# Patient Record
Sex: MALE | Race: BLACK OR AFRICAN AMERICAN | NOT HISPANIC OR LATINO
[De-identification: names, ages, dates, MRNs, and addresses within clinical notes are randomized per-mention and may not be internally consistent; named-entity substitution may affect disease eponyms.]

---

## 2017-01-18 ENCOUNTER — APPOINTMENT (OUTPATIENT)
Dept: FAMILY MEDICINE | Facility: CLINIC | Age: 72
End: 2017-01-18

## 2017-01-18 VITALS — WEIGHT: 170 LBS | BODY MASS INDEX: 25.18 KG/M2 | HEIGHT: 69 IN

## 2017-01-18 VITALS — DIASTOLIC BLOOD PRESSURE: 62 MMHG | SYSTOLIC BLOOD PRESSURE: 130 MMHG

## 2017-01-20 LAB
ALBUMIN SERPL ELPH-MCNC: 4.6 G/DL
ALP BLD-CCNC: 112 U/L
ALT SERPL-CCNC: 27 U/L
ANION GAP SERPL CALC-SCNC: 16 MMOL/L
AST SERPL-CCNC: 18 U/L
BASOPHILS # BLD AUTO: 0.03 K/UL
BASOPHILS NFR BLD AUTO: 0.3 %
BILIRUB SERPL-MCNC: 0.3 MG/DL
BUN SERPL-MCNC: 16 MG/DL
CALCIUM SERPL-MCNC: 10 MG/DL
CHLORIDE SERPL-SCNC: 101 MMOL/L
CHOLEST SERPL-MCNC: 158 MG/DL
CHOLEST/HDLC SERPL: 3.4 RATIO
CO2 SERPL-SCNC: 23 MMOL/L
CREAT SERPL-MCNC: 1.45 MG/DL
CREAT SPEC-SCNC: 52 MG/DL
EOSINOPHIL # BLD AUTO: 0.21 K/UL
EOSINOPHIL NFR BLD AUTO: 2.3 %
GLUCOSE SERPL-MCNC: 231 MG/DL
HBA1C MFR BLD HPLC: 12.7 %
HCT VFR BLD CALC: 44.1 %
HDLC SERPL-MCNC: 47 MG/DL
HGB BLD-MCNC: 13.7 G/DL
IMM GRANULOCYTES NFR BLD AUTO: 0.4 %
LDLC SERPL CALC-MCNC: 80 MG/DL
LYMPHOCYTES # BLD AUTO: 3.24 K/UL
LYMPHOCYTES NFR BLD AUTO: 35.7 %
MAN DIFF?: NORMAL
MCHC RBC-ENTMCNC: 27.2 PG
MCHC RBC-ENTMCNC: 31.1 GM/DL
MCV RBC AUTO: 87.5 FL
MICROALBUMIN 24H UR DL<=1MG/L-MCNC: 2.7 MG/DL
MICROALBUMIN/CREAT 24H UR-RTO: 52 UG/MG
MONOCYTES # BLD AUTO: 0.63 K/UL
MONOCYTES NFR BLD AUTO: 6.9 %
NEUTROPHILS # BLD AUTO: 4.93 K/UL
NEUTROPHILS NFR BLD AUTO: 54.4 %
PLATELET # BLD AUTO: 298 K/UL
POTASSIUM SERPL-SCNC: 4.5 MMOL/L
PROT SERPL-MCNC: 7.4 G/DL
RBC # BLD: 5.04 M/UL
RBC # FLD: 13.6 %
SODIUM SERPL-SCNC: 140 MMOL/L
TRIGL SERPL-MCNC: 156 MG/DL
WBC # FLD AUTO: 9.08 K/UL

## 2017-01-26 ENCOUNTER — APPOINTMENT (OUTPATIENT)
Dept: ENDOCRINOLOGY | Facility: CLINIC | Age: 72
End: 2017-01-26

## 2017-01-26 VITALS
SYSTOLIC BLOOD PRESSURE: 140 MMHG | HEART RATE: 80 BPM | WEIGHT: 176 LBS | BODY MASS INDEX: 26.07 KG/M2 | DIASTOLIC BLOOD PRESSURE: 70 MMHG | HEIGHT: 69 IN

## 2017-01-26 LAB — GLUCOSE BLDC GLUCOMTR-MCNC: 115

## 2017-01-26 RX ORDER — INSULIN GLARGINE 100 [IU]/ML
100 INJECTION, SOLUTION SUBCUTANEOUS DAILY
Refills: 0 | Status: DISCONTINUED | COMMUNITY
Start: 2017-01-18 | End: 2017-01-26

## 2017-01-31 ENCOUNTER — APPOINTMENT (OUTPATIENT)
Dept: ENDOCRINOLOGY | Facility: CLINIC | Age: 72
End: 2017-01-31

## 2017-01-31 VITALS
SYSTOLIC BLOOD PRESSURE: 130 MMHG | HEIGHT: 69 IN | BODY MASS INDEX: 25.92 KG/M2 | DIASTOLIC BLOOD PRESSURE: 70 MMHG | WEIGHT: 175 LBS | HEART RATE: 80 BPM

## 2017-02-01 LAB
C PEPTIDE SERPL-MCNC: 0.9 NG/ML
GLUCOSE SERPL-MCNC: 335 MG/DL
T3 SERPL-MCNC: 82 NG/DL
T4 FREE SERPL-MCNC: 1.2 NG/DL
T4 SERPL-MCNC: 4.1 UG/DL
THYROGLOB AB SERPL-ACNC: 34.5 IU/ML
THYROPEROXIDASE AB SERPL IA-ACNC: <10 IU/ML
TSH SERPL-ACNC: 4.21 UIU/ML

## 2017-02-02 ENCOUNTER — APPOINTMENT (OUTPATIENT)
Dept: ENDOCRINOLOGY | Facility: CLINIC | Age: 72
End: 2017-02-02

## 2017-02-02 RX ORDER — LANCETS 30 GAUGE
EACH MISCELLANEOUS
Qty: 1 | Refills: 5 | Status: ACTIVE | COMMUNITY
Start: 2017-02-02 | End: 1900-01-01

## 2017-02-02 RX ORDER — BLOOD SUGAR DIAGNOSTIC
STRIP MISCELLANEOUS 4 TIMES DAILY
Qty: 1 | Refills: 5 | Status: ACTIVE | COMMUNITY
Start: 2017-02-02 | End: 1900-01-01

## 2017-02-06 LAB — GAD65 AB SER-MCNC: 0 NMOL/L

## 2017-02-07 LAB — PANC ISLET CELL AB SER QL: <5 JDF UNITS

## 2017-03-02 ENCOUNTER — APPOINTMENT (OUTPATIENT)
Dept: ENDOCRINOLOGY | Facility: CLINIC | Age: 72
End: 2017-03-02

## 2017-03-02 DIAGNOSIS — R25.2 CRAMP AND SPASM: ICD-10-CM

## 2017-03-20 ENCOUNTER — APPOINTMENT (OUTPATIENT)
Dept: ENDOCRINOLOGY | Facility: CLINIC | Age: 72
End: 2017-03-20

## 2017-03-20 VITALS
BODY MASS INDEX: 25.92 KG/M2 | DIASTOLIC BLOOD PRESSURE: 62 MMHG | SYSTOLIC BLOOD PRESSURE: 148 MMHG | HEIGHT: 69 IN | WEIGHT: 175 LBS | HEART RATE: 78 BPM

## 2017-03-20 DIAGNOSIS — E78.2 MIXED HYPERLIPIDEMIA: ICD-10-CM

## 2017-03-20 LAB
GLUCOSE BLDC GLUCOMTR-MCNC: 374
HBA1C MFR BLD HPLC: 10.9

## 2017-03-29 ENCOUNTER — APPOINTMENT (OUTPATIENT)
Dept: FAMILY MEDICINE | Facility: CLINIC | Age: 72
End: 2017-03-29

## 2017-03-29 VITALS
WEIGHT: 175 LBS | DIASTOLIC BLOOD PRESSURE: 60 MMHG | HEIGHT: 69 IN | SYSTOLIC BLOOD PRESSURE: 110 MMHG | BODY MASS INDEX: 25.92 KG/M2

## 2017-03-29 DIAGNOSIS — Z09 ENCOUNTER FOR FOLLOW-UP EXAMINATION AFTER COMPLETED TREATMENT FOR CONDITIONS OTHER THAN MALIGNANT NEOPLASM: ICD-10-CM

## 2017-03-29 RX ORDER — FUROSEMIDE 40 MG/1
40 TABLET ORAL DAILY
Qty: 90 | Refills: 1 | Status: ACTIVE | COMMUNITY
Start: 2017-01-18

## 2017-03-29 RX ORDER — FLAXSEED OIL 1000 MG
1000 CAPSULE ORAL DAILY
Refills: 0 | Status: ACTIVE | COMMUNITY
Start: 2017-03-29

## 2017-04-03 ENCOUNTER — APPOINTMENT (OUTPATIENT)
Dept: ENDOCRINOLOGY | Facility: CLINIC | Age: 72
End: 2017-04-03

## 2017-04-20 ENCOUNTER — MEDICATION RENEWAL (OUTPATIENT)
Age: 72
End: 2017-04-20

## 2017-04-28 ENCOUNTER — APPOINTMENT (OUTPATIENT)
Dept: CHRONIC DISEASE MANAGEMENT | Facility: CLINIC | Age: 72
End: 2017-04-28

## 2017-05-25 ENCOUNTER — APPOINTMENT (OUTPATIENT)
Dept: ENDOCRINOLOGY | Facility: CLINIC | Age: 72
End: 2017-05-25

## 2017-05-25 VITALS
WEIGHT: 180 LBS | BODY MASS INDEX: 26.66 KG/M2 | SYSTOLIC BLOOD PRESSURE: 124 MMHG | HEIGHT: 69 IN | HEART RATE: 76 BPM | DIASTOLIC BLOOD PRESSURE: 62 MMHG

## 2017-05-25 LAB
GLUCOSE BLDC GLUCOMTR-MCNC: 196
HBA1C MFR BLD HPLC: 10.2

## 2017-06-27 ENCOUNTER — APPOINTMENT (OUTPATIENT)
Dept: FAMILY MEDICINE | Facility: CLINIC | Age: 72
End: 2017-06-27

## 2017-06-27 VITALS — SYSTOLIC BLOOD PRESSURE: 138 MMHG | WEIGHT: 176 LBS | BODY MASS INDEX: 25.99 KG/M2 | DIASTOLIC BLOOD PRESSURE: 68 MMHG

## 2017-06-27 DIAGNOSIS — Z86.79 PERSONAL HISTORY OF OTHER DISEASES OF THE CIRCULATORY SYSTEM: ICD-10-CM

## 2017-06-27 DIAGNOSIS — H26.9 UNSPECIFIED CATARACT: ICD-10-CM

## 2017-06-27 DIAGNOSIS — R60.0 LOCALIZED EDEMA: ICD-10-CM

## 2017-06-27 DIAGNOSIS — R35.0 FREQUENCY OF MICTURITION: ICD-10-CM

## 2017-06-28 LAB
ALBUMIN SERPL ELPH-MCNC: 3.8 G/DL
ALP BLD-CCNC: 95 U/L
ALT SERPL-CCNC: 25 U/L
ANION GAP SERPL CALC-SCNC: 17 MMOL/L
APPEARANCE: CLEAR
AST SERPL-CCNC: 22 U/L
BACTERIA: NEGATIVE
BASOPHILS # BLD AUTO: 0.02 K/UL
BASOPHILS NFR BLD AUTO: 0.2 %
BILIRUB SERPL-MCNC: 0.6 MG/DL
BILIRUBIN URINE: NEGATIVE
BLOOD URINE: ABNORMAL
BUN SERPL-MCNC: 21 MG/DL
CALCIUM SERPL-MCNC: 9.1 MG/DL
CHLORIDE SERPL-SCNC: 100 MMOL/L
CHOLEST SERPL-MCNC: 142 MG/DL
CHOLEST/HDLC SERPL: 3.4 RATIO
CO2 SERPL-SCNC: 21 MMOL/L
COLOR: YELLOW
CREAT SERPL-MCNC: 1.81 MG/DL
EOSINOPHIL # BLD AUTO: 0.15 K/UL
EOSINOPHIL NFR BLD AUTO: 1.6 %
GLUCOSE QUALITATIVE U: 1000 MG/DL
GLUCOSE SERPL-MCNC: 375 MG/DL
HBA1C MFR BLD HPLC: 10.4 %
HCT VFR BLD CALC: 38.7 %
HDLC SERPL-MCNC: 42 MG/DL
HGB BLD-MCNC: 12.4 G/DL
HYALINE CASTS: 3 /LPF
IMM GRANULOCYTES NFR BLD AUTO: 0.5 %
KETONES URINE: NEGATIVE
LDLC SERPL CALC-MCNC: 78 MG/DL
LEUKOCYTE ESTERASE URINE: NEGATIVE
LYMPHOCYTES # BLD AUTO: 1.74 K/UL
LYMPHOCYTES NFR BLD AUTO: 18.7 %
MAN DIFF?: NORMAL
MCHC RBC-ENTMCNC: 26.8 PG
MCHC RBC-ENTMCNC: 32 GM/DL
MCV RBC AUTO: 83.6 FL
MICROSCOPIC-UA: NORMAL
MONOCYTES # BLD AUTO: 0.96 K/UL
MONOCYTES NFR BLD AUTO: 10.3 %
NEUTROPHILS # BLD AUTO: 6.38 K/UL
NEUTROPHILS NFR BLD AUTO: 68.7 %
NITRITE URINE: NEGATIVE
NT-PROBNP SERPL-MCNC: 125 PG/ML
PH URINE: 5
PLATELET # BLD AUTO: 317 K/UL
POTASSIUM SERPL-SCNC: 4.7 MMOL/L
PROT SERPL-MCNC: 7.3 G/DL
PROTEIN URINE: 30 MG/DL
PSA SERPL-MCNC: 1.28 NG/ML
RBC # BLD: 4.63 M/UL
RBC # FLD: 14.1 %
RED BLOOD CELLS URINE: 2 /HPF
SODIUM SERPL-SCNC: 138 MMOL/L
SPECIFIC GRAVITY URINE: 1.02
SQUAMOUS EPITHELIAL CELLS: 1 /HPF
TRIGL SERPL-MCNC: 111 MG/DL
TSH SERPL-ACNC: 3.24 UIU/ML
UROBILINOGEN URINE: NORMAL MG/DL
WBC # FLD AUTO: 9.3 K/UL
WHITE BLOOD CELLS URINE: 0 /HPF

## 2017-07-06 ENCOUNTER — MEDICATION RENEWAL (OUTPATIENT)
Age: 72
End: 2017-07-06

## 2017-07-07 RX ORDER — INFUSION SET FOR INSULIN PUMP
INFUSION SETS-PARAPHERNALIA MISCELLANEOUS
Qty: 3 | Refills: 1 | Status: DISCONTINUED | OUTPATIENT
Start: 2017-07-06 | End: 2017-07-06

## 2017-07-07 RX ORDER — INSULIN PUMP SYRINGE, 3 ML
EACH MISCELLANEOUS
Qty: 3 | Refills: 1 | Status: DISCONTINUED | OUTPATIENT
Start: 2017-07-06 | End: 2017-07-06

## 2017-07-13 ENCOUNTER — APPOINTMENT (OUTPATIENT)
Dept: ENDOCRINOLOGY | Facility: CLINIC | Age: 72
End: 2017-07-13

## 2017-08-16 ENCOUNTER — APPOINTMENT (OUTPATIENT)
Dept: ENDOCRINOLOGY | Facility: CLINIC | Age: 72
End: 2017-08-16

## 2017-10-26 ENCOUNTER — APPOINTMENT (OUTPATIENT)
Dept: ENDOCRINOLOGY | Facility: CLINIC | Age: 72
End: 2017-10-26

## 2017-11-02 ENCOUNTER — APPOINTMENT (OUTPATIENT)
Dept: FAMILY MEDICINE | Facility: CLINIC | Age: 72
End: 2017-11-02
Payer: MEDICARE

## 2017-11-02 VITALS
DIASTOLIC BLOOD PRESSURE: 66 MMHG | HEIGHT: 69 IN | BODY MASS INDEX: 25.48 KG/M2 | WEIGHT: 172 LBS | SYSTOLIC BLOOD PRESSURE: 154 MMHG

## 2017-11-02 VITALS — DIASTOLIC BLOOD PRESSURE: 70 MMHG | SYSTOLIC BLOOD PRESSURE: 136 MMHG

## 2017-11-02 DIAGNOSIS — Z23 ENCOUNTER FOR IMMUNIZATION: ICD-10-CM

## 2017-11-02 DIAGNOSIS — E11.40 TYPE 2 DIABETES MELLITUS WITH DIABETIC NEUROPATHY, UNSPECIFIED: ICD-10-CM

## 2017-11-02 PROCEDURE — 99214 OFFICE O/P EST MOD 30 MIN: CPT | Mod: 25

## 2017-11-02 PROCEDURE — G0008: CPT

## 2017-11-02 PROCEDURE — 90688 IIV4 VACCINE SPLT 0.5 ML IM: CPT

## 2017-11-15 ENCOUNTER — APPOINTMENT (OUTPATIENT)
Dept: ENDOCRINOLOGY | Facility: CLINIC | Age: 72
End: 2017-11-15

## 2017-12-05 ENCOUNTER — APPOINTMENT (OUTPATIENT)
Dept: FAMILY MEDICINE | Facility: CLINIC | Age: 72
End: 2017-12-05
Payer: MEDICARE

## 2017-12-05 VITALS
WEIGHT: 172 LBS | DIASTOLIC BLOOD PRESSURE: 60 MMHG | BODY MASS INDEX: 25.48 KG/M2 | SYSTOLIC BLOOD PRESSURE: 140 MMHG | HEIGHT: 69 IN

## 2017-12-05 DIAGNOSIS — Z00.00 ENCOUNTER FOR GENERAL ADULT MEDICAL EXAMINATION W/OUT ABNORMAL FINDINGS: ICD-10-CM

## 2017-12-05 DIAGNOSIS — I50.30 UNSPECIFIED DIASTOLIC (CONGESTIVE) HEART FAILURE: ICD-10-CM

## 2017-12-05 PROCEDURE — 99214 OFFICE O/P EST MOD 30 MIN: CPT | Mod: 25

## 2017-12-05 PROCEDURE — 36415 COLL VENOUS BLD VENIPUNCTURE: CPT

## 2017-12-06 ENCOUNTER — APPOINTMENT (OUTPATIENT)
Dept: ENDOCRINOLOGY | Facility: CLINIC | Age: 72
End: 2017-12-06
Payer: MEDICARE

## 2017-12-06 VITALS
WEIGHT: 177 LBS | BODY MASS INDEX: 26.22 KG/M2 | SYSTOLIC BLOOD PRESSURE: 130 MMHG | HEIGHT: 69 IN | DIASTOLIC BLOOD PRESSURE: 60 MMHG | HEART RATE: 72 BPM

## 2017-12-06 LAB
25(OH)D3 SERPL-MCNC: 10.5 NG/ML
ALBUMIN SERPL ELPH-MCNC: 4 G/DL
ALP BLD-CCNC: 113 U/L
ALT SERPL-CCNC: 20 U/L
ANION GAP SERPL CALC-SCNC: 21 MMOL/L
APPEARANCE: CLEAR
AST SERPL-CCNC: 14 U/L
BACTERIA: NEGATIVE
BASOPHILS # BLD AUTO: 0.04 K/UL
BASOPHILS NFR BLD AUTO: 0.5 %
BILIRUB SERPL-MCNC: 0.2 MG/DL
BILIRUBIN URINE: NEGATIVE
BLOOD URINE: NEGATIVE
BUN SERPL-MCNC: 15 MG/DL
CALCIUM SERPL-MCNC: 9.3 MG/DL
CHLORIDE SERPL-SCNC: 95 MMOL/L
CHOLEST SERPL-MCNC: 182 MG/DL
CHOLEST/HDLC SERPL: 4.7 RATIO
CO2 SERPL-SCNC: 24 MMOL/L
COLOR: YELLOW
CREAT SERPL-MCNC: 1.27 MG/DL
EOSINOPHIL # BLD AUTO: 0.29 K/UL
EOSINOPHIL NFR BLD AUTO: 3.7
GLUCOSE BLDC GLUCOMTR-MCNC: 148
GLUCOSE QUALITATIVE U: 1000 MG/DL
GLUCOSE SERPL-MCNC: 280 MG/DL
HBA1C MFR BLD HPLC: 12.9 %
HCT VFR BLD CALC: 41.9 %
HDLC SERPL-MCNC: 39 MG/DL
HGB BLD-MCNC: 13.3 G/DL
IMM GRANULOCYTES NFR BLD AUTO: 0.6 %
KETONES URINE: NEGATIVE
LDLC SERPL CALC-MCNC: 88 MG/DL
LEUKOCYTE ESTERASE URINE: NEGATIVE
LYMPHOCYTES # BLD AUTO: 3.26 K/UL
LYMPHOCYTES NFR BLD AUTO: 42.1 %
MAN DIFF?: NORMAL
MCHC RBC-ENTMCNC: 28.1 PG
MCHC RBC-ENTMCNC: 31.7 GM/DL
MCV RBC AUTO: 88.4 FL
MICROSCOPIC-UA: NORMAL
MONOCYTES # BLD AUTO: 0.54 K/UL
MONOCYTES NFR BLD AUTO: 7 %
NEUTROPHILS # BLD AUTO: 3.57 K/UL
NEUTROPHILS NFR BLD AUTO: 46.1 %
NITRITE URINE: NEGATIVE
PH URINE: 5.5
PLATELET # BLD AUTO: 270 K/UL
POTASSIUM SERPL-SCNC: 4.4 MMOL/L
PROT SERPL-MCNC: 6.9 G/DL
PROTEIN URINE: NEGATIVE MG/DL
PSA SERPL-MCNC: 1.09 NG/ML
RBC # BLD: 4.74 M/UL
RBC # FLD: 14 %
RED BLOOD CELLS URINE: 1 /HPF
SODIUM SERPL-SCNC: 140 MMOL/L
SPECIFIC GRAVITY URINE: 1.02
SQUAMOUS EPITHELIAL CELLS: 0 /HPF
TRIGL SERPL-MCNC: 273 MG/DL
TSH SERPL-ACNC: 3.62 UIU/ML
UROBILINOGEN URINE: NEGATIVE MG/DL
WBC # FLD AUTO: 7.75 K/UL
WHITE BLOOD CELLS URINE: 0 /HPF

## 2017-12-06 PROCEDURE — 82962 GLUCOSE BLOOD TEST: CPT

## 2017-12-06 PROCEDURE — 99214 OFFICE O/P EST MOD 30 MIN: CPT | Mod: 25

## 2017-12-06 RX ORDER — GABAPENTIN 300 MG/1
300 CAPSULE ORAL
Refills: 0 | Status: ACTIVE | COMMUNITY

## 2017-12-06 RX ORDER — ISOSORBIDE MONONITRATE 30 MG/1
30 TABLET, EXTENDED RELEASE ORAL
Refills: 0 | Status: ACTIVE | COMMUNITY

## 2018-01-08 ENCOUNTER — APPOINTMENT (OUTPATIENT)
Dept: ENDOCRINOLOGY | Facility: CLINIC | Age: 73
End: 2018-01-08
Payer: COMMERCIAL

## 2018-01-08 PROCEDURE — G0108 DIAB MANAGE TRN  PER INDIV: CPT

## 2018-01-08 RX ORDER — INSULIN LISPRO 100 [IU]/ML
100 INJECTION, SOLUTION INTRAVENOUS; SUBCUTANEOUS
Qty: 5 | Refills: 3 | Status: ACTIVE | COMMUNITY
Start: 2018-01-08

## 2018-01-17 ENCOUNTER — APPOINTMENT (OUTPATIENT)
Dept: ENDOCRINOLOGY | Facility: CLINIC | Age: 73
End: 2018-01-17

## 2018-01-22 ENCOUNTER — APPOINTMENT (OUTPATIENT)
Dept: FAMILY MEDICINE | Facility: CLINIC | Age: 73
End: 2018-01-22
Payer: COMMERCIAL

## 2018-01-22 VITALS
SYSTOLIC BLOOD PRESSURE: 130 MMHG | HEIGHT: 69 IN | BODY MASS INDEX: 26.22 KG/M2 | TEMPERATURE: 99.8 F | DIASTOLIC BLOOD PRESSURE: 70 MMHG | WEIGHT: 177 LBS

## 2018-01-22 PROCEDURE — 99213 OFFICE O/P EST LOW 20 MIN: CPT

## 2018-06-06 ENCOUNTER — APPOINTMENT (OUTPATIENT)
Dept: FAMILY MEDICINE | Facility: CLINIC | Age: 73
End: 2018-06-06
Payer: COMMERCIAL

## 2018-06-06 VITALS
RESPIRATION RATE: 18 BRPM | HEART RATE: 73 BPM | DIASTOLIC BLOOD PRESSURE: 72 MMHG | SYSTOLIC BLOOD PRESSURE: 128 MMHG | HEIGHT: 71 IN | OXYGEN SATURATION: 97 %

## 2018-06-06 DIAGNOSIS — Z79.4 TYPE 2 DIABETES MELLITUS WITH HYPERGLYCEMIA: ICD-10-CM

## 2018-06-06 DIAGNOSIS — E78.5 HYPERLIPIDEMIA, UNSPECIFIED: ICD-10-CM

## 2018-06-06 DIAGNOSIS — J45.909 UNSPECIFIED ASTHMA, UNCOMPLICATED: ICD-10-CM

## 2018-06-06 DIAGNOSIS — I50.9 HEART FAILURE, UNSPECIFIED: ICD-10-CM

## 2018-06-06 DIAGNOSIS — I73.9 PERIPHERAL VASCULAR DISEASE, UNSPECIFIED: ICD-10-CM

## 2018-06-06 DIAGNOSIS — E11.65 TYPE 2 DIABETES MELLITUS WITH HYPERGLYCEMIA: ICD-10-CM

## 2018-06-06 DIAGNOSIS — I25.10 ATHEROSCLEROTIC HEART DISEASE OF NATIVE CORONARY ARTERY W/OUT ANGINA PECTORIS: ICD-10-CM

## 2018-06-06 DIAGNOSIS — I10 ESSENTIAL (PRIMARY) HYPERTENSION: ICD-10-CM

## 2018-06-06 PROCEDURE — 99215 OFFICE O/P EST HI 40 MIN: CPT | Mod: 25

## 2018-06-06 PROCEDURE — 36415 COLL VENOUS BLD VENIPUNCTURE: CPT

## 2018-06-06 RX ORDER — INSULIN LISPRO 100 [IU]/ML
100 INJECTION, SOLUTION INTRAVENOUS; SUBCUTANEOUS 3 TIMES DAILY
Qty: 2 | Refills: 0 | Status: ACTIVE | COMMUNITY
Start: 1900-01-01 | End: 1900-01-01

## 2018-06-06 RX ORDER — AZITHROMYCIN 250 MG/1
250 TABLET, FILM COATED ORAL
Qty: 1 | Refills: 0 | Status: DISCONTINUED | COMMUNITY
Start: 2018-01-22 | End: 2018-06-06

## 2018-06-06 RX ORDER — INSULIN ASPART 100 [IU]/ML
100 INJECTION, SOLUTION INTRAVENOUS; SUBCUTANEOUS 3 TIMES DAILY
Refills: 0 | Status: DISCONTINUED | COMMUNITY
Start: 2017-01-18 | End: 2018-06-06

## 2018-06-06 RX ORDER — INSULIN GLARGINE 300 U/ML
300 INJECTION, SOLUTION SUBCUTANEOUS
Qty: 2 | Refills: 5 | Status: ACTIVE | COMMUNITY
Start: 1900-01-01 | End: 1900-01-01

## 2018-06-06 RX ORDER — PREDNISONE 20 MG/1
20 TABLET ORAL
Qty: 12 | Refills: 0 | Status: DISCONTINUED | COMMUNITY
Start: 2018-01-22 | End: 2018-06-06

## 2018-06-06 NOTE — PHYSICAL EXAM
[No Acute Distress] : no acute distress [Well Nourished] : well nourished [Normal Sclera/Conjunctiva] : normal sclera/conjunctiva [EOMI] : extraocular movements intact [No Respiratory Distress] : no respiratory distress  [Clear to Auscultation] : lungs were clear to auscultation bilaterally [No Accessory Muscle Use] : no accessory muscle use [Normal Rate] : normal rate  [Regular Rhythm] : with a regular rhythm [Normal Gait] : normal gait [Normal Affect] : the affect was normal [Alert and Oriented x3] : oriented to person, place, and time [Normal Insight/Judgement] : insight and judgment were intact

## 2018-06-06 NOTE — HISTORY OF PRESENT ILLNESS
[FreeTextEntry1] : in the process of getting the insulin pump [de-identified] : here for bw and insulin refills\par admits eating 3 eggs daily, using evaporated milk, eating rice, sweets

## 2018-06-06 NOTE — COUNSELING
[Healthy eating counseling provided] : healthy eating [Low Fat Diet] : Low fat diet [Low Salt Diet] : Low salt diet [Patient Non-adherent to care plan] : Patient non-adherent to care plan [Good understanding] : Patient has a good understanding of lifestyle changes and the steps needed to achieve self management goals [de-identified] : Cont rx, chk bw, enforced low sugar/carb diet, exercise, close bs monitoring including post prandial\par

## 2018-06-13 DIAGNOSIS — E11.21 TYPE 2 DIABETES MELLITUS WITH DIABETIC NEPHROPATHY: ICD-10-CM

## 2018-08-03 ENCOUNTER — APPOINTMENT (OUTPATIENT)
Dept: ULTRASOUND IMAGING | Facility: CLINIC | Age: 73
End: 2018-08-03

## 2018-08-10 ENCOUNTER — FORM ENCOUNTER (OUTPATIENT)
Age: 73
End: 2018-08-10

## 2018-08-11 ENCOUNTER — APPOINTMENT (OUTPATIENT)
Dept: ULTRASOUND IMAGING | Facility: IMAGING CENTER | Age: 73
End: 2018-08-11
Payer: COMMERCIAL

## 2018-08-11 ENCOUNTER — OUTPATIENT (OUTPATIENT)
Dept: OUTPATIENT SERVICES | Facility: HOSPITAL | Age: 73
LOS: 1 days | End: 2018-08-11
Payer: COMMERCIAL

## 2018-08-11 DIAGNOSIS — Z00.8 ENCOUNTER FOR OTHER GENERAL EXAMINATION: ICD-10-CM

## 2018-08-11 PROCEDURE — 76857 US EXAM PELVIC LIMITED: CPT

## 2018-08-11 PROCEDURE — 93976 VASCULAR STUDY: CPT | Mod: 26,59

## 2018-08-11 PROCEDURE — 76857 US EXAM PELVIC LIMITED: CPT | Mod: 26

## 2018-08-11 PROCEDURE — 93975 VASCULAR STUDY: CPT

## 2018-09-03 PROBLEM — R60.0 BILATERAL EDEMA OF LOWER EXTREMITY: Status: RESOLVED | Noted: 2017-03-29 | Resolved: 2018-09-03

## 2018-12-10 ENCOUNTER — INPATIENT (INPATIENT)
Facility: HOSPITAL | Age: 73
LOS: 9 days | Discharge: NURSING, REHAB OR LONG-TERM CARE | DRG: 853 | End: 2018-12-20
Attending: INTERNAL MEDICINE | Admitting: STUDENT IN AN ORGANIZED HEALTH CARE EDUCATION/TRAINING PROGRAM
Payer: MEDICARE

## 2018-12-10 PROCEDURE — 99053 MED SERV 10PM-8AM 24 HR FAC: CPT

## 2018-12-10 PROCEDURE — 93010 ELECTROCARDIOGRAM REPORT: CPT | Mod: 59

## 2018-12-10 PROCEDURE — 36556 INSERT NON-TUNNEL CV CATH: CPT

## 2018-12-10 PROCEDURE — 99291 CRITICAL CARE FIRST HOUR: CPT | Mod: 25

## 2018-12-11 VITALS — HEART RATE: 106 BPM | OXYGEN SATURATION: 100 %

## 2018-12-11 DIAGNOSIS — K92.2 GASTROINTESTINAL HEMORRHAGE, UNSPECIFIED: ICD-10-CM

## 2018-12-11 LAB
ALBUMIN SERPL ELPH-MCNC: 1.8 G/DL — LOW (ref 3.3–5)
ALBUMIN SERPL ELPH-MCNC: 2 G/DL — LOW (ref 3.3–5)
ALBUMIN SERPL ELPH-MCNC: 2.2 G/DL — LOW (ref 3.3–5)
ALBUMIN SERPL ELPH-MCNC: 2.2 G/DL — LOW (ref 3.3–5)
ALBUMIN SERPL ELPH-MCNC: 2.7 G/DL — LOW (ref 3.3–5)
ALP SERPL-CCNC: 101 U/L — SIGNIFICANT CHANGE UP (ref 40–120)
ALP SERPL-CCNC: 102 U/L — SIGNIFICANT CHANGE UP (ref 40–120)
ALP SERPL-CCNC: 119 U/L — SIGNIFICANT CHANGE UP (ref 40–120)
ALP SERPL-CCNC: 137 U/L — HIGH (ref 40–120)
ALP SERPL-CCNC: 85 U/L — SIGNIFICANT CHANGE UP (ref 40–120)
ALT FLD-CCNC: 1442 U/L — HIGH (ref 10–45)
ALT FLD-CCNC: 1578 U/L — HIGH (ref 10–45)
ALT FLD-CCNC: 1729 U/L — HIGH (ref 10–45)
ALT FLD-CCNC: 25 U/L — SIGNIFICANT CHANGE UP (ref 10–45)
ALT FLD-CCNC: 624 U/L — HIGH (ref 10–45)
ANION GAP SERPL CALC-SCNC: 16 MMOL/L — SIGNIFICANT CHANGE UP (ref 5–17)
ANION GAP SERPL CALC-SCNC: 19 MMOL/L — HIGH (ref 5–17)
ANION GAP SERPL CALC-SCNC: 20 MMOL/L — HIGH (ref 5–17)
ANION GAP SERPL CALC-SCNC: 26 MMOL/L — HIGH (ref 5–17)
ANION GAP SERPL CALC-SCNC: 28 MMOL/L — HIGH (ref 5–17)
APPEARANCE UR: ABNORMAL
APPEARANCE UR: ABNORMAL
APTT BLD: 26.9 SEC — LOW (ref 27.5–36.3)
AST SERPL-CCNC: 2375 U/L — HIGH (ref 10–40)
AST SERPL-CCNC: 2632 U/L — HIGH (ref 10–40)
AST SERPL-CCNC: 2634 U/L — HIGH (ref 10–40)
AST SERPL-CCNC: 37 U/L — SIGNIFICANT CHANGE UP (ref 10–40)
AST SERPL-CCNC: 818 U/L — HIGH (ref 10–40)
BASOPHILS # BLD AUTO: 0.1 K/UL — SIGNIFICANT CHANGE UP (ref 0–0.2)
BASOPHILS # BLD AUTO: 0.1 K/UL — SIGNIFICANT CHANGE UP (ref 0–0.2)
BASOPHILS NFR BLD AUTO: 0.6 % — SIGNIFICANT CHANGE UP (ref 0–2)
BILIRUB SERPL-MCNC: 0.3 MG/DL — SIGNIFICANT CHANGE UP (ref 0.2–1.2)
BILIRUB SERPL-MCNC: 0.3 MG/DL — SIGNIFICANT CHANGE UP (ref 0.2–1.2)
BILIRUB SERPL-MCNC: 0.4 MG/DL — SIGNIFICANT CHANGE UP (ref 0.2–1.2)
BILIRUB UR-MCNC: NEGATIVE — SIGNIFICANT CHANGE UP
BILIRUB UR-MCNC: NEGATIVE — SIGNIFICANT CHANGE UP
BLD GP AB SCN SERPL QL: NEGATIVE — SIGNIFICANT CHANGE UP
BUN SERPL-MCNC: 76 MG/DL — HIGH (ref 7–23)
BUN SERPL-MCNC: 84 MG/DL — HIGH (ref 7–23)
BUN SERPL-MCNC: 88 MG/DL — HIGH (ref 7–23)
CALCIUM SERPL-MCNC: 10.3 MG/DL — SIGNIFICANT CHANGE UP (ref 8.4–10.5)
CALCIUM SERPL-MCNC: 8.4 MG/DL — SIGNIFICANT CHANGE UP (ref 8.4–10.5)
CALCIUM SERPL-MCNC: 8.9 MG/DL — SIGNIFICANT CHANGE UP (ref 8.4–10.5)
CALCIUM SERPL-MCNC: 9.1 MG/DL — SIGNIFICANT CHANGE UP (ref 8.4–10.5)
CALCIUM SERPL-MCNC: 9.4 MG/DL — SIGNIFICANT CHANGE UP (ref 8.4–10.5)
CHLORIDE SERPL-SCNC: 104 MMOL/L — SIGNIFICANT CHANGE UP (ref 96–108)
CHLORIDE SERPL-SCNC: 107 MMOL/L — SIGNIFICANT CHANGE UP (ref 96–108)
CHLORIDE SERPL-SCNC: 107 MMOL/L — SIGNIFICANT CHANGE UP (ref 96–108)
CHLORIDE SERPL-SCNC: 108 MMOL/L — SIGNIFICANT CHANGE UP (ref 96–108)
CHLORIDE SERPL-SCNC: 110 MMOL/L — HIGH (ref 96–108)
CO2 SERPL-SCNC: 17 MMOL/L — LOW (ref 22–31)
CO2 SERPL-SCNC: 18 MMOL/L — LOW (ref 22–31)
CO2 SERPL-SCNC: 20 MMOL/L — LOW (ref 22–31)
CO2 SERPL-SCNC: 20 MMOL/L — LOW (ref 22–31)
CO2 SERPL-SCNC: 21 MMOL/L — LOW (ref 22–31)
COLOR SPEC: ABNORMAL
COLOR SPEC: YELLOW — SIGNIFICANT CHANGE UP
CREAT SERPL-MCNC: 1.42 MG/DL — HIGH (ref 0.5–1.3)
CREAT SERPL-MCNC: 1.57 MG/DL — HIGH (ref 0.5–1.3)
CREAT SERPL-MCNC: 1.58 MG/DL — HIGH (ref 0.5–1.3)
CREAT SERPL-MCNC: 1.59 MG/DL — HIGH (ref 0.5–1.3)
CREAT SERPL-MCNC: 1.73 MG/DL — HIGH (ref 0.5–1.3)
D DIMER BLD IA.RAPID-MCNC: 3673 NG/ML DDU — HIGH
DIFF PNL FLD: ABNORMAL
DIFF PNL FLD: ABNORMAL
EOSINOPHIL # BLD AUTO: 0.3 K/UL — SIGNIFICANT CHANGE UP (ref 0–0.5)
EOSINOPHIL # BLD AUTO: 0.4 K/UL — SIGNIFICANT CHANGE UP (ref 0–0.5)
EOSINOPHIL NFR BLD AUTO: 1.8 % — SIGNIFICANT CHANGE UP (ref 0–6)
EOSINOPHIL NFR BLD AUTO: 3 % — SIGNIFICANT CHANGE UP (ref 0–6)
FIBRINOGEN PPP-MCNC: 620 MG/DL — HIGH (ref 350–510)
GAS PNL BLDA: SIGNIFICANT CHANGE UP
GAS PNL BLDV: SIGNIFICANT CHANGE UP
GLUCOSE BLDC GLUCOMTR-MCNC: 186 MG/DL — HIGH (ref 70–99)
GLUCOSE BLDC GLUCOMTR-MCNC: 329 MG/DL — HIGH (ref 70–99)
GLUCOSE SERPL-MCNC: 175 MG/DL — HIGH (ref 70–99)
GLUCOSE SERPL-MCNC: 232 MG/DL — HIGH (ref 70–99)
GLUCOSE SERPL-MCNC: 321 MG/DL — HIGH (ref 70–99)
GLUCOSE SERPL-MCNC: 348 MG/DL — HIGH (ref 70–99)
GLUCOSE SERPL-MCNC: 356 MG/DL — HIGH (ref 70–99)
GLUCOSE UR QL: NEGATIVE — SIGNIFICANT CHANGE UP
GLUCOSE UR QL: NEGATIVE — SIGNIFICANT CHANGE UP
GRAM STN FLD: SIGNIFICANT CHANGE UP
HAPTOGLOB SERPL-MCNC: 192 MG/DL — SIGNIFICANT CHANGE UP (ref 34–200)
HCT VFR BLD CALC: 17.9 % — CRITICAL LOW (ref 39–50)
HCT VFR BLD CALC: 22.2 % — LOW (ref 39–50)
HCT VFR BLD CALC: 23.5 % — LOW (ref 39–50)
HCT VFR BLD CALC: 25.3 % — LOW (ref 39–50)
HGB BLD-MCNC: 5.7 G/DL — CRITICAL LOW (ref 13–17)
HGB BLD-MCNC: 7.5 G/DL — LOW (ref 13–17)
HGB BLD-MCNC: 7.8 G/DL — LOW (ref 13–17)
HGB BLD-MCNC: 8.4 G/DL — LOW (ref 13–17)
INR BLD: 1.45 RATIO — HIGH (ref 0.88–1.16)
KETONES UR-MCNC: NEGATIVE — SIGNIFICANT CHANGE UP
KETONES UR-MCNC: NEGATIVE — SIGNIFICANT CHANGE UP
LACTATE BLDV-MCNC: 13.5 MMOL/L — CRITICAL HIGH (ref 0.7–2)
LDH SERPL L TO P-CCNC: >2250 U/L — HIGH (ref 50–242)
LEUKOCYTE ESTERASE UR-ACNC: ABNORMAL
LEUKOCYTE ESTERASE UR-ACNC: ABNORMAL
LYMPHOCYTES # BLD AUTO: 24 % — SIGNIFICANT CHANGE UP (ref 13–44)
LYMPHOCYTES # BLD AUTO: 24.5 % — SIGNIFICANT CHANGE UP (ref 13–44)
LYMPHOCYTES # BLD AUTO: 5.1 K/UL — HIGH (ref 1–3.3)
LYMPHOCYTES # BLD AUTO: 6 K/UL — HIGH (ref 1–3.3)
MAGNESIUM SERPL-MCNC: 1.8 MG/DL — SIGNIFICANT CHANGE UP (ref 1.6–2.6)
MAGNESIUM SERPL-MCNC: 1.8 MG/DL — SIGNIFICANT CHANGE UP (ref 1.6–2.6)
MAGNESIUM SERPL-MCNC: 2 MG/DL — SIGNIFICANT CHANGE UP (ref 1.6–2.6)
MCHC RBC-ENTMCNC: 27.9 PG — SIGNIFICANT CHANGE UP (ref 27–34)
MCHC RBC-ENTMCNC: 29.5 PG — SIGNIFICANT CHANGE UP (ref 27–34)
MCHC RBC-ENTMCNC: 30.1 PG — SIGNIFICANT CHANGE UP (ref 27–34)
MCHC RBC-ENTMCNC: 30.3 PG — SIGNIFICANT CHANGE UP (ref 27–34)
MCHC RBC-ENTMCNC: 31.8 GM/DL — LOW (ref 32–36)
MCHC RBC-ENTMCNC: 33.2 GM/DL — SIGNIFICANT CHANGE UP (ref 32–36)
MCHC RBC-ENTMCNC: 33.4 GM/DL — SIGNIFICANT CHANGE UP (ref 32–36)
MCHC RBC-ENTMCNC: 33.7 GM/DL — SIGNIFICANT CHANGE UP (ref 32–36)
MCV RBC AUTO: 87.6 FL — SIGNIFICANT CHANGE UP (ref 80–100)
MCV RBC AUTO: 89 FL — SIGNIFICANT CHANGE UP (ref 80–100)
MCV RBC AUTO: 89.1 FL — SIGNIFICANT CHANGE UP (ref 80–100)
MCV RBC AUTO: 90.6 FL — SIGNIFICANT CHANGE UP (ref 80–100)
MONOCYTES # BLD AUTO: 1.8 K/UL — HIGH (ref 0–0.9)
MONOCYTES # BLD AUTO: 3.5 K/UL — HIGH (ref 0–0.9)
MONOCYTES NFR BLD AUTO: 11 % — SIGNIFICANT CHANGE UP (ref 2–14)
MONOCYTES NFR BLD AUTO: 8.6 % — SIGNIFICANT CHANGE UP (ref 2–14)
NEUTROPHILS # BLD AUTO: 13.4 K/UL — HIGH (ref 1.8–7.4)
NEUTROPHILS # BLD AUTO: 24.9 K/UL — HIGH (ref 1.8–7.4)
NEUTROPHILS NFR BLD AUTO: 53 % — SIGNIFICANT CHANGE UP (ref 43–77)
NEUTROPHILS NFR BLD AUTO: 64.6 % — SIGNIFICANT CHANGE UP (ref 43–77)
NITRITE UR-MCNC: NEGATIVE — SIGNIFICANT CHANGE UP
NITRITE UR-MCNC: NEGATIVE — SIGNIFICANT CHANGE UP
OB PNL STL: POSITIVE
PH UR: 5.5 — SIGNIFICANT CHANGE UP (ref 5–8)
PH UR: 6 — SIGNIFICANT CHANGE UP (ref 5–8)
PHOSPHATE SERPL-MCNC: 4.8 MG/DL — HIGH (ref 2.5–4.5)
PHOSPHATE SERPL-MCNC: 4.9 MG/DL — HIGH (ref 2.5–4.5)
PHOSPHATE SERPL-MCNC: 5.7 MG/DL — HIGH (ref 2.5–4.5)
PLATELET # BLD AUTO: 292 K/UL — SIGNIFICANT CHANGE UP (ref 150–400)
PLATELET # BLD AUTO: 301 K/UL — SIGNIFICANT CHANGE UP (ref 150–400)
PLATELET # BLD AUTO: 305 K/UL — SIGNIFICANT CHANGE UP (ref 150–400)
PLATELET # BLD AUTO: 450 K/UL — HIGH (ref 150–400)
POTASSIUM SERPL-MCNC: 4.4 MMOL/L — SIGNIFICANT CHANGE UP (ref 3.5–5.3)
POTASSIUM SERPL-MCNC: 4.9 MMOL/L — SIGNIFICANT CHANGE UP (ref 3.5–5.3)
POTASSIUM SERPL-MCNC: 5.5 MMOL/L — HIGH (ref 3.5–5.3)
POTASSIUM SERPL-MCNC: 5.5 MMOL/L — HIGH (ref 3.5–5.3)
POTASSIUM SERPL-MCNC: 6 MMOL/L — HIGH (ref 3.5–5.3)
POTASSIUM SERPL-SCNC: 4.4 MMOL/L — SIGNIFICANT CHANGE UP (ref 3.5–5.3)
POTASSIUM SERPL-SCNC: 4.9 MMOL/L — SIGNIFICANT CHANGE UP (ref 3.5–5.3)
POTASSIUM SERPL-SCNC: 5.5 MMOL/L — HIGH (ref 3.5–5.3)
POTASSIUM SERPL-SCNC: 5.5 MMOL/L — HIGH (ref 3.5–5.3)
POTASSIUM SERPL-SCNC: 6 MMOL/L — HIGH (ref 3.5–5.3)
PROT SERPL-MCNC: 5 G/DL — LOW (ref 6–8.3)
PROT SERPL-MCNC: 5.7 G/DL — LOW (ref 6–8.3)
PROT SERPL-MCNC: 5.8 G/DL — LOW (ref 6–8.3)
PROT SERPL-MCNC: 5.9 G/DL — LOW (ref 6–8.3)
PROT SERPL-MCNC: 7.1 G/DL — SIGNIFICANT CHANGE UP (ref 6–8.3)
PROT UR-MCNC: ABNORMAL
PROT UR-MCNC: ABNORMAL
PROTHROM AB SERPL-ACNC: 16.9 SEC — HIGH (ref 10–12.9)
RAPID RVP RESULT: SIGNIFICANT CHANGE UP
RBC # BLD: 2.05 M/UL — LOW (ref 4.2–5.8)
RBC # BLD: 2.49 M/UL — LOW (ref 4.2–5.8)
RBC # BLD: 2.64 M/UL — LOW (ref 4.2–5.8)
RBC # BLD: 2.79 M/UL — LOW (ref 4.2–5.8)
RBC # FLD: 14.8 % — HIGH (ref 10.3–14.5)
RBC # FLD: 15.3 % — HIGH (ref 10.3–14.5)
RBC # FLD: 15.7 % — HIGH (ref 10.3–14.5)
RBC # FLD: 15.7 % — HIGH (ref 10.3–14.5)
RH IG SCN BLD-IMP: POSITIVE — SIGNIFICANT CHANGE UP
RH IG SCN BLD-IMP: POSITIVE — SIGNIFICANT CHANGE UP
SODIUM SERPL-SCNC: 146 MMOL/L — HIGH (ref 135–145)
SODIUM SERPL-SCNC: 147 MMOL/L — HIGH (ref 135–145)
SODIUM SERPL-SCNC: 147 MMOL/L — HIGH (ref 135–145)
SODIUM SERPL-SCNC: 150 MMOL/L — HIGH (ref 135–145)
SODIUM SERPL-SCNC: 151 MMOL/L — HIGH (ref 135–145)
SP GR SPEC: 1.02 — SIGNIFICANT CHANGE UP (ref 1.01–1.02)
SP GR SPEC: 1.02 — SIGNIFICANT CHANGE UP (ref 1.01–1.02)
SPECIMEN SOURCE: SIGNIFICANT CHANGE UP
URATE SERPL-MCNC: 11.7 MG/DL — HIGH (ref 3.4–8.8)
UROBILINOGEN FLD QL: NEGATIVE — SIGNIFICANT CHANGE UP
UROBILINOGEN FLD QL: NEGATIVE — SIGNIFICANT CHANGE UP
WBC # BLD: 20.8 K/UL — HIGH (ref 3.8–10.5)
WBC # BLD: 22.2 K/UL — HIGH (ref 3.8–10.5)
WBC # BLD: 32.1 K/UL — HIGH (ref 3.8–10.5)
WBC # BLD: 34.7 K/UL — HIGH (ref 3.8–10.5)
WBC # FLD AUTO: 20.8 K/UL — HIGH (ref 3.8–10.5)
WBC # FLD AUTO: 22.2 K/UL — HIGH (ref 3.8–10.5)
WBC # FLD AUTO: 32.1 K/UL — HIGH (ref 3.8–10.5)
WBC # FLD AUTO: 34.7 K/UL — HIGH (ref 3.8–10.5)

## 2018-12-11 PROCEDURE — 99291 CRITICAL CARE FIRST HOUR: CPT

## 2018-12-11 PROCEDURE — 99222 1ST HOSP IP/OBS MODERATE 55: CPT | Mod: GC

## 2018-12-11 PROCEDURE — 93970 EXTREMITY STUDY: CPT | Mod: 26

## 2018-12-11 PROCEDURE — 71045 X-RAY EXAM CHEST 1 VIEW: CPT | Mod: 26

## 2018-12-11 PROCEDURE — 93971 EXTREMITY STUDY: CPT | Mod: 26,GC,59

## 2018-12-11 PROCEDURE — 93308 TTE F-UP OR LMTD: CPT | Mod: 26,GC,59

## 2018-12-11 PROCEDURE — 76705 ECHO EXAM OF ABDOMEN: CPT | Mod: 26,GC,59

## 2018-12-11 PROCEDURE — 93010 ELECTROCARDIOGRAM REPORT: CPT

## 2018-12-11 PROCEDURE — 95819 EEG AWAKE AND ASLEEP: CPT | Mod: 26

## 2018-12-11 PROCEDURE — 76604 US EXAM CHEST: CPT | Mod: 26,GC,59

## 2018-12-11 RX ORDER — DEXTROSE 50 % IN WATER 50 %
12.5 SYRINGE (ML) INTRAVENOUS ONCE
Qty: 0 | Refills: 0 | Status: DISCONTINUED | OUTPATIENT
Start: 2018-12-11 | End: 2018-12-20

## 2018-12-11 RX ORDER — INSULIN LISPRO 100/ML
VIAL (ML) SUBCUTANEOUS EVERY 6 HOURS
Qty: 0 | Refills: 0 | Status: DISCONTINUED | OUTPATIENT
Start: 2018-12-11 | End: 2018-12-12

## 2018-12-11 RX ORDER — SODIUM CHLORIDE 9 MG/ML
1000 INJECTION, SOLUTION INTRAVENOUS
Qty: 0 | Refills: 0 | Status: DISCONTINUED | OUTPATIENT
Start: 2018-12-11 | End: 2018-12-20

## 2018-12-11 RX ORDER — MEROPENEM 1 G/30ML
1000 INJECTION INTRAVENOUS EVERY 8 HOURS
Qty: 0 | Refills: 0 | Status: DISCONTINUED | OUTPATIENT
Start: 2018-12-11 | End: 2018-12-15

## 2018-12-11 RX ORDER — INSULIN LISPRO 100/ML
VIAL (ML) SUBCUTANEOUS
Qty: 0 | Refills: 0 | Status: DISCONTINUED | OUTPATIENT
Start: 2018-12-11 | End: 2018-12-11

## 2018-12-11 RX ORDER — FLUCONAZOLE 150 MG/1
800 TABLET ORAL ONCE
Qty: 0 | Refills: 0 | Status: COMPLETED | OUTPATIENT
Start: 2018-12-11 | End: 2018-12-11

## 2018-12-11 RX ORDER — MEROPENEM 1 G/30ML
1000 INJECTION INTRAVENOUS ONCE
Qty: 0 | Refills: 0 | Status: COMPLETED | OUTPATIENT
Start: 2018-12-11 | End: 2018-12-11

## 2018-12-11 RX ORDER — DEXTROSE 50 % IN WATER 50 %
50 SYRINGE (ML) INTRAVENOUS ONCE
Qty: 0 | Refills: 0 | Status: COMPLETED | OUTPATIENT
Start: 2018-12-11 | End: 2018-12-11

## 2018-12-11 RX ORDER — NOREPINEPHRINE BITARTRATE/D5W 8 MG/250ML
0.1 PLASTIC BAG, INJECTION (ML) INTRAVENOUS
Qty: 8 | Refills: 0 | Status: DISCONTINUED | OUTPATIENT
Start: 2018-12-11 | End: 2018-12-12

## 2018-12-11 RX ORDER — DEXTROSE 50 % IN WATER 50 %
25 SYRINGE (ML) INTRAVENOUS ONCE
Qty: 0 | Refills: 0 | Status: DISCONTINUED | OUTPATIENT
Start: 2018-12-11 | End: 2018-12-20

## 2018-12-11 RX ORDER — INSULIN LISPRO 100/ML
VIAL (ML) SUBCUTANEOUS EVERY 6 HOURS
Qty: 0 | Refills: 0 | Status: DISCONTINUED | OUTPATIENT
Start: 2018-12-11 | End: 2018-12-11

## 2018-12-11 RX ORDER — CALCIUM GLUCONATE 100 MG/ML
2 VIAL (ML) INTRAVENOUS ONCE
Qty: 0 | Refills: 0 | Status: COMPLETED | OUTPATIENT
Start: 2018-12-11 | End: 2018-12-11

## 2018-12-11 RX ORDER — SODIUM CHLORIDE 9 MG/ML
1000 INJECTION, SOLUTION INTRAVENOUS ONCE
Qty: 0 | Refills: 0 | Status: COMPLETED | OUTPATIENT
Start: 2018-12-11 | End: 2018-12-11

## 2018-12-11 RX ORDER — SODIUM CHLORIDE 9 MG/ML
1000 INJECTION INTRAMUSCULAR; INTRAVENOUS; SUBCUTANEOUS ONCE
Qty: 0 | Refills: 0 | Status: DISCONTINUED | OUTPATIENT
Start: 2018-12-11 | End: 2018-12-11

## 2018-12-11 RX ORDER — MEROPENEM 1 G/30ML
INJECTION INTRAVENOUS
Qty: 0 | Refills: 0 | Status: DISCONTINUED | OUTPATIENT
Start: 2018-12-11 | End: 2018-12-15

## 2018-12-11 RX ORDER — VANCOMYCIN HCL 1 G
1000 VIAL (EA) INTRAVENOUS EVERY 24 HOURS
Qty: 0 | Refills: 0 | Status: DISCONTINUED | OUTPATIENT
Start: 2018-12-11 | End: 2018-12-12

## 2018-12-11 RX ORDER — CEFEPIME 1 G/1
1000 INJECTION, POWDER, FOR SOLUTION INTRAMUSCULAR; INTRAVENOUS ONCE
Qty: 0 | Refills: 0 | Status: COMPLETED | OUTPATIENT
Start: 2018-12-11 | End: 2018-12-11

## 2018-12-11 RX ORDER — FLUCONAZOLE 150 MG/1
200 TABLET ORAL EVERY 24 HOURS
Qty: 0 | Refills: 0 | Status: DISCONTINUED | OUTPATIENT
Start: 2018-12-11 | End: 2018-12-16

## 2018-12-11 RX ORDER — GLUCAGON INJECTION, SOLUTION 0.5 MG/.1ML
1 INJECTION, SOLUTION SUBCUTANEOUS ONCE
Qty: 0 | Refills: 0 | Status: DISCONTINUED | OUTPATIENT
Start: 2018-12-11 | End: 2018-12-20

## 2018-12-11 RX ORDER — FUROSEMIDE 40 MG
20 TABLET ORAL ONCE
Qty: 0 | Refills: 0 | Status: COMPLETED | OUTPATIENT
Start: 2018-12-11 | End: 2018-12-11

## 2018-12-11 RX ORDER — ACETAMINOPHEN 500 MG
650 TABLET ORAL EVERY 6 HOURS
Qty: 0 | Refills: 0 | Status: DISCONTINUED | OUTPATIENT
Start: 2018-12-11 | End: 2018-12-20

## 2018-12-11 RX ORDER — INSULIN HUMAN 100 [IU]/ML
5 INJECTION, SOLUTION SUBCUTANEOUS ONCE
Qty: 0 | Refills: 0 | Status: COMPLETED | OUTPATIENT
Start: 2018-12-11 | End: 2018-12-11

## 2018-12-11 RX ORDER — VANCOMYCIN HCL 1 G
1000 VIAL (EA) INTRAVENOUS ONCE
Qty: 0 | Refills: 0 | Status: COMPLETED | OUTPATIENT
Start: 2018-12-11 | End: 2018-12-11

## 2018-12-11 RX ORDER — SODIUM CHLORIDE 9 MG/ML
1000 INJECTION, SOLUTION INTRAVENOUS
Qty: 0 | Refills: 0 | Status: DISCONTINUED | OUTPATIENT
Start: 2018-12-11 | End: 2018-12-12

## 2018-12-11 RX ORDER — SODIUM CHLORIDE 9 MG/ML
2200 INJECTION, SOLUTION INTRAVENOUS ONCE
Qty: 0 | Refills: 0 | Status: COMPLETED | OUTPATIENT
Start: 2018-12-11 | End: 2018-12-11

## 2018-12-11 RX ORDER — CALCIUM GLUCONATE 100 MG/ML
2 VIAL (ML) INTRAVENOUS ONCE
Qty: 0 | Refills: 0 | Status: DISCONTINUED | OUTPATIENT
Start: 2018-12-11 | End: 2018-12-11

## 2018-12-11 RX ORDER — INSULIN HUMAN 100 [IU]/ML
5 INJECTION, SOLUTION SUBCUTANEOUS ONCE
Qty: 0 | Refills: 0 | Status: DISCONTINUED | OUTPATIENT
Start: 2018-12-11 | End: 2018-12-11

## 2018-12-11 RX ORDER — PANTOPRAZOLE SODIUM 20 MG/1
40 TABLET, DELAYED RELEASE ORAL EVERY 12 HOURS
Qty: 0 | Refills: 0 | Status: DISCONTINUED | OUTPATIENT
Start: 2018-12-11 | End: 2018-12-11

## 2018-12-11 RX ORDER — PANTOPRAZOLE SODIUM 20 MG/1
8 TABLET, DELAYED RELEASE ORAL
Qty: 80 | Refills: 0 | Status: DISCONTINUED | OUTPATIENT
Start: 2018-12-11 | End: 2018-12-12

## 2018-12-11 RX ORDER — DEXTROSE 50 % IN WATER 50 %
15 SYRINGE (ML) INTRAVENOUS ONCE
Qty: 0 | Refills: 0 | Status: DISCONTINUED | OUTPATIENT
Start: 2018-12-11 | End: 2018-12-20

## 2018-12-11 RX ADMIN — FLUCONAZOLE 100 MILLIGRAM(S): 150 TABLET ORAL at 10:46

## 2018-12-11 RX ADMIN — Medication 1: at 05:36

## 2018-12-11 RX ADMIN — SODIUM CHLORIDE 500 MILLILITER(S): 9 INJECTION, SOLUTION INTRAVENOUS at 12:57

## 2018-12-11 RX ADMIN — Medication 13.12 MICROGRAM(S)/KG/MIN: at 03:40

## 2018-12-11 RX ADMIN — Medication 2: at 11:57

## 2018-12-11 RX ADMIN — Medication 250 MILLIGRAM(S): at 03:03

## 2018-12-11 RX ADMIN — Medication 50 MILLILITER(S): at 12:57

## 2018-12-11 RX ADMIN — SODIUM CHLORIDE 2000 MILLILITER(S): 9 INJECTION, SOLUTION INTRAVENOUS at 05:54

## 2018-12-11 RX ADMIN — Medication 4: at 18:29

## 2018-12-11 RX ADMIN — Medication 8: at 21:09

## 2018-12-11 RX ADMIN — SODIUM CHLORIDE 1000 MILLILITER(S): 9 INJECTION, SOLUTION INTRAVENOUS at 18:30

## 2018-12-11 RX ADMIN — PANTOPRAZOLE SODIUM 10 MG/HR: 20 TABLET, DELAYED RELEASE ORAL at 14:40

## 2018-12-11 RX ADMIN — MEROPENEM 100 MILLIGRAM(S): 1 INJECTION INTRAVENOUS at 13:02

## 2018-12-11 RX ADMIN — CEFEPIME 100 MILLIGRAM(S): 1 INJECTION, POWDER, FOR SOLUTION INTRAMUSCULAR; INTRAVENOUS at 00:35

## 2018-12-11 RX ADMIN — SODIUM CHLORIDE 2200 MILLILITER(S): 9 INJECTION, SOLUTION INTRAVENOUS at 01:30

## 2018-12-11 RX ADMIN — Medication 200 GRAM(S): at 14:40

## 2018-12-11 RX ADMIN — MEROPENEM 100 MILLIGRAM(S): 1 INJECTION INTRAVENOUS at 05:17

## 2018-12-11 RX ADMIN — INSULIN HUMAN 5 UNIT(S): 100 INJECTION, SOLUTION SUBCUTANEOUS at 12:56

## 2018-12-11 RX ADMIN — SODIUM CHLORIDE 75 MILLILITER(S): 9 INJECTION, SOLUTION INTRAVENOUS at 08:58

## 2018-12-11 RX ADMIN — MEROPENEM 100 MILLIGRAM(S): 1 INJECTION INTRAVENOUS at 21:06

## 2018-12-11 RX ADMIN — Medication 20 MILLIGRAM(S): at 13:02

## 2018-12-11 NOTE — ED ADULT NURSE NOTE - OBJECTIVE STATEMENT
Pt presents to ED via EMS as resident of assisted living for projectile vomiting and AMS/ Pt unresponsive, nonresponsive to pain, pupils fixed at 3mm, nonverbal does not respond to commands. Pt had coffee ground emesis at facility, Pt breathing with retractions, lung sounds with rhonci, pt has tracheostomy in place. Pt has PEG tube in place, cole in place on arrival draining cloudy urine. Pt with generalized +2 edema to upper and lower extremities. Pt has large unstageable pressure ulcer to the sacrum, dark stool . Pt hypotensive on arrival, started on dopamine drip by EMS, pt is febrile rectally.

## 2018-12-11 NOTE — CONSULT NOTE ADULT - ATTENDING COMMENTS
Anemia, coffee ground emesis reported  No evidence of active bleeding  Medically unstable at this time  Consider EGD when patient's clinical status has improved  Continue PPI  Follow up clinically

## 2018-12-11 NOTE — ED PROVIDER NOTE - ATTENDING CONTRIBUTION TO CARE
Patient with fever, emesis and hypotension in route status post emesis with ems. Patient is non-verbal at baseline, has a tracheostomy and peg tube, + recent  and MRSA recently hospitalized at [facility] for sepsis and on antibiotics for 2 weeks prior to discharge. Patient on 10mg of dopamine via ems.  Patient with dark emesis with ems  GEN: ill appearing, eyes open, pupils 3mm non-reactive  HEENT: NCAT, pulled secretions, MMM, tracheostomy tube in place, Trachea midline, normal conjunctiva, perrl  CHEST/LUNGS: vented, Non-tachypneic, CTAB, bilateral breath sounds  CARDIAC: Non-tachycardic, decreased perfusion  ABDOMEN: Soft, ND, No rebound/guarding, no pain elicited  MSK: + edema to all extremities, no gross deformity of extremities  SKIN: No rashes, no petechiae, no vesicles  NEURO: pupils unreactive, patient not moving spontaneously, intact gag reflex, not moving secondary to pain  PSYCH: without capacity  concern for gi bleed with positive guaiac and hypotension with dark emesis  will get iv, cbc, cmp, iv fluids, Will obtain IV x 2, cbc, cmp, +/-ce, VBG with lactate at time 0, fluid bolus @ 30cc/kg initiated upon departure from the room on initial assessment within 30 minutes of arrival, urine analysis and blood cultures x 2 are obtained prior to antibiotics =>prior to administration of antibiotics goal within 3 hours of arrival, and will repeat lactate with VBG if the original is elevated and do so within 6 hours of initial lactate.   will transfuse one unit packed red blood cells, + ulcer to sacrum 8cm unstageable   will consult micu place central line and switch to levophed and admit

## 2018-12-11 NOTE — H&P ADULT - NSHPSOCIALHISTORY_GEN_ALL_CORE
Lives in [facility]   Patient unable to give history secondary to medical condition

## 2018-12-11 NOTE — ED PROVIDER NOTE - CRITICAL CARE PROVIDED
documentation/consult w/ pt's family directly relating to pts condition/consultation with other physicians/conducted a detailed discussion of DNR status/additional history taking/direct patient care (not related to procedure)/interpretation of diagnostic studies

## 2018-12-11 NOTE — ED PROVIDER NOTE - PHYSICAL EXAMINATION
Gen: unresponsive, trach in place, dark vomitus dried around mouth  Head: NCAT  HEENT: pupils equal, round, sluggish to react, oral mucosa dry with dark vomitus dried around  mouth.   Lung: Coarse/rhonchorous breath sounds bilaterally  CV: rrr, no murmurs, stron femoral pulses  Abd: soft, NTND.  Black tarry harpal on rectal exam  MSK: No edema, no visible deformities  Skin: large sacral decubitus non stageable, contaminaed with stool

## 2018-12-11 NOTE — ED ADULT NURSE NOTE - NSIMPLEMENTINTERV_GEN_ALL_ED
Implemented All Fall with Harm Risk Interventions:  [place] to call system. Call bell, personal items and telephone within reach. Instruct patient to call for assistance. Room bathroom lighting operational. Non-slip footwear when patient is off stretcher. Physically safe environment: no spills, clutter or unnecessary equipment. Stretcher in lowest position, wheels locked, appropriate side rails in place. Provide visual cue, wrist band, yellow gown, etc. Monitor gait and stability. Monitor for mental status changes and reorient to person, place, and time. Review medications for side effects contributing to fall risk. Reinforce activity limits and safety measures with patient and family. Provide visual clues: red socks.

## 2018-12-11 NOTE — CHART NOTE - NSCHARTNOTEFT_GEN_A_CORE
:  Tiffani Mcmanus    INDICATION:  Respiratory failure/shock    PROCEDURE:  [x ] LIMITED ECHO  [x ] LIMITED CHEST  [x ] LIMITED ABDOMINAL  [x ] LIMITED DVT    FINDINGS:  Bilateral A line pattern.    Subcostal view only. Normal appearing RV and LV appearance and systolic function  Dilated and non-variable IVC  No pericardial effusion    Bladder collapsed around cole catheter balloon    The bilateral leg veins were examined at the common femoral vein, saphenous vein, and profunda and found to be compressible.   The bilateral popliteal veins are compressible.     INTERPRETATION:  Normal aeration pattern  Normal cardiac function  No evidence of DVT  Distributive shock, not likely to be fluid responsive.     Tiffani Mcmanus MD  Pulmonary/Critical Care Fellow  page: [phone]

## 2018-12-11 NOTE — H&P ADULT - ATTENDING COMMENTS
critical care time 40 mins  Patient seen and examined.  Agree with resident note as above.  Patient with hx as noted including CVA in Sept with resultant "locked in " syndrome with trach, PEG, sacral decub, frequent hosp with drug resistant infections.  Now presents with fever, vomiting, hyperglycemia, melena.  Found in ER to be anemic, volume depleted, acidemic due to lactate, +UA.  Appears to have shock with both hemorrhagic and septic components.  Acute on chronic hypercarbic respiratory failure. Sepsis due to acute cystitis.  Patient will grimace, initiate vent, but otherwise unresponsive.  Will bring to MICU for pressors, vent.  Follow cultures and RVP, broad abx.  Full plan as above and I have edited where appropriate.  FULL CODE.

## 2018-12-11 NOTE — ED PROVIDER NOTE - CARE PLAN
Principal Discharge DX:	GIB (gastrointestinal bleeding)  Secondary Diagnosis:	Hypotension  Secondary Diagnosis:	Fever

## 2018-12-11 NOTE — ED PROVIDER NOTE - MEDICAL DECISION MAKING DETAILS
febrile, hypotensive, unresponsive - will obtain labs, cultures, give fluids, abx, continue pressors and consult micu for admission.  - Janet Lopez, DO

## 2018-12-11 NOTE — H&P ADULT - NSHPLABSRESULTS_GEN_ALL_CORE
.  Labs reviewed personally.                          8.4    34.7  )-----------( 305      ( 11 Dec 2018 05:13 )             25.3     Hgb Trend: 8.4<--, 5.7<--      150<H>  |  104  |  88<H>  ----------------------------<  321<H>  5.5<H>   |  18<L>  |  1.59<H>    Ca    10.3      11 Dec 2018 00:35    TPro  7.1  /  Alb  2.7<L>  /  TBili  0.4  /  DBili  x   /  AST  37  /  ALT  25  /  AlkPhos  137<H>      Creatinine Trend: 1.59<--  PT/INR - ( 11 Dec 2018 00:35 )   PT: 16.9 sec;   INR: 1.45 ratio         PTT - ( 11 Dec 2018 00:35 )  PTT:26.9 sec  Urinalysis Basic - ( 11 Dec 2018 05:16 )    Color: Yellow / Appearance: Turbid / S.021 / pH: x  Gluc: x / Ketone: x  / Bili: x / Urobili: x   Blood: x / Protein: x / Nitrite: x   Leuk Esterase: x / RBC: x / WBC x   Sq Epi: x / Non Sq Epi: x / Bacteria: x        Imaging reviewed personally.    CXR    INTERPRETATION: No focal consolidation.

## 2018-12-11 NOTE — H&P ADULT - HISTORY OF PRESENT ILLNESS
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 Presented to ED from [facility] rehab facility with fevers x 4d, and 4 episodes of vomiting today.  En route to hospital in EMS, patient became hypotensive to 60/palp and dopamine was started.  Pt arrived unresponsive with dark vomitus around mouth. In ED, Pt was found to have melanotic stool and black vomitus. BP was 94/37; Hb was 5.7 and WBC 20.8 with lactate of 13.5. Pt was started on norepi, transfused 2PRBC, cefepime, vanc and given 2.2 L LR. Afterwards, BP improved to 125/56. Past hospitals were [facility] and [facility].  Per family, the rehab facility said he had low grade fevers for a few days. 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 [facility], and 4 episodes of vomiting today.  En route to hospital in EMS, patient became hypotensive to 60/palp and dopamine was started.  Pt arrived unresponsive with dark vomitus around mouth. In ED, Pt was found to have melanotic stool and black vomitus. BP was 94/37; Hb was 5.7 and WBC 20.8 with lactate of 13.5. Pt was started on norepi, transfused 1PRBC, started on  cefepime and vanc, and given 2.2 L LR bolus. Afterwards, BP improved to 125/56. Past hospitals were [facility] and [facility]. 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 [facility], and 4 episodes of vomiting today.  En route to hospital in EMS, patient became hypotensive to 60/palp and dopamine was started.  Pt arrived unresponsive with dark vomitus around mouth. In ED, Pt was found to have melanotic stool and black vomitus. BP was 94/37; Hb was 5.7 and WBC 20.8 with lactate of 13.5. Pt was started on norepi, transfused 2U PRBC, started on  cefepime and vanc, and given 2.2 L LR bolus. Afterwards, BP improved to 125/56. Past hospitals were [facility] and [facility].

## 2018-12-11 NOTE — ED PROCEDURE NOTE - ATTENDING CONTRIBUTION TO CARE
***Carlos Cohen MD FACEP*** Attending physician was available for the key components of the procedure, the patient tolerated well. There were no complications with the procedure.

## 2018-12-11 NOTE — CONSULT NOTE ADULT - SUBJECTIVE AND OBJECTIVE BOX
Chief Complaint:  Patient is a 73y old  Male who presents with a chief complaint of Multifactorial shock (11 Dec 2018 05:31)      HPI:  The patient is a 73M PMH quadriplegic locked in syndrome froml medullary infarct in 2018 with trach, vent dependant and PEG, PE, CAD, PAD, HTN, HLD, T2DM,  who inigtally presented for fever from his rehab and is now admitted to MICU for multifactorial shock.  The patient was sent from his rehab for fevers x 4 d, and dark emesis.  Patient became hypotensive in EMS and was started on pressors and on arrival was noted to have dark vomitus around mouth.     In ED, Pt was found to have dark stools and black vomitus. Hgb was 5.7 now 8.4 and WBC 20.8 now 34.7 with lactate of 13.5, now 9.2. Pt was started on norepi, transfused 2U PRBC, started on  cefepime and vanc (now robert and vanc) and given 2.2 L LR bolus. Afterwards, BP improved to 125/56.     GI was consulted for possible GIB given questionable dark stools in ED and noted anemia.  Of note we have no baseline for comparison.  The patients PEG tube was flushed at bedside and initial return was dark and bilious but after full flushing the return was clear.      Allergies:  penicillin (Unknown)      Home Medications:    Hospital Medications:  acetaminophen    Suspension .. 650 milliGRAM(s) Oral every 6 hours PRN  fluconAZOLE IVPB 200 milliGRAM(s) IV Intermittent every 24 hours  insulin lispro (HumaLOG) corrective regimen sliding scale   SubCutaneous every 6 hours  lactated ringers. 1000 milliLiter(s) IV Continuous <Continuous>  meropenem  IVPB      meropenem  IVPB 1000 milliGRAM(s) IV Intermittent every 8 hours  norepinephrine Infusion 0.1 MICROgram(s)/kG/Min IV Continuous <Continuous>  pantoprazole  Injectable 40 milliGRAM(s) IV Push every 12 hours  vancomycin  IVPB 1000 milliGRAM(s) IV Intermittent every 24 hours      PMHX/PSHX:  Heart failure  Diabetic neuropathy  Hyperlipidemia  Quadriplegia  Hypertension  Stroke  Hypertension  Obstructive cardiomyopathy  No significant past surgical history      Family history:  No pertinent family history in first degree relatives      Social History:     ROS:     General:  No wt loss, fevers, chills, night sweats, fatigue,   Eyes:  Good vision, no reported pain  ENT:  No sore throat, pain, runny nose, dysphagia  CV:  No pain, palpitations, hypo/hypertension  Resp:  No dyspnea, cough, tachypnea, wheezing  GI:  See HPI  :  No pain, bleeding, incontinence, nocturia  Muscle:  No pain, weakness  Neuro:  No weakness, tingling, memory problems  Psych:  No fatigue, insomnia, mood problems, depression  Endocrine:  No polyuria, polydipsia, cold/heat intolerance  Heme:  No petechiae, ecchymosis, easy bruisability  Skin:  No rash, edema      PHYSICAL EXAM:     GENERAL:  tach on vent, non-responsive  CHEST:  Full & symmetric excursion  HEART:  Regular rhythm, no abdominal bruit, no edema  ABDOMEN:  Soft, non-tender, non-distended, normoactive bowel sounds,  no masses , no hepatosplenomegaly, bilious and then clear drainage from PEG tube  EXTREMITIES:  no cyanosis,clubbing or edema  SKIN:  No rash/erythema/ecchymoses/petechiae/wounds/abscess/warm/dry    Vital Signs:  Vital Signs Last 24 Hrs  T(C): 36.9 (11 Dec 2018 08:00), Max: 38.8 (11 Dec 2018 01:05)  T(F): 98.5 (11 Dec 2018 08:00), Max: 101.8 (11 Dec 2018 01:05)  HR: 106 (11 Dec 2018 09:30) (98 - 112)  BP: 129/60 (11 Dec 2018 09:30) (93/44 - 148/63)  BP(mean): 87 (11 Dec 2018 09:30) (64 - 104)  RR: 27 (11 Dec 2018 09:30) (15 - 29)  SpO2: 98% (11 Dec 2018 09:30) (94% - 100%)  Daily Height in cm: 182.88 (11 Dec 2018 04:38)    Daily     LABS:                        8.4    34.7  )-----------( 305      ( 11 Dec 2018 05:13 )             25.3     12-11    151<H>  |  108  |  84<H>  ----------------------------<  175<H>  4.4   |  17<L>  |  1.73<H>    Ca    9.4      11 Dec 2018 05:13  Phos  4.8     12-  Mg     2.0     12-    TPro  5.9<L>  /  Alb  2.2<L>  /  TBili  0.3  /  DBili  x   /  AST  818<H>  /  ALT  624<H>  /  AlkPhos  119  12-11    LIVER FUNCTIONS - ( 11 Dec 2018 05:13 )  Alb: 2.2 g/dL / Pro: 5.9 g/dL / ALK PHOS: 119 U/L / ALT: 624 U/L / AST: 818 U/L / GGT: x           PT/INR - ( 11 Dec 2018 00:35 )   PT: 16.9 sec;   INR: 1.45 ratio         PTT - ( 11 Dec 2018 00:35 )  PTT:26.9 sec  Urinalysis Basic - ( 11 Dec 2018 05:16 )    Color: Yellow / Appearance: Turbid / S.021 / pH: x  Gluc: x / Ketone: Negative  / Bili: Negative / Urobili: Negative   Blood: x / Protein: 300 mg/dL / Nitrite: Negative   Leuk Esterase: Small / RBC: >50 /hpf / WBC 12 /HPF   Sq Epi: x / Non Sq Epi: 2 / Bacteria: Few          Imaging:

## 2018-12-11 NOTE — H&P ADULT - ASSESSMENT
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     #Neuro   Unclear baseline mental status   Per prior hospitalisation " best neuro exam was awake, alert, able to answer questions with purposeful blinking"  No sedation and currently ventilator dependant   Tylenol for pain control     #CVS  Multifactorial shock (septic/hemorrhagic) with BP down to 60/palp in EMS on pressors   On norepinephrine   S/p 1U PRBC   S/p 2.2L LR in ED, receiving 1L LR in MICU.   At home, on norvasc and metoprolol for BP control  At home, on atorvastatin for HLD       #Heme   Melena and dakr vomitus siggestive of UGIB vs LGIB   S/p transfusion 1U PRBC to 8.4; baseline 8.3 from prior hospitalisation 11/29  Continue to trend Hgb   At home, on xarelto for prior pulmonary emboli     #Pulm:   Has trach; vent dependant.   CXR demonstrates no focal consolidation   On vanc and cefepime for coverage of possible pneumonia   No blood gas performed in ED     GI   UGIB vs LGIB with patient presenting with anemia, melena and dark vomitus.   Hb 5.7 received 1U pRBC to 8.4  Has PEG  NPO   Pantoprazole 40 IV qday  Possible GI c/s for endoscopy to localize site of bleeding when pt stable.     #Renal   CKD with baseline of 1.25 - 1.81 since 2015   Current Cr 1.59, BUN 88 c/b hyperkalemia to 5.5  Anion gap 28, likely lactic acidosis from multifactorial shock   Will trend K, Cr, BUN and Uop    #ID   Multifactorial shock possibly septic in origin from possible UTI source   Currently on vanc and meropenem   In ED given vancomycin and cefepime   Lactate 13.5 ->9.2 , will trend   Awaiting Ucx to possibly narrow   Large sacral decubitus ulcer, continuing with regimen of [facility] by nursing home.    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     #Neuro   Per prior hospitalisation and daughter " best neuro exam was awake, alert, able to answer questions with purposeful blinking"  No sedation and currently ventilator dependant   Tylenol for pain control     #CVS  Multifactorial shock (septic/hemorrhagic) with BP down to 60/palp in EMS on pressors   On norepinephrine   S/p 2U PRBC   S/p 2.2L LR in ED, receiving 1L LR in MICU.   At home, on norvasc and metoprolol for BP control  At home, on atorvastatin for HLD       #Heme   Melena and dakr vomitus siggestive of UGIB vs LGIB   S/p transfusion 2U PRBC to 8.4; baseline 8.3 from prior hospitalisation 11/29  Continue to trend Hgb   At home, on xarelto for prior pulmonary emboli     #Pulm:   Has trach; vent dependant.   CXR demonstrates no focal consolidation   On vanc and cefepime for coverage of possible pneumonia   No blood gas performed in ED     GI   UGIB vs LGIB with patient presenting with anemia, melena and dark vomitus.   Hb 5.7 received 2U pRBC to 8.4  Has PEG  NPO   Pantoprazole 40 IV qday  Possible GI c/s for endoscopy to localize site of bleeding when pt stable.     #Renal   CKD with baseline of 1.25 - 1.81 since 2015   Current Cr 1.59, BUN 88 c/b hyperkalemia to 5.5  Anion gap 28, likely lactic acidosis from multifactorial shock   Will trend K, Cr, BUN and Uop    #ID   Multifactorial shock possibly septic in origin from possible UTI source   Currently on vanc and meropenem   In ED given vancomycin and cefepime   Lactate 13.5 ->9.2 , will trend   Awaiting Ucx to possibly narrow   Large sacral decubitus ulcer, continuing with regimen of [facility] by nursing home.    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     #Neuro   Per prior hospitalisation and daughter " best neuro exam was awake, alert, able to answer questions with purposeful blinking"  No sedation and currently ventilator dependant   Tylenol for pain control   Check EEG    #CVS  Multifactorial shock (septic/hemorrhagic) with BP down to 60/palp in EMS on pressors   On norepinephrine   S/p 2U PRBC   S/p 2.2L LR in ED, receiving 1L LR in MICU.   At home, on norvasc and metoprolol for BP control  At home, on atorvastatin for HLD       #Heme   Melena and dakr vomitus siggestive of UGIB vs LGIB   S/p transfusion 2U PRBC to 8.4; baseline 8.3 from prior hospitalisation 11/29  Continue to trend Hgb   At home, on xarelto for prior pulmonary emboli     #Pulm:   Has trach; vent dependant.   CXR demonstrates no focal consolidation   On vanc and meropenem for coverage of possible pneumonia   check ABG    GI   likely UGIB with patient presenting with anemia, melena and dark vomitus.   Hb 5.7 received 2U pRBC to 8.4, trend hgb q12h  Has PEG, place to gravity for now  NPO   Pantoprazole 40 IV q12h  Possible GI c/s for endoscopy to localize site of bleeding when pt stable.     #Renal   CKD with baseline of 1.25 - 1.81 since 2015   Current Cr 1.59, BUN 88 c/b hyperkalemia to 5.5  Anion gap 28, likely lactic acidosis from multifactorial shock   Will trend K, Cr, BUN and Uop    #ID   Multifactorial shock possibly septic in origin from possible UTI source   Currently on vanc and meropenem   In ED given vancomycin and cefepime   Lactate 13.5 ->9.2 , will trend   Awaiting Ucx to possibly narrow   Large sacral decubitus ulcer, continuing with regimen of [facility] by nursing home.    #Endo   BG elevated to 321 in setting of multifactorial shock with T2DM  Repeat BG down to 189  BG q6hrs  ISS    At home, on humulin and ISS humalog

## 2018-12-11 NOTE — ED PROVIDER NOTE - OBJECTIVE STATEMENT
73M pmh CAD, HTN, HLD, CVA with trach presents from rehab facility with 4 days of fevers, and 4 episodes of vomiting today.  Per EMS, en route to hospital, patient became hypotensive to 60/palp and dopamine was started.  Pt arrives unresponsive with dark vomitus around mouth.  Strong femoral pulses.  Per family, the rehab facility said he had low grade fevers for a few days.

## 2018-12-11 NOTE — H&P ADULT - NSHPPHYSICALEXAM_GEN_ALL_CORE
Gen: unresponsive, trach in place, dark vomitus dried around mouth  Head: NCAT  HEENT: pupils equal, round, sluggish to react, oral mucosa dry with dark vomitus dried around  mouth.   Lung: Coarse/rhonchorous breath sounds bilaterally  CV: rrr, no murmurs, stron femoral pulses  Abd: soft, NTND.  Black tarry harpal on rectal exam  MSK: No edema, no visible deformities  Skin: large sacral decubitus non stageable, contaminaed with stool Gen: unresponsive, trach in place, dark vomitus dried around mouth  Head: NCAT  HEENT: pupils equal, round, sluggish to react, oral mucosa dry with dark vomitus dried around  mouth.   Lung: Coarse/rhonchorous breath sounds bilaterally  CV: rrr, no murmurs, strong femoral pulses  Abd: soft, NTND.  Black tarry stool on rectal exam  MSK: no visible deformities +LUE edema (due to infiltrated IV in ER)  Skin: large sacral decubitus non stageable, contaminated with stool

## 2018-12-11 NOTE — H&P ADULT - PMH
Diabetic neuropathy    Heart failure    Hyperlipidemia    Hypertension    Hypertension    Obstructive cardiomyopathy    Quadriplegia    Stroke

## 2018-12-11 NOTE — CONSULT NOTE ADULT - ASSESSMENT
Impression:  1)Anemia -     Recommendations:   - obtain outpatient records for baseline hemoglobin   - put patient on PPI gtt   - monitor H/H, transfuse as needed   - NPO for now   - monitor BM's    Radha Zamora, PGY-4  Gastroenterology Fellow  Pager x [phone] or [phone]  (After 5 pm or on weekends please page GI on call) Impression:  1)Anemia - possible does to some GI losses however patients hemoglobin responded appropriately to transfusions and patients PEG tube flush was bilious follow by clear so he does not appear to currently have an active upper GIB that would have caused his instability  This is much more likely from his septic state.  We would recommend further stabilization of patient and infection management before proceeding with any endoscopic intervention.      Recommendations:   - obtain outpatient records for baseline hemoglobin   - put patient on PPI gtt   - monitor H/H, transfuse as needed   - NPO for now   - monitor BM's   - no role for endoscopic intervention at this time, will re-evaluate need for scope once patient's medical status improves   - if patient does not have BM's would obtain CT A/P to rule out obstruction    Radha Zamora, PGY-4  Gastroenterology Fellow  Pager x [phone] or [phone]  (After 5 pm or on weekends please page GI on call)

## 2018-12-12 DIAGNOSIS — T14.90XA INJURY, UNSPECIFIED, INITIAL ENCOUNTER: ICD-10-CM

## 2018-12-12 DIAGNOSIS — E11.9 TYPE 2 DIABETES MELLITUS WITHOUT COMPLICATIONS: ICD-10-CM

## 2018-12-12 DIAGNOSIS — I26.99 OTHER PULMONARY EMBOLISM WITHOUT ACUTE COR PULMONALE: ICD-10-CM

## 2018-12-12 DIAGNOSIS — I10 ESSENTIAL (PRIMARY) HYPERTENSION: ICD-10-CM

## 2018-12-12 DIAGNOSIS — A41.9 SEPSIS, UNSPECIFIED ORGANISM: ICD-10-CM

## 2018-12-12 DIAGNOSIS — I63.9 CEREBRAL INFARCTION, UNSPECIFIED: ICD-10-CM

## 2018-12-12 DIAGNOSIS — N17.9 ACUTE KIDNEY FAILURE, UNSPECIFIED: ICD-10-CM

## 2018-12-12 DIAGNOSIS — J96.90 RESPIRATORY FAILURE, UNSPECIFIED, UNSPECIFIED WHETHER WITH HYPOXIA OR HYPERCAPNIA: ICD-10-CM

## 2018-12-12 DIAGNOSIS — E87.0 HYPEROSMOLALITY AND HYPERNATREMIA: ICD-10-CM

## 2018-12-12 DIAGNOSIS — Z29.9 ENCOUNTER FOR PROPHYLACTIC MEASURES, UNSPECIFIED: ICD-10-CM

## 2018-12-12 DIAGNOSIS — K92.2 GASTROINTESTINAL HEMORRHAGE, UNSPECIFIED: ICD-10-CM

## 2018-12-12 LAB
ALBUMIN SERPL ELPH-MCNC: 1.9 G/DL — LOW (ref 3.3–5)
ALBUMIN SERPL ELPH-MCNC: 2 G/DL — LOW (ref 3.3–5)
ALBUMIN SERPL ELPH-MCNC: 2.1 G/DL — LOW (ref 3.3–5)
ALP SERPL-CCNC: 105 U/L — SIGNIFICANT CHANGE UP (ref 40–120)
ALP SERPL-CCNC: 95 U/L — SIGNIFICANT CHANGE UP (ref 40–120)
ALP SERPL-CCNC: 99 U/L — SIGNIFICANT CHANGE UP (ref 40–120)
ALT FLD-CCNC: 1332 U/L — HIGH (ref 10–45)
ALT FLD-CCNC: 1440 U/L — HIGH (ref 10–45)
ALT FLD-CCNC: 1533 U/L — HIGH (ref 10–45)
ANION GAP SERPL CALC-SCNC: 11 MMOL/L — SIGNIFICANT CHANGE UP (ref 5–17)
ANION GAP SERPL CALC-SCNC: 13 MMOL/L — SIGNIFICANT CHANGE UP (ref 5–17)
ANION GAP SERPL CALC-SCNC: 17 MMOL/L — SIGNIFICANT CHANGE UP (ref 5–17)
APTT BLD: 31.1 SEC — SIGNIFICANT CHANGE UP (ref 27.5–36.3)
AST SERPL-CCNC: 1261 U/L — HIGH (ref 10–40)
AST SERPL-CCNC: 1823 U/L — HIGH (ref 10–40)
AST SERPL-CCNC: 945 U/L — HIGH (ref 10–40)
BASOPHILS # BLD AUTO: 0 K/UL — SIGNIFICANT CHANGE UP (ref 0–0.2)
BILIRUB SERPL-MCNC: 0.4 MG/DL — SIGNIFICANT CHANGE UP (ref 0.2–1.2)
BILIRUB SERPL-MCNC: 0.5 MG/DL — SIGNIFICANT CHANGE UP (ref 0.2–1.2)
BILIRUB SERPL-MCNC: 0.5 MG/DL — SIGNIFICANT CHANGE UP (ref 0.2–1.2)
BUN SERPL-MCNC: 51 MG/DL — HIGH (ref 7–23)
BUN SERPL-MCNC: 59 MG/DL — HIGH (ref 7–23)
BUN SERPL-MCNC: 66 MG/DL — HIGH (ref 7–23)
CALCIUM SERPL-MCNC: 8.9 MG/DL — SIGNIFICANT CHANGE UP (ref 8.4–10.5)
CALCIUM SERPL-MCNC: 9.1 MG/DL — SIGNIFICANT CHANGE UP (ref 8.4–10.5)
CALCIUM SERPL-MCNC: 9.1 MG/DL — SIGNIFICANT CHANGE UP (ref 8.4–10.5)
CHLORIDE SERPL-SCNC: 113 MMOL/L — HIGH (ref 96–108)
CHLORIDE SERPL-SCNC: 114 MMOL/L — HIGH (ref 96–108)
CHLORIDE SERPL-SCNC: 115 MMOL/L — HIGH (ref 96–108)
CO2 SERPL-SCNC: 22 MMOL/L — SIGNIFICANT CHANGE UP (ref 22–31)
CO2 SERPL-SCNC: 25 MMOL/L — SIGNIFICANT CHANGE UP (ref 22–31)
CO2 SERPL-SCNC: 26 MMOL/L — SIGNIFICANT CHANGE UP (ref 22–31)
CREAT SERPL-MCNC: 1.14 MG/DL — SIGNIFICANT CHANGE UP (ref 0.5–1.3)
CREAT SERPL-MCNC: 1.22 MG/DL — SIGNIFICANT CHANGE UP (ref 0.5–1.3)
CREAT SERPL-MCNC: 1.35 MG/DL — HIGH (ref 0.5–1.3)
CULTURE RESULTS: SIGNIFICANT CHANGE UP
EOSINOPHIL # BLD AUTO: 0.2 K/UL — SIGNIFICANT CHANGE UP (ref 0–0.5)
EOSINOPHIL NFR BLD AUTO: 2 % — SIGNIFICANT CHANGE UP (ref 0–6)
GLUCOSE BLDC GLUCOMTR-MCNC: 281 MG/DL — HIGH (ref 70–99)
GLUCOSE BLDC GLUCOMTR-MCNC: 284 MG/DL — HIGH (ref 70–99)
GLUCOSE BLDC GLUCOMTR-MCNC: 290 MG/DL — HIGH (ref 70–99)
GLUCOSE BLDC GLUCOMTR-MCNC: 302 MG/DL — HIGH (ref 70–99)
GLUCOSE BLDC GLUCOMTR-MCNC: 356 MG/DL — HIGH (ref 70–99)
GLUCOSE SERPL-MCNC: 297 MG/DL — HIGH (ref 70–99)
GLUCOSE SERPL-MCNC: 349 MG/DL — HIGH (ref 70–99)
GLUCOSE SERPL-MCNC: 363 MG/DL — HIGH (ref 70–99)
HCT VFR BLD CALC: 20.2 % — CRITICAL LOW (ref 39–50)
HCT VFR BLD CALC: 24.8 % — LOW (ref 39–50)
HCT VFR BLD CALC: 24.9 % — LOW (ref 39–50)
HGB BLD-MCNC: 6.7 G/DL — CRITICAL LOW (ref 13–17)
HGB BLD-MCNC: 8.2 G/DL — LOW (ref 13–17)
HGB BLD-MCNC: 8.2 G/DL — LOW (ref 13–17)
INR BLD: 1.88 RATIO — HIGH (ref 0.88–1.16)
LYMPHOCYTES # BLD AUTO: 1.7 K/UL — SIGNIFICANT CHANGE UP (ref 1–3.3)
LYMPHOCYTES # BLD AUTO: 15 % — SIGNIFICANT CHANGE UP (ref 13–44)
MAGNESIUM SERPL-MCNC: 1.7 MG/DL — SIGNIFICANT CHANGE UP (ref 1.6–2.6)
MAGNESIUM SERPL-MCNC: 1.7 MG/DL — SIGNIFICANT CHANGE UP (ref 1.6–2.6)
MAGNESIUM SERPL-MCNC: 1.8 MG/DL — SIGNIFICANT CHANGE UP (ref 1.6–2.6)
MCHC RBC-ENTMCNC: 29.1 PG — SIGNIFICANT CHANGE UP (ref 27–34)
MCHC RBC-ENTMCNC: 29.3 PG — SIGNIFICANT CHANGE UP (ref 27–34)
MCHC RBC-ENTMCNC: 29.8 PG — SIGNIFICANT CHANGE UP (ref 27–34)
MCHC RBC-ENTMCNC: 32.9 GM/DL — SIGNIFICANT CHANGE UP (ref 32–36)
MCHC RBC-ENTMCNC: 33.1 GM/DL — SIGNIFICANT CHANGE UP (ref 32–36)
MCHC RBC-ENTMCNC: 33.3 GM/DL — SIGNIFICANT CHANGE UP (ref 32–36)
MCV RBC AUTO: 88.4 FL — SIGNIFICANT CHANGE UP (ref 80–100)
MCV RBC AUTO: 88.7 FL — SIGNIFICANT CHANGE UP (ref 80–100)
MCV RBC AUTO: 89.5 FL — SIGNIFICANT CHANGE UP (ref 80–100)
MONOCYTES # BLD AUTO: 1 K/UL — HIGH (ref 0–0.9)
MONOCYTES NFR BLD AUTO: 1 % — LOW (ref 2–14)
NEUTROPHILS # BLD AUTO: 15.8 K/UL — HIGH (ref 1.8–7.4)
NEUTROPHILS NFR BLD AUTO: 74 % — SIGNIFICANT CHANGE UP (ref 43–77)
PHOSPHATE SERPL-MCNC: 2.4 MG/DL — LOW (ref 2.5–4.5)
PHOSPHATE SERPL-MCNC: 2.9 MG/DL — SIGNIFICANT CHANGE UP (ref 2.5–4.5)
PHOSPHATE SERPL-MCNC: 3.7 MG/DL — SIGNIFICANT CHANGE UP (ref 2.5–4.5)
PLATELET # BLD AUTO: 225 K/UL — SIGNIFICANT CHANGE UP (ref 150–400)
PLATELET # BLD AUTO: 237 K/UL — SIGNIFICANT CHANGE UP (ref 150–400)
PLATELET # BLD AUTO: 245 K/UL — SIGNIFICANT CHANGE UP (ref 150–400)
POTASSIUM SERPL-MCNC: 3.3 MMOL/L — LOW (ref 3.5–5.3)
POTASSIUM SERPL-MCNC: 3.7 MMOL/L — SIGNIFICANT CHANGE UP (ref 3.5–5.3)
POTASSIUM SERPL-MCNC: 3.9 MMOL/L — SIGNIFICANT CHANGE UP (ref 3.5–5.3)
POTASSIUM SERPL-SCNC: 3.3 MMOL/L — LOW (ref 3.5–5.3)
POTASSIUM SERPL-SCNC: 3.7 MMOL/L — SIGNIFICANT CHANGE UP (ref 3.5–5.3)
POTASSIUM SERPL-SCNC: 3.9 MMOL/L — SIGNIFICANT CHANGE UP (ref 3.5–5.3)
PROT SERPL-MCNC: 5.2 G/DL — LOW (ref 6–8.3)
PROT SERPL-MCNC: 5.5 G/DL — LOW (ref 6–8.3)
PROT SERPL-MCNC: 5.6 G/DL — LOW (ref 6–8.3)
PROTHROM AB SERPL-ACNC: 21.9 SEC — HIGH (ref 10–12.9)
RBC # BLD: 2.26 M/UL — LOW (ref 4.2–5.8)
RBC # BLD: 2.79 M/UL — LOW (ref 4.2–5.8)
RBC # BLD: 2.82 M/UL — LOW (ref 4.2–5.8)
RBC # FLD: 15.2 % — HIGH (ref 10.3–14.5)
RBC # FLD: 15.7 % — HIGH (ref 10.3–14.5)
RBC # FLD: 16.5 % — HIGH (ref 10.3–14.5)
SODIUM SERPL-SCNC: 152 MMOL/L — HIGH (ref 135–145)
SPECIMEN SOURCE: SIGNIFICANT CHANGE UP
WBC # BLD: 14.2 K/UL — HIGH (ref 3.8–10.5)
WBC # BLD: 18.7 K/UL — HIGH (ref 3.8–10.5)
WBC # BLD: 19.3 K/UL — HIGH (ref 3.8–10.5)
WBC # FLD AUTO: 14.2 K/UL — HIGH (ref 3.8–10.5)
WBC # FLD AUTO: 18.7 K/UL — HIGH (ref 3.8–10.5)
WBC # FLD AUTO: 19.3 K/UL — HIGH (ref 3.8–10.5)

## 2018-12-12 PROCEDURE — 99291 CRITICAL CARE FIRST HOUR: CPT

## 2018-12-12 PROCEDURE — 43235 EGD DIAGNOSTIC BRUSH WASH: CPT | Mod: GC

## 2018-12-12 RX ORDER — INSULIN LISPRO 100/ML
VIAL (ML) SUBCUTANEOUS EVERY 6 HOURS
Qty: 0 | Refills: 0 | Status: DISCONTINUED | OUTPATIENT
Start: 2018-12-12 | End: 2018-12-12

## 2018-12-12 RX ORDER — POTASSIUM CHLORIDE 20 MEQ
20 PACKET (EA) ORAL ONCE
Qty: 0 | Refills: 0 | Status: COMPLETED | OUTPATIENT
Start: 2018-12-12 | End: 2018-12-12

## 2018-12-12 RX ORDER — POTASSIUM CHLORIDE 20 MEQ
20 PACKET (EA) ORAL ONCE
Qty: 0 | Refills: 0 | Status: DISCONTINUED | OUTPATIENT
Start: 2018-12-12 | End: 2018-12-12

## 2018-12-12 RX ORDER — INSULIN LISPRO 100/ML
VIAL (ML) SUBCUTANEOUS EVERY 6 HOURS
Qty: 0 | Refills: 0 | Status: DISCONTINUED | OUTPATIENT
Start: 2018-12-12 | End: 2018-12-20

## 2018-12-12 RX ORDER — HUMAN INSULIN 100 [IU]/ML
20 INJECTION, SUSPENSION SUBCUTANEOUS ONCE
Qty: 0 | Refills: 0 | Status: COMPLETED | OUTPATIENT
Start: 2018-12-12 | End: 2018-12-12

## 2018-12-12 RX ORDER — HUMAN INSULIN 100 [IU]/ML
30 INJECTION, SUSPENSION SUBCUTANEOUS ONCE
Qty: 0 | Refills: 0 | Status: DISCONTINUED | OUTPATIENT
Start: 2018-12-12 | End: 2018-12-12

## 2018-12-12 RX ORDER — HUMAN INSULIN 100 [IU]/ML
20 INJECTION, SUSPENSION SUBCUTANEOUS EVERY 12 HOURS
Qty: 0 | Refills: 0 | Status: DISCONTINUED | OUTPATIENT
Start: 2018-12-12 | End: 2018-12-14

## 2018-12-12 RX ORDER — INSULIN GLARGINE 100 [IU]/ML
30 INJECTION, SOLUTION SUBCUTANEOUS AT BEDTIME
Qty: 0 | Refills: 0 | Status: DISCONTINUED | OUTPATIENT
Start: 2018-12-12 | End: 2018-12-12

## 2018-12-12 RX ORDER — SODIUM CHLORIDE 9 MG/ML
1000 INJECTION, SOLUTION INTRAVENOUS
Qty: 0 | Refills: 0 | Status: DISCONTINUED | OUTPATIENT
Start: 2018-12-12 | End: 2018-12-16

## 2018-12-12 RX ORDER — FENTANYL CITRATE 50 UG/ML
50 INJECTION INTRAVENOUS ONCE
Qty: 0 | Refills: 0 | Status: DISCONTINUED | OUTPATIENT
Start: 2018-12-12 | End: 2018-12-12

## 2018-12-12 RX ORDER — PANTOPRAZOLE SODIUM 20 MG/1
40 TABLET, DELAYED RELEASE ORAL EVERY 12 HOURS
Qty: 0 | Refills: 0 | Status: DISCONTINUED | OUTPATIENT
Start: 2018-12-12 | End: 2018-12-19

## 2018-12-12 RX ORDER — MIDAZOLAM HYDROCHLORIDE 1 MG/ML
2 INJECTION, SOLUTION INTRAMUSCULAR; INTRAVENOUS ONCE
Qty: 0 | Refills: 0 | Status: DISCONTINUED | OUTPATIENT
Start: 2018-12-12 | End: 2018-12-12

## 2018-12-12 RX ADMIN — Medication 20: at 07:10

## 2018-12-12 RX ADMIN — HUMAN INSULIN 20 UNIT(S): 100 INJECTION, SUSPENSION SUBCUTANEOUS at 23:06

## 2018-12-12 RX ADMIN — Medication 6: at 18:21

## 2018-12-12 RX ADMIN — SODIUM CHLORIDE 75 MILLILITER(S): 9 INJECTION, SOLUTION INTRAVENOUS at 17:31

## 2018-12-12 RX ADMIN — FLUCONAZOLE 100 MILLIGRAM(S): 150 TABLET ORAL at 10:34

## 2018-12-12 RX ADMIN — MIDAZOLAM HYDROCHLORIDE 2 MILLIGRAM(S): 1 INJECTION, SOLUTION INTRAMUSCULAR; INTRAVENOUS at 09:35

## 2018-12-12 RX ADMIN — Medication 10: at 02:22

## 2018-12-12 RX ADMIN — Medication 3: at 12:11

## 2018-12-12 RX ADMIN — PANTOPRAZOLE SODIUM 40 MILLIGRAM(S): 20 TABLET, DELAYED RELEASE ORAL at 23:16

## 2018-12-12 RX ADMIN — HUMAN INSULIN 20 UNIT(S): 100 INJECTION, SUSPENSION SUBCUTANEOUS at 10:38

## 2018-12-12 RX ADMIN — Medication 250 MILLIGRAM(S): at 02:16

## 2018-12-12 RX ADMIN — MEROPENEM 100 MILLIGRAM(S): 1 INJECTION INTRAVENOUS at 23:17

## 2018-12-12 RX ADMIN — Medication 20 MILLIEQUIVALENT(S): at 13:36

## 2018-12-12 RX ADMIN — MEROPENEM 100 MILLIGRAM(S): 1 INJECTION INTRAVENOUS at 13:36

## 2018-12-12 RX ADMIN — FENTANYL CITRATE 50 MICROGRAM(S): 50 INJECTION INTRAVENOUS at 09:40

## 2018-12-12 RX ADMIN — MEROPENEM 100 MILLIGRAM(S): 1 INJECTION INTRAVENOUS at 06:13

## 2018-12-12 RX ADMIN — Medication 8: at 23:14

## 2018-12-12 NOTE — PROGRESS NOTE ADULT - PROBLEM SELECTOR PLAN 6
Patient with STACEY in setting of Sepsis   Patient with Hypernatremia Sodium 152  Will continue 1/2 ns @ 75 cc/hr   Continue to monitor BMP

## 2018-12-12 NOTE — PROGRESS NOTE ADULT - ASSESSMENT
Impression:  1)Anemia - GI losses likely however  PEG tube flush was bilious follow by clear so he does not appear to currently have an active upper GIB that would have caused his instability  This is much more likely from his septic state.  We would recommend further stabilization of patient and infection management before proceeding with any endoscopic intervention.      Recommendations:   - obtain outpatient records for baseline hemoglobin   - put patient on PPI gtt   - monitor H/H, transfuse as needed   - NPO for now   - monitor BM's   - no role for endoscopic intervention at this time, will re-evaluate need for scope once patient's medical status improves   - GI will continue to follow    Radha Zamora, PGY-4  Gastroenterology Fellow  Pager x [phone] or [phone]  (After 5 pm or on weekends please page GI on call) Impression:  1)Anemia - GI losses likely however  PEG tube flush was bilious follow by clear so he does not appear to currently have an active upper GIB that would have caused his instability  This is much more likely from his septic state.  We would recommend endoscopic evaluation today as patient is now off pressor support and continues to have dropping H/H and questionable dark BM's.    Recommendations:   - will perform EGD at bedside today   - obtain outpatient records for baseline hemoglobin   - put patient on PPI gtt   - monitor H/H, transfuse as needed   - NPO for now   - monitor BM's   - GI will continue to follow    Radha Zamora, PGY-4  Gastroenterology Fellow  Pager x [phone] or [phone]  (After 5 pm or on weekends please page GI on call)

## 2018-12-12 NOTE — PROGRESS NOTE ADULT - SUBJECTIVE AND OBJECTIVE BOX
Chief Complaint:  Patient is a 73y old  Male who presents with a chief complaint of Multifactorial shock (11 Dec 2018 11:09)      Interval Events:   Patient with drop in H/H overnight to 6.7 from 8.4 the day prior and received 2 units of PRBC and is now 8.2.  Patient with multiple "dark" BM's but not melena overnight.      Allergies:  penicillin (Unknown)      Hospital Medications:  acetaminophen    Suspension .. 650 milliGRAM(s) Oral every 6 hours PRN  dextrose 40% Gel 15 Gram(s) Oral once PRN  dextrose 5%. 1000 milliLiter(s) IV Continuous <Continuous>  dextrose 50% Injectable 12.5 Gram(s) IV Push once  dextrose 50% Injectable 25 Gram(s) IV Push once  dextrose 50% Injectable 25 Gram(s) IV Push once  fluconAZOLE IVPB 200 milliGRAM(s) IV Intermittent every 24 hours  glucagon  Injectable 1 milliGRAM(s) IntraMuscular once PRN  insulin lispro (HumaLOG) corrective regimen sliding scale   SubCutaneous every 6 hours  meropenem  IVPB      meropenem  IVPB 1000 milliGRAM(s) IV Intermittent every 8 hours  norepinephrine Infusion 0.1 MICROgram(s)/kG/Min IV Continuous <Continuous>  pantoprazole Infusion 8 mG/Hr IV Continuous <Continuous>  sodium chloride 0.45%. 1000 milliLiter(s) IV Continuous <Continuous>  vancomycin  IVPB 1000 milliGRAM(s) IV Intermittent every 24 hours      PMHX/PSHX:  Heart failure  Diabetic neuropathy  Hyperlipidemia  Quadriplegia  Hypertension  Stroke  Hypertension  Obstructive cardiomyopathy  No significant past surgical history      Family history:  No pertinent family history in first degree relatives          PHYSICAL EXAM:   GENERAL:  tach on vent, non-responsive  CHEST:  Full & symmetric excursion  HEART:  Regular rhythm, no abdominal bruit, no edema  ABDOMEN:  Soft, non-tender, non-distended, normoactive bowel sounds,  no masses , no hepatosplenomegaly, bilious and then clear drainage from PEG tube  EXTREMITIES:  no cyanosis,clubbing or edema  SKIN:  No rash/erythema/ecchymoses/petechiae/wounds/abscess/warm/dry      Vital Signs:  Vital Signs Last 24 Hrs  T(C): 37 (12 Dec 2018 04:00), Max: 38.2 (11 Dec 2018 12:00)  T(F): 98.6 (12 Dec 2018 04:00), Max: 100.7 (11 Dec 2018 12:00)  HR: 107 (12 Dec 2018 06:00) (96 - 127)  BP: 124/57 (12 Dec 2018 06:00) (92/45 - 161/67)  BP(mean): 82 (12 Dec 2018 06:00) (64 - 97)  RR: 18 (12 Dec 2018 06:00) (9 - 28)  SpO2: 100% (12 Dec 2018 06:00) (94% - 100%)  Daily     Daily Weight in k.6 (12 Dec 2018 04:00)    LABS:                        8.2    18.7  )-----------( 225      ( 12 Dec 2018 06:14 )             24.9     12    152<H>  |  114<H>  |  59<H>  ----------------------------<  363<H>  3.7   |  25  |  1.22    Ca    9.1      12 Dec 2018 06:14  Phos  2.9     12  Mg     1.7         TPro  5.5<L>  /  Alb  2.0<L>  /  TBili  0.5  /  DBili  x   /  AST  1261<H>  /  ALT  1440<H>  /  AlkPhos  99  12-12    LIVER FUNCTIONS - ( 12 Dec 2018 06:14 )  Alb: 2.0 g/dL / Pro: 5.5 g/dL / ALK PHOS: 99 U/L / ALT: 1440 U/L / AST: 1261 U/L / GGT: x           PT/INR - ( 12 Dec 2018 00:26 )   PT: 21.9 sec;   INR: 1.88 ratio         PTT - ( 12 Dec 2018 00:26 )  PTT:31.1 sec  Urinalysis Basic - ( 11 Dec 2018 05:16 )    Color: Yellow / Appearance: Turbid / S.021 / pH: x  Gluc: x / Ketone: Negative  / Bili: Negative / Urobili: Negative   Blood: x / Protein: 300 mg/dL / Nitrite: Negative   Leuk Esterase: Small / RBC: >50 /hpf / WBC 12 /HPF   Sq Epi: x / Non Sq Epi: 2 / Bacteria: Few          Imaging:

## 2018-12-12 NOTE — PROVIDER CONTACT NOTE (OTHER) - REASON
while aspirating peg tube received 130 residual, also pt had 3 spots of eschar at .5cm on back of head

## 2018-12-12 NOTE — PROGRESS NOTE ADULT - PROBLEM SELECTOR PLAN 1
Patient admitted with Multi- Factorial Shock ( Sepsis/ Hemorrhagic)   UTI likely cause of Sepsis; UA + Yeast / Bacteria   Urine Cx 12/11: Contaminated, Repeat CX Ordered   Continue Meropenem and Fluconazole   Sputum cx 12/11: Pseudomonas / NL christopher-> Possible colonization  CXR 12/11: No focal Consolidation   Bld cx 12/11: No growth

## 2018-12-12 NOTE — DIETITIAN INITIAL EVALUATION ADULT. - PERTINENT MEDS FT
MEDICATIONS  (STANDING):  dextrose 5%. 1000 milliLiter(s) (50 mL/Hr) IV Continuous <Continuous>  dextrose 50% Injectable 12.5 Gram(s) IV Push once  dextrose 50% Injectable 25 Gram(s) IV Push once  dextrose 50% Injectable 25 Gram(s) IV Push once  fentaNYL    Injectable 50 MICROGram(s) IV Push once  fluconAZOLE IVPB 200 milliGRAM(s) IV Intermittent every 24 hours  insulin lispro (HumaLOG) corrective regimen sliding scale   SubCutaneous every 6 hours  insulin NPH human recombinant 20 Unit(s) SubCutaneous every 12 hours  meropenem  IVPB      meropenem  IVPB 1000 milliGRAM(s) IV Intermittent every 8 hours  midazolam Injectable 2 milliGRAM(s) IV Push once  norepinephrine Infusion 0.1 MICROgram(s)/kG/Min (13.125 mL/Hr) IV Continuous <Continuous>  pantoprazole Infusion 8 mG/Hr (10 mL/Hr) IV Continuous <Continuous>  sodium chloride 0.45%. 1000 milliLiter(s) (75 mL/Hr) IV Continuous <Continuous>    MEDICATIONS  (PRN):  acetaminophen    Suspension .. 650 milliGRAM(s) Oral every 6 hours PRN Moderate Pain (4 - 6)  dextrose 40% Gel 15 Gram(s) Oral once PRN Blood Glucose LESS THAN 70 milliGRAM(s)/deciLiter  glucagon  Injectable 1 milliGRAM(s) IntraMuscular once PRN Glucose <70 milliGRAM(s)/deciLiter

## 2018-12-12 NOTE — CHART NOTE - NSCHARTNOTEFT_GEN_A_CORE
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 Presented to ED from [facility] with fevers x 4d, and 4 episodes of vomiting today. En route to hospital in EMS, patient became hypotensive to 60/palp and dopamine was started.  Pt arrived unresponsive with dark vomitus around mouth. In ED, pt was found to have melanotic stool and black vomitus. BP was 94/37; Hb was 5.7 and WBC 20.8 with lactate of 13.5. Pt was started on norepi, transfused 2U PRBC, started on cefepime and vanc, and given 2.2 L LR bolus w/ improvement in BP to 125/56.           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 Presented to ED from [facility] with fevers x 4d, and 4 episodes of vomiting today. En route to hospital in EMS, patient became hypotensive to 60/palp and dopamine was started.  Pt arrived unresponsive with dark vomitus around mouth. In ED, pt was found to have melanotic stool and black vomitus. BP was 94/37; Hb was 5.7 and WBC 20.8 with lactate of 13.5. Pt was started on norepi, transfused 2U PRBC, started on cefepime and vanc, and given 2.2 L LR bolus w/ improvement in BP to 125/56. UA positive for bacteria and budding yeast. Pt started on vanc, meropenem, and diflucan. Hgb continued to trend down on MICU floor, pt required 1U prbc transfusion, started on PPI gtt. Lactate trending down. Pt with elevated K on floors, corrected w/ albuterol, insulin, lasix. Also found to have shock liver. GI performed upper endoscopy, no source of bleeding visualized. TFs restarted. Urine culture contaminated, repeat culture sent. Sputum culture grew pseudomonas. BCx NGTD. Course complicated by hyperglycemia, pt transitioned from low corrective ISS to high, started on NPH 20 bid. Pt has remained afebrile since admission, off levophed since yesterday.     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     #Neuro   - Per prior hospitalization and daughter "best neuro exam was awake, alert, able to answer questions with purposeful blinking"  - No sedation and currently ventilator dependant   - Tylenol for pain control     #CVS  Multifactorial shock (septic/hemorrhagic) with BP down to 60/palp in EMS on pressors   - Off norepinephrine   - S/p 1U PRBC o/n, 3U total since admission   - pt hypernatremic o/n, switched to 1/2 NS for IVF resuscitation  - At home, on norvasc and metoprolol for BP control  - At home, on atorvastatin for HLD     #Heme   Melena and dark vomitus on presentation suggestive of UGIB vs LGIB   - baseline Hgb 8.3 from prior hospitalization 11/29  - Continue to trend Hgb, s/p 1U o/n   - at home, on xarelto for prior pulmonary emboli   - LE duplex negative for DVTs    #Pulm:   Has trach; vent dependant.   - CXR demonstrates no focal consolidation   - c/w meropenem for coverage of possible pneumonia  - will d/c vanc   - sputum GS showing moderate GN rods, few GP rods; cx growing pseudomonas    #GI   likely UGIB with patient presenting with anemia, melena and dark vomitus.   - s/p upper endoscopy this am, no source of bleeding identified  - PPI gtt stopped  - trend hgb q12h  - Has PEG, placed to gravity for now  - will restart TFs    #Renal   - CKD with baseline of 1.25 - 1.81 since 2015   - Cr improving  - s/p insulin, albuterol, lasix for hyperkalemia yesterday  - Initial Anion gap 28, likely lactic acidosis from multifactorial shock. Anion gap now closed   - Will trend K, Cr, BUN and Uop    Hypernatremia  - pt hypernatremic o/n, switched to 1/2 NS  - will monitor     #ID   Multifactorial shock possibly septic in origin from possible UTI source   - will c/w meropenem, d/c-ed vanc  - UA + with few budding yeast, started on diflucan   - lactate trending down  - Ucx contaminated, will repeat today  - Large sacral decubitus ulcer, continuing with regimen of medihoney by nursing home    #Endo   BG elevated in setting of multifactorial shock with T2DM  - pt on humulin 38 bid at home  - placed on high dose ISS, will start NPH 20 bid  - will increase NPH once TFs restarted  - BG q6hrs    FOR FOLLOW UP:  - increase NPH as needed; pt on humalin 38 bid as outpt  - f/u Ucx, Bcx  - c/w meropenem, diflucan  - monitor CBC

## 2018-12-12 NOTE — PROGRESS NOTE ADULT - ASSESSMENT
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 Presented to ED from [facility] with fevers x 4d, and 4 episodes of vomiting on day of admission. En route to hospital in ambulance, patient became hypotensive and dopamine was started.  Pt arrived unresponsive with dark vomitus around mouth. In ED, pt was found to have melanotic stool and black vomitus. BP was 94/37; Hb was 5.7 and WBC 20.8 with lactate of 13.5. Pt was started on norepi, transfused 2U PRBC, started on cefepime and vanc, and given 2.2 L LR bolus w/ improvement in BP. Patient was admitted to MICU for further management. Patient was found to have a + UA positive for bacteria and budding yeast. Pt was started on meropenem, and diflucan. Hgb continued to trend down on MICU floor, pt required 1U prbc transfusion again on 12/12, Patient was placed on PPI gtt. GI performed upper endoscopy on 12/2 no source of bleeding visualized. TFs restarted. Urine culture contaminated, repeat culture sent. Sputum culture grew pseudomonas but no evidence of Infiltrate on CXR     12/12: Patient transferred to RCU this evening. Patient transfused 1 unit of PRBCs this morning. Patient S/p bedside EGD no evidence of active bleeding, TFs resumed. Patient transitioned off PPI drip to Protonix IVP Q 12 hrs  Patient remains on broad spec abx / antifungal for presumed UTI. Will attempt TOV this evening

## 2018-12-12 NOTE — PROGRESS NOTE ADULT - PROBLEM SELECTOR PLAN 3
Patient with Melena / Coffee Ground Emesis on admission   EGD 12/12: No evidence of active bleeding   If H+H continues to downtrend GI will consider Colonoscopy   Continue to monitor CBC Q 12 Hrs  Continue IVP Protonix q 12hrs

## 2018-12-12 NOTE — PHARMACOTHERAPY INTERVENTION NOTE - COMMENTS
Recommended to give NPH 20 units SQ q12h for hyperglycemia. Will continue to monitor and titrate dose base on BG and PO status.

## 2018-12-12 NOTE — PROGRESS NOTE ADULT - SUBJECTIVE AND OBJECTIVE BOX
Patient is a 73y old  Male who presents with a chief complaint of Multifactorial shock (12 Dec 2018 07:21)      Interval Events: Patient transferred to RCU This evening     REVIEW OF SYSTEMS:  [ ] Positive  [ ] All other systems negative  [x] Unable to assess ROS because patient is Non-responsive to verbal stimuli     Vital Signs Last 24 Hrs  T(C): 37.2 (-18 @ 15:35), Max: 37.4 (18 @ 20:00)  T(F): 99 (18 @ 15:35), Max: 99.3 (18 @ 20:00)  HR: 114 (18 @ 18:33) (104 - 123)  BP: 120/61 (18 @ 15:35) (104/49 - 136/63)  RR: 19 (18 @ 15:35) (12 - 22)  SpO2: 100% (18 @ 18:33) (100% - 100%)    PHYSICAL EXAM:  HEENT:   [x]Tracheostomy: # 8 Cuffed Shiley   [x]Pupils equal  [ ]No oral lesions  [ ]Abnormal    SKIN  [ ]No Rash  [ ] Abnormal  [x] pressure Unstagable Sacral pressure wound      CARDIAC  [ ]Regular  [x]Abnormal Slightly tachycardic     PULMONARY  [x]Bilateral Coarse Breath Sounds  [ ]Normal Excursion  [ ]Abnormal    GI  [x]PEG      [x] +BS		              [x]Soft, nondistended, nontender	  [ ]Abnormal    MUSCULOSKELETAL                                   [x]Bedbound                 [ ]Abnormal    [ ]Ambulatory/OOB to chair                           EXTREMITIES                                         [ ]Normal  [x]Edema + Trace pedal edema                           NEUROLOGIC  [ ] Normal, non focal  [x] Focal findings: Grimaces to noxious stimuli, actively trying to resist eye opening, No withdrawal to pain     PSYCHIATRIC  [ ]Alert and appropriate  [x] Sedated	 [ ]Agitated    :  Guerra: [x] Yes, if yes: Date of Placement: Placed  will attempt TOV this evening and placed EUD to protect sacral pressure wound from Incontinence                    [  ] No    LINES: Central Lines [ ] Yes, if yes: Date of Placement                                     [x] No    HOSPITAL MEDICATIONS:  MEDICATIONS  (STANDING):  dextrose 5%. 1000 milliLiter(s) (50 mL/Hr) IV Continuous <Continuous>  dextrose 50% Injectable 12.5 Gram(s) IV Push once  dextrose 50% Injectable 25 Gram(s) IV Push once  dextrose 50% Injectable 25 Gram(s) IV Push once  fluconAZOLE IVPB 200 milliGRAM(s) IV Intermittent every 24 hours  insulin lispro (HumaLOG) corrective regimen sliding scale   SubCutaneous every 6 hours  insulin NPH human recombinant 20 Unit(s) SubCutaneous every 12 hours  meropenem  IVPB      meropenem  IVPB 1000 milliGRAM(s) IV Intermittent every 8 hours  pantoprazole  Injectable 40 milliGRAM(s) IV Push every 12 hours  sodium chloride 0.45%. 1000 milliLiter(s) (75 mL/Hr) IV Continuous <Continuous>    MEDICATIONS  (PRN):  acetaminophen    Suspension .. 650 milliGRAM(s) Oral every 6 hours PRN Moderate Pain (4 - 6)  dextrose 40% Gel 15 Gram(s) Oral once PRN Blood Glucose LESS THAN 70 milliGRAM(s)/deciLiter  glucagon  Injectable 1 milliGRAM(s) IntraMuscular once PRN Glucose <70 milliGRAM(s)/deciLiter      LABS:                        8.2    19.3  )-----------( 237      ( 12 Dec 2018 12:11 )             24.8     12-12    152<H>  |  115<H>  |  51<H>  ----------------------------<  297<H>  3.3<L>   |  26  |  1.14    Ca    9.1      12 Dec 2018 12:11  Phos  2.4     12-12  Mg     1.7     1212    TPro  5.6<L>  /  Alb  2.1<L>  /  TBili  0.5  /  DBili  x   /  AST  945<H>  /  ALT  1332<H>  /  AlkPhos  105  12-12    PT/INR - ( 12 Dec 2018 00:26 )   PT: 21.9 sec;   INR: 1.88 ratio         PTT - ( 12 Dec 2018 00:26 )  PTT:31.1 sec  Urinalysis Basic - ( 11 Dec 2018 05:16 )    Color: Yellow / Appearance: Turbid / S.021 / pH: x  Gluc: x / Ketone: Negative  / Bili: Negative / Urobili: Negative   Blood: x / Protein: 300 mg/dL / Nitrite: Negative   Leuk Esterase: Small / RBC: >50 /hpf / WBC 12 /HPF   Sq Epi: x / Non Sq Epi: 2 / Bacteria: Few      Arterial Blood Gas:   @ 18:09  7.48/31/149/23/99/.0  ABG lactate: --  Arterial Blood Gas:   @ 11:38  7.47/30/146/22/99/-1.3  ABG lactate: --  Arterial Blood Gas:   @ 05:04  7.40/32/163/20/99/-3.9  ABG lactate: --    Venous Blood Gas:   @ 06:14  7.37/38/53/22/86  VBG Lactate: 8.1  Venous Blood Gas:   @ 02:07  --/--/--/--/--  VBG Lactate: 13.5    CAPILLARY BLOOD GLUCOSE    MICROBIOLOGY:     RADIOLOGY:  [ ] Reviewed and interpreted by me    Mode: AC/ CMV (Assist Control/ Continuous Mandatory Ventilation)  RR (machine): 16  TV (machine): 500  FiO2: 30  PEEP: 5  ITime: 1  MAP: 8  PIP: 16

## 2018-12-12 NOTE — PROGRESS NOTE ADULT - PROBLEM SELECTOR PLAN 2
Patient with Tracheostomy at baseline   Continue Mechanical Ventilation   Continue Chest PT and Suctioning PRN   Attempt weaning as tolerating

## 2018-12-12 NOTE — CHART NOTE - NSCHARTNOTEFT_GEN_A_CORE
Called by RN, pt with Called by RN, pt with increased residual at 8 pm of 140 cc. Tube feeding held, at 10 pm residual 100 cc.   RN will recheck in 1 hour and resume tube feeds if residual cont to decrease.   Per policy to hold TFs if residual is 1.5x > than TF rate.       Cherelle Doan ANP-BC  [id number]

## 2018-12-12 NOTE — DIETITIAN INITIAL EVALUATION ADULT. - ENERGY NEEDS
Ht: 72"  Wt: 158  BMI: 21.4 kg/m2   IBW: 178 (+/-10%)     89% IBW  Edema: 2+ generaglzed, 3+ arm        Skin per flow sheet: unstageable sacrum, DTI B/L heels

## 2018-12-12 NOTE — PROGRESS NOTE ADULT - SUBJECTIVE AND OBJECTIVE BOX
INTERVAL HPI/OVERNIGHT EVENTS:    SUBJECTIVE: Patient seen and examined at bedside.     ROS not obtained 2/2 MS.     OBJECTIVE:    VITAL SIGNS:  ICU Vital Signs Last 24 Hrs  T(C): 37 (12 Dec 2018 04:00), Max: 38.2 (11 Dec 2018 12:00)  T(F): 98.6 (12 Dec 2018 04:00), Max: 100.7 (11 Dec 2018 12:00)  HR: 107 (12 Dec 2018 06:00) (96 - 127)  BP: 124/57 (12 Dec 2018 06:00) (92/45 - 161/67)  BP(mean): 82 (12 Dec 2018 06:00) (64 - 97)  ABP: --  ABP(mean): --  RR: 18 (12 Dec 2018 06:00) (9 - 28)  SpO2: 100% (12 Dec 2018 06:00) (94% - 100%)    Mode: AC/ CMV (Assist Control/ Continuous Mandatory Ventilation), RR (machine): 18, TV (machine): 500, FiO2: 30, PEEP: 5, ITime: 0.7, MAP: 8, PIP: 19    12-11 @ 07:01  -  12-12 @ 07:00  --------------------------------------------------------  IN: 5189.9 mL / OUT: 3760 mL / NET: 1429.9 mL      CAPILLARY BLOOD GLUCOSE      POCT Blood Glucose.: 356 mg/dL (12 Dec 2018 02:15)      PHYSICAL EXAM:    General: NAD  HEENT: NCAT, PERRL, clear conjunctiva, no erythema or exudates in the oropharynx  Neck: supple, no JVD  Respiratory: CTAB, normal respiratory effort  Cardiovascular: RRR, S1S2, no murmurs, rubs, or gallops  Abdomen: soft, nontender, nondistended, normal bowel sounds  Extremities: 2+ peripheral pulses b/l, no edema or cyanosis   Skin: normal color and turgor; no rash  Neurological: AOx3, nonfocal    MEDICATIONS:  MEDICATIONS  (STANDING):  dextrose 5%. 1000 milliLiter(s) (50 mL/Hr) IV Continuous <Continuous>  dextrose 50% Injectable 12.5 Gram(s) IV Push once  dextrose 50% Injectable 25 Gram(s) IV Push once  dextrose 50% Injectable 25 Gram(s) IV Push once  fluconAZOLE IVPB 200 milliGRAM(s) IV Intermittent every 24 hours  insulin lispro (HumaLOG) corrective regimen sliding scale   SubCutaneous every 6 hours  meropenem  IVPB      meropenem  IVPB 1000 milliGRAM(s) IV Intermittent every 8 hours  norepinephrine Infusion 0.1 MICROgram(s)/kG/Min (13.125 mL/Hr) IV Continuous <Continuous>  pantoprazole Infusion 8 mG/Hr (10 mL/Hr) IV Continuous <Continuous>  sodium chloride 0.45%. 1000 milliLiter(s) (75 mL/Hr) IV Continuous <Continuous>  vancomycin  IVPB 1000 milliGRAM(s) IV Intermittent every 24 hours    MEDICATIONS  (PRN):  acetaminophen    Suspension .. 650 milliGRAM(s) Oral every 6 hours PRN Moderate Pain (4 - 6)  dextrose 40% Gel 15 Gram(s) Oral once PRN Blood Glucose LESS THAN 70 milliGRAM(s)/deciLiter  glucagon  Injectable 1 milliGRAM(s) IntraMuscular once PRN Glucose <70 milliGRAM(s)/deciLiter      ALLERGIES:  Allergies    penicillin (Unknown)    Intolerances        LABS:                        8.2    18.7  )-----------( 225      ( 12 Dec 2018 06:14 )             24.9     12-    152<H>  |  114<H>  |  59<H>  ----------------------------<  363<H>  3.7   |  25  |  1.22    Ca    9.1      12 Dec 2018 06:14  Phos  2.9       Mg     1.7         TPro  5.5<L>  /  Alb  2.0<L>  /  TBili  0.5  /  DBili  x   /  AST  x   /  ALT  x   /  AlkPhos  99  12-12    PT/INR - ( 12 Dec 2018 00:26 )   PT: 21.9 sec;   INR: 1.88 ratio         PTT - ( 12 Dec 2018 00:26 )  PTT:31.1 sec  Urinalysis Basic - ( 11 Dec 2018 05:16 )    Color: Yellow / Appearance: Turbid / S.021 / pH: x  Gluc: x / Ketone: Negative  / Bili: Negative / Urobili: Negative   Blood: x / Protein: 300 mg/dL / Nitrite: Negative   Leuk Esterase: Small / RBC: >50 /hpf / WBC 12 /HPF   Sq Epi: x / Non Sq Epi: 2 / Bacteria: Few        RADIOLOGY & ADDITIONAL TESTS: Reviewed. INTERVAL HPI/OVERNIGHT EVENTS: Pt's Hgb fell to 6.7 o/n, required 1U pRBC transfusion. Hgb increased to 8.2. Pt hypernatremic, switched to 1/2 LR.     SUBJECTIVE: Patient seen and examined at bedside.     ROS not obtained 2/2 MS.     OBJECTIVE:    VITAL SIGNS:  ICU Vital Signs Last 24 Hrs  T(C): 37 (12 Dec 2018 04:00), Max: 38.2 (11 Dec 2018 12:00)  T(F): 98.6 (12 Dec 2018 04:00), Max: 100.7 (11 Dec 2018 12:00)  HR: 107 (12 Dec 2018 06:00) (96 - 127)  BP: 124/57 (12 Dec 2018 06:00) (92/45 - 161/67)  BP(mean): 82 (12 Dec 2018 06:00) (64 - 97)  ABP: --  ABP(mean): --  RR: 18 (12 Dec 2018 06:00) (9 - 28)  SpO2: 100% (12 Dec 2018 06:00) (94% - 100%)    Mode: AC/ CMV (Assist Control/ Continuous Mandatory Ventilation), RR (machine): 18, TV (machine): 500, FiO2: 30, PEEP: 5, ITime: 0.7, MAP: 8, PIP: 19    12- @ 07:01  -  12- @ 07:00  --------------------------------------------------------  IN: 5189.9 mL / OUT: 3760 mL / NET: 1429.9 mL      CAPILLARY BLOOD GLUCOSE      POCT Blood Glucose.: 356 mg/dL (12 Dec 2018 02:15)      PHYSICAL EXAM:    General: NAD  HEENT: NCAT, PERRL, clear conjunctiva, no erythema or exudates in the oropharynx  Neck: supple, no JVD  Respiratory: CTAB, normal respiratory effort  Cardiovascular: RRR, S1S2, no murmurs, rubs, or gallops  Abdomen: soft, nontender, nondistended, normal bowel sounds  Extremities: 2+ peripheral pulses b/l. b/l LE edema, LUE edema   Skin: normal color and turgor; no rash  Neurological: AOx0, nonfocal    MEDICATIONS:  MEDICATIONS  (STANDING):  dextrose 5%. 1000 milliLiter(s) (50 mL/Hr) IV Continuous <Continuous>  dextrose 50% Injectable 12.5 Gram(s) IV Push once  dextrose 50% Injectable 25 Gram(s) IV Push once  dextrose 50% Injectable 25 Gram(s) IV Push once  fluconAZOLE IVPB 200 milliGRAM(s) IV Intermittent every 24 hours  insulin lispro (HumaLOG) corrective regimen sliding scale   SubCutaneous every 6 hours  meropenem  IVPB      meropenem  IVPB 1000 milliGRAM(s) IV Intermittent every 8 hours  norepinephrine Infusion 0.1 MICROgram(s)/kG/Min (13.125 mL/Hr) IV Continuous <Continuous>  pantoprazole Infusion 8 mG/Hr (10 mL/Hr) IV Continuous <Continuous>  sodium chloride 0.45%. 1000 milliLiter(s) (75 mL/Hr) IV Continuous <Continuous>  vancomycin  IVPB 1000 milliGRAM(s) IV Intermittent every 24 hours    MEDICATIONS  (PRN):  acetaminophen    Suspension .. 650 milliGRAM(s) Oral every 6 hours PRN Moderate Pain (4 - 6)  dextrose 40% Gel 15 Gram(s) Oral once PRN Blood Glucose LESS THAN 70 milliGRAM(s)/deciLiter  glucagon  Injectable 1 milliGRAM(s) IntraMuscular once PRN Glucose <70 milliGRAM(s)/deciLiter      ALLERGIES:  Allergies    penicillin (Unknown)    Intolerances        LABS:                        8.2    18.7  )-----------( 225      ( 12 Dec 2018 06:14 )             24.9     12-12    152<H>  |  114<H>  |  59<H>  ----------------------------<  363<H>  3.7   |  25  |  1.22    Ca    9.1      12 Dec 2018 06:14  Phos  2.9     12-12  Mg     1.7     12-12    TPro  5.5<L>  /  Alb  2.0<L>  /  TBili  0.5  /  DBili  x   /  AST  x   /  ALT  x   /  AlkPhos  99  12-12    PT/INR - ( 12 Dec 2018 00:26 )   PT: 21.9 sec;   INR: 1.88 ratio         PTT - ( 12 Dec 2018 00:26 )  PTT:31.1 sec  Urinalysis Basic - ( 11 Dec 2018 05:16 )    Color: Yellow / Appearance: Turbid / S.021 / pH: x  Gluc: x / Ketone: Negative  / Bili: Negative / Urobili: Negative   Blood: x / Protein: 300 mg/dL / Nitrite: Negative   Leuk Esterase: Small / RBC: >50 /hpf / WBC 12 /HPF   Sq Epi: x / Non Sq Epi: 2 / Bacteria: Few        RADIOLOGY & ADDITIONAL TESTS: Reviewed.

## 2018-12-12 NOTE — EEG REPORT - NS EEG TEXT BOX
[facility]   COMPREHENSIVE EPILEPSY CENTER   REPORT OF ROUTINE VIDEO EEG     [facility]: [address] [place] [address] [address], [place] [address], Ph#: [phone]  [facility]: [id number], [place] [address], Ph#: [phone]  Office: 1 [facility], [place] [address] Ph#: [phone]    Patient Name: TRACEY OZUNA  Age and : 73y (43)  MRN #: [id number]  Location: [facility]  Referring Physician: Jolynn Richardson    Study Date: 18    _____________________________________________________________  TECHNICAL INFORMATION    Placement and Labeling of Electrodes:  The EEG was performed utilizing 20 channels referential EEG connections (coronal over temporal over parasagittal montage) using all standard 10-20 electrode placements with EKG.  Recording was at a sampling rate of 256 samples per second per channel.  Time synchronized digital video recording was done simultaneously with EEG recording.  A low light infrared camera was used for low light recording.  Michael and seizure detection algorithms were utilized.    _____________________________________________________________  HISTORY    Patient is a 73y old  Male who presents with a chief complaint of Multifactorial shock (12 Dec 2018 07:21)      PERTINENT MEDICATION:  none pertinent  _____________________________________________________________  STUDY INTERPRETATION    Findings: The background was continuous, variable minimally reactive.   Background predominantly consisted of delta activities. No posterior dominant rhythm seen.    Focal Slowing:   None were present.    Sleep Background:  Stage II sleep transients were not recorded.    Other Non-Epileptiform Findings:  Diffuse attenuation.    Interictal Epileptiform Activity:   None were present.    Events:  Clinical events: None recorded.  Seizures: None recorded.    Activation Procedures:   Hyperventilation was not performed.    Photic stimulation was not performed.     Artifacts:  Intermittent myogenic and movement artifacts were noted.    ECG:  The heart rate on single channel ECG was predominantly between 70-80 BPM.    _____________________________________________________________  EEG SUMMARY/CLASSIFICATION  Abnormal EEG in an unresponsive patient patient.  - Severe generalized slowing.    _____________________________________________________________  EEG IMPRESSION/CLINICAL CORRELATE  Abnormal EEG study.  1. Severe nonspecific diffuse or multifocal cerebral dysfunction.   2. No epileptiform pattern or seizure seen.    Daniela William MD  Epilepsy Fellow, [facility] [place] [facility]   COMPREHENSIVE EPILEPSY CENTER   REPORT OF ROUTINE VIDEO EEG     [facility]: [address] [place] [address] [address], [place] [address], Ph#: [phone]  [facility]: [address], [place] [address], Ph#: [phone]  Office: 1 [facility], [facility] [address], [place] [address] Ph#: [phone]    Patient Name: TRACEY OZUNA  Age and : 73y (43)  MRN #: [id number]  [facility]: [facility]  Referring Physician: Jolynn Richardson    Study Date: 18    _____________________________________________________________  TECHNICAL INFORMATION    Placement and Labeling of Electrodes:  The EEG was performed utilizing 20 channels referential EEG connections (coronal over temporal over parasagittal montage) using all standard 10-20 electrode placements with EKG.  Recording was at a sampling rate of 256 samples per second per channel.  Time synchronized digital video recording was done simultaneously with EEG recording.  A low light infrared camera was used for low light recording.  Michael and seizure detection algorithms were utilized.    _____________________________________________________________  HISTORY    Patient is a 73y old  Male who presents with a chief complaint of Multifactorial shock (12 Dec 2018 07:21)      PERTINENT MEDICATION:  none pertinent  _____________________________________________________________  STUDY INTERPRETATION    Findings: The background was continuous, variable minimally reactive. Background predominantly consisted of diffusely attenuated theta, delta and faster activities. No posterior dominant rhythm seen.    Focal Slowing:   None were present.    Sleep Background:  Stage II sleep transients were not recorded.    Other Non-Epileptiform Findings:  Diffuse attenuation.    Interictal Epileptiform Activity:   None were present.    Events:  Clinical events: None recorded.  Seizures: None recorded.    Activation Procedures:   Hyperventilation was not performed.    Photic stimulation was not performed.     Artifacts:  Intermittent myogenic and movement artifacts were noted.    ECG:  The heart rate on single channel ECG was predominantly between 70-80 BPM.    _____________________________________________________________  EEG SUMMARY/CLASSIFICATION  Abnormal EEG in an unresponsive patient.  - Severe generalized slowing.    _____________________________________________________________  EEG IMPRESSION/CLINICAL CORRELATE  Abnormal EEG study.  1. Severe nonspecific diffuse or multifocal cerebral dysfunction.   2. No epileptiform pattern or seizure seen.      Daniela William MD  Epilepsy Fellow, [facility]    Isabela Beck MD  Attending Physician, [facility]

## 2018-12-12 NOTE — DIETITIAN INITIAL EVALUATION ADULT. - NS AS NUTRI INTERV ENTERAL NUTRITION
When EN initiated, recommend Vital 1.2 at 85cc/hr x 18 hrs provides 1836 kcals, 115gm protein, 1240cc free water  meets 25 Kcal/Kg, 1.6Gm/kg dosing wt 71.6kg.  Danactive 2 times/day

## 2018-12-12 NOTE — PROGRESS NOTE ADULT - PROBLEM SELECTOR PLAN 8
Patient with hx of HTN Usually on Norvasc and Metoprolol   Patient hypotensive in setting of shock on admission requiring pressors   Will monitor BP off Anti- htn currently

## 2018-12-12 NOTE — DIETITIAN INITIAL EVALUATION ADULT. - FACTORS AFF FOOD INTAKE
difficulty swallowing/EN on hold for GI work up, plan for EGD today difficulty swallowing/EN on hold, plan for EGD today

## 2018-12-12 NOTE — PROGRESS NOTE ADULT - PROBLEM SELECTOR PLAN 4
Tube feeds restarted this evening   Patient placed back on Decreased dose of NPH until patient at goal rate  Patient usually on NPH 38 Units BID at NH   Will continue NPH 20 Units q 12 hrs and increase as needed as patient reaches goal rate of tube feeds  Continue Insulin sliding scale , Monitor Blood Glucose levels

## 2018-12-12 NOTE — PROGRESS NOTE ADULT - ASSESSMENT
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     #Neuro   - Per prior hospitalization and daughter " best neuro exam was awake, alert, able to answer questions with purposeful blinking"  - No sedation and currently ventilator dependant   - Tylenol for pain control     #CVS  Multifactorial shock (septic/hemorrhagic) with BP down to 60/palp in EMS on pressors   - On norepinephrine   - S/p 1U PRBC o/n, 3U total since admission   - pt hypernatremic o/n, switched to 1/2 NS for IVF resuscitation  - At home, on norvasc and metoprolol for BP control  - At home, on atorvastatin for HLD     #Heme   Melena and dark vomitus on presentation suggestive of UGIB vs LGIB   - baseline Hgb 8.3 from prior hospitalization 11/29  - Continue to trend Hgb, s/p 1U o/n   - home, on xarelto for prior pulmonary emboli   - LE duplex negative for DVTs    #Pulm:   Has trach; vent dependant.   - CXR demonstrates no focal consolidation   - On vanc and meropenem for coverage of possible pneumonia   - sputum GS showing moderate GN rods, few GP rods    #GI   likely UGIB with patient presenting with anemia, melena and dark vomitus.   - trend hgb q12h  - Has PEG, placed to gravity for now  - NPO   - Pantoprazole 40 gtt  - pt seen by GI, possible intervention when more hemodynamically stable    #Renal   - CKD with baseline of 1.25 - 1.81 since 2015   - Current Cr 1.59, BUN 88 c/b hyperkalemia to 5.5  - s/p insulin, albuterol, lasix for hyperkalemia yesterday  - Initial Anion gap 28, likely lactic acidosis from multifactorial shock. Anion gap now closed   - Will trend K, Cr, BUN and Uop    Hypernatremia  - pt hypernatremic o/n, switched to 1/2 NS  - will monitor     #ID   Multifactorial shock possibly septic in origin from possible UTI source   - Currently on vanc and meropenem   - UA + with few budding yeast, so also started on diflucan   - lactate trending down  - Awaiting Ucx to possibly narrow   - Large sacral decubitus ulcer, continuing with regimen of medihoney by nursing home.    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     #Neuro   - Per prior hospitalization and daughter " best neuro exam was awake, alert, able to answer questions with purposeful blinking"  - No sedation and currently ventilator dependant   - Tylenol for pain control     #CVS  Multifactorial shock (septic/hemorrhagic) with BP down to 60/palp in EMS on pressors   - Off norepinephrine   - S/p 1U PRBC o/n, 3U total since admission   - pt hypernatremic o/n, switched to 1/2 NS for IVF resuscitation  - At home, on norvasc and metoprolol for BP control  - At home, on atorvastatin for HLD     #Heme   Melena and dark vomitus on presentation suggestive of UGIB vs LGIB   - baseline Hgb 8.3 from prior hospitalization 11/29  - Continue to trend Hgb, s/p 1U o/n   - at home, on xarelto for prior pulmonary emboli   - LE duplex negative for DVTs    #Pulm:   Has trach; vent dependant.   - CXR demonstrates no focal consolidation   - c/w meropenem for coverage of possible pneumonia  - will d/c vanc   - sputum GS showing moderate GN rods, few GP rods; cx growing pseudomonas    #GI   likely UGIB with patient presenting with anemia, melena and dark vomitus.   - s/p upper endoscopy this am, no source of bleeding identified  - trend hgb q12h  - Has PEG, placed to gravity for now  - NPO   - Pantoprazole 40 gtt  - pt seen by GI, possible intervention when more hemodynamically stable    #Renal   - CKD with baseline of 1.25 - 1.81 since 2015   - Current Cr 1.59, BUN 88 c/b hyperkalemia to 5.5  - s/p insulin, albuterol, lasix for hyperkalemia yesterday  - Initial Anion gap 28, likely lactic acidosis from multifactorial shock. Anion gap now closed   - Will trend K, Cr, BUN and Uop    Hypernatremia  - pt hypernatremic o/n, switched to 1/2 NS  - will monitor     #ID   Multifactorial shock possibly septic in origin from possible UTI source   - Currently on vanc and meropenem   - UA + with few budding yeast, so also started on diflucan   - lactate trending down  - Awaiting Ucx to possibly narrow   - Large sacral decubitus ulcer, continuing with regimen of medihoney by nursing home.    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     #Neuro   - Per prior hospitalization and daughter " best neuro exam was awake, alert, able to answer questions with purposeful blinking"  - No sedation and currently ventilator dependant   - Tylenol for pain control     #CVS  Multifactorial shock (septic/hemorrhagic) with BP down to 60/palp in EMS on pressors   - Off norepinephrine   - S/p 1U PRBC o/n, 3U total since admission   - pt hypernatremic o/n, switched to 1/2 NS for IVF resuscitation  - At home, on norvasc and metoprolol for BP control  - At home, on atorvastatin for HLD     #Heme   Melena and dark vomitus on presentation suggestive of UGIB vs LGIB   - baseline Hgb 8.3 from prior hospitalization 11/29  - Continue to trend Hgb, s/p 1U o/n   - at home, on xarelto for prior pulmonary emboli   - LE duplex negative for DVTs    #Pulm:   Has trach; vent dependant.   - CXR demonstrates no focal consolidation   - c/w meropenem for coverage of possible pneumonia  - will d/c vanc   - sputum GS showing moderate GN rods, few GP rods; cx growing pseudomonas    #GI   likely UGIB with patient presenting with anemia, melena and dark vomitus.   - s/p upper endoscopy this am, no source of bleeding identified  - trend hgb q12h  - Has PEG, placed to gravity for now  - will restart TFs  - Pantoprazole 40 gtt    #Renal   - CKD with baseline of 1.25 - 1.81 since 2015   - Cr improving  - s/p insulin, albuterol, lasix for hyperkalemia yesterday  - Initial Anion gap 28, likely lactic acidosis from multifactorial shock. Anion gap now closed   - Will trend K, Cr, BUN and Uop    Hypernatremia  - pt hypernatremic o/n, switched to 1/2 NS  - will monitor     #ID   Multifactorial shock possibly septic in origin from possible UTI source   - will c/w meropenem, d/c-ed vanc  - UA + with few budding yeast, started on diflucan   - lactate trending down  - Ucx contaminated, will repeat today  - Large sacral decubitus ulcer, continuing with regimen of medihoney by nursing home    #Endo   BG elevated in setting of multifactorial shock with T2DM  - pt on humulin 38 bid at home  - placed on high dose ISS, will start NPH 20 bid  - will increase NPH once TFs restarted  - BG q6hrs

## 2018-12-12 NOTE — DIETITIAN INITIAL EVALUATION ADULT. - OTHER INFO
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   GI issues:  no vomiting, fecal incontinence 12/11    Food allergies: NKFA   Vit/supplement PTA: liquid protein supplement, vitamin C, multivitamin/mineral, Zn

## 2018-12-12 NOTE — DIETITIAN INITIAL EVALUATION ADULT. - NS FNS WEIGHT USED FOR CALC
Progress Notes by Rubia Irving MD at 11/08/18 08:40 AM     Author:  Rubia Irving MD Service:  (none) Author Type:  Physician     Filed:  11/08/18 08:40 AM Encounter Date:  11/6/2018 Status:  Signed     :  Rubia Irving MD (Physician)            Result Reviewed. Electronically Signed by:    Rubia Irving MD , 11/8/2018    Please let patient know biopsies from the colonoscopy showed hyperplastic polyps, which do not put at increased risk for cancer. We can plan to repeat colonoscopy in 5 years or so, sooner if symptoms of concern were to develop.   [KJ1.1M]    Revision History        User Key Date/Time User Provider Type Action    > KJ1.1 11/08/18 08:40 AM Rubia Irving MD Physician Sign    M - Manual
dosing/71.6kg

## 2018-12-12 NOTE — PROGRESS NOTE ADULT - PROBLEM SELECTOR PLAN 7
Patient with Hx of CVA resulting in " Locked in Syndrome"   ASA currently on held 2/2 GI Bleed  Atorvastatin currently on hold in setting of shock liver  Continue supportive care

## 2018-12-13 LAB
-  AMIKACIN: SIGNIFICANT CHANGE UP
-  AZTREONAM: SIGNIFICANT CHANGE UP
-  CANDIDA ALBICANS: SIGNIFICANT CHANGE UP
-  CANDIDA GLABRATA: SIGNIFICANT CHANGE UP
-  CANDIDA KRUSEI: SIGNIFICANT CHANGE UP
-  CANDIDA PARAPSILOSIS: SIGNIFICANT CHANGE UP
-  CANDIDA TROPICALIS: SIGNIFICANT CHANGE UP
-  CEFEPIME: SIGNIFICANT CHANGE UP
-  CEFTAZIDIME: SIGNIFICANT CHANGE UP
-  CIPROFLOXACIN: SIGNIFICANT CHANGE UP
-  COAGULASE NEGATIVE STAPHYLOCOCCUS: SIGNIFICANT CHANGE UP
-  GENTAMICIN: SIGNIFICANT CHANGE UP
-  IMIPENEM: SIGNIFICANT CHANGE UP
-  K. PNEUMONIAE GROUP: SIGNIFICANT CHANGE UP
-  KPC RESISTANCE GENE: SIGNIFICANT CHANGE UP
-  LEVOFLOXACIN: SIGNIFICANT CHANGE UP
-  MEROPENEM: SIGNIFICANT CHANGE UP
-  PIPERACILLIN/TAZOBACTAM: SIGNIFICANT CHANGE UP
-  STREPTOCOCCUS SP. (NOT GRP A, B OR S PNEUMONIAE): SIGNIFICANT CHANGE UP
-  TOBRAMYCIN: SIGNIFICANT CHANGE UP
A BAUMANNII DNA SPEC QL NAA+PROBE: SIGNIFICANT CHANGE UP
ANION GAP SERPL CALC-SCNC: 11 MMOL/L — SIGNIFICANT CHANGE UP (ref 5–17)
APPEARANCE UR: CLEAR — SIGNIFICANT CHANGE UP
APTT BLD: 31.4 SEC — SIGNIFICANT CHANGE UP (ref 27.5–36.3)
BILIRUB UR-MCNC: NEGATIVE — SIGNIFICANT CHANGE UP
BUN SERPL-MCNC: 32 MG/DL — HIGH (ref 7–23)
CALCIUM SERPL-MCNC: 9.2 MG/DL — SIGNIFICANT CHANGE UP (ref 8.4–10.5)
CHLORIDE SERPL-SCNC: 116 MMOL/L — HIGH (ref 96–108)
CHOLEST SERPL-MCNC: 76 MG/DL — SIGNIFICANT CHANGE UP (ref 10–199)
CO2 SERPL-SCNC: 25 MMOL/L — SIGNIFICANT CHANGE UP (ref 22–31)
COLOR SPEC: YELLOW — SIGNIFICANT CHANGE UP
CREAT SERPL-MCNC: 0.89 MG/DL — SIGNIFICANT CHANGE UP (ref 0.5–1.3)
CULTURE RESULTS: SIGNIFICANT CHANGE UP
DIFF PNL FLD: NEGATIVE — SIGNIFICANT CHANGE UP
E CLOAC COMP DNA BLD POS QL NAA+PROBE: SIGNIFICANT CHANGE UP
E COLI DNA BLD POS QL NAA+NON-PROBE: SIGNIFICANT CHANGE UP
ENTEROCOC DNA BLD POS QL NAA+NON-PROBE: SIGNIFICANT CHANGE UP
ENTEROCOC DNA BLD POS QL NAA+NON-PROBE: SIGNIFICANT CHANGE UP
GLUCOSE BLDC GLUCOMTR-MCNC: 216 MG/DL — HIGH (ref 70–99)
GLUCOSE BLDC GLUCOMTR-MCNC: 234 MG/DL — HIGH (ref 70–99)
GLUCOSE BLDC GLUCOMTR-MCNC: 264 MG/DL — HIGH (ref 70–99)
GLUCOSE BLDC GLUCOMTR-MCNC: 274 MG/DL — HIGH (ref 70–99)
GLUCOSE SERPL-MCNC: 234 MG/DL — HIGH (ref 70–99)
GLUCOSE UR QL: NEGATIVE MG/DL — SIGNIFICANT CHANGE UP
GP B STREP DNA BLD POS QL NAA+NON-PROBE: SIGNIFICANT CHANGE UP
GRAM STN FLD: SIGNIFICANT CHANGE UP
HAEM INFLU DNA BLD POS QL NAA+NON-PROBE: SIGNIFICANT CHANGE UP
HBA1C BLD-MCNC: 6.2 % — HIGH (ref 4–5.6)
HCT VFR BLD CALC: 23.9 % — LOW (ref 39–50)
HCT VFR BLD CALC: 25.5 % — LOW (ref 39–50)
HDLC SERPL-MCNC: 17 MG/DL — LOW
HGB BLD-MCNC: 7.8 G/DL — LOW (ref 13–17)
HGB BLD-MCNC: 7.9 G/DL — LOW (ref 13–17)
INR BLD: 1.33 RATIO — HIGH (ref 0.88–1.16)
K OXYTOCA DNA BLD POS QL NAA+NON-PROBE: SIGNIFICANT CHANGE UP
KETONES UR-MCNC: NEGATIVE — SIGNIFICANT CHANGE UP
L MONOCYTOG DNA BLD POS QL NAA+NON-PROBE: SIGNIFICANT CHANGE UP
LEUKOCYTE ESTERASE UR-ACNC: NEGATIVE — SIGNIFICANT CHANGE UP
LIPID PNL WITH DIRECT LDL SERPL: 34 MG/DL — SIGNIFICANT CHANGE UP
MAGNESIUM SERPL-MCNC: 1.8 MG/DL — SIGNIFICANT CHANGE UP (ref 1.6–2.6)
MCHC RBC-ENTMCNC: 27.9 PG — SIGNIFICANT CHANGE UP (ref 27–34)
MCHC RBC-ENTMCNC: 29.1 PG — SIGNIFICANT CHANGE UP (ref 27–34)
MCHC RBC-ENTMCNC: 31.1 GM/DL — LOW (ref 32–36)
MCHC RBC-ENTMCNC: 32.5 GM/DL — SIGNIFICANT CHANGE UP (ref 32–36)
MCV RBC AUTO: 89.3 FL — SIGNIFICANT CHANGE UP (ref 80–100)
MCV RBC AUTO: 89.7 FL — SIGNIFICANT CHANGE UP (ref 80–100)
METHOD TYPE: SIGNIFICANT CHANGE UP
METHOD TYPE: SIGNIFICANT CHANGE UP
MRSA SPEC QL CULT: SIGNIFICANT CHANGE UP
MSSA DNA SPEC QL NAA+PROBE: SIGNIFICANT CHANGE UP
N MEN ISLT CULT: SIGNIFICANT CHANGE UP
NITRITE UR-MCNC: NEGATIVE — SIGNIFICANT CHANGE UP
ORGANISM # SPEC MICROSCOPIC CNT: SIGNIFICANT CHANGE UP
ORGANISM # SPEC MICROSCOPIC CNT: SIGNIFICANT CHANGE UP
P AERUGINOSA DNA BLD POS NAA+NON-PROBE: SIGNIFICANT CHANGE UP
PH UR: 7.5 — SIGNIFICANT CHANGE UP (ref 5–8)
PHOSPHATE SERPL-MCNC: 1.5 MG/DL — LOW (ref 2.5–4.5)
PLATELET # BLD AUTO: 204 K/UL — SIGNIFICANT CHANGE UP (ref 150–400)
PLATELET # BLD AUTO: 239 K/UL — SIGNIFICANT CHANGE UP (ref 150–400)
POTASSIUM SERPL-MCNC: 3.3 MMOL/L — LOW (ref 3.5–5.3)
POTASSIUM SERPL-SCNC: 3.3 MMOL/L — LOW (ref 3.5–5.3)
PROCALCITONIN SERPL-MCNC: 11.54 NG/ML — HIGH (ref 0.02–0.1)
PROT UR-MCNC: ABNORMAL MG/DL
PROTHROM AB SERPL-ACNC: 15.4 SEC — HIGH (ref 10–12.9)
RBC # BLD: 2.68 M/UL — LOW (ref 4.2–5.8)
RBC # BLD: 2.85 M/UL — LOW (ref 4.2–5.8)
RBC # FLD: 16.4 % — HIGH (ref 10.3–14.5)
RBC # FLD: 17.1 % — HIGH (ref 10.3–14.5)
S MARCESCENS DNA BLD POS NAA+NON-PROBE: SIGNIFICANT CHANGE UP
S PNEUM DNA BLD POS QL NAA+NON-PROBE: SIGNIFICANT CHANGE UP
S PYO DNA BLD POS QL NAA+NON-PROBE: SIGNIFICANT CHANGE UP
SODIUM SERPL-SCNC: 152 MMOL/L — HIGH (ref 135–145)
SP GR SPEC: 1.01 — SIGNIFICANT CHANGE UP (ref 1.01–1.02)
SPECIMEN SOURCE: SIGNIFICANT CHANGE UP
TOTAL CHOLESTEROL/HDL RATIO MEASUREMENT: 4.5 RATIO — SIGNIFICANT CHANGE UP (ref 3.4–9.6)
TRIGL SERPL-MCNC: 125 MG/DL — SIGNIFICANT CHANGE UP (ref 10–149)
UROBILINOGEN FLD QL: 1 MG/DL — SIGNIFICANT CHANGE UP
WBC # BLD: 16.4 K/UL — HIGH (ref 3.8–10.5)
WBC # BLD: 17 K/UL — HIGH (ref 3.8–10.5)
WBC # FLD AUTO: 16.4 K/UL — HIGH (ref 3.8–10.5)
WBC # FLD AUTO: 17 K/UL — HIGH (ref 3.8–10.5)

## 2018-12-13 PROCEDURE — 99232 SBSQ HOSP IP/OBS MODERATE 35: CPT | Mod: GC

## 2018-12-13 PROCEDURE — 99223 1ST HOSP IP/OBS HIGH 75: CPT | Mod: GC

## 2018-12-13 PROCEDURE — 99233 SBSQ HOSP IP/OBS HIGH 50: CPT | Mod: GC

## 2018-12-13 PROCEDURE — 99232 SBSQ HOSP IP/OBS MODERATE 35: CPT

## 2018-12-13 RX ORDER — POTASSIUM CHLORIDE 20 MEQ
40 PACKET (EA) ORAL ONCE
Qty: 0 | Refills: 0 | Status: COMPLETED | OUTPATIENT
Start: 2018-12-13 | End: 2018-12-13

## 2018-12-13 RX ORDER — BACLOFEN 100 %
10 POWDER (GRAM) MISCELLANEOUS ONCE
Qty: 0 | Refills: 0 | Status: COMPLETED | OUTPATIENT
Start: 2018-12-13 | End: 2018-12-13

## 2018-12-13 RX ORDER — POTASSIUM PHOSPHATE, MONOBASIC POTASSIUM PHOSPHATE, DIBASIC 236; 224 MG/ML; MG/ML
15 INJECTION, SOLUTION INTRAVENOUS ONCE
Qty: 0 | Refills: 0 | Status: COMPLETED | OUTPATIENT
Start: 2018-12-13 | End: 2018-12-13

## 2018-12-13 RX ORDER — SODIUM HYPOCHLORITE 0.125 %
1 SOLUTION, NON-ORAL MISCELLANEOUS
Qty: 0 | Refills: 0 | Status: DISCONTINUED | OUTPATIENT
Start: 2018-12-13 | End: 2018-12-20

## 2018-12-13 RX ADMIN — HUMAN INSULIN 20 UNIT(S): 100 INJECTION, SUSPENSION SUBCUTANEOUS at 22:58

## 2018-12-13 RX ADMIN — Medication 40 MILLIEQUIVALENT(S): at 09:23

## 2018-12-13 RX ADMIN — Medication 4: at 23:03

## 2018-12-13 RX ADMIN — MEROPENEM 100 MILLIGRAM(S): 1 INJECTION INTRAVENOUS at 21:15

## 2018-12-13 RX ADMIN — MEROPENEM 100 MILLIGRAM(S): 1 INJECTION INTRAVENOUS at 06:37

## 2018-12-13 RX ADMIN — Medication 10 MILLIGRAM(S): at 22:33

## 2018-12-13 RX ADMIN — SODIUM CHLORIDE 75 MILLILITER(S): 9 INJECTION, SOLUTION INTRAVENOUS at 06:39

## 2018-12-13 RX ADMIN — PANTOPRAZOLE SODIUM 40 MILLIGRAM(S): 20 TABLET, DELAYED RELEASE ORAL at 13:01

## 2018-12-13 RX ADMIN — Medication 6: at 06:37

## 2018-12-13 RX ADMIN — Medication 6: at 13:07

## 2018-12-13 RX ADMIN — Medication 4: at 18:30

## 2018-12-13 RX ADMIN — HUMAN INSULIN 20 UNIT(S): 100 INJECTION, SUSPENSION SUBCUTANEOUS at 09:23

## 2018-12-13 RX ADMIN — MEROPENEM 100 MILLIGRAM(S): 1 INJECTION INTRAVENOUS at 13:07

## 2018-12-13 RX ADMIN — SODIUM CHLORIDE 75 MILLILITER(S): 9 INJECTION, SOLUTION INTRAVENOUS at 22:35

## 2018-12-13 RX ADMIN — PANTOPRAZOLE SODIUM 40 MILLIGRAM(S): 20 TABLET, DELAYED RELEASE ORAL at 21:15

## 2018-12-13 RX ADMIN — POTASSIUM PHOSPHATE, MONOBASIC POTASSIUM PHOSPHATE, DIBASIC 62.5 MILLIMOLE(S): 236; 224 INJECTION, SOLUTION INTRAVENOUS at 09:21

## 2018-12-13 RX ADMIN — FLUCONAZOLE 100 MILLIGRAM(S): 150 TABLET ORAL at 09:26

## 2018-12-13 NOTE — PROGRESS NOTE ADULT - SUBJECTIVE AND OBJECTIVE BOX
Patient is a 73y old  Male who presents with a chief complaint of Multifactorial shock (13 Dec 2018 07:37)      Interval Events:    REVIEW OF SYSTEMS:  [ ] Positive  [ ] All other systems negative  [ ] Unable to assess ROS because ________    Vital Signs Last 24 Hrs  T(C): 36.8 (12-13-18 @ 06:37), Max: 37.4 (12-12-18 @ 20:00)  T(F): 98.2 (12-13-18 @ 06:37), Max: 99.3 (12-12-18 @ 20:00)  HR: 104 (12-13-18 @ 08:00) (93 - 116)  BP: 153/67 (12-13-18 @ 06:37) (102/60 - 153/67)  RR: 16 (12-13-18 @ 06:37) (16 - 19)  SpO2: 100% (12-13-18 @ 08:00) (100% - 100%)    PHYSICAL EXAM:  HEENT:   [ ]Tracheostomy:  [ ]Pupils equal  [ ]No oral lesions  [ ]Abnormal    SKIN  [ ]No Rash  [ ] Abnormal  [ ] pressure    CARDIAC  [ ]Regular  [ ]Abnormal    PULMONARY  [ ]Bilateral Clear Breath Sounds  [ ]Normal Excursion  [ ]Abnormal    GI  [ ]PEG      [ ] +BS		              [ ]Soft, nondistended, nontender	  [ ]Abnormal    MUSCULOSKELETAL                                   [ ]Bedbound                 [ ]Abnormal    [ ]Ambulatory/OOB to chair                           EXTREMITIES                                         [ ]Normal  [ ]Edema                           NEUROLOGIC  [ ] Normal, non focal  [ ] Focal findings:    PSYCHIATRIC  [ ]Alert and appropriate  [ ] Sedated	 [ ]Agitated    :  Guerra: [ ] Yes, if yes: Date of Placement:                   [  ] No    LINES: Central Lines [ ] Yes, if yes: Date of Placement                                     [  ] No    HOSPITAL MEDICATIONS:  MEDICATIONS  (STANDING):  dextrose 5%. 1000 milliLiter(s) (50 mL/Hr) IV Continuous <Continuous>  dextrose 50% Injectable 12.5 Gram(s) IV Push once  dextrose 50% Injectable 25 Gram(s) IV Push once  dextrose 50% Injectable 25 Gram(s) IV Push once  fluconAZOLE IVPB 200 milliGRAM(s) IV Intermittent every 24 hours  insulin lispro (HumaLOG) corrective regimen sliding scale   SubCutaneous every 6 hours  insulin NPH human recombinant 20 Unit(s) SubCutaneous every 12 hours  meropenem  IVPB      meropenem  IVPB 1000 milliGRAM(s) IV Intermittent every 8 hours  pantoprazole  Injectable 40 milliGRAM(s) IV Push every 12 hours  potassium chloride   Powder 40 milliEquivalent(s) Oral once  sodium chloride 0.45%. 1000 milliLiter(s) (75 mL/Hr) IV Continuous <Continuous>    MEDICATIONS  (PRN):  acetaminophen    Suspension .. 650 milliGRAM(s) Oral every 6 hours PRN Moderate Pain (4 - 6)  dextrose 40% Gel 15 Gram(s) Oral once PRN Blood Glucose LESS THAN 70 milliGRAM(s)/deciLiter  glucagon  Injectable 1 milliGRAM(s) IntraMuscular once PRN Glucose <70 milliGRAM(s)/deciLiter      LABS:                        7.9    17.0  )-----------( 239      ( 13 Dec 2018 07:16 )             25.5     12-13    152<H>  |  116<H>  |  32<H>  ----------------------------<  234<H>  3.3<L>   |  25  |  0.89    Ca    9.2      13 Dec 2018 07:16  Phos  1.5     12-13  Mg     1.8     12-13    TPro  5.6<L>  /  Alb  2.1<L>  /  TBili  0.5  /  DBili  x   /  AST  945<H>  /  ALT  1332<H>  /  AlkPhos  105  12-12    PT/INR - ( 13 Dec 2018 07:16 )   PT: 15.4 sec;   INR: 1.33 ratio       PTT - ( 13 Dec 2018 07:16 )  PTT:31.4 sec    Arterial Blood Gas:  12-11 @ 18:09  7.48/31/149/23/99/.0  ABG lactate: --  Arterial Blood Gas:  12-11 @ 11:38  7.47/30/146/22/99/-1.3  ABG lactate: --    CAPILLARY BLOOD GLUCOSE    MICROBIOLOGY:     RADIOLOGY:  [ ] Reviewed and interpreted by me    Mode: AC/ CMV (Assist Control/ Continuous Mandatory Ventilation)  RR (machine): 16  TV (machine): 500  FiO2: 30  PEEP: 5  ITime: 1  MAP: 9  PIP: 18 Patient is a 73y old  Male who presents with a chief complaint of Multifactorial shock (13 Dec 2018 07:37)    Interval Events:    No events overnight.    REVIEW OF SYSTEMS:  [ ] Positive  [ ] All other systems negative  [ ] Unable to assess ROS because ________    Vital Signs Last 24 Hrs  T(C): 36.8 (12-13-18 @ 06:37), Max: 37.4 (12-12-18 @ 20:00)  T(F): 98.2 (12-13-18 @ 06:37), Max: 99.3 (12-12-18 @ 20:00)  HR: 104 (12-13-18 @ 08:00) (93 - 116)  BP: 153/67 (12-13-18 @ 06:37) (102/60 - 153/67)  RR: 16 (12-13-18 @ 06:37) (16 - 19)  SpO2: 100% (12-13-18 @ 08:00) (100% - 100%)    PHYSICAL EXAM:  HEENT:   X[ ]Tracheostomy:  #8 Cuffed Shiley   [ ]Pupils equal  [ ]No oral lesions  [ ]Abnormal    SKIN  [X ]No Rash  [ ] Abnormal  [ ] pressure    CARDIAC  [X ]Regular  [ ]Abnormal    PULMONARY  [X ]Bilateral Clear Breath Sounds  [ ]Normal Excursion  [ ]Abnormal    GI  [ X]PEG      [X ] +BS		              [X ]Soft, nondistended, nontender	  [ ]Abnormal    MUSCULOSKELETAL                                   [ ]Bedbound                 [ ]Abnormal    [ X]Ambulatory/OOB to chair                           EXTREMITIES                                         [X ]Normal  [ ]Edema                           NEUROLOGIC  [ ] Normal, non focal  [ ] Focal findings:    PSYCHIATRIC  [ ]Alert and appropriate  [ ] Sedated	 [ ]Agitated    :  Guerra: [ ] Yes, if yes: Date of Placement:                   [ X ] No    LINES: Central Lines [ ] Yes, if yes: Date of Placement                                     [X  ] No    HOSPITAL MEDICATIONS:  MEDICATIONS  (STANDING):  dextrose 5%. 1000 milliLiter(s) (50 mL/Hr) IV Continuous <Continuous>  dextrose 50% Injectable 12.5 Gram(s) IV Push once  dextrose 50% Injectable 25 Gram(s) IV Push once  dextrose 50% Injectable 25 Gram(s) IV Push once  fluconAZOLE IVPB 200 milliGRAM(s) IV Intermittent every 24 hours  insulin lispro (HumaLOG) corrective regimen sliding scale   SubCutaneous every 6 hours  insulin NPH human recombinant 20 Unit(s) SubCutaneous every 12 hours  meropenem  IVPB      meropenem  IVPB 1000 milliGRAM(s) IV Intermittent every 8 hours  pantoprazole  Injectable 40 milliGRAM(s) IV Push every 12 hours  potassium chloride   Powder 40 milliEquivalent(s) Oral once  sodium chloride 0.45%. 1000 milliLiter(s) (75 mL/Hr) IV Continuous <Continuous>    MEDICATIONS  (PRN):  acetaminophen    Suspension .. 650 milliGRAM(s) Oral every 6 hours PRN Moderate Pain (4 - 6)  dextrose 40% Gel 15 Gram(s) Oral once PRN Blood Glucose LESS THAN 70 milliGRAM(s)/deciLiter  glucagon  Injectable 1 milliGRAM(s) IntraMuscular once PRN Glucose <70 milliGRAM(s)/deciLiter    LABS:                        7.9    17.0  )-----------( 239      ( 13 Dec 2018 07:16 )             25.5     12-13    152<H>  |  116<H>  |  32<H>  ----------------------------<  234<H>  3.3<L>   |  25  |  0.89    Ca    9.2      13 Dec 2018 07:16  Phos  1.5     12-13  Mg     1.8     12-13    TPro  5.6<L>  /  Alb  2.1<L>  /  TBili  0.5  /  DBili  x   /  AST  945<H>  /  ALT  1332<H>  /  AlkPhos  105  12-12    PT/INR - ( 13 Dec 2018 07:16 )   PT: 15.4 sec;   INR: 1.33 ratio       PTT - ( 13 Dec 2018 07:16 )  PTT:31.4 sec    Arterial Blood Gas:  12-11 @ 18:09  7.48/31/149/23/99/.0  ABG lactate: --  Arterial Blood Gas:  12-11 @ 11:38  7.47/30/146/22/99/-1.3  ABG lactate: --    CAPILLARY BLOOD GLUCOSE    MICROBIOLOGY:     RADIOLOGY:  [ ] Reviewed and interpreted by me    Mode: AC/ CMV (Assist Control/ Continuous Mandatory Ventilation)  RR (machine): 16  TV (machine): 500  FiO2: 30  PEEP: 5  ITime: 1  MAP: 9  PIP: 18 Patient is a 73y old  Male who presents with a chief complaint of Multifactorial shock (13 Dec 2018 07:37)    Interval Events:    No events overnight.    REVIEW OF SYSTEMS:  [ ] Positive  [ ] All other systems negative  [X ] Unable to assess ROS because patient is non-verbal     Vital Signs Last 24 Hrs  T(C): 36.8 (12-13-18 @ 06:37), Max: 37.4 (12-12-18 @ 20:00)  T(F): 98.2 (12-13-18 @ 06:37), Max: 99.3 (12-12-18 @ 20:00)  HR: 104 (12-13-18 @ 08:00) (93 - 116)  BP: 153/67 (12-13-18 @ 06:37) (102/60 - 153/67)  RR: 16 (12-13-18 @ 06:37) (16 - 19)  SpO2: 100% (12-13-18 @ 08:00) (100% - 100%)    PHYSICAL EXAM:  HEENT:   X[ ]Tracheostomy:  #8 Cuffed Shiley   [ ]Pupils equal  [ ]No oral lesions  [ ]Abnormal    SKIN  [X ]No Rash  [ ] Abnormal  [ ] pressure    CARDIAC  [X ]Regular  [ ]Abnormal    PULMONARY  [X ]Bilateral Clear Breath Sounds  [ ]Normal Excursion  [ ]Abnormal    GI  [ X]PEG      [X ] +BS		              [X ]Soft, nondistended, nontender	  [ ]Abnormal    MUSCULOSKELETAL                                   [ ]Bedbound                 [ ]Abnormal    [ X]Ambulatory/OOB to chair                           EXTREMITIES                                         [X ]Normal  [ ]Edema                           NEUROLOGIC  [ ] Normal, non focal  [ ] Focal findings:    PSYCHIATRIC  [ ]Alert and appropriate  [ ] Sedated	 [ ]Agitated    :  Guerra: [ ] Yes, if yes: Date of Placement:                   [ X ] No    LINES: Central Lines [ ] Yes, if yes: Date of Placement                                     [X  ] No    HOSPITAL MEDICATIONS:  MEDICATIONS  (STANDING):  dextrose 5%. 1000 milliLiter(s) (50 mL/Hr) IV Continuous <Continuous>  dextrose 50% Injectable 12.5 Gram(s) IV Push once  dextrose 50% Injectable 25 Gram(s) IV Push once  dextrose 50% Injectable 25 Gram(s) IV Push once  fluconAZOLE IVPB 200 milliGRAM(s) IV Intermittent every 24 hours  insulin lispro (HumaLOG) corrective regimen sliding scale   SubCutaneous every 6 hours  insulin NPH human recombinant 20 Unit(s) SubCutaneous every 12 hours  meropenem  IVPB      meropenem  IVPB 1000 milliGRAM(s) IV Intermittent every 8 hours  pantoprazole  Injectable 40 milliGRAM(s) IV Push every 12 hours  potassium chloride   Powder 40 milliEquivalent(s) Oral once  sodium chloride 0.45%. 1000 milliLiter(s) (75 mL/Hr) IV Continuous <Continuous>    MEDICATIONS  (PRN):  acetaminophen    Suspension .. 650 milliGRAM(s) Oral every 6 hours PRN Moderate Pain (4 - 6)  dextrose 40% Gel 15 Gram(s) Oral once PRN Blood Glucose LESS THAN 70 milliGRAM(s)/deciLiter  glucagon  Injectable 1 milliGRAM(s) IntraMuscular once PRN Glucose <70 milliGRAM(s)/deciLiter    LABS:                        7.9    17.0  )-----------( 239      ( 13 Dec 2018 07:16 )             25.5     12-13    152<H>  |  116<H>  |  32<H>  ----------------------------<  234<H>  3.3<L>   |  25  |  0.89    Ca    9.2      13 Dec 2018 07:16  Phos  1.5     12-13  Mg     1.8     12-13    TPro  5.6<L>  /  Alb  2.1<L>  /  TBili  0.5  /  DBili  x   /  AST  945<H>  /  ALT  1332<H>  /  AlkPhos  105  12-12    PT/INR - ( 13 Dec 2018 07:16 )   PT: 15.4 sec;   INR: 1.33 ratio       PTT - ( 13 Dec 2018 07:16 )  PTT:31.4 sec    Arterial Blood Gas:  12-11 @ 18:09  7.48/31/149/23/99/.0  ABG lactate: --  Arterial Blood Gas:  12-11 @ 11:38  7.47/30/146/22/99/-1.3  ABG lactate: --    CAPILLARY BLOOD GLUCOSE    MICROBIOLOGY:     RADIOLOGY:  [ ] Reviewed and interpreted by me    Mode: AC/ CMV (Assist Control/ Continuous Mandatory Ventilation)  RR (machine): 16  TV (machine): 500  FiO2: 30  PEEP: 5  ITime: 1  MAP: 9  PIP: 18

## 2018-12-13 NOTE — PROGRESS NOTE ADULT - PROBLEM SELECTOR PLAN 3
Patient with Melena / Coffee Ground Emesis on admission   EGD 12/12: No evidence of active bleeding   If H+H continues to downtrend GI will consider Colonoscopy   Continue to monitor CBC Q 12 Hrs  Continue IVP Protonix q 12hrs Patient with Melena / Coffee Ground Emesis on admission   EGD 12/12: No evidence of active bleeding   If H+H continues to downtrend GI will consider Colonoscopy   Continue to monitor CBC Q 12 Hrs, H/H stable   Continue IVP Protonix q 12hrs

## 2018-12-13 NOTE — PROGRESS NOTE ADULT - SUBJECTIVE AND OBJECTIVE BOX
Chief Complaint:  Patient is a 73y old  Male who presents with a chief complaint of Multifactorial shock (12 Dec 2018 19:05)      Interval Events:   Patient moved from MICU overnight to RCU.  Per nursing he has had no BRB or melena since his move.  Feeds where started but he is having high residuals per nursing.    Allergies:  penicillin (Unknown)      Hospital Medications:  acetaminophen    Suspension .. 650 milliGRAM(s) Oral every 6 hours PRN  dextrose 40% Gel 15 Gram(s) Oral once PRN  dextrose 5%. 1000 milliLiter(s) IV Continuous <Continuous>  dextrose 50% Injectable 12.5 Gram(s) IV Push once  dextrose 50% Injectable 25 Gram(s) IV Push once  dextrose 50% Injectable 25 Gram(s) IV Push once  fluconAZOLE IVPB 200 milliGRAM(s) IV Intermittent every 24 hours  glucagon  Injectable 1 milliGRAM(s) IntraMuscular once PRN  insulin lispro (HumaLOG) corrective regimen sliding scale   SubCutaneous every 6 hours  insulin NPH human recombinant 20 Unit(s) SubCutaneous every 12 hours  meropenem  IVPB      meropenem  IVPB 1000 milliGRAM(s) IV Intermittent every 8 hours  pantoprazole  Injectable 40 milliGRAM(s) IV Push every 12 hours  sodium chloride 0.45%. 1000 milliLiter(s) IV Continuous <Continuous>      PMHX/PSHX:  Heart failure  Diabetic neuropathy  Hyperlipidemia  Quadriplegia  Hypertension  Stroke  Hypertension  Obstructive cardiomyopathy  No significant past surgical history      Family history:  No pertinent family history in first degree relatives          PHYSICAL EXAM:     GENERAL:  tach on vent, non-responsive  CHEST:  Full & symmetric excursion  HEART:  Regular rhythm, no abdominal bruit, no edema  ABDOMEN:  Soft, non-tender, non-distended, normoactive bowel sounds,  no masses , no hepatosplenomegaly, bilious and then clear drainage from PEG tube  EXTREMITIES:  no cyanosis,clubbing or edema  SKIN:  No rash/erythema/ecchymoses/petechiae/wounds/abscess/warm/dry    Vital Signs:  Vital Signs Last 24 Hrs  T(C): 36.8 (13 Dec 2018 06:37), Max: 37.4 (12 Dec 2018 20:00)  T(F): 98.2 (13 Dec 2018 06:37), Max: 99.3 (12 Dec 2018 20:00)  HR: 105 (13 Dec 2018 06:37) (93 - 116)  BP: 153/67 (13 Dec 2018 06:37) (102/60 - 153/67)  BP(mean): 80 (12 Dec 2018 13:00) (80 - 86)  RR: 16 (13 Dec 2018 06:37) (16 - 19)  SpO2: 100% (13 Dec 2018 06:37) (100% - 100%)  Daily     Daily Weight in k.6 (12 Dec 2018 10:05)    LABS:                        7.8    16.4  )-----------( 204      ( 13 Dec 2018 01:34 )             23.9     12    152<H>  |  115<H>  |  51<H>  ----------------------------<  297<H>  3.3<L>   |  26  |  1.14    Ca    9.1      12 Dec 2018 12:11  Phos  2.4     12-  Mg     1.7     12    TPro  5.6<L>  /  Alb  2.1<L>  /  TBili  0.5  /  DBili  x   /  AST  945<H>  /  ALT  1332<H>  /  AlkPhos  105  12-12    LIVER FUNCTIONS - ( 12 Dec 2018 12:11 )  Alb: 2.1 g/dL / Pro: 5.6 g/dL / ALK PHOS: 105 U/L / ALT: 1332 U/L / AST: 945 U/L / GGT: x           PT/INR - ( 12 Dec 2018 00:26 )   PT: 21.9 sec;   INR: 1.88 ratio         PTT - ( 12 Dec 2018 00:26 )  PTT:31.1 sec        Imaging:

## 2018-12-13 NOTE — CONSULT NOTE ADULT - ASSESSMENT
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 Patient now s/p EGD w/o identified bleed and w/stable Hgb, weaned off pressors on Vanc/meropenem and found to have GNR bacteremia    # Bacteremia: GNR in anaerobic bottle. U/A grossly positive and urine culture with 3+ organisms, sputum with pseudomonas, sacral decubitus ulcer present. Suspect possible proteus bacteremia from urine source as PCR negative for other organisms. Of note, Pseudomonas in sputum is resistent to meropenem/imipenem (I - fluoroquinolones, S - Gentamycin/Cefepime/Zosyn).        Hospital course has been complicated by significant leukocytosis and elevated procalcitonin, transaminitis 2/2 shock liver, blood cultures growing GNR in anaerobic bottles, + sputum culture for pseudomonas (R - meropenem/imipenem, I - fluoroquinolones, S - Gentamycin/Cefepime/Zosyn), positive urinalysis for bacteria and budding yeast with blood and +LE. Fever of 100.7 F was recorded on 12/11 at noon but no other fevers noted. Patient underwent EGD that was negative for active bleed. RVP negative, CXR did not demonstrate pulmonary infection, LE duplex negative for DVT. Patient received Vancomycin x1 dose on 12/11 and 12/12, and Cefepime was broadened to Meropenem on day of admission (12/11). Diflucan was added for budding yeast in the urine.  Patient has been seen by wound care for sacral decubitus. 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 Patient now s/p EGD w/o identified bleed and w/stable Hgb, weaned off pressors on Vanc/meropenem and found to have GNR bacteremia    # Bacteremia: GNR in anaerobic bottle. U/A grossly positive and urine culture with 3+ organisms, sputum with pseudomonas, sacral decubitus ulcer present. Suspect possible proteus bacteremia from urine source as PCR negative for other organisms. Of note, Pseudomonas in sputum is resistent to meropenem/imipenem (I - fluoroquinolones, S - Gentamycin/Cefepime/Zosyn).  - Follow-up speciation of BCx and repeat UCx  - C/w meropenem for now, if cultures grow pseudomonas would switch back to cefepime.  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 Patient now s/p EGD w/o identified bleed and w/stable Hgb, weaned off pressors on Vanc/meropenem and found to have GNR bacteremia    # Bacteremia: GNR in anaerobic bottle. U/A grossly positive and urine culture with 3+ organisms, sputum with pseudomonas, sacral decubitus ulcer present. Suspect possible proteus bacteremia from urine source as PCR negative for other organisms. Of note, Pseudomonas in sputum is resistent to meropenem/imipenem (I - fluoroquinolones, S - Gentamycin/Cefepime/Zosyn).  - Follow-up speciation of BCx and repeat UCx  - C/w meropenem for now, if cultures grow pseudomonas would switch back to cefepime.  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 Patient now s/p EGD w/o identified bleed and w/stable Hgb, weaned off pressors on Vanc/meropenem and found to have GNR bacteremia    # Bacteremia: GNR in anaerobic bottle. U/A grossly positive and urine culture with 3+ organisms, sputum with pseudomonas, sacral decubitus ulcer present. Suspect possible proteus bacteremia from urine source as PCR negative for other organisms. May be GI source given GN anaerobic bacteremia and GI bleed, difficult to evaluate for abdominal tenderness. Of note, Pseudomonas in sputum is resistent to meropenem/imipenem (I - fluoroquinolones, S - Gentamycin/Cefepime/Zosyn).  - Follow-up speciation of BCx and repeat UCx  - C/w meropenem for now, if cultures grow pseudomonas would switch back to cefepime + anaerobic coverage.  - Trend leukocytosis, fevers, vitals  - C/w wound care

## 2018-12-13 NOTE — CHART NOTE - NSCHARTNOTEFT_GEN_A_CORE
Called by RN, blood cultures from 12/11 with gram negative rods in anaerobic bottles of both sets.   Pt is currently on Meropenem for sepsis, possible UTI source  Will endorse to primary team in am and repeat BCx.   ID eval ?     Cherelle Doan ANP-BC  [phone]

## 2018-12-13 NOTE — PROGRESS NOTE ADULT - ASSESSMENT
Impression:  1)Anemia - GI losses likely however EGD completely normal.  Patients hemoglobin has now stabilized over the last three readings and no overt GI bleeding.    Recommendations:   - monitor H/H, transfuse as needed   - continue with tube feeds   - monitor BM's   - if patient drops his hemoglobin again may need to consider CT vs colonoscopy    - please call GI back with any further questions    Radha Zamora, PGY-4  Gastroenterology Fellow  Pager x [phone] or [phone]  (After 5 pm or on weekends please page GI on call)

## 2018-12-13 NOTE — PROGRESS NOTE ADULT - ASSESSMENT
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 Presented to ED from [facility] with fevers x 4d, and 4 episodes of vomiting on day of admission. En route to hospital in ambulance, patient became hypotensive and dopamine was started.  Pt arrived unresponsive with dark vomitus around mouth. In ED, pt was found to have melanotic stool and black vomitus. BP was 94/37; Hb was 5.7 and WBC 20.8 with lactate of 13.5. Pt was started on norepi, transfused 2U PRBC, started on cefepime and vanc, and given 2.2 L LR bolus w/ improvement in BP. Patient was admitted to MICU for further management. Patient was found to have a + UA positive for bacteria and budding yeast. Pt was started on meropenem, and diflucan. Hgb continued to trend down on MICU floor, pt required 1U prbc transfusion again on 12/12, Patient was placed on PPI gtt. GI performed upper endoscopy on 12/2 no source of bleeding visualized. TFs restarted. Urine culture contaminated, repeat culture sent. Sputum culture grew pseudomonas but no evidence of Infiltrate on CXR     12/12: Patient transferred to RCU this evening. Patient transfused 1 unit of PRBCs this morning. Patient S/p bedside EGD no evidence of active bleeding, TFs resumed. Patient transitioned off PPI drip to Protonix IVP Q 12 hrs  Patient remains on broad spec abx / antifungal for presumed UTI. 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 Presented to ED from [facility] with fevers x 4d, and 4 episodes of vomiting on day of admission. En route to hospital in ambulance, patient became hypotensive and dopamine was started.  Pt arrived unresponsive with dark vomitus around mouth. In ED, pt was found to have melanotic stool and black vomitus. BP was 94/37; Hb was 5.7 and WBC 20.8 with lactate of 13.5. Pt was started on norepi, transfused 2U PRBC, started on cefepime and vanc, and given 2.2 L LR bolus w/ improvement in BP. Patient was admitted to MICU for further management. Patient was found to have a + UA positive for bacteria and budding yeast. Pt was started on meropenem, and diflucan. Hgb continued to trend down on MICU floor, pt required 1U prbc transfusion again on 12/12, Patient was placed on PPI gtt. GI performed upper endoscopy on 12/2 no source of bleeding visualized. TFs restarted. Urine culture contaminated, repeat culture sent. Sputum culture grew pseudomonas but no evidence of Infiltrate on CXR     12/12: Patient transferred to RCU this evening. Patient transfused 1 unit of PRBCs this morning. Patient S/p bedside EGD no evidence of active bleeding, TFs resumed. Patient transitioned off PPI drip to Protonix IVP Q 12 hrs  Patient remains on broad spec abx / antifungal for presumed UTI. Will attempt TOV this evening   12/12 Sodium 152, free water 250 ml q8, potassium 3.3  KCL 40 mg x1, H/H stable. Continue with Meropenem and Fluconazole. 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 Presented to ED from [facility] with fevers x 4d, and 4 episodes of vomiting on day of admission. En route to hospital in ambulance, patient became hypotensive and dopamine was started.  Pt arrived unresponsive with dark vomitus around mouth. In ED, pt was found to have melanotic stool and black vomitus. BP was 94/37; Hb was 5.7 and WBC 20.8 with lactate of 13.5. Pt was started on norepi, transfused 2U PRBC, started on cefepime and vanc, and given 2.2 L LR bolus w/ improvement in BP. Patient was admitted to MICU for further management. Patient was found to have a + UA positive for bacteria and budding yeast. Pt was started on meropenem, and diflucan. Hgb continued to trend down on MICU floor, pt required 1U prbc transfusion again on 12/12, Patient was placed on PPI gtt. GI performed upper endoscopy on 12/2 no source of bleeding visualized. TFs restarted. Urine culture contaminated, repeat culture sent. Sputum culture grew pseudomonas but no evidence of Infiltrate on CXR     12/12: Patient transferred to RCU this evening. Patient transfused 1 unit of PRBCs this morning. Patient S/p bedside EGD no evidence of active bleeding, TFs resumed. Patient transitioned off PPI drip to Protonix IVP Q 12 hrs  Patient remains on broad spec abx / antifungal for presumed UTI. Will attempt TOV this evening   12/12 Sodium 152, free water 250 ml q8, potassium 3.3  KCL 40 mg x1, H/H stable. Continue with Meropenem and Fluconazole. WBC on admission 32, now 17, positive blood cultures, GNR on 2 sets, on Meropenem, ID consulted.

## 2018-12-13 NOTE — CONSULT NOTE ADULT - SUBJECTIVE AND OBJECTIVE BOX
Wound SURGERY CONSULT NOTE    HPI  with Trach, Vent dependant and PEG, PE on Xarelto , CAD, PAD, HTN, HLD, T2DM, admitted to MICU for multifactorial shock (septic/hemorrhagic) on pressors. Presented to ED from [facility] with fevers x 4d, and 4 episodes of vomiting on day of admission. En route to hospital in ambulance, patient became hypotensive and dopamine was started.  Pt arrived unresponsive with dark vomitus around mouth. In ED, pt was found to have melanotic stool and black vomitus. BP was 94/37; Hb was 5.7 and WBC 20.8 with lactate of 13.5. Pt was started on norepi, transfused 2U PRBC, started on cefepime and vanc, and given 2.2 L LR bolus w/ improvement in BP. Patient was admitted to MICU for further management. Patient was found to have a + UA positive for bacteria and budding yeast. Pt was started on meropenem, and diflucan. Hgb continued to trend down on MICU floor, pt required 1U prbc transfusion again on 12/12, Patient was placed on PPI gtt. GI performed upper endoscopy on 12/2 no source of bleeding visualized. TFs restarted. Urine culture contaminated, repeat culture sent. Sputum culture grew pseudomonas but no evidence of Infiltrate on CXR     12/12: Patient transferred to RCU this evening. Patient transfused 1 unit of PRBCs this morning. Patient S/p bedside EGD no evidence of active bleeding, TFs resumed. Patient transitioned off PPI drip to Protonix IVP Q 12 hrs  Patient remains on broad spec abx / antifungal for presumed UTI. Will attempt TOV this evening   12/12 Sodium 152, free water 250 ml q8, potassium 3.3  KCL 40 mg x1, H/H stable. Continue with Meropenem and Fluconazole. WBC on admission 32, now 17, positive blood cultures, GNR on 2 sets, on Meropenem, ID consulted.      Wound consult requested to assist w/ management of  sacral pressure ulcer.  Aquacel dressing paced awaiting consult. No drainage, odor, redness, warmth, pain, f/c/s noted. scant drainage. Pt  (+)incontinent (+)sedentary. Offloading & pericare initiated upon admission.       PAST MEDICAL & SURGICAL HISTORY:  Heart failure  DM w/ neuropathy  Hyperlipidemia  Quadriplegia  Hypertension  Stroke  Hypertension  Obstructive cardiomyopathy  s/p Trach  s/p PEG      REVIEW OF SYSTEMS  Pt unable to offer    MEDICATIONS  (STANDING):  dextrose 5%. 1000 milliLiter(s) (50 mL/Hr) IV Continuous <Continuous>  dextrose 50% Injectable 12.5 Gram(s) IV Push once  dextrose 50% Injectable 25 Gram(s) IV Push once  dextrose 50% Injectable 25 Gram(s) IV Push once  fluconAZOLE IVPB 200 milliGRAM(s) IV Intermittent every 24 hours  insulin lispro (HumaLOG) corrective regimen sliding scale   SubCutaneous every 6 hours  insulin NPH human recombinant 20 Unit(s) SubCutaneous every 12 hours     meropenem  IVPB 1000 milliGRAM(s) IV Intermittent every 8 hours  pantoprazole  Injectable 40 milliGRAM(s) IV Push every 12 hours  sodium chloride 0.45%. 1000 milliLiter(s) (75 mL/Hr) IV Continuous <Continuous>    MEDICATIONS  (PRN):  acetaminophen    Suspension .. 650 milliGRAM(s) Oral every 6 hours PRN Moderate Pain (4 - 6)  dextrose 40% Gel 15 Gram(s) Oral once PRN Blood Glucose LESS THAN 70 milliGRAM(s)/deciLiter  glucagon  Injectable 1 milliGRAM(s) IntraMuscular once PRN Glucose <70 milliGRAM(s)/deciLiter      Allergies    penicillin (Unknown)    SOCIAL HISTORY:  lives in SNF    FAMILY HISTORY:  No pertinent family history in first degree relatives      Vital Signs Last 24 Hrs  T(C): 36.7 (13 Dec 2018 13:45), Max: 37.4 (12 Dec 2018 20:00)  T(F): 98.1 (13 Dec 2018 13:45), Max: 99.3 (12 Dec 2018 20:00)  HR: 111 (13 Dec 2018 15:02) (93 - 116)  BP: 142/64 (13 Dec 2018 13:45) (102/60 - 153/67)  BP(mean): --  RR: 16 (13 Dec 2018 13:45) (16 - 19)  SpO2: 100% (13 Dec 2018 15:02) (100% - 100%)    NAD / Alert,but non verbal/ frail  WD/ WN/ WG  [employer]    Cardiovascular: RRR     Respiratory: course BS      Gastrointestinal soft NT/ND (+)BS  (+)PEG    Neurology  nonverbal, no follow commands/ paraplegic    Musculoskeletal/Vascular: passive ROM  mild BLE edema equal  no DP/PT pulses palpable  BLE equally warm  no acute ischemia noted  no deformities/ contractures  Dorsal RLE 3.4cm x 4cm x0 resolving intact blister w/o drainage  Lateral RLE w/ 4cm x 1cm x 0.1cm w/ fibrinous exudate & halo of hypopigmented skin  drainage  No odor, erythema, increased warmth, tenderness, induration, fluctuance      Skin:  moist w/ good turgor    Sacral unstageable pressure Injury  8.8cm x 14cm x 2cm  soft sunken adherent eschar  wound is  at 5o'clock on the wound skin edge  there is a 1cm open area noted ther  (+) serosanguinous drainage  No odor, erythema, increased warmth, tenderness, induration, fluctuance    LABS:  12-13    152<H>  |  116<H>  |  32<H>  ----------------------------<  234<H>  3.3<L>   |  25  |  0.89    Ca    9.2      13 Dec 2018 07:16  Phos  1.5     12-13  Mg     1.8     12-13    TPro  5.6<L>  /  Alb  2.1<L>  /  TBili  0.5  /  DBili  x   /  AST  945<H>  /  ALT  1332<H>  /  AlkPhos  105  12-12                          7.9    17.0  )-----------( 239      ( 13 Dec 2018 07:16 )             25.5     PT/INR - ( 13 Dec 2018 07:16 )   PT: 15.4 sec;   INR: 1.33 ratio    PTT - ( 13 Dec 2018 07:16 )  PTT:31.4 sec      RADIOLOGY & ADDITIONAL STUDIES:    < from: [facility] Duplex Lower Ext Vein Scan, Bilat (12.11.18 @ 14:35) >  FINDINGS:    There is normal compressibility of the bilateral common femoral, femoral   and popliteal veins. No calf vein thrombosis is detected.    Doppler examination shows normal spontaneous and phasic flow.    IMPRESSION:     No evidence of bilateral lower extremity deep venous thrombosis.      < from: Xray Chest 1 View AP/PA (12.11.18 @ 01:19) >  FINDINGS:  Partially visualized tracheostomy in place.   No focal consolidation.  No pleural effusion or pneumothorax.  Cardiac size is not accurately evaluated in this projection.  Aortic calcifications.    IMPRESSION:  No focal consolidation.      Cultures:  Culture - Sputum . (12.11.18 @ 09:57)    -  Gentamicin: S 4    -  Imipenem: R >8    -  Levofloxacin: I 4    -  Meropenem: R 8    -  Piperacillin/Tazobactam: S <=8    -  Tobramycin: S <=2    Gram Stain:   Numerous polymorphonuclear leukocytes per low power field  No Squamous epithelial cells per low power field  Moderate Gram Negative Rods per oil power field  Few Gram Positive Rods per oil power field  Few Yeast like cells per oil power field    -  Amikacin: S <=8    -  Aztreonam: S <=4    -  Cefepime: S 4    -  Ceftazidime: S 4    -  Ciprofloxacin: I 2    Specimen Source: .Sputum Sputum/TRAP    Culture Results:   Numerous Pseudomonas aeruginosa (Carbapenem Resistant)  Normal Respiratory Beatrice present    Organism Identification: Pseudomonas aeruginosa (Carbapenem Resistant)    Organism: Pseudomonas aeruginosa (Carbapenem Resistant)    Method Type: GABBY    Culture - Blood (12.11.18 @ 05:03)    Gram Stain:   Growth in anaerobic bottle: Gram Negative Rods    Specimen Source: .Blood Blood-Peripheral    Culture Results:   Growth in anaerobic bottle: Gram Negative Rods

## 2018-12-13 NOTE — CONSULT NOTE ADULT - SUBJECTIVE AND OBJECTIVE BOX
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 and admitted to MICU on 12/11 for multifactorial shock (septic/hemorrhagic) requiring pressor support. His presentation was preceded by 4 days of fever and 1 day of nausea with emesis x4 episodes. He became hypotensive to SBP 60's en route to the ED and was started on pressors. In the ED, he reportedly had episodes of melanotic stool and black vomitus in the setting of hypotension and anemia (Hgb 5.7) with elevated lactate 13.5 and leukocytosis (WBC 20.8). He was transfused 2U pRBC, given pressors and IV fluids, started on Cefepime/Vanc, and admitted to the MICU.    Hospital course has been complicated by significant leukocytosis and elevated procalcitonin, transaminitis 2/2 shock liver, blood cultures growing GNR in anaerobic bottles, + sputum culture for pseudomonas (R - meropenem/imipenem, I - fluoroquinolones, S - Gentamycin/Cefepime/Zosyn), positive urinalysis for bacteria and budding yeast with blood and +LE. Fever of 100.7 F was recorded on 12/11 at noon but no other fevers noted. Patient underwent EGD that was negative for active bleed. RVP negative, CXR did not demonstrate pulmonary infection, LE duplex negative for DVT. Patient received Vancomycin x1 dose on 12/11 and 12/12, and Cefepime was broadened to Meropenem on day of admission (12/11). Diflucan was added for budding yeast in the urine.      PAST MEDICAL & SURGICAL HISTORY:  Heart failure  Diabetic neuropathy  Hyperlipidemia  Quadriplegia  Hypertension  Stroke  Hypertension  Obstructive cardiomyopathy  No significant past surgical history      Allergies  penicillin (Unknown)        ANTIMICROBIALS:  fluconAZOLE IVPB 200 every 24 hours  meropenem  IVPB    meropenem  IVPB 1000 every 8 hours      OTHER MEDS: MEDICATIONS  (STANDING):  acetaminophen    Suspension .. 650 every 6 hours PRN  dextrose 40% Gel 15 once PRN  dextrose 50% Injectable 12.5 once  dextrose 50% Injectable 25 once  dextrose 50% Injectable 25 once  glucagon  Injectable 1 once PRN  insulin lispro (HumaLOG) corrective regimen sliding scale  every 6 hours  insulin NPH human recombinant 20 every 12 hours  pantoprazole  Injectable 40 every 12 hours      SOCIAL HISTORY:       FAMILY HISTORY:  No pertinent family history in first degree relatives      REVIEW OF SYSTEMS  [  ] ROS unobtainable because:    [  ] All other systems negative except as noted below:	    Constitutional:  [ ] fever [ ] chills  [ ] weight loss  [ ] weakness  Skin:  [ ] rash [ ] phlebitis	  Eyes: [ ] icterus [ ] pain  [ ] discharge	  ENMT: [ ] sore throat  [ ] thrush [ ] ulcers [ ] exudates  Respiratory: [ ] dyspnea [ ] hemoptysis [ ] cough [ ] sputum	  Cardiovascular:  [ ] chest pain [ ] palpitations [ ] edema	  Gastrointestinal:  [ ] nausea [ ] vomiting [ ] diarrhea [ ] constipation [ ] pain	  Genitourinary:  [ ] dysuria [ ] frequency [ ] hematuria [ ] discharge [ ] flank pain  [ ] incontinence  Musculoskeletal:  [ ] myalgias [ ] arthralgias [ ] arthritis  [ ] back pain  Neurological:  [ ] headache [ ] seizures  [ ] confusion/altered mental status  Psychiatric:  [ ] anxiety [ ] depression	  Hematology/Lymphatics:  [ ] lymphadenopathy  Endocrine:  [ ] adrenal [ ] thyroid  Allergic/Immunologic:	 [ ] transplant [ ] seasonal    Vital Signs Last 24 Hrs  T(F): 98.2 (12-13-18 @ 06:37), Max: 101.8 (12-11-18 @ 01:05)    Vital Signs Last 24 Hrs  HR: 104 (12-13-18 @ 12:59) (93 - 116)  BP: 153/67 (12-13-18 @ 06:37) (102/60 - 153/67)  RR: 16 (12-13-18 @ 06:37)  SpO2: 100% (12-13-18 @ 12:59) (100% - 100%)  Wt(kg): --    PHYSICAL EXAM:  General: non-toxic  HEAD/EYES: anicteric, PERRL  ENT:  supple  Cardiovascular:   S1, S2  Respiratory:  clear bilaterally  GI:  soft, non-tender, normal bowel sounds  :  no CVA tenderness   Musculoskeletal:  no synovitis  Neurologic:  grossly non-focal  Skin:  no rash  Lymph: no lymphadenopathy  Psychiatric:  appropriate affect  Vascular:  no phlebitis                                7.9    17.0  )-----------( 239      ( 13 Dec 2018 07:16 )             25.5       12-13    152<H>  |  116<H>  |  32<H>  ----------------------------<  234<H>  3.3<L>   |  25  |  0.89    Ca    9.2      13 Dec 2018 07:16  Phos  1.5     12-13  Mg     1.8     12-13    TPro  5.6<L>  /  Alb  2.1<L>  /  TBili  0.5  /  DBili  x   /  AST  945<H>  /  ALT  1332<H>  /  AlkPhos  105  12-12          MICROBIOLOGY:    Culture - Sputum (collected 12-11-18 @ 09:57)  Source: .Sputum Sputum/TRAP  Gram Stain (12-11-18 @ 13:09):    Numerous polymorphonuclear leukocytes per low power field    No Squamous epithelial cells per low power field    Moderate Gram Negative Rods per oil power field    Few Gram Positive Rods per oil power field    Few Yeast like cells per oil power field  Final Report (12-13-18 @ 12:12):    Numerous Pseudomonas aeruginosa (Carbapenem Resistant)    Normal Respiratory Beatrice present  Organism: Pseudomonas aeruginosa (Carbapenem Resistant) (12-13-18 @ 12:12)  Organism: Pseudomonas aeruginosa (Carbapenem Resistant) (12-13-18 @ 12:12)      -  Amikacin: S <=8      -  Aztreonam: S <=4      -  Cefepime: S 4      -  Ceftazidime: S 4      -  Ciprofloxacin: I 2      -  Gentamicin: S 4      -  Imipenem: R >8      -  Levofloxacin: I 4      -  Meropenem: R 8      -  Piperacillin/Tazobactam: S <=8      -  Tobramycin: S <=2      Method Type: GABBY    Culture - Urine (collected 12-11-18 @ 09:56)  Source: .Urine Catheterized  Final Report (12-12-18 @ 10:25):    Culture grew 3 or more types of organisms which indicate    collection contamination; consider recollection only if clinically    indicated.    Culture - Blood (collected 12-11-18 @ 05:03)  Source: .Blood Blood-Peripheral  Gram Stain (12-13-18 @ 01:27):    Growth in anaerobic bottle: Gram Negative Rods  Preliminary Report (12-13-18 @ 01:27):    Growth in anaerobic bottle: Gram Negative Rods    Culture - Blood (collected 12-11-18 @ 05:03)  Source: .Blood Blood-Peripheral  Gram Stain (12-13-18 @ 01:25):    Growth in anaerobic bottle: Gram Negative Rods  Preliminary Report (12-13-18 @ 01:26):    Growth in anaerobic bottle: Gram Negative Rods    "Due to technical problems, Proteus sp. will Not be reported as part of    the BCID panel until further notice"    ***Blood Panel PCR results on this specimen are available    approximately 3 hours after the Gram stain result.***    Gram stain, PCR, and/or culture results may not always    correspond due to difference in methodologies.    ************************************************************    This PCR assay was performed using [employer].    The following targets are tested for: Enterococcus,    vancomycin resistant enterococci, Listeria monocytogenes,    coagulase negative staphylococci, S. aureus,    methicillin resistant S. aureus, Streptococcus agalactiae    (Group B), S. pneumoniae, S. pyogenes (Group A),    Acinetobacter baumannii, Enterobacter cloacae, E. coli,    Klebsiella oxytoca, K. pneumoniae, Proteus sp.,    Serratia marcescens, Haemophilus influenzae,    Neisseria meningitidis, Pseudomonas aeruginosa, Candida    albicans, C. glabrata, C krusei, C parapsilosis,    C. tropicalis and the KPC resistance gene.  Organism: Blood Culture PCR (12-13-18 @ 04:10)  Organism: Blood Culture PCR (12-13-18 @ 04:10)      -  Acinetobacter baumanii: Nondet      -  Candida albicans: Nondet      -  Candida glabrata: Nondet      -  Candida krusei: Nondet      -  Candida parapsilosis: Nondet      -  Candida tropicalis: Nondet      -  Coagulase negative Staphylococcus: Nondet      -  Enterobacter cloacae complex: Nondet      -  Enterococcus species: Nondet      -  Escherichia coli: Nondet      -  Haemophilus influenzae: Nondet      -  Klebsiella oxytoca: Nondet      -  Klebsiella pneumoniae: Nondet      -  Listeria monocytogenes: Nondet      -  Methicillin resistant Staphylococcus aureus (MRSA): Nondet      -  Multidrug (KPC pos) resistant organism: Nondet      -  Neisseria meningitidis: Nondet      -  Pseudomonas aeruginosa: Nondet      -  Serratia marcescens: Nondet      -  Staphylococcus aureus: Nondet      -  Streptococcus agalactiae (Group B): Nondet      -  Streptococcus pneumoniae: Nondet      -  Streptococcus pyogenes (Group A): Nondet      -  Streptococcus sp. 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 His presentation was preceded by 4 days of fever and 1 day of nausea with emesis x4 episodes. He became hypotensive to SBP 60's en route to the ED and was started on pressors. In the ED, he reportedly had episodes of melanotic stool and black vomitus in the setting of hypotension and anemia (Hgb 5.7) with elevated lactate 13.5 and leukocytosis (WBC 20.8). He was transfused 2U pRBC, given pressors and IV fluids, started on Cefepime/Vanc, and admitted to the MICU.    Hospital course has been complicated by significant leukocytosis and elevated procalcitonin, transaminitis 2/2 shock liver, blood cultures growing GNR in anaerobic bottles, + sputum culture for pseudomonas (R - meropenem/imipenem, I - fluoroquinolones, S - Gentamycin/Cefepime/Zosyn), positive urinalysis for bacteria and budding yeast with blood and +LE. Fever of 100.7 F was recorded on 12/11 at noon but no other fevers noted. Patient underwent EGD that was negative for active bleed. RVP negative, CXR did not demonstrate pulmonary infection, LE duplex negative for DVT. Patient received Vancomycin x1 dose on 12/11 and 12/12, and Cefepime was broadened to Meropenem on day of admission (12/11). Diflucan was added for budding yeast in the urine.  Patient has been seen by wound care for sacral decubitus.      PAST MEDICAL & SURGICAL HISTORY:  Heart failure  Diabetic neuropathy  Hyperlipidemia  Quadriplegia  Hypertension  Stroke  Hypertension  Obstructive cardiomyopathy  No significant past surgical history      Allergies  penicillin (Unknown)        ANTIMICROBIALS:  fluconAZOLE IVPB 200 every 24 hours  meropenem  IVPB    meropenem  IVPB 1000 every 8 hours      OTHER MEDS: MEDICATIONS  (STANDING):  acetaminophen    Suspension .. 650 every 6 hours PRN  dextrose 40% Gel 15 once PRN  dextrose 50% Injectable 12.5 once  dextrose 50% Injectable 25 once  dextrose 50% Injectable 25 once  glucagon  Injectable 1 once PRN  insulin lispro (HumaLOG) corrective regimen sliding scale  every 6 hours  insulin NPH human recombinant 20 every 12 hours  pantoprazole  Injectable 40 every 12 hours      SOCIAL HISTORY:       FAMILY HISTORY:  No pertinent family history in first degree relatives      REVIEW OF SYSTEMS  [  ] ROS unobtainable because:    [  ] All other systems negative except as noted below:	    Constitutional:  [ ] fever [ ] chills  [ ] weight loss  [ ] weakness  Skin:  [ ] rash [ ] phlebitis	  Eyes: [ ] icterus [ ] pain  [ ] discharge	  ENMT: [ ] sore throat  [ ] thrush [ ] ulcers [ ] exudates  Respiratory: [ ] dyspnea [ ] hemoptysis [ ] cough [ ] sputum	  Cardiovascular:  [ ] chest pain [ ] palpitations [ ] edema	  Gastrointestinal:  [ ] nausea [ ] vomiting [ ] diarrhea [ ] constipation [ ] pain	  Genitourinary:  [ ] dysuria [ ] frequency [ ] hematuria [ ] discharge [ ] flank pain  [ ] incontinence  Musculoskeletal:  [ ] myalgias [ ] arthralgias [ ] arthritis  [ ] back pain  Neurological:  [ ] headache [ ] seizures  [ ] confusion/altered mental status  Psychiatric:  [ ] anxiety [ ] depression	  Hematology/Lymphatics:  [ ] lymphadenopathy  Endocrine:  [ ] adrenal [ ] thyroid  Allergic/Immunologic:	 [ ] transplant [ ] seasonal    Vital Signs Last 24 Hrs  T(F): 98.2 (12-13-18 @ 06:37), Max: 101.8 (12-11-18 @ 01:05)    Vital Signs Last 24 Hrs  HR: 104 (12-13-18 @ 12:59) (93 - 116)  BP: 153/67 (12-13-18 @ 06:37) (102/60 - 153/67)  RR: 16 (12-13-18 @ 06:37)  SpO2: 100% (12-13-18 @ 12:59) (100% - 100%)  Wt(kg): --    PHYSICAL EXAM:  General: non-toxic  HEAD/EYES: anicteric, PERRL  ENT:  supple  Cardiovascular:   S1, S2  Respiratory:  clear bilaterally  GI:  soft, non-tender, normal bowel sounds  :  no CVA tenderness   Musculoskeletal:  no synovitis  Neurologic:  grossly non-focal  Skin:  no rash  Lymph: no lymphadenopathy  Psychiatric:  appropriate affect  Vascular:  no phlebitis                                7.9    17.0  )-----------( 239      ( 13 Dec 2018 07:16 )             25.5       12-13    152<H>  |  116<H>  |  32<H>  ----------------------------<  234<H>  3.3<L>   |  25  |  0.89    Ca    9.2      13 Dec 2018 07:16  Phos  1.5     12-13  Mg     1.8     12-13    TPro  5.6<L>  /  Alb  2.1<L>  /  TBili  0.5  /  DBili  x   /  AST  945<H>  /  ALT  1332<H>  /  AlkPhos  105  12-12          MICROBIOLOGY:    Culture - Sputum (collected 12-11-18 @ 09:57)  Source: .Sputum Sputum/TRAP  Gram Stain (12-11-18 @ 13:09):    Numerous polymorphonuclear leukocytes per low power field    No Squamous epithelial cells per low power field    Moderate Gram Negative Rods per oil power field    Few Gram Positive Rods per oil power field    Few Yeast like cells per oil power field  Final Report (12-13-18 @ 12:12):    Numerous Pseudomonas aeruginosa (Carbapenem Resistant)    Normal Respiratory Beatrice present  Organism: Pseudomonas aeruginosa (Carbapenem Resistant) (12-13-18 @ 12:12)  Organism: Pseudomonas aeruginosa (Carbapenem Resistant) (12-13-18 @ 12:12)      -  Amikacin: S <=8      -  Aztreonam: S <=4      -  Cefepime: S 4      -  Ceftazidime: S 4      -  Ciprofloxacin: I 2      -  Gentamicin: S 4      -  Imipenem: R >8      -  Levofloxacin: I 4      -  Meropenem: R 8      -  Piperacillin/Tazobactam: S <=8      -  Tobramycin: S <=2      Method Type: GABBY    Culture - Urine (collected 12-11-18 @ 09:56)  Source: .Urine Catheterized  Final Report (12-12-18 @ 10:25):    Culture grew 3 or more types of organisms which indicate    collection contamination; consider recollection only if clinically    indicated.    Culture - Blood (collected 12-11-18 @ 05:03)  Source: .Blood Blood-Peripheral  Gram Stain (12-13-18 @ 01:27):    Growth in anaerobic bottle: Gram Negative Rods  Preliminary Report (12-13-18 @ 01:27):    Growth in anaerobic bottle: Gram Negative Rods    Culture - Blood (collected 12-11-18 @ 05:03)  Source: .Blood Blood-Peripheral  Gram Stain (12-13-18 @ 01:25):    Growth in anaerobic bottle: Gram Negative Rods  Preliminary Report (12-13-18 @ 01:26):    Growth in anaerobic bottle: Gram Negative Rods    "Due to technical problems, Proteus sp. will Not be reported as part of    the BCID panel until further notice"    ***Blood Panel PCR results on this specimen are available    approximately 3 hours after the Gram stain result.***    Gram stain, PCR, and/or culture results may not always    correspond due to difference in methodologies.    ************************************************************    This PCR assay was performed using [employer].    The following targets are tested for: Enterococcus,    vancomycin resistant enterococci, Listeria monocytogenes,    coagulase negative staphylococci, S. aureus,    methicillin resistant S. aureus, Streptococcus agalactiae    (Group B), S. pneumoniae, S. pyogenes (Group A),    Acinetobacter baumannii, Enterobacter cloacae, E. coli,    Klebsiella oxytoca, K. pneumoniae, Proteus sp.,    Serratia marcescens, Haemophilus influenzae,    Neisseria meningitidis, Pseudomonas aeruginosa, Candida    albicans, C. glabrata, C krusei, C parapsilosis,    C. tropicalis and the KPC resistance gene.  Organism: Blood Culture PCR (12-13-18 @ 04:10)  Organism: Blood Culture PCR (12-13-18 @ 04:10)      -  Acinetobacter baumanii: Nondet      -  Candida albicans: Nondet      -  Candida glabrata: Nondet      -  Candida krusei: Nondet      -  Candida parapsilosis: Nondet      -  Candida tropicalis: Nondet      -  Coagulase negative Staphylococcus: Nondet      -  Enterobacter cloacae complex: Nondet      -  Enterococcus species: Nondet      -  Escherichia coli: Nondet      -  Haemophilus influenzae: Nondet      -  Klebsiella oxytoca: Nondet      -  Klebsiella pneumoniae: Nondet      -  Listeria monocytogenes: Nondet      -  Methicillin resistant Staphylococcus aureus (MRSA): Nondet      -  Multidrug (KPC pos) resistant organism: Nondet      -  Neisseria meningitidis: Nondet      -  Pseudomonas aeruginosa: Nondet      -  Serratia marcescens: Nondet      -  Staphylococcus aureus: Nondet      -  Streptococcus agalactiae (Group B): Nondet      -  Streptococcus pneumoniae: Nondet      -  Streptococcus pyogenes (Group A): Nondet      -  Streptococcus sp. 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 His presentation was preceded by 4 days of fever and 1 day of nausea with emesis x4 episodes. He became hypotensive to SBP 60's en route to the ED and was started on pressors. In the ED, he reportedly had episodes of melanotic stool and black vomitus in the setting of hypotension and anemia (Hgb 5.7) with elevated lactate 13.5 and leukocytosis (WBC 20.8). He was transfused 2U pRBC, given pressors and IV fluids, started on Cefepime/Vanc, and admitted to the MICU.    Hospital course has been complicated by significant leukocytosis and elevated procalcitonin, transaminitis 2/2 shock liver, blood cultures growing GNR in anaerobic bottles, + sputum culture for pseudomonas (R - meropenem/imipenem, I - fluoroquinolones, S - Gentamycin/Cefepime/Zosyn), positive urinalysis for bacteria and budding yeast with blood and +LE. Fever of 100.7 F was recorded on 12/11 at noon but no other fevers noted. Patient underwent EGD that was negative for active bleed. RVP negative, CXR did not demonstrate pulmonary infection, LE duplex negative for DVT. Patient received Vancomycin x1 dose on 12/11 and 12/12, and Cefepime was broadened to Meropenem on day of admission (12/11). Diflucan was added for budding yeast in the urine.  Patient has been seen by wound care for sacral decubitus.      PAST MEDICAL & SURGICAL HISTORY:  Heart failure  Diabetic neuropathy  Hyperlipidemia  Quadriplegia  Hypertension  Stroke  Hypertension  Obstructive cardiomyopathy  No significant past surgical history      Allergies  penicillin (Unknown)        ANTIMICROBIALS:  fluconAZOLE IVPB 200 every 24 hours  meropenem  IVPB    meropenem  IVPB 1000 every 8 hours      OTHER MEDS: MEDICATIONS  (STANDING):  acetaminophen    Suspension .. 650 every 6 hours PRN  dextrose 40% Gel 15 once PRN  dextrose 50% Injectable 12.5 once  dextrose 50% Injectable 25 once  dextrose 50% Injectable 25 once  glucagon  Injectable 1 once PRN  insulin lispro (HumaLOG) corrective regimen sliding scale  every 6 hours  insulin NPH human recombinant 20 every 12 hours  pantoprazole  Injectable 40 every 12 hours      SOCIAL HISTORY:       FAMILY HISTORY:  No pertinent family history in first degree relatives      REVIEW OF SYSTEMS  [x] ROS unobtainable because:  baseline neurological status  [  ] All other systems negative except as noted below:	    Vital Signs Last 24 Hrs  T(F): 98.2 (12-13-18 @ 06:37), Max: 101.8 (12-11-18 @ 01:05)    Vital Signs Last 24 Hrs  HR: 104 (12-13-18 @ 12:59) (93 - 116)  BP: 153/67 (12-13-18 @ 06:37) (102/60 - 153/67)  RR: 16 (12-13-18 @ 06:37)  SpO2: 100% (12-13-18 @ 12:59) (100% - 100%)  Wt(kg): --    PHYSICAL EXAM:  General: In bed, immobile  HEAD/EYES: normocephalic,   ENT:  trach/vent in place  Cardiovascular:   S1, S2  Respiratory:  clear bilaterally  GI:  soft, unable to determine tenderness due to neurological status.   :  Unable to determine tenderness based on neurologic status  Musculoskeletal: No active mobility  Neurologic:  grossly non-focal  Skin:  no rash on exposed skin, unable to view sacral ulcer at this time due to positioning/vent/recent dressing  Lymph: no lymphadenopathy  Psychiatric:  No verbal responsive  Vascular:  no phlebitis                                7.9    17.0  )-----------( 239      ( 13 Dec 2018 07:16 )             25.5       12-13    152<H>  |  116<H>  |  32<H>  ----------------------------<  234<H>  3.3<L>   |  25  |  0.89    Ca    9.2      13 Dec 2018 07:16  Phos  1.5     12-13  Mg     1.8     12-13    TPro  5.6<L>  /  Alb  2.1<L>  /  TBili  0.5  /  DBili  x   /  AST  945<H>  /  ALT  1332<H>  /  AlkPhos  105  12-12          MICROBIOLOGY:    Culture - Sputum (collected 12-11-18 @ 09:57)  Source: .Sputum Sputum/TRAP  Gram Stain (12-11-18 @ 13:09):    Numerous polymorphonuclear leukocytes per low power field    No Squamous epithelial cells per low power field    Moderate Gram Negative Rods per oil power field    Few Gram Positive Rods per oil power field    Few Yeast like cells per oil power field  Final Report (12-13-18 @ 12:12):    Numerous Pseudomonas aeruginosa (Carbapenem Resistant)    Normal Respiratory Beatrice present  Organism: Pseudomonas aeruginosa (Carbapenem Resistant) (12-13-18 @ 12:12)  Organism: Pseudomonas aeruginosa (Carbapenem Resistant) (12-13-18 @ 12:12)      -  Amikacin: S <=8      -  Aztreonam: S <=4      -  Cefepime: S 4      -  Ceftazidime: S 4      -  Ciprofloxacin: I 2      -  Gentamicin: S 4      -  Imipenem: R >8      -  Levofloxacin: I 4      -  Meropenem: R 8      -  Piperacillin/Tazobactam: S <=8      -  Tobramycin: S <=2      Method Type: GABBY    Culture - Urine (collected 12-11-18 @ 09:56)  Source: .Urine Catheterized  Final Report (12-12-18 @ 10:25):    Culture grew 3 or more types of organisms which indicate    collection contamination; consider recollection only if clinically    indicated.    Culture - Blood (collected 12-11-18 @ 05:03)  Source: .Blood Blood-Peripheral  Gram Stain (12-13-18 @ 01:27):    Growth in anaerobic bottle: Gram Negative Rods  Preliminary Report (12-13-18 @ 01:27):    Growth in anaerobic bottle: Gram Negative Rods    Culture - Blood (collected 12-11-18 @ 05:03)  Source: .Blood Blood-Peripheral  Gram Stain (12-13-18 @ 01:25):    Growth in anaerobic bottle: Gram Negative Rods  Preliminary Report (12-13-18 @ 01:26):    Growth in anaerobic bottle: Gram Negative Rods    "Due to technical problems, Proteus sp. will Not be reported as part of    the BCID panel until further notice"    ***Blood Panel PCR results on this specimen are available    approximately 3 hours after the Gram stain result.***    Gram stain, PCR, and/or culture results may not always    correspond due to difference in methodologies.    ************************************************************    This PCR assay was performed using [employer].    The following targets are tested for: Enterococcus,    vancomycin resistant enterococci, Listeria monocytogenes,    coagulase negative staphylococci, S. aureus,    methicillin resistant S. aureus, Streptococcus agalactiae    (Group B), S. pneumoniae, S. pyogenes (Group A),    Acinetobacter baumannii, Enterobacter cloacae, E. coli,    Klebsiella oxytoca, K. pneumoniae, Proteus sp.,    Serratia marcescens, Haemophilus influenzae,    Neisseria meningitidis, Pseudomonas aeruginosa, Candida    albicans, C. glabrata, C krusei, C parapsilosis,    C. tropicalis and the KPC resistance gene.  Organism: Blood Culture PCR (12-13-18 @ 04:10)  Organism: Blood Culture PCR (12-13-18 @ 04:10)      -  Acinetobacter baumanii: Nondet      -  Candida albicans: Nondet      -  Candida glabrata: Nondet      -  Candida krusei: Nondet      -  Candida parapsilosis: Nondet      -  Candida tropicalis: Nondet      -  Coagulase negative Staphylococcus: Nondet      -  Enterobacter cloacae complex: Nondet      -  Enterococcus species: Nondet      -  Escherichia coli: Nondet      -  Haemophilus influenzae: Nondet      -  Klebsiella oxytoca: Nondet      -  Klebsiella pneumoniae: Nondet      -  Listeria monocytogenes: Nondet      -  Methicillin resistant Staphylococcus aureus (MRSA): Nondet      -  Multidrug (KPC pos) resistant organism: Nondet      -  Neisseria meningitidis: Nondet      -  Pseudomonas aeruginosa: Nondet      -  Serratia marcescens: Nondet      -  Staphylococcus aureus: Nondet      -  Streptococcus agalactiae (Group B): Nondet      -  Streptococcus pneumoniae: Nondet      -  Streptococcus pyogenes (Group A): Nondet      -  Streptococcus sp. 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 His presentation was preceded by 4 days of fever and 1 day of nausea with emesis x4 episodes. He became hypotensive to SBP 60's en route to the ED and was started on pressors. In the ED, he reportedly had episodes of melanotic stool and black vomitus in the setting of hypotension and anemia (Hgb 5.7) with elevated lactate 13.5 and leukocytosis (WBC 20.8). He was transfused 2U pRBC, given pressors and IV fluids, started on Cefepime/Vanc, and admitted to the MICU.    Hospital course has been complicated by significant leukocytosis and elevated procalcitonin, transaminitis 2/2 shock liver, blood cultures growing GNR in anaerobic bottles, + sputum culture for pseudomonas (R - meropenem/imipenem, I - fluoroquinolones, S - Gentamycin/Cefepime/Zosyn), positive urinalysis for bacteria and budding yeast with blood and +LE. Fever of 100.7 F was recorded on 12/11 at noon but no other fevers noted. Patient underwent EGD that was negative for active bleed. RVP negative, CXR did not demonstrate pulmonary infection, LE duplex negative for DVT. Patient received Vancomycin x1 dose on 12/11 and 12/12, and Cefepime was broadened to Meropenem on day of admission (12/11). Diflucan was added for budding yeast in the urine.  Patient has been seen by wound care for sacral decubitus.      PAST MEDICAL & SURGICAL HISTORY:  Heart failure  Diabetic neuropathy  Hyperlipidemia  Quadriplegia  Hypertension  Stroke  Hypertension  Obstructive cardiomyopathy  No significant past surgical history      Allergies  penicillin (Unknown)        ANTIMICROBIALS:  fluconAZOLE IVPB 200 every 24 hours  meropenem  IVPB    meropenem  IVPB 1000 every 8 hours      OTHER MEDS: MEDICATIONS  (STANDING):  acetaminophen    Suspension .. 650 every 6 hours PRN  dextrose 40% Gel 15 once PRN  dextrose 50% Injectable 12.5 once  dextrose 50% Injectable 25 once  dextrose 50% Injectable 25 once  glucagon  Injectable 1 once PRN  insulin lispro (HumaLOG) corrective regimen sliding scale  every 6 hours  insulin NPH human recombinant 20 every 12 hours  pantoprazole  Injectable 40 every 12 hours      SOCIAL HISTORY:       FAMILY HISTORY:  No pertinent family history in first degree relatives      REVIEW OF SYSTEMS  [x] ROS unobtainable because:  baseline neurological status  [  ] All other systems negative except as noted below:	    Vital Signs Last 24 Hrs  T(F): 98.2 (12-13-18 @ 06:37), Max: 101.8 (12-11-18 @ 01:05)    Vital Signs Last 24 Hrs  HR: 104 (12-13-18 @ 12:59) (93 - 116)  BP: 153/67 (12-13-18 @ 06:37) (102/60 - 153/67)  RR: 16 (12-13-18 @ 06:37)  SpO2: 100% (12-13-18 @ 12:59) (100% - 100%)  Wt(kg): --    PHYSICAL EXAM:  General: In bed, immobile  HEAD/EYES: normocephalic,   ENT:  trach/vent in place  Cardiovascular:   S1, S2  Respiratory:  clear bilaterally  GI:  soft, unable to determine tenderness due to neurological status.   :  Unable to determine tenderness based on neurologic status  Musculoskeletal: No active mobility  Neurologic:  grossly non-focal  Skin:  large unstageable sacral decubitus with eschar, non-purulent  Lymph: no lymphadenopathy  Psychiatric:  No verbal responsive  Vascular:  no phlebitis                                7.9    17.0  )-----------( 239      ( 13 Dec 2018 07:16 )             25.5       12-13    152<H>  |  116<H>  |  32<H>  ----------------------------<  234<H>  3.3<L>   |  25  |  0.89    Ca    9.2      13 Dec 2018 07:16  Phos  1.5     12-13  Mg     1.8     12-13    TPro  5.6<L>  /  Alb  2.1<L>  /  TBili  0.5  /  DBili  x   /  AST  945<H>  /  ALT  1332<H>  /  AlkPhos  105  12-12          MICROBIOLOGY:    Culture - Sputum (collected 12-11-18 @ 09:57)  Source: .Sputum Sputum/TRAP  Gram Stain (12-11-18 @ 13:09):    Numerous polymorphonuclear leukocytes per low power field    No Squamous epithelial cells per low power field    Moderate Gram Negative Rods per oil power field    Few Gram Positive Rods per oil power field    Few Yeast like cells per oil power field  Final Report (12-13-18 @ 12:12):    Numerous Pseudomonas aeruginosa (Carbapenem Resistant)    Normal Respiratory Beatrice present  Organism: Pseudomonas aeruginosa (Carbapenem Resistant) (12-13-18 @ 12:12)  Organism: Pseudomonas aeruginosa (Carbapenem Resistant) (12-13-18 @ 12:12)      -  Amikacin: S <=8      -  Aztreonam: S <=4      -  Cefepime: S 4      -  Ceftazidime: S 4      -  Ciprofloxacin: I 2      -  Gentamicin: S 4      -  Imipenem: R >8      -  Levofloxacin: I 4      -  Meropenem: R 8      -  Piperacillin/Tazobactam: S <=8      -  Tobramycin: S <=2      Method Type: GABBY    Culture - Urine (collected 12-11-18 @ 09:56)  Source: .Urine Catheterized  Final Report (12-12-18 @ 10:25):    Culture grew 3 or more types of organisms which indicate    collection contamination; consider recollection only if clinically    indicated.    Culture - Blood (collected 12-11-18 @ 05:03)  Source: .Blood Blood-Peripheral  Gram Stain (12-13-18 @ 01:27):    Growth in anaerobic bottle: Gram Negative Rods  Preliminary Report (12-13-18 @ 01:27):    Growth in anaerobic bottle: Gram Negative Rods    Culture - Blood (collected 12-11-18 @ 05:03)  Source: .Blood Blood-Peripheral  Gram Stain (12-13-18 @ 01:25):    Growth in anaerobic bottle: Gram Negative Rods  Preliminary Report (12-13-18 @ 01:26):    Growth in anaerobic bottle: Gram Negative Rods    "Due to technical problems, Proteus sp. will Not be reported as part of    the BCID panel until further notice"    ***Blood Panel PCR results on this specimen are available    approximately 3 hours after the Gram stain result.***    Gram stain, PCR, and/or culture results may not always    correspond due to difference in methodologies.    ************************************************************    This PCR assay was performed using [employer].    The following targets are tested for: Enterococcus,    vancomycin resistant enterococci, Listeria monocytogenes,    coagulase negative staphylococci, S. aureus,    methicillin resistant S. aureus, Streptococcus agalactiae    (Group B), S. pneumoniae, S. pyogenes (Group A),    Acinetobacter baumannii, Enterobacter cloacae, E. coli,    Klebsiella oxytoca, K. pneumoniae, Proteus sp.,    Serratia marcescens, Haemophilus influenzae,    Neisseria meningitidis, Pseudomonas aeruginosa, Candida    albicans, C. glabrata, C krusei, C parapsilosis,    C. tropicalis and the KPC resistance gene.  Organism: Blood Culture PCR (12-13-18 @ 04:10)  Organism: Blood Culture PCR (12-13-18 @ 04:10)      -  Acinetobacter baumanii: Nondet      -  Candida albicans: Nondet      -  Candida glabrata: Nondet      -  Candida krusei: Nondet      -  Candida parapsilosis: Nondet      -  Candida tropicalis: Nondet      -  Coagulase negative Staphylococcus: Nondet      -  Enterobacter cloacae complex: Nondet      -  Enterococcus species: Nondet      -  Escherichia coli: Nondet      -  Haemophilus influenzae: Nondet      -  Klebsiella oxytoca: Nondet      -  Klebsiella pneumoniae: Nondet      -  Listeria monocytogenes: Nondet      -  Methicillin resistant Staphylococcus aureus (MRSA): Nondet      -  Multidrug (KPC pos) resistant organism: Nondet      -  Neisseria meningitidis: Nondet      -  Pseudomonas aeruginosa: Nondet      -  Serratia marcescens: Nondet      -  Staphylococcus aureus: Nondet      -  Streptococcus agalactiae (Group B): Nondet      -  Streptococcus pneumoniae: Nondet      -  Streptococcus pyogenes (Group A): Nondet      -  Streptococcus sp. 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 His presentation was preceded by 4 days of fever and 1 day of nausea with emesis x4 episodes. He became hypotensive to SBP 60's en route to the ED and was started on pressors. In the ED, he reportedly had episodes of melanotic stool and black vomitus in the setting of hypotension and anemia (Hgb 5.7) with elevated lactate 13.5 and leukocytosis (WBC 20.8). He was transfused 2U pRBC, given pressors and IV fluids, started on Cefepime/Vanc, and admitted to the MICU.    Hospital course has been complicated by significant leukocytosis and elevated procalcitonin, transaminitis 2/2 shock liver, blood cultures growing GNR in anaerobic bottles, + sputum culture for pseudomonas (R - meropenem/imipenem, I - fluoroquinolones, S - Gentamycin/Cefepime/Zosyn), positive urinalysis for bacteria and budding yeast with blood and +LE. Fever of 100.7 F was recorded on 12/11 at noon but no other fevers noted. Patient underwent EGD that was negative for active bleed. RVP negative, CXR did not demonstrate pulmonary infection, LE duplex negative for DVT. Patient received Vancomycin x1 dose on 12/11 and 12/12, and Cefepime was broadened to Meropenem on day of admission (12/11). Diflucan was added for budding yeast in the urine.  Patient has been seen by wound care for sacral decubitus.    PAST MEDICAL & SURGICAL HISTORY:  Heart failure  Diabetic neuropathy  Hyperlipidemia  Quadriplegia  Hypertension  Stroke  Hypertension  Obstructive cardiomyopathy  No significant past surgical history    Allergies  penicillin (Unknown)    ANTIMICROBIALS:  fluconAZOLE IVPB 200 every 24 hours  meropenem  IVPB    meropenem  IVPB 1000 every 8 hours    OTHER MEDS: MEDICATIONS  (STANDING):  acetaminophen    Suspension .. 650 every 6 hours PRN  dextrose 40% Gel 15 once PRN  dextrose 50% Injectable 12.5 once  dextrose 50% Injectable 25 once  dextrose 50% Injectable 25 once  glucagon  Injectable 1 once PRN  insulin lispro (HumaLOG) corrective regimen sliding scale  every 6 hours  insulin NPH human recombinant 20 every 12 hours  pantoprazole  Injectable 40 every 12 hours    SOCIAL HISTORY: Not able to assess--non verbal, locked in    FAMILY HISTORY:  Not able to assess--non verbal, locked in    REVIEW OF SYSTEMS  [x] ROS unobtainable because:  baseline neurological status, Not able to assess--non verbal, locked in  [  ] All other systems negative except as noted below:	    Vital Signs Last 24 Hrs  T(F): 98.2 (12-13-18 @ 06:37), Max: 101.8 (12-11-18 @ 01:05)    Vital Signs Last 24 Hrs  HR: 104 (12-13-18 @ 12:59) (93 - 116)  BP: 153/67 (12-13-18 @ 06:37) (102/60 - 153/67)  RR: 16 (12-13-18 @ 06:37)  SpO2: 100% (12-13-18 @ 12:59) (100% - 100%)    PHYSICAL EXAM:  General: In bed, immobile  HEAD/EYES: normocephalic,   ENT:  trach/vent in place  Cardiovascular:   S1, S2  Respiratory:  clear bilaterally  GI:  soft, unable to determine tenderness due to neurological status.   :  Unable to determine tenderness based on neurologic status  Musculoskeletal: No active mobility  Neurologic:  grossly non-focal  Skin:  large unstageable sacral decubitus with eschar, non-purulent  Lymph: no lymphadenopathy  Psychiatric:  No verbal responsive  Vascular:  no phlebitis    Labs:                        7.9    17.0  )-----------( 239      ( 13 Dec 2018 07:16 )             25.5     12-13    152<H>  |  116<H>  |  32<H>  ----------------------------<  234<H>  3.3<L>   |  25  |  0.89    Ca    9.2      13 Dec 2018 07:16  Phos  1.5     12-13  Mg     1.8     12-13    TPro  5.6<L>  /  Alb  2.1<L>  /  TBili  0.5  /  DBili  x   /  AST  945<H>  /  ALT  1332<H>  /  AlkPhos  105  12-12    MICROBIOLOGY:    Culture - Sputum (collected 12-11-18 @ 09:57)  Source: .Sputum Sputum/TRAP  Gram Stain (12-11-18 @ 13:09):    Numerous polymorphonuclear leukocytes per low power field    No Squamous epithelial cells per low power field    Moderate Gram Negative Rods per oil power field    Few Gram Positive Rods per oil power field    Few Yeast like cells per oil power field  Final Report (12-13-18 @ 12:12):    Numerous Pseudomonas aeruginosa (Carbapenem Resistant)    Normal Respiratory Beatrice present  Organism: Pseudomonas aeruginosa (Carbapenem Resistant) (12-13-18 @ 12:12)  Organism: Pseudomonas aeruginosa (Carbapenem Resistant) (12-13-18 @ 12:12)      -  Amikacin: S <=8      -  Aztreonam: S <=4      -  Cefepime: S 4      -  Ceftazidime: S 4      -  Ciprofloxacin: I 2      -  Gentamicin: S 4      -  Imipenem: R >8      -  Levofloxacin: I 4      -  Meropenem: R 8      -  Piperacillin/Tazobactam: S <=8      -  Tobramycin: S <=2      Method Type: GABBY    Culture - Urine (collected 12-11-18 @ 09:56)  Source: .Urine Catheterized  Final Report (12-12-18 @ 10:25):    Culture grew 3 or more types of organisms which indicate    collection contamination; consider recollection only if clinically    indicated.    Culture - Blood (collected 12-11-18 @ 05:03)  Source: .Blood Blood-Peripheral  Gram Stain (12-13-18 @ 01:27):    Growth in anaerobic bottle: Gram Negative Rods  Preliminary Report (12-13-18 @ 01:27):    Growth in anaerobic bottle: Gram Negative Rods    Culture - Blood (collected 12-11-18 @ 05:03)  Source: .Blood Blood-Peripheral  Gram Stain (12-13-18 @ 01:25):    Growth in anaerobic bottle: Gram Negative Rods  Preliminary Report (12-13-18 @ 01:26):    Growth in anaerobic bottle: Gram Negative Rods    Rapid RVP Result: NotDetec (12-11 @ 05:41)    (otherwise reviewed)    RADIOLOGY:    12/11 USG:    INTERPRETATION:  Location:  Right femoral vein  Dynamic gray scale imaging of the target vessel was obtained using a high   frequency linear transducer. Both the target vein and surrounding   arterial structures were visualized and identified. The patency of the   targeted vein was confirmed with compression and lack of internal echoes.   There was direct visualization of needle entry into the vein followed by   successful catheter placement.     IMPRESSION:  Ultrasound-guided cannulation of right femoral vein    12/11 CXR:    FINDINGS:  Partially visualized tracheostomy in place.   No focal consolidation.  No pleural effusion or pneumothorax.  Cardiac size is not accurately evaluated in this projection.  Aortic calcifications.    IMPRESSION:  No focal consolidation.

## 2018-12-13 NOTE — CONSULT NOTE ADULT - ATTENDING COMMENTS
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 and admitted to MICU on 12/11 for multifactorial shock (septic/hemorrhagic) requiring pressor support.  Leukocytosis, fever  Sacral wound examined--unstageable, non-purulent  BCX with GNR, PCR negative  Pseudomonas from sputum CRE--S Zosyn, Cefepime  Elevated LFTs, improving--shock liver?  Still leukocytosis but improving  Based on clinical improvement, and stable resp status, for now would continue Meropenem (and expect Pseudomonas to be colonizer)  Overall, Gram negative bacteremia, fever, leukocytosis, sepsis, positive culture finding  - Meropenem 1g q 8  - If any signs worsening/fever/resp failure, would change to Cefepime 2g q 8 + Flagyl 500mg q 8  - Suspect Katie would have better efficacy on GNR anaerobe in blood (as compared to Flagyl)  - F/U pending BCX  - Monitor for further signs infection  - Discussed case with RCU NP    Rodger Beasley MD  Pager [phone]  After 5pm and on weekends call [phone]    I was physically present for the key portions of the evaluation and management service provided. I saw and examined the patient. I agree with the above history, physical, and plan except for any discrepancies which I have documented in “Attending Attestation.” Please refer to “Attending Attestation” for final plan.

## 2018-12-13 NOTE — CONSULT NOTE ADULT - ATTENDING COMMENTS
72 yo AA, diabetic  male a/w septic/hemorraghic shock  Melena documented in setting of Xarelto administration with abnormal coagulation profile  EGD did not confirm any site of UGI bleed  h/o polymicrobial infections  In Sept 2018 documented ICB with resultant quadriplegia, and locked in syndrome  h/o sacral wound  resides in [place]  exam confirms patient has trach , on vent, non responsive  unable to assit with turning in bed  Large US sacral wound, butterfly distribution, with adherent eschar, no fluctuance, no purulent drainage, and a small inferior area of FT breakdown  No visible bone  Wound care provided    Remain available 72 yo AA, diabetic  male a/w septic/hemorraghic shock  Melena documented in setting of Xarelto administration with abnormal coagulation profile  EGD did not confirm any site of UGI bleed  h/o polymicrobial infections  In Sept 2018 documented ICB with resultant quadriplegia, and locked in syndrome  h/o sacral wound  resides in [place]  exam confirms patient has trach , on vent, non responsive  unable to assist with turning in bed  Large US sacral wound, butterfly distribution, with adherent eschar, no fluctuance, no purulent drainage, and a small inferior area of FT breakdown  No visible bone  Wound care provided    Remain available

## 2018-12-13 NOTE — PROGRESS NOTE ADULT - PROBLEM SELECTOR PLAN 2
Patient with Tracheostomy at baseline   Continue Mechanical Ventilation   Continue Chest PT and Suctioning PRN   Attempt weaning as tolerating Patient with Tracheostomy at baseline   Continue Mechanical Ventilation, weaning trials as tolerated   Continue Chest PT and Suctioning PRN   Attempt weaning as tolerating

## 2018-12-14 LAB
-  AMIKACIN: SIGNIFICANT CHANGE UP
-  AMPICILLIN/SULBACTAM: SIGNIFICANT CHANGE UP
-  AMPICILLIN: SIGNIFICANT CHANGE UP
-  AZTREONAM: SIGNIFICANT CHANGE UP
-  CEFAZOLIN: SIGNIFICANT CHANGE UP
-  CEFEPIME: SIGNIFICANT CHANGE UP
-  CEFOXITIN: SIGNIFICANT CHANGE UP
-  CEFTRIAXONE: SIGNIFICANT CHANGE UP
-  CIPROFLOXACIN: SIGNIFICANT CHANGE UP
-  ERTAPENEM: SIGNIFICANT CHANGE UP
-  GENTAMICIN: SIGNIFICANT CHANGE UP
-  LEVOFLOXACIN: SIGNIFICANT CHANGE UP
-  MEROPENEM: SIGNIFICANT CHANGE UP
-  PIPERACILLIN/TAZOBACTAM: SIGNIFICANT CHANGE UP
-  TOBRAMYCIN: SIGNIFICANT CHANGE UP
-  TRIMETHOPRIM/SULFAMETHOXAZOLE: SIGNIFICANT CHANGE UP
ANION GAP SERPL CALC-SCNC: 12 MMOL/L — SIGNIFICANT CHANGE UP (ref 5–17)
APTT BLD: 30.7 SEC — SIGNIFICANT CHANGE UP (ref 27.5–36.3)
BUN SERPL-MCNC: 18 MG/DL — SIGNIFICANT CHANGE UP (ref 7–23)
CALCIUM SERPL-MCNC: 8.8 MG/DL — SIGNIFICANT CHANGE UP (ref 8.4–10.5)
CHLORIDE SERPL-SCNC: 113 MMOL/L — HIGH (ref 96–108)
CO2 SERPL-SCNC: 25 MMOL/L — SIGNIFICANT CHANGE UP (ref 22–31)
CREAT SERPL-MCNC: 0.72 MG/DL — SIGNIFICANT CHANGE UP (ref 0.5–1.3)
CULTURE RESULTS: NO GROWTH — SIGNIFICANT CHANGE UP
CULTURE RESULTS: SIGNIFICANT CHANGE UP
CULTURE RESULTS: SIGNIFICANT CHANGE UP
GLUCOSE BLDC GLUCOMTR-MCNC: 158 MG/DL — HIGH (ref 70–99)
GLUCOSE BLDC GLUCOMTR-MCNC: 159 MG/DL — HIGH (ref 70–99)
GLUCOSE BLDC GLUCOMTR-MCNC: 191 MG/DL — HIGH (ref 70–99)
GLUCOSE BLDC GLUCOMTR-MCNC: 194 MG/DL — HIGH (ref 70–99)
GLUCOSE SERPL-MCNC: 149 MG/DL — HIGH (ref 70–99)
GRAM STN FLD: SIGNIFICANT CHANGE UP
HCT VFR BLD CALC: 24.6 % — LOW (ref 39–50)
HGB BLD-MCNC: 8.1 G/DL — LOW (ref 13–17)
INR BLD: 1.2 RATIO — HIGH (ref 0.88–1.16)
MAGNESIUM SERPL-MCNC: 1.6 MG/DL — SIGNIFICANT CHANGE UP (ref 1.6–2.6)
MCHC RBC-ENTMCNC: 29.8 PG — SIGNIFICANT CHANGE UP (ref 27–34)
MCHC RBC-ENTMCNC: 32.8 GM/DL — SIGNIFICANT CHANGE UP (ref 32–36)
MCV RBC AUTO: 90.8 FL — SIGNIFICANT CHANGE UP (ref 80–100)
METHOD TYPE: SIGNIFICANT CHANGE UP
ORGANISM # SPEC MICROSCOPIC CNT: SIGNIFICANT CHANGE UP
PHOSPHATE SERPL-MCNC: 2.1 MG/DL — LOW (ref 2.5–4.5)
PLATELET # BLD AUTO: 238 K/UL — SIGNIFICANT CHANGE UP (ref 150–400)
POTASSIUM SERPL-MCNC: 3.4 MMOL/L — LOW (ref 3.5–5.3)
POTASSIUM SERPL-SCNC: 3.4 MMOL/L — LOW (ref 3.5–5.3)
PROTHROM AB SERPL-ACNC: 13.7 SEC — HIGH (ref 10–12.9)
RBC # BLD: 2.71 M/UL — LOW (ref 4.2–5.8)
RBC # FLD: 17.6 % — HIGH (ref 10.3–14.5)
SODIUM SERPL-SCNC: 150 MMOL/L — HIGH (ref 135–145)
SPECIMEN SOURCE: SIGNIFICANT CHANGE UP
WBC # BLD: 13.6 K/UL — HIGH (ref 3.8–10.5)
WBC # FLD AUTO: 13.6 K/UL — HIGH (ref 3.8–10.5)

## 2018-12-14 PROCEDURE — 99233 SBSQ HOSP IP/OBS HIGH 50: CPT | Mod: GC

## 2018-12-14 PROCEDURE — 99232 SBSQ HOSP IP/OBS MODERATE 35: CPT

## 2018-12-14 RX ORDER — AMLODIPINE BESYLATE 2.5 MG/1
10 TABLET ORAL DAILY
Qty: 0 | Refills: 0 | Status: DISCONTINUED | OUTPATIENT
Start: 2018-12-14 | End: 2018-12-20

## 2018-12-14 RX ORDER — METOPROLOL TARTRATE 50 MG
100 TABLET ORAL
Qty: 0 | Refills: 0 | Status: DISCONTINUED | OUTPATIENT
Start: 2018-12-14 | End: 2018-12-20

## 2018-12-14 RX ORDER — HUMAN INSULIN 100 [IU]/ML
30 INJECTION, SUSPENSION SUBCUTANEOUS
Qty: 0 | Refills: 0 | Status: DISCONTINUED | OUTPATIENT
Start: 2018-12-14 | End: 2018-12-16

## 2018-12-14 RX ADMIN — HUMAN INSULIN 30 UNIT(S): 100 INJECTION, SUSPENSION SUBCUTANEOUS at 22:47

## 2018-12-14 RX ADMIN — HUMAN INSULIN 20 UNIT(S): 100 INJECTION, SUSPENSION SUBCUTANEOUS at 10:17

## 2018-12-14 RX ADMIN — Medication 1 APPLICATION(S): at 05:54

## 2018-12-14 RX ADMIN — Medication 2: at 18:41

## 2018-12-14 RX ADMIN — MEROPENEM 100 MILLIGRAM(S): 1 INJECTION INTRAVENOUS at 13:56

## 2018-12-14 RX ADMIN — Medication 1 DROP(S): at 23:11

## 2018-12-14 RX ADMIN — FLUCONAZOLE 100 MILLIGRAM(S): 150 TABLET ORAL at 11:27

## 2018-12-14 RX ADMIN — PANTOPRAZOLE SODIUM 40 MILLIGRAM(S): 20 TABLET, DELAYED RELEASE ORAL at 22:14

## 2018-12-14 RX ADMIN — Medication 2: at 23:11

## 2018-12-14 RX ADMIN — MEROPENEM 100 MILLIGRAM(S): 1 INJECTION INTRAVENOUS at 05:53

## 2018-12-14 RX ADMIN — MEROPENEM 100 MILLIGRAM(S): 1 INJECTION INTRAVENOUS at 22:14

## 2018-12-14 RX ADMIN — Medication 1 APPLICATION(S): at 17:46

## 2018-12-14 RX ADMIN — PANTOPRAZOLE SODIUM 40 MILLIGRAM(S): 20 TABLET, DELAYED RELEASE ORAL at 10:08

## 2018-12-14 RX ADMIN — Medication 100 MILLIGRAM(S): at 17:46

## 2018-12-14 RX ADMIN — Medication 1 DROP(S): at 20:27

## 2018-12-14 RX ADMIN — SODIUM CHLORIDE 75 MILLILITER(S): 9 INJECTION, SOLUTION INTRAVENOUS at 17:47

## 2018-12-14 RX ADMIN — Medication 2: at 12:27

## 2018-12-14 RX ADMIN — Medication 2: at 05:54

## 2018-12-14 NOTE — PROGRESS NOTE ADULT - ASSESSMENT
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  Leukocytosis, fever  Sacral wound examined--unstageable, non-purulent  BCX with GNR, IDed as proteus  Pseudomonas from sputum CRE--S Zosyn, Cefepime  Elevated LFTs, improving--shock liver?  Still leukocytosis, continues to improve on present therapy  Based on clinical improvement, and stable resp status, for now would continue Meropenem (expect Pseudomonas is colonizer)  ? Urine source, contam UCX  Overall, Gram negative bacteremia, fever, leukocytosis, sepsis, positive culture finding  - Meropenem 1g q 8  - If any signs worsening/fever/resp failure, would change to Cefepime 2g q 8 + Flagyl 500mg q 8  - F/U pending BCX  - Monitor for further signs infection  - Await final S of maday Beasley MD  Pager [phone]  After 5pm and on weekends call [phone]

## 2018-12-14 NOTE — PROVIDER CONTACT NOTE (CRITICAL VALUE NOTIFICATION) - TEST AND RESULT REPORTED:
positive sputum cx, done on 12/11/18. Neumerous Pseudomonas Aeruginosa ( Carbapenem Resistant). Normal resp. christopher present

## 2018-12-14 NOTE — PROGRESS NOTE ADULT - PROBLEM SELECTOR PLAN 3
Patient with Melena / Coffee Ground Emesis on admission   EGD 12/12: No evidence of active bleeding   If H+H continues to downtrend GI will consider Colonoscopy   Continue to monitor CBC Q 12 Hrs, H/H stable   Continue IVP Protonix q 12hrs

## 2018-12-14 NOTE — PROGRESS NOTE ADULT - ASSESSMENT
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 Patient now s/p EGD w/o identified bleed and w/stable Hgb, weaned off pressors on Vanc/meropenem and found to have GNR bacteremia    # Bacteremia: Proteus mirabilis bacteremia in anaerobic bottle. U/A grossly positive and urine culture with 3+ organisms, sputum with pseudomonas, sacral decubitus ulcer present. Repeat urinalysis negative after 3 days of Meropenem, suspect repeat UCx will likely be as well. Possible infection at sacral decubitus, however suspect likely urinary as decubitus is non-purulent/erythematous. Pseudomonas in sputum is likely colonization, particularly as it is resistant to meropenem and patient has had significant clinical improvement on meropenem w/o clinical signs of PNA.  - Follow-up sensitivities of Proteus bacteremia  - Follow-up repeat UCx  - C/w meropenem  - C/w wound care  - Trend leukocytosis, fevers, vitals

## 2018-12-14 NOTE — PROVIDER CONTACT NOTE (CRITICAL VALUE NOTIFICATION) - SITUATION
positive sputum cx  ,  NUMEROUS CARBAPENEM RESISTANT PSEUDOMONAS AURUGINOSA. PT IS ON CONTACT ISOLATION FOR CRE PSA SPUTUM AND ON MEROPENEM IVSS

## 2018-12-14 NOTE — PROGRESS NOTE ADULT - SUBJECTIVE AND OBJECTIVE BOX
Patient is a 73y old  Male who presents with a chief complaint of Multifactorial shock (13 Dec 2018 13:35)      Interval Events:    REVIEW OF SYSTEMS:  [ ] Positive  [ ] All other systems negative  [ ] Unable to assess ROS because ________    Vital Signs Last 24 Hrs  T(C): 36.6 (18 @ 04:37), Max: 37.4 (18 @ 21:04)  T(F): 97.9 (18 @ 04:37), Max: 99.3 (18 @ 21:04)  HR: 94 (18 @ 04:44) (93 - 111)  BP: 134/66 (18 @ 04:37) (134/66 - 142/64)  RR: 16 (18 @ 04:37) (15 - 18)  SpO2: 100% (18 @ 04:44) (100% - 100%)    PHYSICAL EXAM:  HEENT:   [ ]Tracheostomy:  [ ]Pupils equal  [ ]No oral lesions  [ ]Abnormal    SKIN  [ ]No Rash  [ ] Abnormal  [ ] pressure    CARDIAC  [ ]Regular  [ ]Abnormal    PULMONARY  [ ]Bilateral Clear Breath Sounds  [ ]Normal Excursion  [ ]Abnormal    GI  [ ]PEG      [ ] +BS		              [ ]Soft, nondistended, nontender	  [ ]Abnormal    MUSCULOSKELETAL                                   [ ]Bedbound                 [ ]Abnormal    [ ]Ambulatory/OOB to chair                           EXTREMITIES                                         [ ]Normal  [ ]Edema                           NEUROLOGIC  [ ] Normal, non focal  [ ] Focal findings:    PSYCHIATRIC  [ ]Alert and appropriate  [ ] Sedated	 [ ]Agitated    :  Guerra: [ ] Yes, if yes: Date of Placement:                   [  ] No    LINES: Central Lines [ ] Yes, if yes: Date of Placement                                     [  ] No    HOSPITAL MEDICATIONS:  MEDICATIONS  (STANDING):  Dakins Solution - 1/4 Strength 1 Application(s) Topical two times a day  dextrose 5%. 1000 milliLiter(s) (50 mL/Hr) IV Continuous <Continuous>  dextrose 50% Injectable 12.5 Gram(s) IV Push once  dextrose 50% Injectable 25 Gram(s) IV Push once  dextrose 50% Injectable 25 Gram(s) IV Push once  fluconAZOLE IVPB 200 milliGRAM(s) IV Intermittent every 24 hours  insulin lispro (HumaLOG) corrective regimen sliding scale   SubCutaneous every 6 hours  insulin NPH human recombinant 20 Unit(s) SubCutaneous every 12 hours  meropenem  IVPB      meropenem  IVPB 1000 milliGRAM(s) IV Intermittent every 8 hours  pantoprazole  Injectable 40 milliGRAM(s) IV Push every 12 hours  sodium chloride 0.45%. 1000 milliLiter(s) (75 mL/Hr) IV Continuous <Continuous>    MEDICATIONS  (PRN):  acetaminophen    Suspension .. 650 milliGRAM(s) Oral every 6 hours PRN Moderate Pain (4 - 6)  dextrose 40% Gel 15 Gram(s) Oral once PRN Blood Glucose LESS THAN 70 milliGRAM(s)/deciLiter  glucagon  Injectable 1 milliGRAM(s) IntraMuscular once PRN Glucose <70 milliGRAM(s)/deciLiter      LABS:                        8.1    13.6  )-----------( 238      ( 14 Dec 2018 06:59 )             24.6     12-14    150<H>  |  113<H>  |  18  ----------------------------<  149<H>  3.4<L>   |  25  |  0.72    Ca    8.8      14 Dec 2018 06:59  Phos  2.1     12-14  Mg     1.6     12-14    TPro  5.6<L>  /  Alb  2.1<L>  /  TBili  0.5  /  DBili  x   /  AST  945<H>  /  ALT  1332<H>  /  AlkPhos  105  12-12    PT/INR - ( 13 Dec 2018 07:16 )   PT: 15.4 sec;   INR: 1.33 ratio         PTT - ( 14 Dec 2018 06:59 )  PTT:30.7 sec  Urinalysis Basic - ( 13 Dec 2018 21:17 )    Color: Yellow / Appearance: Clear / S.014 / pH: x  Gluc: x / Ketone: Negative  / Bili: Negative / Urobili: 1.0 mg/dL   Blood: x / Protein: Trace mg/dL / Nitrite: Negative   Leuk Esterase: Negative / RBC: 2 /HPF / WBC 2 /HPF   Sq Epi: x / Non Sq Epi: 1 /HPF / Bacteria: Negative    CAPILLARY BLOOD GLUCOSE    MICROBIOLOGY:     RADIOLOGY:  [ ] Reviewed and interpreted by me    Mode: AC/ CMV (Assist Control/ Continuous Mandatory Ventilation)  RR (machine): 16  TV (machine): 500  FiO2: 30  PEEP: 5  ITime: 1  MAP: 8  PIP: 15 Patient is a 73y old  Male who presents with a chief complaint of Multifactorial shock (13 Dec 2018 13:35)    Interval Events:    Hiccups overnight, baclofen 10 mg x1.    REVIEW OF SYSTEMS:  [ ] Positive  [ ] All other systems negative  [X ] Unable to assess ROS because patient is non-verbal     Vital Signs Last 24 Hrs  T(C): 36.6 (18 @ 04:37), Max: 37.4 (18 @ 21:04)  T(F): 97.9 (18 @ 04:37), Max: 99.3 (18 @ 21:04)  HR: 94 (18 @ 04:44) (93 - 111)  BP: 134/66 (18 @ 04:37) (134/66 - 142/64)  RR: 16 (18 @ 04:37) (15 - 18)  SpO2: 100% (18 @ 04:44) (100% - 100%)    PHYSICAL EXAM:  HEENT:   [ X]Tracheostomy: #8 Cuffed Shiley   [ ]Pupils equal  [ ]No oral lesions  [ ]Abnormal    SKIN  [X ]No Rash  [ ] Abnormal  [ ] pressure    CARDIAC  [ X]Regular  [ ]Abnormal    PULMONARY  [ X]Bilateral Clear Breath Sounds  [ ]Normal Excursion  [ ]Abnormal    GI  [X ]PEG      [X ] +BS		              [X ]Soft, nondistended, nontender	  [ ]Abnormal    MUSCULOSKELETAL                                   [ ]Bedbound                 [ ]Abnormal    [X ]Ambulatory/OOB to chair                           EXTREMITIES                                         [X]Normal  [ ]Edema                           NEUROLOGIC  [X ] Normal, non focal  [ ] Focal findings:    PSYCHIATRIC  [ ]Alert and appropriate  [ ] Sedated	 [ ]Agitated    :  Guerra: [ ] Yes, if yes: Date of Placement:                   [  X] No    LINES: Central Lines [ ] Yes, if yes: Date of Placement                                     [ X ] No    HOSPITAL MEDICATIONS:  MEDICATIONS  (STANDING):  Dakins Solution - 1/4 Strength 1 Application(s) Topical two times a day  dextrose 5%. 1000 milliLiter(s) (50 mL/Hr) IV Continuous <Continuous>  dextrose 50% Injectable 12.5 Gram(s) IV Push once  dextrose 50% Injectable 25 Gram(s) IV Push once  dextrose 50% Injectable 25 Gram(s) IV Push once  fluconAZOLE IVPB 200 milliGRAM(s) IV Intermittent every 24 hours  insulin lispro (HumaLOG) corrective regimen sliding scale   SubCutaneous every 6 hours  insulin NPH human recombinant 20 Unit(s) SubCutaneous every 12 hours  meropenem  IVPB      meropenem  IVPB 1000 milliGRAM(s) IV Intermittent every 8 hours  pantoprazole  Injectable 40 milliGRAM(s) IV Push every 12 hours  sodium chloride 0.45%. 1000 milliLiter(s) (75 mL/Hr) IV Continuous <Continuous>    MEDICATIONS  (PRN):  acetaminophen    Suspension .. 650 milliGRAM(s) Oral every 6 hours PRN Moderate Pain (4 - 6)  dextrose 40% Gel 15 Gram(s) Oral once PRN Blood Glucose LESS THAN 70 milliGRAM(s)/deciLiter  glucagon  Injectable 1 milliGRAM(s) IntraMuscular once PRN Glucose <70 milliGRAM(s)/deciLiter    LABS:                        8.1    13.6  )-----------( 238      ( 14 Dec 2018 06:59 )             24.6     12-14    150<H>  |  113<H>  |  18  ----------------------------<  149<H>  3.4<L>   |  25  |  0.72    Ca    8.8      14 Dec 2018 06:59  Phos  2.1     12-14  Mg     1.6     12-14    TPro  5.6<L>  /  Alb  2.1<L>  /  TBili  0.5  /  DBili  x   /  AST  945<H>  /  ALT  1332<H>  /  AlkPhos  105  12-12    PT/INR - ( 13 Dec 2018 07:16 )   PT: 15.4 sec;   INR: 1.33 ratio         PTT - ( 14 Dec 2018 06:59 )  PTT:30.7 sec  Urinalysis Basic - ( 13 Dec 2018 21:17 )    Color: Yellow / Appearance: Clear / S.014 / pH: x  Gluc: x / Ketone: Negative  / Bili: Negative / Urobili: 1.0 mg/dL   Blood: x / Protein: Trace mg/dL / Nitrite: Negative   Leuk Esterase: Negative / RBC: 2 /HPF / WBC 2 /HPF   Sq Epi: x / Non Sq Epi: 1 /HPF / Bacteria: Negative    CAPILLARY BLOOD GLUCOSE    MICROBIOLOGY:     RADIOLOGY:  [ ] Reviewed and interpreted by me    Mode: AC/ CMV (Assist Control/ Continuous Mandatory Ventilation)  RR (machine): 16  TV (machine): 500  FiO2: 30  PEEP: 5  ITime: 1  MAP: 8  PIP: 15

## 2018-12-14 NOTE — PROGRESS NOTE ADULT - PROBLEM SELECTOR PLAN 2
Patient with Tracheostomy at baseline   Continue Mechanical Ventilation, weaning trials as tolerated   Continue Chest PT and Suctioning PRN   Attempt weaning as tolerating

## 2018-12-14 NOTE — PROGRESS NOTE ADULT - SUBJECTIVE AND OBJECTIVE BOX
CC: F/U for Bacteremia    Saw/spoke to patient. No fevers, no chills. Overall well. No new complaints.    Allergies  penicillin (Unknown)    ANTIMICROBIALS:  fluconAZOLE IVPB 200 every 24 hours  meropenem  IVPB    meropenem  IVPB 1000 every 8 hours    PE:    Vital Signs Last 24 Hrs  T(C): 37.1 (14 Dec 2018 11:30), Max: 37.4 (13 Dec 2018 21:04)  T(F): 98.7 (14 Dec 2018 11:30), Max: 99.3 (13 Dec 2018 21:04)  HR: 109 (14 Dec 2018 11:37) (93 - 111)  BP: 147/72 (14 Dec 2018 11:30) (134/66 - 147/72)  RR: 16 (14 Dec 2018 11:30) (15 - 18)  SpO2: 99% (14 Dec 2018 11:37) (99% - 100%)    Gen: AOx0, nonverbal  CV: S1+S2 normal, nontachycardic  Resp: Trach, no crackles, no wheeze  Abd: Soft, nontender, +BS, PEG  Ext: No LE edema, no wounds    LABS:                        8.1    13.6  )-----------( 238      ( 14 Dec 2018 06:59 )             24.6     12-14    150<H>  |  113<H>  |  18  ----------------------------<  149<H>  3.4<L>   |  25  |  0.72    Ca    8.8      14 Dec 2018 06:59  Phos  2.1     12-14  Mg     1.6     12-14    Urinalysis Basic - ( 13 Dec 2018 21:17 )    Color: Yellow / Appearance: Clear / S.014 / pH: x  Gluc: x / Ketone: Negative  / Bili: Negative / Urobili: 1.0 mg/dL   Blood: x / Protein: Trace mg/dL / Nitrite: Negative   Leuk Esterase: Negative / RBC: 2 /HPF / WBC 2 /HPF   Sq Epi: x / Non Sq Epi: 1 /HPF / Bacteria: Negative    MICROBIOLOGY:    .Blood Blood-Peripheral  18   No growth to date    .Sputum Sputum/TRAP  18   Numerous Pseudomonas aeruginosa (Carbapenem Resistant)  Normal Respiratory Beatrice present  --  Pseudomonas aeruginosa (Carbapenem Resistant)    .Blood Blood-Peripheral  18   Growth in anaerobic bottle: Proteus mirabilis    Rapid RVP Result: NotDetec ( @ 05:41)    (otherwise reviewed)    RADIOLOGY:     CXR:    FINDINGS:  Partially visualized tracheostomy in place.   No focal consolidation.  No pleural effusion or pneumothorax.  Cardiac size is not accurately evaluated in this projection.  Aortic calcifications.    IMPRESSION:  No focal consolidation.

## 2018-12-14 NOTE — PROGRESS NOTE ADULT - SUBJECTIVE AND OBJECTIVE BOX
Follow Up:      Interval History/ROS:    Allergies  penicillin (Unknown)        ANTIMICROBIALS:  fluconAZOLE IVPB 200 every 24 hours  meropenem  IVPB    meropenem  IVPB 1000 every 8 hours      OTHER MEDS:  MEDICATIONS  (STANDING):  acetaminophen    Suspension .. 650 every 6 hours PRN  dextrose 40% Gel 15 once PRN  dextrose 50% Injectable 12.5 once  dextrose 50% Injectable 25 once  dextrose 50% Injectable 25 once  glucagon  Injectable 1 once PRN  insulin lispro (HumaLOG) corrective regimen sliding scale  every 6 hours  insulin NPH human recombinant 20 every 12 hours  pantoprazole  Injectable 40 every 12 hours      Vital Signs Last 24 Hrs  T(C): 36.6 (14 Dec 2018 04:37), Max: 37.4 (13 Dec 2018 21:04)  T(F): 97.9 (14 Dec 2018 04:37), Max: 99.3 (13 Dec 2018 21:04)  HR: 98 (14 Dec 2018 08:31) (93 - 111)  BP: 134/66 (14 Dec 2018 04:37) (134/66 - 142/64)  BP(mean): --  RR: 16 (14 Dec 2018 04:37) (15 - 18)  SpO2: 100% (14 Dec 2018 08:31) (100% - 100%)    PHYSICAL EXAM:  General: non-toxic  HEAD/EYES: anicteric, PERRL  ENT:  supple  Cardiovascular:   S1, S2  Respiratory:  clear bilaterally  GI:  soft, non-tender, normal bowel sounds  :  no CVA tenderness   Musculoskeletal:  no synovitis  Neurologic:  grossly non-focal  Skin:  no rash  Lymph: no lymphadenopathy  Psychiatric:  appropriate affect  Vascular:  no phlebitis                                8.1    13.6  )-----------( 238      ( 14 Dec 2018 06:59 )             24.6       12-14    150<H>  |  113<H>  |  18  ----------------------------<  149<H>  3.4<L>   |  25  |  0.72    Ca    8.8      14 Dec 2018 06:59  Phos  2.1     12-14  Mg     1.6     12-14    TPro  5.6<L>  /  Alb  2.1<L>  /  TBili  0.5  /  DBili  x   /  AST  945<H>  /  ALT  1332<H>  /  AlkPhos  105  12-12      Urinalysis Basic - ( 13 Dec 2018 21:17 )    Color: Yellow / Appearance: Clear / S.014 / pH: x  Gluc: x / Ketone: Negative  / Bili: Negative / Urobili: 1.0 mg/dL   Blood: x / Protein: Trace mg/dL / Nitrite: Negative   Leuk Esterase: Negative / RBC: 2 /HPF / WBC 2 /HPF   Sq Epi: x / Non Sq Epi: 1 /HPF / Bacteria: Negative        MICROBIOLOGY:  v  .Blood Blood-Peripheral  18   No growth to date.  --  --      .Sputum Sputum/TRAP  18   Numerous Pseudomonas aeruginosa (Carbapenem Resistant)  Normal Respiratory Beatrice present  --  Pseudomonas aeruginosa (Carbapenem Resistant)      .Urine Catheterized  18   Culture grew 3 or more types of organisms which indicate  collection contamination; consider recollection only if clinically  indicated.  --  --      .Blood Blood-Peripheral  18   Growth in anaerobic bottle: Proteus mirabilis  "Due to technical problems, Proteus sp. will Not be reported as part of  the BCID panel until further notice"  ***Blood Panel PCR results on this specimen are available  approximately 3 hours after theGram stain result.***  Gram stain, PCR, and/or culture results may not always  correspond due to difference in methodologies.  ************************************************************  This PCR assay was performed using [employer].  The following targets are tested for: Enterococcus,  vancomycin resistant enterococci, Listeria monocytogenes,  coagulase negative staphylococci, S. aureus,  methicillin resistant S. aureus, Streptococcus agalactiae  (Group B), S. pneumoniae, S. pyogenes (Group A),  Acinetobacter baumannii, Enterobacter cloacae, E. coli,  Klebsiella oxytoca, K. pneumoniae, Proteus sp.,  Serratia marcescens, Haemophilus influenzae,  Neisseria meningitidis, Pseudomonas aeruginosa, Candida  albicans, C. glabrata, C krusei,C parapsilosis,  C. tropicalis and the KPC resistance gene.  --  Blood Culture PCR          Rapid RVP Result: NotDetec ( @ 05:41)        RADIOLOGY: Follow Up:  Bacteremia    Interval History/ROS: No new events, patient appears well    Allergies  penicillin (Unknown)        ANTIMICROBIALS:  fluconAZOLE IVPB 200 every 24 hours  meropenem  IVPB    meropenem  IVPB 1000 every 8 hours      OTHER MEDS:  MEDICATIONS  (STANDING):  acetaminophen    Suspension .. 650 every 6 hours PRN  dextrose 40% Gel 15 once PRN  dextrose 50% Injectable 12.5 once  dextrose 50% Injectable 25 once  dextrose 50% Injectable 25 once  glucagon  Injectable 1 once PRN  insulin lispro (HumaLOG) corrective regimen sliding scale  every 6 hours  insulin NPH human recombinant 20 every 12 hours  pantoprazole  Injectable 40 every 12 hours      Vital Signs Last 24 Hrs  T(C): 36.6 (14 Dec 2018 04:37), Max: 37.4 (13 Dec 2018 21:04)  T(F): 97.9 (14 Dec 2018 04:37), Max: 99.3 (13 Dec 2018 21:04)  HR: 98 (14 Dec 2018 08:31) (93 - 111)  BP: 134/66 (14 Dec 2018 04:37) (134/66 - 142/64)  BP(mean): --  RR: 16 (14 Dec 2018 04:37) (15 - 18)  SpO2: 100% (14 Dec 2018 08:31) (100% - 100%)    PHYSICAL EXAM:  General: non-toxic  HEAD/EYES: anicteric  ENT:  supple  Cardiovascular:   S1, S2  Respiratory:  On vent, clear bilaterally  GI:  soft, normal bowel sounds   Musculoskeletal: quadriplegic   Neurologic: quadriplegic, eye muscles functional  Skin: unstagable sacral decubitus ulcer  Lymph: no lymphadenopathy  Psychiatric:  appropriate affect  Vascular:  no phlebitis                                8.1    13.6  )-----------( 238      ( 14 Dec 2018 06:59 )             24.6       12-14    150<H>  |  113<H>  |  18  ----------------------------<  149<H>  3.4<L>   |  25  |  0.72    Ca    8.8      14 Dec 2018 06:59  Phos  2.1     12-14  Mg     1.6     12-14    TPro  5.6<L>  /  Alb  2.1<L>  /  TBili  0.5  /  DBili  x   /  AST  945<H>  /  ALT  1332<H>  /  AlkPhos  105  12-12      Urinalysis Basic - ( 13 Dec 2018 21:17 )    Color: Yellow / Appearance: Clear / S.014 / pH: x  Gluc: x / Ketone: Negative  / Bili: Negative / Urobili: 1.0 mg/dL   Blood: x / Protein: Trace mg/dL / Nitrite: Negative   Leuk Esterase: Negative / RBC: 2 /HPF / WBC 2 /HPF   Sq Epi: x / Non Sq Epi: 1 /HPF / Bacteria: Negative        MICROBIOLOGY:  v  .Blood Blood-Peripheral  18   No growth to date.  --  --      .Sputum Sputum/TRAP  18   Numerous Pseudomonas aeruginosa (Carbapenem Resistant)  Normal Respiratory Beatrice present  --  Pseudomonas aeruginosa (Carbapenem Resistant)      .Urine Catheterized  18   Culture grew 3 or more types of organisms which indicate  collection contamination; consider recollection only if clinically  indicated.  --  --      .Blood Blood-Peripheral  18   Growth in anaerobic bottle: Proteus mirabilis  "Due to technical problems, Proteus sp. will Not be reported as part of  the BCID panel until further notice"  ***Blood Panel PCR results on this specimen are available  approximately 3 hours after theGram stain result.***  Gram stain, PCR, and/or culture results may not always  correspond due to difference in methodologies.  ************************************************************  This PCR assay was performed using [employer].  The following targets are tested for: Enterococcus,  vancomycin resistant enterococci, Listeria monocytogenes,  coagulase negative staphylococci, S. aureus,  methicillin resistant S. aureus, Streptococcus agalactiae  (Group B), S. pneumoniae, S. pyogenes (Group A),  Acinetobacter baumannii, Enterobacter cloacae, E. coli,  Klebsiella oxytoca, K. pneumoniae, Proteus sp.,  Serratia marcescens, Haemophilus influenzae,  Neisseria meningitidis, Pseudomonas aeruginosa, Candida  albicans, C. glabrata, C krusei,C parapsilosis,  C. tropicalis and the KPC resistance gene.  --  Blood Culture PCR          Rapid RVP Result: NotDetec ( @ 05:41)        RADIOLOGY:

## 2018-12-14 NOTE — PROGRESS NOTE ADULT - ASSESSMENT
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 Presented to ED from [facility] with fevers x 4d, and 4 episodes of vomiting on day of admission. En route to hospital in ambulance, patient became hypotensive and dopamine was started.  Pt arrived unresponsive with dark vomitus around mouth. In ED, pt was found to have melanotic stool and black vomitus. BP was 94/37; Hb was 5.7 and WBC 20.8 with lactate of 13.5. Pt was started on norepi, transfused 2U PRBC, started on cefepime and vanc, and given 2.2 L LR bolus w/ improvement in BP. Patient was admitted to MICU for further management. Patient was found to have a + UA positive for bacteria and budding yeast. Pt was started on meropenem, and diflucan. Hgb continued to trend down on MICU floor, pt required 1U prbc transfusion again on 12/12, Patient was placed on PPI gtt. GI performed upper endoscopy on 12/2 no source of bleeding visualized. TFs restarted. Urine culture contaminated, repeat culture sent. Sputum culture grew pseudomonas but no evidence of Infiltrate on CXR     12/12: Patient transferred to RCU this evening. Patient transfused 1 unit of PRBCs this morning. Patient S/p bedside EGD no evidence of active bleeding, TFs resumed. Patient transitioned off PPI drip to Protonix IVP Q 12 hrs  Patient remains on broad spec abx / antifungal for presumed UTI. Will attempt TOV this evening   12/12 Sodium 152, free water 250 ml q8, potassium 3.3  KCL 40 mg x1, H/H stable. Continue with Meropenem and Fluconazole. WBC on admission 32, now 17, positive blood cultures, GNR on 2 sets, on Meropenem, ID consulted. 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 Presented to ED from [facility] with fevers x 4d, and 4 episodes of vomiting on day of admission. En route to hospital in ambulance, patient became hypotensive and dopamine was started.  Pt arrived unresponsive with dark vomitus around mouth. In ED, pt was found to have melanotic stool and black vomitus. BP was 94/37; Hb was 5.7 and WBC 20.8 with lactate of 13.5. Pt was started on norepi, transfused 2U PRBC, started on cefepime and vanc, and given 2.2 L LR bolus w/ improvement in BP. Patient was admitted to MICU for further management. Patient was found to have a + UA positive for bacteria and budding yeast. Pt was started on meropenem, and diflucan. Hgb continued to trend down on MICU floor, pt required 1U prbc transfusion again on 12/12, Patient was placed on PPI gtt. GI performed upper endoscopy on 12/2 no source of bleeding visualized. TFs restarted. Urine culture contaminated, repeat culture sent. Sputum culture grew pseudomonas but no evidence of Infiltrate on CXR     12/12: Patient transferred to RCU this evening. Patient transfused 1 unit of PRBCs this morning. Patient S/p bedside EGD no evidence of active bleeding, TFs resumed. Patient transitioned off PPI drip to Protonix IVP Q 12 hrs  Patient remains on broad spec abx / antifungal for presumed UTI. Will attempt TOV this evening   12/12 Sodium 152, free water 250 ml q8, potassium 3.3  KCL 40 mg x1, H/H stable. Continue with Meropenem and Fluconazole. WBC on admission 32, now 17, positive blood cultures, GNR on 2 sets, on Meropenem, ID consulted.    12/14 Stable, cpap as tolerated. Per ID If any signs worsening/fever/resp failure, would change to Cefepime 2g q 8 + Flagyl 500mg q 8, continue on Meropenem for now.

## 2018-12-15 LAB
ANION GAP SERPL CALC-SCNC: 13 MMOL/L — SIGNIFICANT CHANGE UP (ref 5–17)
APTT BLD: 31.5 SEC — SIGNIFICANT CHANGE UP (ref 27.5–36.3)
BUN SERPL-MCNC: 13 MG/DL — SIGNIFICANT CHANGE UP (ref 7–23)
CALCIUM SERPL-MCNC: 8.7 MG/DL — SIGNIFICANT CHANGE UP (ref 8.4–10.5)
CHLORIDE SERPL-SCNC: 106 MMOL/L — SIGNIFICANT CHANGE UP (ref 96–108)
CO2 SERPL-SCNC: 23 MMOL/L — SIGNIFICANT CHANGE UP (ref 22–31)
CREAT SERPL-MCNC: 0.63 MG/DL — SIGNIFICANT CHANGE UP (ref 0.5–1.3)
CULTURE RESULTS: SIGNIFICANT CHANGE UP
GLUCOSE BLDC GLUCOMTR-MCNC: 147 MG/DL — HIGH (ref 70–99)
GLUCOSE BLDC GLUCOMTR-MCNC: 152 MG/DL — HIGH (ref 70–99)
GLUCOSE BLDC GLUCOMTR-MCNC: 157 MG/DL — HIGH (ref 70–99)
GLUCOSE SERPL-MCNC: 149 MG/DL — HIGH (ref 70–99)
HCT VFR BLD CALC: 26.9 % — LOW (ref 39–50)
HGB BLD-MCNC: 8.7 G/DL — LOW (ref 13–17)
INR BLD: 1.18 RATIO — HIGH (ref 0.88–1.16)
MAGNESIUM SERPL-MCNC: 1.6 MG/DL — SIGNIFICANT CHANGE UP (ref 1.6–2.6)
MCHC RBC-ENTMCNC: 29.3 PG — SIGNIFICANT CHANGE UP (ref 27–34)
MCHC RBC-ENTMCNC: 32.4 GM/DL — SIGNIFICANT CHANGE UP (ref 32–36)
MCV RBC AUTO: 90.6 FL — SIGNIFICANT CHANGE UP (ref 80–100)
ORGANISM # SPEC MICROSCOPIC CNT: SIGNIFICANT CHANGE UP
ORGANISM # SPEC MICROSCOPIC CNT: SIGNIFICANT CHANGE UP
PHOSPHATE SERPL-MCNC: 3.1 MG/DL — SIGNIFICANT CHANGE UP (ref 2.5–4.5)
PLATELET # BLD AUTO: 280 K/UL — SIGNIFICANT CHANGE UP (ref 150–400)
POTASSIUM SERPL-MCNC: 3.5 MMOL/L — SIGNIFICANT CHANGE UP (ref 3.5–5.3)
POTASSIUM SERPL-SCNC: 3.5 MMOL/L — SIGNIFICANT CHANGE UP (ref 3.5–5.3)
PROTHROM AB SERPL-ACNC: 13.6 SEC — HIGH (ref 10–12.9)
RBC # BLD: 2.97 M/UL — LOW (ref 4.2–5.8)
RBC # FLD: 17.8 % — HIGH (ref 10.3–14.5)
SODIUM SERPL-SCNC: 142 MMOL/L — SIGNIFICANT CHANGE UP (ref 135–145)
WBC # BLD: 12.1 K/UL — HIGH (ref 3.8–10.5)
WBC # FLD AUTO: 12.1 K/UL — HIGH (ref 3.8–10.5)

## 2018-12-15 PROCEDURE — 99233 SBSQ HOSP IP/OBS HIGH 50: CPT

## 2018-12-15 RX ORDER — CEFEPIME 1 G/1
INJECTION, POWDER, FOR SOLUTION INTRAMUSCULAR; INTRAVENOUS
Qty: 0 | Refills: 0 | Status: DISCONTINUED | OUTPATIENT
Start: 2018-12-15 | End: 2018-12-20

## 2018-12-15 RX ORDER — METRONIDAZOLE 500 MG
TABLET ORAL
Qty: 0 | Refills: 0 | Status: DISCONTINUED | OUTPATIENT
Start: 2018-12-15 | End: 2018-12-18

## 2018-12-15 RX ORDER — METOCLOPRAMIDE HCL 10 MG
5 TABLET ORAL ONCE
Qty: 0 | Refills: 0 | Status: COMPLETED | OUTPATIENT
Start: 2018-12-15 | End: 2018-12-15

## 2018-12-15 RX ORDER — CEFEPIME 1 G/1
2000 INJECTION, POWDER, FOR SOLUTION INTRAMUSCULAR; INTRAVENOUS EVERY 8 HOURS
Qty: 0 | Refills: 0 | Status: DISCONTINUED | OUTPATIENT
Start: 2018-12-16 | End: 2018-12-20

## 2018-12-15 RX ORDER — METRONIDAZOLE 500 MG
500 TABLET ORAL ONCE
Qty: 0 | Refills: 0 | Status: COMPLETED | OUTPATIENT
Start: 2018-12-15 | End: 2018-12-15

## 2018-12-15 RX ORDER — METRONIDAZOLE 500 MG
500 TABLET ORAL EVERY 8 HOURS
Qty: 0 | Refills: 0 | Status: DISCONTINUED | OUTPATIENT
Start: 2018-12-16 | End: 2018-12-18

## 2018-12-15 RX ORDER — METRONIDAZOLE 500 MG
500 TABLET ORAL EVERY 8 HOURS
Qty: 0 | Refills: 0 | Status: DISCONTINUED | OUTPATIENT
Start: 2018-12-15 | End: 2018-12-15

## 2018-12-15 RX ORDER — CEFEPIME 1 G/1
2000 INJECTION, POWDER, FOR SOLUTION INTRAMUSCULAR; INTRAVENOUS ONCE
Qty: 0 | Refills: 0 | Status: COMPLETED | OUTPATIENT
Start: 2018-12-15 | End: 2018-12-15

## 2018-12-15 RX ADMIN — Medication 1 DROP(S): at 05:18

## 2018-12-15 RX ADMIN — Medication 100 MILLIGRAM(S): at 18:32

## 2018-12-15 RX ADMIN — Medication 1 APPLICATION(S): at 05:19

## 2018-12-15 RX ADMIN — PANTOPRAZOLE SODIUM 40 MILLIGRAM(S): 20 TABLET, DELAYED RELEASE ORAL at 10:04

## 2018-12-15 RX ADMIN — CEFEPIME 100 MILLIGRAM(S): 1 INJECTION, POWDER, FOR SOLUTION INTRAMUSCULAR; INTRAVENOUS at 23:21

## 2018-12-15 RX ADMIN — Medication 2: at 18:31

## 2018-12-15 RX ADMIN — Medication 1 APPLICATION(S): at 18:32

## 2018-12-15 RX ADMIN — Medication 5 MILLIGRAM(S): at 23:31

## 2018-12-15 RX ADMIN — HUMAN INSULIN 30 UNIT(S): 100 INJECTION, SUSPENSION SUBCUTANEOUS at 05:22

## 2018-12-15 RX ADMIN — SODIUM CHLORIDE 75 MILLILITER(S): 9 INJECTION, SOLUTION INTRAVENOUS at 18:31

## 2018-12-15 RX ADMIN — Medication 1 DROP(S): at 23:32

## 2018-12-15 RX ADMIN — HUMAN INSULIN 30 UNIT(S): 100 INJECTION, SUSPENSION SUBCUTANEOUS at 18:31

## 2018-12-15 RX ADMIN — Medication 1 DROP(S): at 12:16

## 2018-12-15 RX ADMIN — Medication 100 MILLIGRAM(S): at 05:18

## 2018-12-15 RX ADMIN — SODIUM CHLORIDE 75 MILLILITER(S): 9 INJECTION, SOLUTION INTRAVENOUS at 03:14

## 2018-12-15 RX ADMIN — PANTOPRAZOLE SODIUM 40 MILLIGRAM(S): 20 TABLET, DELAYED RELEASE ORAL at 21:34

## 2018-12-15 RX ADMIN — AMLODIPINE BESYLATE 10 MILLIGRAM(S): 2.5 TABLET ORAL at 05:18

## 2018-12-15 RX ADMIN — Medication 1 DROP(S): at 18:32

## 2018-12-15 RX ADMIN — FLUCONAZOLE 100 MILLIGRAM(S): 150 TABLET ORAL at 10:04

## 2018-12-15 RX ADMIN — Medication 2: at 05:18

## 2018-12-15 RX ADMIN — MEROPENEM 100 MILLIGRAM(S): 1 INJECTION INTRAVENOUS at 05:19

## 2018-12-15 RX ADMIN — MEROPENEM 100 MILLIGRAM(S): 1 INJECTION INTRAVENOUS at 21:36

## 2018-12-15 RX ADMIN — Medication 100 MILLIGRAM(S): at 23:31

## 2018-12-15 RX ADMIN — MEROPENEM 100 MILLIGRAM(S): 1 INJECTION INTRAVENOUS at 14:57

## 2018-12-15 NOTE — PROGRESS NOTE ADULT - PROBLEM SELECTOR PLAN 4
Tube feeds restarted this evening   Patient placed back on Decreased dose of NPH until patient at goal rate  Patient usually on NPH 38 Units BID at NH   Will continue NPH 20 Units q 12 hrs and increase as needed as patient reaches goal rate of tube feeds  Continue Insulin sliding scale , Monitor Blood Glucose levels Tube feeds restarted this evening   FS  130  - 210, continue with NPH 30 units BID   Patient usually on NPH 38 Units BID at NH   Will continue NPH 30 Units q 12 hrs and increase as needed as patient reaches goal rate of tube feeds  Continue Insulin sliding scale , Monitor Blood Glucose levels

## 2018-12-15 NOTE — PROGRESS NOTE ADULT - PROBLEM SELECTOR PLAN 1
Patient admitted with Multi- Factorial Shock ( Sepsis/ Hemorrhagic)   UTI likely cause of Sepsis; UA + Yeast / Bacteria   Urine Cx 12/11: Contaminated, Repeat CX (12/12) NGTD   Continue Meropenem and Fluconazole   Sputum cx 12/11: Pseudomonas / NL christopher-> Possible colonization  CXR 12/11: No focal Consolidation   Blood cultures 12/11 NGTD

## 2018-12-15 NOTE — PROGRESS NOTE ADULT - PROBLEM SELECTOR PLAN 6
Patient with STACEY in setting of Sepsis   Patient with Hypernatremia Sodium 152  Will continue 1/2 ns @ 75 cc/hr   Continue to monitor BMP Patient with STACEY in setting of Sepsis   12/15 Sodium 142  Will continue 1/2 ns @ 75 cc/hr x1 day   Continue to monitor BMP

## 2018-12-15 NOTE — PROGRESS NOTE ADULT - ASSESSMENT
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 Presented to ED from [facility] with fevers x 4d, and 4 episodes of vomiting on day of admission. En route to hospital in ambulance, patient became hypotensive and dopamine was started.  Pt arrived unresponsive with dark vomitus around mouth. In ED, pt was found to have melanotic stool and black vomitus. BP was 94/37; Hb was 5.7 and WBC 20.8 with lactate of 13.5. Pt was started on norepi, transfused 2U PRBC, started on cefepime and vanc, and given 2.2 L LR bolus w/ improvement in BP. Patient was admitted to MICU for further management. Patient was found to have a + UA positive for bacteria and budding yeast. Pt was started on meropenem, and diflucan. Hgb continued to trend down on MICU floor, pt required 1U prbc transfusion again on 12/12, Patient was placed on PPI gtt. GI performed upper endoscopy on 12/2 no source of bleeding visualized. TFs restarted. Urine culture contaminated, repeat culture sent. Sputum culture grew pseudomonas but no evidence of Infiltrate on CXR     12/12: Patient transferred to RCU this evening. Patient transfused 1 unit of PRBCs this morning. Patient S/p bedside EGD no evidence of active bleeding, TFs resumed. Patient transitioned off PPI drip to Protonix IVP Q 12 hrs  Patient remains on broad spec abx / antifungal for presumed UTI. Will attempt TOV this evening   12/12 Sodium 152, free water 250 ml q8, potassium 3.3  KCL 40 mg x1, H/H stable. Continue with Meropenem and Fluconazole. WBC on admission 32, now 17, positive blood cultures, GNR on 2 sets, on Meropenem, ID consulted.    12/14 Stable, cpap as tolerated. Per ID If any signs worsening/fever/resp failure, would change to Cefepime 2g q 8 + Flagyl 500mg q 8, continue on Meropenem for now.   12/15 Stable, will continue with weaning trials. Sputum culture with Pseudomonas aeruginosa. Hypertensive 150's on 12/14, home medications for BP restarted.

## 2018-12-15 NOTE — PROGRESS NOTE ADULT - PROBLEM SELECTOR PLAN 9
RN reports Unstagable Sacral Pressure wound   Will place Wound care consult RN reports Unstagable Sacral Pressure wound   Wound care following

## 2018-12-15 NOTE — PROGRESS NOTE ADULT - PROBLEM SELECTOR PLAN 8
Patient with hx of HTN Usually on Norvasc and Metoprolol   Patient hypotensive in setting of shock on admission requiring pressors   Will monitor BP off Anti- htn currently Patient hypotensive in setting of shock on admission requiring pressors   12/15 Now  hypertensive, home medications Metoprolol and Norvasc restarted 12/14 for BP >150's

## 2018-12-15 NOTE — PROGRESS NOTE ADULT - PROBLEM SELECTOR PLAN 2
Patient with Tracheostomy at baseline   Continue Mechanical Ventilation, weaning trials as tolerated   Continue Chest PT and Suctioning PRN   Attempt weaning as tolerating, CPAP < 1 hr tolerated

## 2018-12-15 NOTE — PROGRESS NOTE ADULT - SUBJECTIVE AND OBJECTIVE BOX
Patient is a 73y old  Male who presents with a chief complaint of Multifactorial shock (14 Dec 2018 10:32)    Interval Events:    No events overnight.     REVIEW OF SYSTEMS:  [ ] Positive  [ ] All other systems negative  [X ] Unable to assess ROS because patient is non-verbal     Vital Signs Last 24 Hrs  T(C): 36.4 (12-15-18 @ 05:00), Max: 37.1 (18 @ 11:30)  T(F): 97.6 (12-15-18 @ 05:00), Max: 98.7 (18 @ 11:30)  HR: 68 (12-15-18 @ 08:35) (68 - 109)  BP: 143/73 (12-15-18 @ 05:00) (128/59 - 159/63)  RR: 16 (12-15-18 @ 05:00) (16 - 16)  SpO2: 99% (12-15-18 @ 08:35) (99% - 100%)    PHYSICAL EXAM:  HEENT:   [X ]Tracheostomy:  #8 Cuffed Shiley   [ ]Pupils equal  [ ]No oral lesions  [ ]Abnormal    SKIN  [X ]No Rash  [ ] Abnormal  [ ] pressure    CARDIAC  [X ]Regular  [ ]Abnormal    PULMONARY  [X ]Bilateral Clear Breath Sounds  [ ]Normal Excursion  [ ]Abnormal    GI  [X ]PEG      [X ] +BS		              [X ]Soft, nondistended, nontender	  [ ]Abnormal    MUSCULOSKELETAL                                   [X ]Bedbound                 [ ]Abnormal    [ X]Ambulatory/OOB to chair  with assist                      EXTREMITIES                                         [ X]Normal  [ ]Edema                           NEUROLOGIC  [ ] Normal, non focal  [ ] Focal findings:    PSYCHIATRIC  [ ]Alert and appropriate  [ ] Sedated	 [ ]Agitated    :  Guerra: [ ] Yes, if yes: Date of Placement:                   [X  ] No    LINES: Central Lines [ ] Yes, if yes: Date of Placement                                     [  X] No    HOSPITAL MEDICATIONS:  MEDICATIONS  (STANDING):  amLODIPine   Tablet 10 milliGRAM(s) Oral daily  artificial  tears Solution 1 Drop(s) Both EYES every 6 hours  Dakins Solution - 1/4 Strength 1 Application(s) Topical two times a day  dextrose 5%. 1000 milliLiter(s) (50 mL/Hr) IV Continuous <Continuous>  dextrose 50% Injectable 12.5 Gram(s) IV Push once  dextrose 50% Injectable 25 Gram(s) IV Push once  dextrose 50% Injectable 25 Gram(s) IV Push once  fluconAZOLE IVPB 200 milliGRAM(s) IV Intermittent every 24 hours  insulin lispro (HumaLOG) corrective regimen sliding scale   SubCutaneous every 6 hours  insulin NPH human recombinant 30 Unit(s) SubCutaneous two times a day  meropenem  IVPB      meropenem  IVPB 1000 milliGRAM(s) IV Intermittent every 8 hours  metoprolol tartrate 100 milliGRAM(s) Oral two times a day  pantoprazole  Injectable 40 milliGRAM(s) IV Push every 12 hours  sodium chloride 0.45%. 1000 milliLiter(s) (75 mL/Hr) IV Continuous <Continuous>    MEDICATIONS  (PRN):  acetaminophen    Suspension .. 650 milliGRAM(s) Oral every 6 hours PRN Moderate Pain (4 - 6)  dextrose 40% Gel 15 Gram(s) Oral once PRN Blood Glucose LESS THAN 70 milliGRAM(s)/deciLiter  glucagon  Injectable 1 milliGRAM(s) IntraMuscular once PRN Glucose <70 milliGRAM(s)/deciLiter    LABS:                        8.7    12.1  )-----------( 280      ( 15 Dec 2018 07:09 )             26.9     12-15    142  |  106  |  13  ----------------------------<  149<H>  3.5   |  23  |  0.63    Ca    8.7      15 Dec 2018 07:07  Phos  3.1     12-15  Mg     1.6     12-15    PT/INR - ( 15 Dec 2018 07:09 )   PT: 13.6 sec;   INR: 1.18 ratio       PTT - ( 15 Dec 2018 07:09 )  PTT:31.5 sec  Urinalysis Basic - ( 13 Dec 2018 21:17 )    Color: Yellow / Appearance: Clear / S.014 / pH: x  Gluc: x / Ketone: Negative  / Bili: Negative / Urobili: 1.0 mg/dL   Blood: x / Protein: Trace mg/dL / Nitrite: Negative   Leuk Esterase: Negative / RBC: 2 /HPF / WBC 2 /HPF   Sq Epi: x / Non Sq Epi: 1 /HPF / Bacteria: Negative    CAPILLARY BLOOD GLUCOSE    MICROBIOLOGY:     RADIOLOGY:  [ ] Reviewed and interpreted by me    Mode: AC/ CMV (Assist Control/ Continuous Mandatory Ventilation)  RR (machine): 16  TV (machine): 500  FiO2: 30  PEEP: 5  ITime: 1  MAP: 8  PIP: 17

## 2018-12-16 LAB
ANION GAP SERPL CALC-SCNC: 11 MMOL/L — SIGNIFICANT CHANGE UP (ref 5–17)
APTT BLD: 30.5 SEC — SIGNIFICANT CHANGE UP (ref 27.5–36.3)
BUN SERPL-MCNC: 13 MG/DL — SIGNIFICANT CHANGE UP (ref 7–23)
CALCIUM SERPL-MCNC: 8.5 MG/DL — SIGNIFICANT CHANGE UP (ref 8.4–10.5)
CHLORIDE SERPL-SCNC: 103 MMOL/L — SIGNIFICANT CHANGE UP (ref 96–108)
CO2 SERPL-SCNC: 23 MMOL/L — SIGNIFICANT CHANGE UP (ref 22–31)
CREAT SERPL-MCNC: 0.7 MG/DL — SIGNIFICANT CHANGE UP (ref 0.5–1.3)
GLUCOSE BLDC GLUCOMTR-MCNC: 104 MG/DL — HIGH (ref 70–99)
GLUCOSE BLDC GLUCOMTR-MCNC: 131 MG/DL — HIGH (ref 70–99)
GLUCOSE BLDC GLUCOMTR-MCNC: 154 MG/DL — HIGH (ref 70–99)
GLUCOSE BLDC GLUCOMTR-MCNC: 154 MG/DL — HIGH (ref 70–99)
GLUCOSE BLDC GLUCOMTR-MCNC: 172 MG/DL — HIGH (ref 70–99)
GLUCOSE BLDC GLUCOMTR-MCNC: 47 MG/DL — LOW (ref 70–99)
GLUCOSE BLDC GLUCOMTR-MCNC: 50 MG/DL — LOW (ref 70–99)
GLUCOSE BLDC GLUCOMTR-MCNC: 63 MG/DL — LOW (ref 70–99)
GLUCOSE BLDC GLUCOMTR-MCNC: 64 MG/DL — LOW (ref 70–99)
GLUCOSE SERPL-MCNC: 46 MG/DL — LOW (ref 70–99)
HCT VFR BLD CALC: 28.9 % — LOW (ref 39–50)
HGB BLD-MCNC: 8.9 G/DL — LOW (ref 13–17)
INR BLD: 1.23 RATIO — HIGH (ref 0.88–1.16)
MAGNESIUM SERPL-MCNC: 1.5 MG/DL — LOW (ref 1.6–2.6)
MCHC RBC-ENTMCNC: 27.5 PG — SIGNIFICANT CHANGE UP (ref 27–34)
MCHC RBC-ENTMCNC: 30.7 GM/DL — LOW (ref 32–36)
MCV RBC AUTO: 89.5 FL — SIGNIFICANT CHANGE UP (ref 80–100)
PHOSPHATE SERPL-MCNC: 3.4 MG/DL — SIGNIFICANT CHANGE UP (ref 2.5–4.5)
PLATELET # BLD AUTO: 330 K/UL — SIGNIFICANT CHANGE UP (ref 150–400)
POTASSIUM SERPL-MCNC: 3.7 MMOL/L — SIGNIFICANT CHANGE UP (ref 3.5–5.3)
POTASSIUM SERPL-SCNC: 3.7 MMOL/L — SIGNIFICANT CHANGE UP (ref 3.5–5.3)
PROTHROM AB SERPL-ACNC: 14.1 SEC — HIGH (ref 10–12.9)
RBC # BLD: 3.23 M/UL — LOW (ref 4.2–5.8)
RBC # FLD: 17.2 % — HIGH (ref 10.3–14.5)
SODIUM SERPL-SCNC: 137 MMOL/L — SIGNIFICANT CHANGE UP (ref 135–145)
WBC # BLD: 13 K/UL — HIGH (ref 3.8–10.5)
WBC # FLD AUTO: 13 K/UL — HIGH (ref 3.8–10.5)

## 2018-12-16 PROCEDURE — 99233 SBSQ HOSP IP/OBS HIGH 50: CPT

## 2018-12-16 PROCEDURE — 99232 SBSQ HOSP IP/OBS MODERATE 35: CPT

## 2018-12-16 RX ORDER — DEXTROSE 10 % IN WATER 10 %
1000 INTRAVENOUS SOLUTION INTRAVENOUS
Qty: 0 | Refills: 0 | Status: DISCONTINUED | OUTPATIENT
Start: 2018-12-16 | End: 2018-12-16

## 2018-12-16 RX ORDER — HUMAN INSULIN 100 [IU]/ML
20 INJECTION, SUSPENSION SUBCUTANEOUS EVERY 12 HOURS
Qty: 0 | Refills: 0 | Status: DISCONTINUED | OUTPATIENT
Start: 2018-12-16 | End: 2018-12-20

## 2018-12-16 RX ORDER — SODIUM CHLORIDE 9 MG/ML
1000 INJECTION, SOLUTION INTRAVENOUS
Qty: 0 | Refills: 0 | Status: DISCONTINUED | OUTPATIENT
Start: 2018-12-16 | End: 2018-12-16

## 2018-12-16 RX ORDER — MAGNESIUM SULFATE 500 MG/ML
2 VIAL (ML) INJECTION ONCE
Qty: 0 | Refills: 0 | Status: COMPLETED | OUTPATIENT
Start: 2018-12-16 | End: 2018-12-16

## 2018-12-16 RX ORDER — DEXTROSE 50 % IN WATER 50 %
50 SYRINGE (ML) INTRAVENOUS ONCE
Qty: 0 | Refills: 0 | Status: COMPLETED | OUTPATIENT
Start: 2018-12-16 | End: 2018-12-16

## 2018-12-16 RX ORDER — FLUCONAZOLE 150 MG/1
200 TABLET ORAL EVERY 24 HOURS
Qty: 0 | Refills: 0 | Status: DISCONTINUED | OUTPATIENT
Start: 2018-12-16 | End: 2018-12-17

## 2018-12-16 RX ORDER — DEXTROSE 50 % IN WATER 50 %
25 SYRINGE (ML) INTRAVENOUS ONCE
Qty: 0 | Refills: 0 | Status: COMPLETED | OUTPATIENT
Start: 2018-12-16 | End: 2018-12-16

## 2018-12-16 RX ADMIN — CEFEPIME 100 MILLIGRAM(S): 1 INJECTION, POWDER, FOR SOLUTION INTRAMUSCULAR; INTRAVENOUS at 22:38

## 2018-12-16 RX ADMIN — Medication 25 GRAM(S): at 06:30

## 2018-12-16 RX ADMIN — Medication 1 DROP(S): at 17:32

## 2018-12-16 RX ADMIN — Medication 1 DROP(S): at 05:02

## 2018-12-16 RX ADMIN — Medication 50 MILLILITER(S): at 11:50

## 2018-12-16 RX ADMIN — CEFEPIME 100 MILLIGRAM(S): 1 INJECTION, POWDER, FOR SOLUTION INTRAMUSCULAR; INTRAVENOUS at 14:33

## 2018-12-16 RX ADMIN — Medication 1 APPLICATION(S): at 17:34

## 2018-12-16 RX ADMIN — Medication 50 MILLILITER(S): at 13:15

## 2018-12-16 RX ADMIN — AMLODIPINE BESYLATE 10 MILLIGRAM(S): 2.5 TABLET ORAL at 05:02

## 2018-12-16 RX ADMIN — CEFEPIME 100 MILLIGRAM(S): 1 INJECTION, POWDER, FOR SOLUTION INTRAMUSCULAR; INTRAVENOUS at 05:04

## 2018-12-16 RX ADMIN — Medication 100 MILLIGRAM(S): at 17:33

## 2018-12-16 RX ADMIN — Medication 100 MILLIGRAM(S): at 17:32

## 2018-12-16 RX ADMIN — Medication 100 MILLIGRAM(S): at 05:02

## 2018-12-16 RX ADMIN — FLUCONAZOLE 100 MILLIGRAM(S): 150 TABLET ORAL at 12:05

## 2018-12-16 RX ADMIN — PANTOPRAZOLE SODIUM 40 MILLIGRAM(S): 20 TABLET, DELAYED RELEASE ORAL at 22:38

## 2018-12-16 RX ADMIN — Medication 1 DROP(S): at 11:21

## 2018-12-16 RX ADMIN — Medication 100 MILLIGRAM(S): at 09:29

## 2018-12-16 RX ADMIN — Medication 50 GRAM(S): at 11:54

## 2018-12-16 RX ADMIN — PANTOPRAZOLE SODIUM 40 MILLIGRAM(S): 20 TABLET, DELAYED RELEASE ORAL at 11:20

## 2018-12-16 RX ADMIN — Medication 1 APPLICATION(S): at 05:03

## 2018-12-16 NOTE — PROGRESS NOTE ADULT - PROBLEM SELECTOR PLAN 8
Patient hypotensive in setting of shock on admission requiring pressors   12/15 Now  hypertensive, home medications Metoprolol and Norvasc restarted 12/14 for BP >150's

## 2018-12-16 NOTE — PROGRESS NOTE ADULT - PROBLEM SELECTOR PLAN 4
Tube feeds restarted this evening   FS  130  - 210, continue with NPH 30 units BID   Patient usually on NPH 38 Units BID at NH   Will continue NPH 30 Units q 12 hrs and increase as needed as patient reaches goal rate of tube feeds  Continue Insulin sliding scale , Monitor Blood Glucose levels Tube feeds restarted this evening   FS  130  - 210, continue with NPH 30 units BID - monitor after isolated episode of hypoglycemia    Patient usually on NPH 38 Units BID at NH   Will continue NPH 30 Units q 12 hrs and increase as needed as patient reaches goal rate of tube feeds  Continue Insulin sliding scale , Monitor Blood Glucose levels

## 2018-12-16 NOTE — PROGRESS NOTE ADULT - SUBJECTIVE AND OBJECTIVE BOX
Patient is a 73y old  Male who presents with a chief complaint of Multifactorial shock (14 Dec 2018 10:32)    Interval Events:    No events overnight.     REVIEW OF SYSTEMS:  [ ] Positive  [ ] All other systems negative  [X ] Unable to assess ROS because patient is non-verbal     Vital Signs Last 24 Hrs  T(C): 36.4 (12-15-18 @ 05:00), Max: 37.1 (18 @ 11:30)  T(F): 97.6 (12-15-18 @ 05:00), Max: 98.7 (18 @ 11:30)  HR: 68 (12-15-18 @ 08:35) (68 - 109)  BP: 143/73 (12-15-18 @ 05:00) (128/59 - 159/63)  RR: 16 (12-15-18 @ 05:00) (16 - 16)  SpO2: 99% (12-15-18 @ 08:35) (99% - 100%)    PHYSICAL EXAM:  HEENT:   [X ]Tracheostomy:  #8 Cuffed Shiley   [ ]Pupils equal  [ ]No oral lesions  [ ]Abnormal    SKIN  [X ]No Rash  [ ] Abnormal  [ ] pressure    CARDIAC  [X ]Regular  [ ]Abnormal    PULMONARY  [X ]Bilateral Clear Breath Sounds  [ ]Normal Excursion  [ ]Abnormal    GI  [X ]PEG      [X ] +BS		              [X ]Soft, nondistended, nontender	  [ ]Abnormal    MUSCULOSKELETAL                                   [X ]Bedbound                 [ ]Abnormal    [ X]Ambulatory/OOB to chair  with assist                      EXTREMITIES                                         [ X]Normal  [ ]Edema                           NEUROLOGIC  [ ] Normal, non focal  [ ] Focal findings:    PSYCHIATRIC  [ ]Alert and appropriate  [ ] Sedated	 [ ]Agitated    :  Guerra: [ ] Yes, if yes: Date of Placement:                   [X  ] No    LINES: Central Lines [ ] Yes, if yes: Date of Placement                                     [  X] No    HOSPITAL MEDICATIONS:  MEDICATIONS  (STANDING):  amLODIPine   Tablet 10 milliGRAM(s) Oral daily  artificial  tears Solution 1 Drop(s) Both EYES every 6 hours  Dakins Solution - 1/4 Strength 1 Application(s) Topical two times a day  dextrose 5%. 1000 milliLiter(s) (50 mL/Hr) IV Continuous <Continuous>  dextrose 50% Injectable 12.5 Gram(s) IV Push once  dextrose 50% Injectable 25 Gram(s) IV Push once  dextrose 50% Injectable 25 Gram(s) IV Push once  fluconAZOLE IVPB 200 milliGRAM(s) IV Intermittent every 24 hours  insulin lispro (HumaLOG) corrective regimen sliding scale   SubCutaneous every 6 hours  insulin NPH human recombinant 30 Unit(s) SubCutaneous two times a day  meropenem  IVPB      meropenem  IVPB 1000 milliGRAM(s) IV Intermittent every 8 hours  metoprolol tartrate 100 milliGRAM(s) Oral two times a day  pantoprazole  Injectable 40 milliGRAM(s) IV Push every 12 hours  sodium chloride 0.45%. 1000 milliLiter(s) (75 mL/Hr) IV Continuous <Continuous>    MEDICATIONS  (PRN):  acetaminophen    Suspension .. 650 milliGRAM(s) Oral every 6 hours PRN Moderate Pain (4 - 6)  dextrose 40% Gel 15 Gram(s) Oral once PRN Blood Glucose LESS THAN 70 milliGRAM(s)/deciLiter  glucagon  Injectable 1 milliGRAM(s) IntraMuscular once PRN Glucose <70 milliGRAM(s)/deciLiter    LABS:                        8.7    12.1  )-----------( 280      ( 15 Dec 2018 07:09 )             26.9     12-15    142  |  106  |  13  ----------------------------<  149<H>  3.5   |  23  |  0.63    Ca    8.7      15 Dec 2018 07:07  Phos  3.1     12-15  Mg     1.6     12-15    PT/INR - ( 15 Dec 2018 07:09 )   PT: 13.6 sec;   INR: 1.18 ratio       PTT - ( 15 Dec 2018 07:09 )  PTT:31.5 sec  Urinalysis Basic - ( 13 Dec 2018 21:17 )    Color: Yellow / Appearance: Clear / S.014 / pH: x  Gluc: x / Ketone: Negative  / Bili: Negative / Urobili: 1.0 mg/dL   Blood: x / Protein: Trace mg/dL / Nitrite: Negative   Leuk Esterase: Negative / RBC: 2 /HPF / WBC 2 /HPF   Sq Epi: x / Non Sq Epi: 1 /HPF / Bacteria: Negative    CAPILLARY BLOOD GLUCOSE    MICROBIOLOGY:     RADIOLOGY:  [ ] Reviewed and interpreted by me    Mode: AC/ CMV (Assist Control/ Continuous Mandatory Ventilation)  RR (machine): 16  TV (machine): 500  FiO2: 30  PEEP: 5  ITime: 1  MAP: 8  PIP: 17 Patient is a 73y old  Male who presents with a chief complaint of Multifactorial shock (14 Dec 2018 10:32)    Interval Events:     events overnight. episode of hypothermia(95R)    and hypoglycemia  fs 47    REVIEW OF SYSTEMS:  [ ] Positive  [ ] All other systems negative  [X ] Unable to assess ROS because patient is non-verbal     Vital Signs Last 24 Hrs  T(C): 36.4 (12-15-18 @ 05:00), Max: 37.1 (18 @ 11:30)  T(F): 97.6 (12-15-18 @ 05:00), Max: 98.7 (18 @ 11:30)  HR: 68 (12-15-18 @ 08:35) (68 - 109)  BP: 143/73 (12-15-18 @ 05:00) (128/59 - 159/63)  RR: 16 (12-15-18 @ 05:00) (16 - 16)  SpO2: 99% (12-15-18 @ 08:35) (99% - 100%)    PHYSICAL EXAM:  HEENT:   [X ]Tracheostomy:  #8 Cuffed Shiley   [ ]Pupils equal  [ ]No oral lesions  [ ]Abnormal    SKIN  [X ]No Rash  [ ] Abnormal  [ ] pressure    CARDIAC  [X ]Regular  [ ]Abnormal    PULMONARY  [X ]Bilateral Clear Breath Sounds  [ ]Normal Excursion  [ ]Abnormal    GI  [X ]PEG      [X ] +BS		              [X ]Soft, nondistended, nontender	  [ ]Abnormal    MUSCULOSKELETAL                                   [X ]Bedbound                 [ ]Abnormal    [ X]Ambulatory/OOB to chair  with assist                      EXTREMITIES                                         [ X]Normal  [ ]Edema                           NEUROLOGIC  [ ] Normal, non focal  [ ] Focal findings:    PSYCHIATRIC  [ ]Alert and appropriate  [ ] Sedated	 [ ]Agitated    :  Guerra: [ ] Yes, if yes: Date of Placement:                   [X  ] No    LINES: Central Lines [ ] Yes, if yes: Date of Placement                                     [  X] No    HOSPITAL MEDICATIONS:  MEDICATIONS  (STANDING):  amLODIPine   Tablet 10 milliGRAM(s) Oral daily  artificial  tears Solution 1 Drop(s) Both EYES every 6 hours  Dakins Solution - 1/4 Strength 1 Application(s) Topical two times a day  dextrose 5%. 1000 milliLiter(s) (50 mL/Hr) IV Continuous <Continuous>  dextrose 50% Injectable 12.5 Gram(s) IV Push once  dextrose 50% Injectable 25 Gram(s) IV Push once  dextrose 50% Injectable 25 Gram(s) IV Push once  fluconAZOLE IVPB 200 milliGRAM(s) IV Intermittent every 24 hours  insulin lispro (HumaLOG) corrective regimen sliding scale   SubCutaneous every 6 hours  insulin NPH human recombinant 30 Unit(s) SubCutaneous two times a day  meropenem  IVPB      meropenem  IVPB 1000 milliGRAM(s) IV Intermittent every 8 hours  metoprolol tartrate 100 milliGRAM(s) Oral two times a day  pantoprazole  Injectable 40 milliGRAM(s) IV Push every 12 hours  sodium chloride 0.45%. 1000 milliLiter(s) (75 mL/Hr) IV Continuous <Continuous>    MEDICATIONS  (PRN):  acetaminophen    Suspension .. 650 milliGRAM(s) Oral every 6 hours PRN Moderate Pain (4 - 6)  dextrose 40% Gel 15 Gram(s) Oral once PRN Blood Glucose LESS THAN 70 milliGRAM(s)/deciLiter  glucagon  Injectable 1 milliGRAM(s) IntraMuscular once PRN Glucose <70 milliGRAM(s)/deciLiter    LABS:                        8.7    12.1  )-----------( 280      ( 15 Dec 2018 07:09 )             26.9     12-15    142  |  106  |  13  ----------------------------<  149<H>  3.5   |  23  |  0.63    Ca    8.7      15 Dec 2018 07:07  Phos  3.1     12-15  Mg     1.6     12-15    PT/INR - ( 15 Dec 2018 07:09 )   PT: 13.6 sec;   INR: 1.18 ratio       PTT - ( 15 Dec 2018 07:09 )  PTT:31.5 sec  Urinalysis Basic - ( 13 Dec 2018 21:17 )    Color: Yellow / Appearance: Clear / S.014 / pH: x  Gluc: x / Ketone: Negative  / Bili: Negative / Urobili: 1.0 mg/dL   Blood: x / Protein: Trace mg/dL / Nitrite: Negative   Leuk Esterase: Negative / RBC: 2 /HPF / WBC 2 /HPF   Sq Epi: x / Non Sq Epi: 1 /HPF / Bacteria: Negative    CAPILLARY BLOOD GLUCOSE    MICROBIOLOGY:     RADIOLOGY:  [ ] Reviewed and interpreted by me    Mode: AC/ CMV (Assist Control/ Continuous Mandatory Ventilation)  RR (machine): 16  TV (machine): 500  FiO2: 30  PEEP: 5  ITime: 1  MAP: 8  PIP: 17

## 2018-12-16 NOTE — CHART NOTE - NSCHARTNOTEFT_GEN_A_CORE
episode of hypoglycemia early this AM-  plus hypothermic      given D50 IVP- for blood sugar 64.      responded 154     15 min later     D10w started at 12 45 per attending         Blood sugar  range 154.    550 pm-  will DC  D10w   and hold NPH  at this time  which has been reduced to 20 u 2 times a day-   monitor finger sticks and cover as needed.   and re evaluate in am .    BECKY Moya NP RCU

## 2018-12-16 NOTE — CHART NOTE - NSCHARTNOTEFT_GEN_A_CORE
Called by RN, temp 94. 5F orally rechecked rectally 95.5F.  Pt seen at bedside skin warm to touch, pt immobile and non verbal at baseline since admission however BP stable.     Vital Signs Last 24 Hrs  T(C): 36.6 (16 Dec 2018 02:09), Max: 36.6 (16 Dec 2018 02:09)  T(F): 97.8 (16 Dec 2018 02:09), Max: 97.8 (16 Dec 2018 02:09)  HR: 74 (16 Dec 2018 02:09) (61 - 75)  BP: 131/67 (16 Dec 2018 02:09) (122/76 - 149/70)  BP(mean): --  RR: 19 (16 Dec 2018 02:09) (16 - 19)  SpO2: 100% (16 Dec 2018 02:09) (96% - 100%)    CBC Full  -  ( 15 Dec 2018 07:09 )  WBC Count : 12.1 K/uL  Hemoglobin : 8.7 g/dL  Hematocrit : 26.9 %  Platelet Count - Automated : 280 K/uL  Mean Cell Volume : 90.6 fl  Mean Cell Hemoglobin : 29.3 pg  Mean Cell Hemoglobin Concentration : 32.4 gm/dL    Culture - Urine (12.13.18 @ 19:33)    Specimen Source: .Urine Catheterized    Culture Results:   No growth  Culture - Blood (12.13.18 @ 09:19)    Specimen Source: .Blood Blood-Peripheral    Culture Results:   No growth to date.  Culture - Blood (12.13.18 @ 09:19)    Specimen Source: .Blood Blood-Peripheral    Culture Results:   No growth to date.    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 [facility], and 4 episodes of vomiting on day of admission. On course to ED pt became hypotensive and dopamine was started.  Pt arrived unresponsive with dark vomitus around mouth. In ED, pt was found to have melanotic stool and black vomitus. Pt transfused and admitted to MICU, pt transferred to RCU on 12/12 when stable. Pt presently on Meropenem and Fluconazole. WBC on admission 32, now 17, positive blood cultures, GNR on 2 sets, on Meropenem as per ID consulted. As per ID If any signs worsen/fever/resp failure, would change to Cefepime 2g q 8 + Flagyl 500mg q 8 hr. Meropenem discontinued, pt cultured less than 48 hrs NTD. Pt placed on Hyperthermia blanket till temperature normalized.     Haresh Perez NP  [id number]

## 2018-12-16 NOTE — PROGRESS NOTE ADULT - PROBLEM SELECTOR PLAN 6
Patient with STACEY in setting of Sepsis   12/15 Sodium 142  Will continue 1/2 ns @ 75 cc/hr x1 day   Continue to monitor BMP

## 2018-12-16 NOTE — PROGRESS NOTE ADULT - ASSESSMENT
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 Presented to ED from [facility] with fevers x 4d, and 4 episodes of vomiting on day of admission. En route to hospital in ambulance, patient became hypotensive and dopamine was started.  Pt arrived unresponsive with dark vomitus around mouth. In ED, pt was found to have melanotic stool and black vomitus. BP was 94/37; Hb was 5.7 and WBC 20.8 with lactate of 13.5. Pt was started on norepi, transfused 2U PRBC, started on cefepime and vanc, and given 2.2 L LR bolus w/ improvement in BP. Patient was admitted to MICU for further management. Patient was found to have a + UA positive for bacteria and budding yeast. Pt was started on meropenem, and diflucan. Hgb continued to trend down on MICU floor, pt required 1U prbc transfusion again on 12/12, Patient was placed on PPI gtt. GI performed upper endoscopy on 12/2 no source of bleeding visualized. TFs restarted. Urine culture contaminated, repeat culture sent. Sputum culture grew pseudomonas but no evidence of Infiltrate on CXR     12/12: Patient transferred to RCU this evening. Patient transfused 1 unit of PRBCs this morning. Patient S/p bedside EGD no evidence of active bleeding, TFs resumed. Patient transitioned off PPI drip to Protonix IVP Q 12 hrs  Patient remains on broad spec abx / antifungal for presumed UTI. Will attempt TOV this evening   12/12 Sodium 152, free water 250 ml q8, potassium 3.3  KCL 40 mg x1, H/H stable. Continue with Meropenem and Fluconazole. WBC on admission 32, now 17, positive blood cultures, GNR on 2 sets, on Meropenem, ID consulted.    12/14 Stable, cpap as tolerated. Per ID If any signs worsening/fever/resp failure, would change to Cefepime 2g q 8 + Flagyl 500mg q 8, continue on Meropenem for now.   12/15 Stable, will continue with weaning trials. Sputum culture with Pseudomonas aeruginosa. Hypertensive 150's on 12/14, home medications for BP restarted. 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 Presented to ED from [facility] with fevers x 4d, and 4 episodes of vomiting on day of admission. En route to hospital in ambulance, patient became hypotensive and dopamine was started.  Pt arrived unresponsive with dark vomitus around mouth. In ED, pt was found to have melanotic stool and black vomitus. BP was 94/37; Hb was 5.7 and WBC 20.8 with lactate of 13.5. Pt was started on norepi, transfused 2U PRBC, started on cefepime and vanc, and given 2.2 L LR bolus w/ improvement in BP. Patient was admitted to MICU for further management. Patient was found to have a + UA positive for bacteria and budding yeast. Pt was started on meropenem, and diflucan. Hgb continued to trend down on MICU floor, pt required 1U prbc transfusion again on 12/12, Patient was placed on PPI gtt. GI performed upper endoscopy on 12/2 no source of bleeding visualized. TFs restarted. Urine culture contaminated, repeat culture sent. Sputum culture grew pseudomonas but no evidence of Infiltrate on CXR     12/12: Patient transferred to RCU this evening. Patient transfused 1 unit of PRBCs this morning. Patient S/p bedside EGD no evidence of active bleeding, TFs resumed. Patient transitioned off PPI drip to Protonix IVP Q 12 hrs  Patient remains on broad spec abx / antifungal for presumed UTI. Will attempt TOV this evening   12/12 Sodium 152, free water 250 ml q8, potassium 3.3  KCL 40 mg x1, H/H stable. Continue with Meropenem and Fluconazole. WBC on admission 32, now 17, positive blood cultures, GNR on 2 sets, on Meropenem, ID consulted.    12/14 Stable, cpap as tolerated. Per ID If any signs worsening/fever/resp failure, would change to Cefepime 2g q 8 + Flagyl 500mg q 8, continue on Meropenem for now.   12/15 Stable, will continue with weaning trials. Sputum culture with Pseudomonas aeruginosa. Hypertensive 150's on 12/14, home medications for BP restarted.   12/15  episode  of hypothermia and hypoglycemia- feeds continuos  ABX changed per ID and hypoglycemia protocol instituted.

## 2018-12-16 NOTE — PROGRESS NOTE ADULT - ASSESSMENT
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  Leukocytosis, fever.  Sacral wound examined--unstageable, non-purulent.  Bacteremia with proteus sensitive to cefepime.      Suggest:  - continue cefepime and flagyl  - f/u all cultures    above d/w RCU NP

## 2018-12-16 NOTE — PROGRESS NOTE ADULT - SUBJECTIVE AND OBJECTIVE BOX
CC: F/U for Bacteremia    Interval History/ROS: hypothermia overnight and antibiotics adjusted. Unable to obtain ROS - patient non-verbal    Allergies  penicillin (Unknown)    ANTIMICROBIALS:    cefepime   IVPB 2000 every 8 hours  fluconAZOLE IVPB 200 every 24 hours  metroNIDAZOLE  IVPB 500 every 8 hours    MEDICATIONS  (STANDING):  amLODIPine   Tablet 10 daily  insulin lispro (HumaLOG) corrective regimen sliding scale  every 6 hours  insulin NPH human recombinant 20 every 12 hours  metoprolol tartrate 100 two times a day  pantoprazole  Injectable 40 every 12 hours    Vital Signs Last 24 Hrs  T(F): 99.9 (18 @ 10:00), Max: 99.9 (18 @ 10:00)  HR: 75 (18 @ 11:53)  BP: 104/55 (18 @ 10:00)  RR: 2 (18 @ 10:00)  SpO2: 100% (18 @ 11:53) (96% - 100%)    PHYSICAL EXAM:  General: non-toxic  HEAD/EYES: eyes closed  ENT:  supple  Cardiovascular:   S1, S2  Respiratory:  clear bilaterally  GI:  soft, non-tender, normal bowel sounds  :  no CVA tenderness   Musculoskeletal:  no synovitis  Neurologic:  not able to assess  Skin:  no rash  Psychiatric:  unable to assess  Vascular:  no phlebitis                        8.9    13.0  )-----------( 330      ( 16 Dec 2018 07:31 )             28.9 -    137  |  103  |  13  ----------------------------<  46  3.7   |  23  |  0.70  Ca    8.5      16 Dec 2018 07:31Phos  3.4     -Mg     1.5         Urinalysis Basic - ( 13 Dec 2018 21:17 )  Color: Yellow / Appearance: Clear / S.014 / pH: x  Gluc: x / Ketone: Negative  / Bili: Negative / Urobili: 1.0 mg/dL   Blood: x / Protein: Trace mg/dL / Nitrite: Negative   Leuk Esterase: Negative / RBC: 2 /HPF / WBC 2 /HPF   Sq Epi: x / Non Sq Epi: 1 /HPF / Bacteria: Negative    MICROBIOLOGY:  Culture - Urine (18 @ 19:33)    Specimen Source: .Urine Catheterized    Culture Results:   No growth    Culture - Blood (18 @ 09:19)    Specimen Source: .Blood Blood-Peripheral    Culture Results:   No growth to date.    Culture - Blood (18 @ 09:19)    Specimen Source: .Blood Blood-Peripheral    Culture Results:   No growth to date.    .Sputum Sputum/TRAP  18   Numerous Pseudomonas aeruginosa (Carbapenem Resistant)  Normal Respiratory Beatrice present  --  Pseudomonas aeruginosa (Carbapenem Resistant)    Culture - Blood (18 @ 05:03)    Gram Stain:   Growth in anaerobic bottle: Gram Negative Rods  Growth in aerobic bottle: Gram Negative Rods    Specimen Source: .Blood Blood-Peripheral    Culture Results:   Growth in aerobic and anaerobic bottles: Proteus mirabilis  See previous culture [id number]          Culture - Blood (18 @ 05:03)    Gram Stain:   Growth in anaerobic bottle: Gram Negative Rods  Growth in aerobic bottle: Gram Negative Rods    Specimen Source: .Blood Blood-Peripheral    Culture Results:   Growth in aerobic and anaerobic bottles: Proteus mirabilis  Proteus mirabilis    -  Trimethoprim/Sulfamethoxazole: S     -  Tobramycin: S <=2    -  Piperacillin/Tazobactam: S <=8    -  Meropenem: S <=1    -  Levofloxacin: I 4    -  Ertapenem: S <=0.5    -  Gentamicin: S <=1    -  Ceftriaxone: S <=1 Enterobacter, Citrobacter, and Serratia may develop resistance during prolonged therapy    -  Ciprofloxacin: R >2    -  Cefoxitin: S <=4    -  Cefazolin: S <=2    -  Cefepime: S <=2    -  Amikacin: S <=8    -  Multidrug (KPC pos) resistant organism: Nondet    -  Staphylococcus aureus: Nondet    -  Methicillin resistant Staphylococcus aureus (MRSA): Nondet    -  Coagulase negative Staphylococcus: Nondet    -  Enterococcus species: Nondet    -  Vancomycin resistant Enterococcus sp.: Nondet    -  Escherichia coli: Nondet    -  Klebsiella oxytoca: Nondet    -  Klebsiella pneumoniae: Nondet    -  Serratia marcescens: Nondet    -  Haemophilus influenzae: Nondet    -  Listeria monocytogenes: Nondet    -  Neisseria meningitidis: Nondet    -  Pseudomonas aeruginosa: Nondet    -  Acinetobacter baumanii: Nondet    -  Enterobacter cloacae complex: Nondet    -  Streptococcus sp. (Not Grp A, B or S pneumoniae): Nondet    -  Streptococcus agalactiae (Group B): Nondet    -  Streptococcus pyogenes (Group A): Nondet    -  Streptococcus pneumoniae: Nondet    -  Candida albicans: Nondet    -  Candida glabrata: Nondet    -  Candida krusei: Nondet    -  Candida parapsilosis: Nondet    -  Candida tropicalis: Nondet    -  Ampicillin: S <=2 These ampicillin results predict results for amoxicillin    -  Ampicillin/Sulbactam: S <=4/2    -  Aztreonam: S <=4    Rapid RVP Result: NotDetec ( @ 05:41)    RADIOLOGY:   CXR:  IMPRESSION:  No focal consolidation.

## 2018-12-17 LAB
ALBUMIN SERPL ELPH-MCNC: 2.2 G/DL — LOW (ref 3.3–5)
ALP SERPL-CCNC: 128 U/L — HIGH (ref 40–120)
ALT FLD-CCNC: 363 U/L — HIGH (ref 10–45)
ANION GAP SERPL CALC-SCNC: 10 MMOL/L — SIGNIFICANT CHANGE UP (ref 5–17)
APTT BLD: 23 SEC — LOW (ref 27.5–36.3)
AST SERPL-CCNC: 79 U/L — HIGH (ref 10–40)
BILIRUB DIRECT SERPL-MCNC: 0.1 MG/DL — SIGNIFICANT CHANGE UP (ref 0–0.2)
BILIRUB INDIRECT FLD-MCNC: 0.3 MG/DL — SIGNIFICANT CHANGE UP (ref 0.2–1)
BILIRUB SERPL-MCNC: 0.4 MG/DL — SIGNIFICANT CHANGE UP (ref 0.2–1.2)
BUN SERPL-MCNC: 15 MG/DL — SIGNIFICANT CHANGE UP (ref 7–23)
CALCIUM SERPL-MCNC: 8.6 MG/DL — SIGNIFICANT CHANGE UP (ref 8.4–10.5)
CHLORIDE SERPL-SCNC: 105 MMOL/L — SIGNIFICANT CHANGE UP (ref 96–108)
CO2 SERPL-SCNC: 22 MMOL/L — SIGNIFICANT CHANGE UP (ref 22–31)
CREAT SERPL-MCNC: 0.76 MG/DL — SIGNIFICANT CHANGE UP (ref 0.5–1.3)
GLUCOSE BLDC GLUCOMTR-MCNC: 101 MG/DL — HIGH (ref 70–99)
GLUCOSE BLDC GLUCOMTR-MCNC: 138 MG/DL — HIGH (ref 70–99)
GLUCOSE BLDC GLUCOMTR-MCNC: 164 MG/DL — HIGH (ref 70–99)
GLUCOSE BLDC GLUCOMTR-MCNC: 178 MG/DL — HIGH (ref 70–99)
GLUCOSE SERPL-MCNC: 97 MG/DL — SIGNIFICANT CHANGE UP (ref 70–99)
HCT VFR BLD CALC: 26.7 % — LOW (ref 39–50)
HGB BLD-MCNC: 8.6 G/DL — LOW (ref 13–17)
INR BLD: 1.19 RATIO — HIGH (ref 0.88–1.16)
MAGNESIUM SERPL-MCNC: 2.1 MG/DL — SIGNIFICANT CHANGE UP (ref 1.6–2.6)
MCHC RBC-ENTMCNC: 28.8 PG — SIGNIFICANT CHANGE UP (ref 27–34)
MCHC RBC-ENTMCNC: 32.2 GM/DL — SIGNIFICANT CHANGE UP (ref 32–36)
MCV RBC AUTO: 89.3 FL — SIGNIFICANT CHANGE UP (ref 80–100)
PHOSPHATE SERPL-MCNC: 3.9 MG/DL — SIGNIFICANT CHANGE UP (ref 2.5–4.5)
PLATELET # BLD AUTO: 318 K/UL — SIGNIFICANT CHANGE UP (ref 150–400)
POTASSIUM SERPL-MCNC: 3.3 MMOL/L — LOW (ref 3.5–5.3)
POTASSIUM SERPL-SCNC: 3.3 MMOL/L — LOW (ref 3.5–5.3)
PROT SERPL-MCNC: 6.1 G/DL — SIGNIFICANT CHANGE UP (ref 6–8.3)
PROTHROM AB SERPL-ACNC: 13.7 SEC — HIGH (ref 10–12.9)
RBC # BLD: 2.99 M/UL — LOW (ref 4.2–5.8)
RBC # FLD: 16.4 % — HIGH (ref 10.3–14.5)
SODIUM SERPL-SCNC: 137 MMOL/L — SIGNIFICANT CHANGE UP (ref 135–145)
WBC # BLD: 11.3 K/UL — HIGH (ref 3.8–10.5)
WBC # FLD AUTO: 11.3 K/UL — HIGH (ref 3.8–10.5)

## 2018-12-17 PROCEDURE — 99233 SBSQ HOSP IP/OBS HIGH 50: CPT

## 2018-12-17 PROCEDURE — 99233 SBSQ HOSP IP/OBS HIGH 50: CPT | Mod: GC

## 2018-12-17 RX ORDER — METOCLOPRAMIDE HCL 10 MG
5 TABLET ORAL ONCE
Qty: 0 | Refills: 0 | Status: COMPLETED | OUTPATIENT
Start: 2018-12-17 | End: 2018-12-17

## 2018-12-17 RX ORDER — POTASSIUM CHLORIDE 20 MEQ
40 PACKET (EA) ORAL
Qty: 0 | Refills: 0 | Status: COMPLETED | OUTPATIENT
Start: 2018-12-17 | End: 2018-12-17

## 2018-12-17 RX ADMIN — Medication 100 MILLIGRAM(S): at 23:20

## 2018-12-17 RX ADMIN — AMLODIPINE BESYLATE 10 MILLIGRAM(S): 2.5 TABLET ORAL at 05:27

## 2018-12-17 RX ADMIN — Medication 2: at 18:03

## 2018-12-17 RX ADMIN — CEFEPIME 100 MILLIGRAM(S): 1 INJECTION, POWDER, FOR SOLUTION INTRAMUSCULAR; INTRAVENOUS at 13:01

## 2018-12-17 RX ADMIN — FLUCONAZOLE 100 MILLIGRAM(S): 150 TABLET ORAL at 11:21

## 2018-12-17 RX ADMIN — Medication 100 MILLIGRAM(S): at 07:28

## 2018-12-17 RX ADMIN — Medication 100 MILLIGRAM(S): at 17:00

## 2018-12-17 RX ADMIN — HUMAN INSULIN 20 UNIT(S): 100 INJECTION, SUSPENSION SUBCUTANEOUS at 06:16

## 2018-12-17 RX ADMIN — PANTOPRAZOLE SODIUM 40 MILLIGRAM(S): 20 TABLET, DELAYED RELEASE ORAL at 10:47

## 2018-12-17 RX ADMIN — Medication 100 MILLIGRAM(S): at 05:28

## 2018-12-17 RX ADMIN — Medication 1 APPLICATION(S): at 17:00

## 2018-12-17 RX ADMIN — CEFEPIME 100 MILLIGRAM(S): 1 INJECTION, POWDER, FOR SOLUTION INTRAMUSCULAR; INTRAVENOUS at 05:28

## 2018-12-17 RX ADMIN — Medication 1 DROP(S): at 11:21

## 2018-12-17 RX ADMIN — Medication 1 DROP(S): at 23:20

## 2018-12-17 RX ADMIN — Medication 5 MILLIGRAM(S): at 00:27

## 2018-12-17 RX ADMIN — Medication 2: at 23:20

## 2018-12-17 RX ADMIN — CEFEPIME 100 MILLIGRAM(S): 1 INJECTION, POWDER, FOR SOLUTION INTRAMUSCULAR; INTRAVENOUS at 21:17

## 2018-12-17 RX ADMIN — Medication 1 DROP(S): at 00:02

## 2018-12-17 RX ADMIN — Medication 1 DROP(S): at 17:00

## 2018-12-17 RX ADMIN — Medication 1 DROP(S): at 05:27

## 2018-12-17 RX ADMIN — Medication 40 MILLIEQUIVALENT(S): at 17:00

## 2018-12-17 RX ADMIN — PANTOPRAZOLE SODIUM 40 MILLIGRAM(S): 20 TABLET, DELAYED RELEASE ORAL at 21:17

## 2018-12-17 RX ADMIN — Medication 1 APPLICATION(S): at 05:27

## 2018-12-17 RX ADMIN — Medication 2: at 00:02

## 2018-12-17 RX ADMIN — Medication 100 MILLIGRAM(S): at 00:02

## 2018-12-17 RX ADMIN — Medication 40 MILLIEQUIVALENT(S): at 11:20

## 2018-12-17 RX ADMIN — Medication 100 MILLIGRAM(S): at 15:28

## 2018-12-17 RX ADMIN — HUMAN INSULIN 20 UNIT(S): 100 INJECTION, SUSPENSION SUBCUTANEOUS at 18:03

## 2018-12-17 NOTE — PROGRESS NOTE ADULT - PROBLEM SELECTOR PLAN 4
NPH reduced to 20U Q12H due to hypoglycemic episodes  Continue Insulin sliding scale , Monitor Blood Glucose levels.  PEG feed rate increased 12/17. Adjust insulin as needed

## 2018-12-17 NOTE — PROGRESS NOTE ADULT - SUBJECTIVE AND OBJECTIVE BOX
CC: F/U Bacteremia    Saw/spoke to patient. No fevers, no chills. Overall unchanged. Antibiotics changed over the weekend to cover tracheal isolate with episode of hypothermia. Otherwise unchanged.    Allergies  penicillin (Unknown)    ANTIMICROBIALS:  cefepime   IVPB    cefepime   IVPB 2000 every 8 hours  fluconAZOLE IVPB 200 every 24 hours  metroNIDAZOLE  IVPB    metroNIDAZOLE  IVPB 500 every 8 hours    PE:    Vital Signs Last 24 Hrs  T(C): 36.3 (17 Dec 2018 09:58), Max: 37.8 (16 Dec 2018 14:45)  T(F): 97.4 (17 Dec 2018 09:58), Max: 100 (16 Dec 2018 14:45)  HR: 67 (17 Dec 2018 12:09) (67 - 83)  BP: 127/67 (17 Dec 2018 09:58) (111/62 - 131/62)  RR: 16 (17 Dec 2018 09:58) (16 - 20)  SpO2: 100% (17 Dec 2018 12:09) (99% - 100%)    Gen: AOx0, NAD, chronically ill appearing  CV: S1+S2 normal, nontachycardic  Resp: Trach, no crackles no wheeze  Abd: Soft, nontender, +BS, PEG  Ext: No LE edema, no wounds    LABS:                        8.6    11.3  )-----------( 318      ( 17 Dec 2018 07:27 )             26.7     12-17    137  |  105  |  15  ----------------------------<  97  3.3<L>   |  22  |  0.76    Ca    8.6      17 Dec 2018 07:27  Phos  3.9     12-17  Mg     2.1     12-17    MICROBIOLOGY:    .Urine Catheterized  12-13-18   No growth     .Blood Blood-Peripheral  12-13-18   No growth to date.     .Sputum Sputum/TRAP  12-11-18   Numerous Pseudomonas aeruginosa (Carbapenem Resistant)  Normal Respiratory Beatrice present  --  Pseudomonas aeruginosa (Carbapenem Resistant)    .Blood Blood-Peripheral  12-11-18   Growth in aerobic and anaerobic bottles: Proteus mirabilis    Rapid RVP Result: NotDetec (12-11 @ 05:41)    (otherwise reviewed)    RADIOLOGY:    12/11 CXR:    FINDINGS:  Partially visualized tracheostomy in place.   No focal consolidation.  No pleural effusion or pneumothorax.  Cardiac size is not accurately evaluated in this projection.  Aortic calcifications.    IMPRESSION:  No focal consolidation.    12/11 USG:    FINDINGS:    There is normal compressibility of the bilateral common femoral, femoral   and popliteal veins. No calf vein thrombosis is detected.    Doppler examination shows normal spontaneous and phasic flow.    IMPRESSION:     No evidence of bilateral lower extremity deep venous thrombosis.

## 2018-12-17 NOTE — PROGRESS NOTE ADULT - SUBJECTIVE AND OBJECTIVE BOX
Patient is a 73y old  Male who presents with a chief complaint of Multifactorial shock (16 Dec 2018 12:37)      Interval Events:    REVIEW OF SYSTEMS:  [ ] Positive  [ ] All other systems negative  [ ] Unable to assess ROS because ________    Vital Signs Last 24 Hrs  T(C): 36.3 (12-17-18 @ 09:58), Max: 37.8 (12-16-18 @ 14:45)  T(F): 97.4 (12-17-18 @ 09:58), Max: 100 (12-16-18 @ 14:45)  HR: 69 (12-17-18 @ 09:58) (67 - 83)  BP: 127/67 (12-17-18 @ 09:58) (111/62 - 131/62)  RR: 16 (12-17-18 @ 09:58) (16 - 20)  SpO2: 100% (12-17-18 @ 09:58) (99% - 100%)    PHYSICAL EXAM:  HEENT:   [ ]Tracheostomy:  [ ]Pupils equal  [ ]No oral lesions  [ ]Abnormal    SKIN  [ ]No Rash  [ ] Abnormal  [ ] pressure    CARDIAC  [ ]Regular  [ ]Abnormal    PULMONARY  [ ]Bilateral Clear Breath Sounds  [ ]Normal Excursion  [ ]Abnormal    GI  [ ]PEG      [ ] +BS		              [ ]Soft, nondistended, nontender	  [ ]Abnormal    MUSCULOSKELETAL                                   [ ]Bedbound                 [ ]Abnormal    [ ]Ambulatory/OOB to chair                           EXTREMITIES                                         [ ]Normal  [ ]Edema                           NEUROLOGIC  [ ] Normal, non focal  [ ] Focal findings:    PSYCHIATRIC  [ ]Alert and appropriate  [ ] Sedated	 [ ]Agitated    :  Guerra: [ ] Yes, if yes: Date of Placement:                   [  ] No    LINES: Central Lines [ ] Yes, if yes: Date of Placement                                     [  ] No    HOSPITAL MEDICATIONS:  MEDICATIONS  (STANDING):  amLODIPine   Tablet 10 milliGRAM(s) Oral daily  artificial  tears Solution 1 Drop(s) Both EYES every 6 hours  cefepime   IVPB      cefepime   IVPB 2000 milliGRAM(s) IV Intermittent every 8 hours  Dakins Solution - 1/4 Strength 1 Application(s) Topical two times a day  dextrose 5%. 1000 milliLiter(s) (50 mL/Hr) IV Continuous <Continuous>  dextrose 50% Injectable 12.5 Gram(s) IV Push once  dextrose 50% Injectable 25 Gram(s) IV Push once  dextrose 50% Injectable 25 Gram(s) IV Push once  fluconAZOLE IVPB 200 milliGRAM(s) IV Intermittent every 24 hours  insulin lispro (HumaLOG) corrective regimen sliding scale   SubCutaneous every 6 hours  insulin NPH human recombinant 20 Unit(s) SubCutaneous every 12 hours  metoprolol tartrate 100 milliGRAM(s) Oral two times a day  metroNIDAZOLE  IVPB      metroNIDAZOLE  IVPB 500 milliGRAM(s) IV Intermittent every 8 hours  pantoprazole  Injectable 40 milliGRAM(s) IV Push every 12 hours  potassium chloride   Powder 40 milliEquivalent(s) Oral two times a day    MEDICATIONS  (PRN):  acetaminophen    Suspension .. 650 milliGRAM(s) Oral every 6 hours PRN Moderate Pain (4 - 6)  dextrose 40% Gel 15 Gram(s) Oral once PRN Blood Glucose LESS THAN 70 milliGRAM(s)/deciLiter  glucagon  Injectable 1 milliGRAM(s) IntraMuscular once PRN Glucose <70 milliGRAM(s)/deciLiter      LABS:                        8.6    11.3  )-----------( 318      ( 17 Dec 2018 07:27 )             26.7     12-17    137  |  105  |  15  ----------------------------<  97  3.3<L>   |  22  |  0.76    Ca    8.6      17 Dec 2018 07:27  Phos  3.9     12-17  Mg     2.1     12-17      PT/INR - ( 17 Dec 2018 07:27 )   PT: 13.7 sec;   INR: 1.19 ratio         PTT - ( 17 Dec 2018 07:27 )  PTT:23.0 sec        CAPILLARY BLOOD GLUCOSE    MICROBIOLOGY:     RADIOLOGY:  [ ] Reviewed and interpreted by me    Mode: AC/ CMV (Assist Control/ Continuous Mandatory Ventilation)  RR (machine): 16  TV (machine): 500  FiO2: 30  PEEP: 5  ITime: 1  MAP: 8  PIP: 15 Patient is a 73y old  Male who presents with a chief complaint of Multifactorial shock......      Interval Events:  No events reported over night    REVIEW OF SYSTEMS:  [ ] Positive  [ ] All other systems negative  [X] Unable to assess ROS because ___non-verbal    Vital Signs Last 24 Hrs  T(C): 36.3 (12-17-18 @ 09:58), Max: 37.8 (12-16-18 @ 14:45)  T(F): 97.4 (12-17-18 @ 09:58), Max: 100 (12-16-18 @ 14:45)  HR: 69 (12-17-18 @ 09:58) (67 - 83)  BP: 127/67 (12-17-18 @ 09:58) (111/62 - 131/62)  RR: 16 (12-17-18 @ 09:58) (16 - 20)  SpO2: 100% (12-17-18 @ 09:58) (99% - 100%)    PHYSICAL EXAM:  HEENT:   [X ]Tracheostomy:  #8 Cuffed Shiley   [ ]Pupils equal  [ ]No oral lesions  [ ]Abnormal    SKIN  [X ]No Rash  [ ] Abnormal  [ ] pressure    CARDIAC  [X ]Regular  [ ]Abnormal    PULMONARY  [X ]Bilateral Clear Breath Sounds  [ ]Normal Excursion  [ ]Abnormal    GI  [X ]PEG      [X ] +BS		              [X ]Soft, nondistended, nontender	  [ ]Abnormal    MUSCULOSKELETAL                                   [X ]Bedbound                 [ ]Abnormal    [ X]Ambulatory/OOB to chair  with assist                      EXTREMITIES                                         [ X]Normal  [ ]Edema                           NEUROLOGIC  [ ] Normal, non focal  [X] Focal findings: not moving limbs, non-verbal    PSYCHIATRIC  [X} Unable to assess as non-verbal, does not respond or interact  [ ] Sedated	 [ ]Agitated    :  Guerra: [ ] Yes, if yes: Date of Placement:                   [X  ] No    LINES: Central Lines [ ] Yes, if yes: Date of Placement                                     [  X] No        HOSPITAL MEDICATIONS:  MEDICATIONS  (STANDING):  amLODIPine   Tablet 10 milliGRAM(s) Oral daily  artificial  tears Solution 1 Drop(s) Both EYES every 6 hours  cefepime   IVPB      cefepime   IVPB 2000 milliGRAM(s) IV Intermittent every 8 hours  Dakins Solution - 1/4 Strength 1 Application(s) Topical two times a day  dextrose 5%. 1000 milliLiter(s) (50 mL/Hr) IV Continuous <Continuous>  dextrose 50% Injectable 12.5 Gram(s) IV Push once  dextrose 50% Injectable 25 Gram(s) IV Push once  dextrose 50% Injectable 25 Gram(s) IV Push once  fluconAZOLE IVPB 200 milliGRAM(s) IV Intermittent every 24 hours  insulin lispro (HumaLOG) corrective regimen sliding scale   SubCutaneous every 6 hours  insulin NPH human recombinant 20 Unit(s) SubCutaneous every 12 hours  metoprolol tartrate 100 milliGRAM(s) Oral two times a day  metroNIDAZOLE  IVPB      metroNIDAZOLE  IVPB 500 milliGRAM(s) IV Intermittent every 8 hours  pantoprazole  Injectable 40 milliGRAM(s) IV Push every 12 hours  potassium chloride   Powder 40 milliEquivalent(s) Oral two times a day    MEDICATIONS  (PRN):  acetaminophen    Suspension .. 650 milliGRAM(s) Oral every 6 hours PRN Moderate Pain (4 - 6)  dextrose 40% Gel 15 Gram(s) Oral once PRN Blood Glucose LESS THAN 70 milliGRAM(s)/deciLiter  glucagon  Injectable 1 milliGRAM(s) IntraMuscular once PRN Glucose <70 milliGRAM(s)/deciLiter      LABS:                        8.6    11.3  )-----------( 318      ( 17 Dec 2018 07:27 )             26.7     12-17    137  |  105  |  15  ----------------------------<  97  3.3<L>   |  22  |  0.76    Ca    8.6      17 Dec 2018 07:27  Phos  3.9     12-17  Mg     2.1     12-17      PT/INR - ( 17 Dec 2018 07:27 )   PT: 13.7 sec;   INR: 1.19 ratio         PTT - ( 17 Dec 2018 07:27 )  PTT:23.0 sec        CAPILLARY BLOOD GLUCOSE    MICROBIOLOGY:     RADIOLOGY:  [ ] Reviewed and interpreted by me    Mode: AC/ CMV (Assist Control/ Continuous Mandatory Ventilation)  RR (machine): 16  TV (machine): 500  FiO2: 30  PEEP: 5  ITime: 1  MAP: 8  PIP: 15

## 2018-12-17 NOTE — CHART NOTE - NSCHARTNOTEFT_GEN_A_CORE
Nutrition Follow Up Note  Patient seen for follow-up assessment. 74 y/o male admitted for multifactorial shock (sepsis/hemorrhagic).     Source:     Diet :     Patient reports:     PO intake :     Source for PO intake:     Enteral /Parenteral Nutrition:       Daily Weight in k.6 (12-12), Weight in k.6 (12-12)  % Weight Change    Pertinent Medications: MEDICATIONS  (STANDING):  amLODIPine   Tablet 10 milliGRAM(s) Oral daily  artificial  tears Solution 1 Drop(s) Both EYES every 6 hours  cefepime   IVPB      cefepime   IVPB 2000 milliGRAM(s) IV Intermittent every 8 hours  Dakins Solution - 1/4 Strength 1 Application(s) Topical two times a day  dextrose 5%. 1000 milliLiter(s) (50 mL/Hr) IV Continuous <Continuous>  dextrose 50% Injectable 12.5 Gram(s) IV Push once  dextrose 50% Injectable 25 Gram(s) IV Push once  dextrose 50% Injectable 25 Gram(s) IV Push once  fluconAZOLE IVPB 200 milliGRAM(s) IV Intermittent every 24 hours  insulin lispro (HumaLOG) corrective regimen sliding scale   SubCutaneous every 6 hours  insulin NPH human recombinant 20 Unit(s) SubCutaneous every 12 hours  metoprolol tartrate 100 milliGRAM(s) Oral two times a day  metroNIDAZOLE  IVPB      metroNIDAZOLE  IVPB 500 milliGRAM(s) IV Intermittent every 8 hours  pantoprazole  Injectable 40 milliGRAM(s) IV Push every 12 hours  potassium chloride   Powder 40 milliEquivalent(s) Oral two times a day    MEDICATIONS  (PRN):  acetaminophen    Suspension .. 650 milliGRAM(s) Oral every 6 hours PRN Moderate Pain (4 - 6)  dextrose 40% Gel 15 Gram(s) Oral once PRN Blood Glucose LESS THAN 70 milliGRAM(s)/deciLiter  glucagon  Injectable 1 milliGRAM(s) IntraMuscular once PRN Glucose <70 milliGRAM(s)/deciLiter    Pertinent Labs:  @ 07:27: Na 137, BUN 15, Cr 0.76, BG 97, K+ 3.3<L>, Phos 3.9, Mg 2.1, Alk Phos --, ALT/SGPT --, AST/SGOT --, HbA1c --    Finger Sticks:  POCT Blood Glucose.: 101 mg/dL ( @ 06:08)  POCT Blood Glucose.: 172 mg/dL ( @ 23:48)  POCT Blood Glucose.: 154 mg/dL ( @ 17:25)  POCT Blood Glucose.: 154 mg/dL ( @ 12:09)  POCT Blood Glucose.: 64 mg/dL ( @ 11:32)  POCT Blood Glucose.: 63 mg/dL ( @ 11:30)      Skin per nursing documentation:   Edema:    Estimated Needs:   [ ] no change since previous assessment  [ ] recalculated:     Previous Nutrition Diagnosis:   Nutrition Diagnosis is:    New Nutrition Diagnosis:  Related to:    As evidenced by:      Interventions:     Recommend  1)    Monitoring and Evaluation:     Continue to monitor Nutritional intake, Tolerance to diet prescription, weights, labs, skin integrity    RD remains available upon request and will follow up per protocol Nutrition Follow Up Note  Patient seen for follow-up assessment. Per chart, 72 y/o male admitted for multifactorial shock (sepsis/hemorrhagic), GI bleed, STACEY in setting of sepsis, bacteremia and s/p EGD () with no evidence of active bleeding. 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     Source: Medical Record, RN    Diet : NPO/Enteral    Per chart, pt experienced 140cc residual on .     Enteral /Parenteral Nutrition: Glucerna 1.2 @40mL x18 hours. Providing 720cc, 864kcal, 43g PRO,       Daily Weight in k.6 (-), Weight in k.6 (-)  % Weight Change    Pertinent Medications: MEDICATIONS  (STANDING):  amLODIPine   Tablet 10 milliGRAM(s) Oral daily  artificial  tears Solution 1 Drop(s) Both EYES every 6 hours  cefepime   IVPB      cefepime   IVPB 2000 milliGRAM(s) IV Intermittent every 8 hours  Dakins Solution - 1/4 Strength 1 Application(s) Topical two times a day  dextrose 5%. 1000 milliLiter(s) (50 mL/Hr) IV Continuous <Continuous>  dextrose 50% Injectable 12.5 Gram(s) IV Push once  dextrose 50% Injectable 25 Gram(s) IV Push once  dextrose 50% Injectable 25 Gram(s) IV Push once  fluconAZOLE IVPB 200 milliGRAM(s) IV Intermittent every 24 hours  insulin lispro (HumaLOG) corrective regimen sliding scale   SubCutaneous every 6 hours  insulin NPH human recombinant 20 Unit(s) SubCutaneous every 12 hours  metoprolol tartrate 100 milliGRAM(s) Oral two times a day  metroNIDAZOLE  IVPB      metroNIDAZOLE  IVPB 500 milliGRAM(s) IV Intermittent every 8 hours  pantoprazole  Injectable 40 milliGRAM(s) IV Push every 12 hours  potassium chloride   Powder 40 milliEquivalent(s) Oral two times a day    MEDICATIONS  (PRN):  acetaminophen    Suspension .. 650 milliGRAM(s) Oral every 6 hours PRN Moderate Pain (4 - 6)  dextrose 40% Gel 15 Gram(s) Oral once PRN Blood Glucose LESS THAN 70 milliGRAM(s)/deciLiter  glucagon  Injectable 1 milliGRAM(s) IntraMuscular once PRN Glucose <70 milliGRAM(s)/deciLiter    Pertinent Labs:  @ 07:27: Na 137, BUN 15, Cr 0.76, BG 97, K+ 3.3<L>, Phos 3.9, Mg 2.1, Alk Phos --, ALT/SGPT --, AST/SGOT --, HbA1c --    Finger Sticks:  POCT Blood Glucose.: 101 mg/dL ( @ 06:08)  POCT Blood Glucose.: 172 mg/dL ( @ 23:48)  POCT Blood Glucose.: 154 mg/dL ( @ 17:25)  POCT Blood Glucose.: 154 mg/dL ( @ 12:09)  POCT Blood Glucose.: 64 mg/dL ( @ 11:32)  POCT Blood Glucose.: 63 mg/dL ( @ 11:30)      Skin per nursing documentation:   Edema:    Estimated Needs:   [ ] no change since previous assessment  [ ] recalculated:     Previous Nutrition Diagnosis:   Nutrition Diagnosis is:    New Nutrition Diagnosis:  Related to:    As evidenced by:      Interventions:     Recommend  1)    Monitoring and Evaluation:     Continue to monitor Nutritional intake, Tolerance to diet prescription, weights, labs, skin integrity    RD remains available upon request and will follow up per protocol Nutrition Follow Up Note  Patient seen for follow-up assessment. Per chart, 74 y/o male admitted for multifactorial shock (sepsis/hemorrhagic), GI bleed, STACEY in setting of sepsis, bacteremia and s/p EGD () with no evidence of active bleeding. 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     Source: Medical Record, RN    Diet : NPO/Enteral    Per chart, pt experienced 140cc residual on . Pt currently tolerating tube feed per RN, residuals 10mL on 12/15, 5mL on  and 10mL on . Most recent BM on 12/15. Noted hypoglycemic events on  and insulin adjustment.    Enteral /Parenteral Nutrition:   Glucerna 1.2 @40mL x18 hours. Providing 720mL, 864kcal (12kcal/kg), 43g PRO (0.6g/kg), 580mL free water per day.     Weight in k.6 ()  No new weights    Pertinent Medications: MEDICATIONS  (STANDING):  amLODIPine   Tablet 10 milliGRAM(s) Oral daily  artificial  tears Solution 1 Drop(s) Both EYES every 6 hours  cefepime   IVPB      cefepime   IVPB 2000 milliGRAM(s) IV Intermittent every 8 hours  Dakins Solution - 1/4 Strength 1 Application(s) Topical two times a day  dextrose 5%. 1000 milliLiter(s) (50 mL/Hr) IV Continuous <Continuous>  dextrose 50% Injectable 12.5 Gram(s) IV Push once  dextrose 50% Injectable 25 Gram(s) IV Push once  dextrose 50% Injectable 25 Gram(s) IV Push once  fluconAZOLE IVPB 200 milliGRAM(s) IV Intermittent every 24 hours  insulin lispro (HumaLOG) corrective regimen sliding scale   SubCutaneous every 6 hours  insulin NPH human recombinant 20 Unit(s) SubCutaneous every 12 hours  metoprolol tartrate 100 milliGRAM(s) Oral two times a day  metroNIDAZOLE  IVPB      metroNIDAZOLE  IVPB 500 milliGRAM(s) IV Intermittent every 8 hours  pantoprazole  Injectable 40 milliGRAM(s) IV Push every 12 hours  potassium chloride   Powder 40 milliEquivalent(s) Oral two times a day    MEDICATIONS  (PRN):  acetaminophen    Suspension .. 650 milliGRAM(s) Oral every 6 hours PRN Moderate Pain (4 - 6)  dextrose 40% Gel 15 Gram(s) Oral once PRN Blood Glucose LESS THAN 70 milliGRAM(s)/deciLiter  glucagon  Injectable 1 milliGRAM(s) IntraMuscular once PRN Glucose <70 milliGRAM(s)/deciLiter    Pertinent Labs:  @ 07:27: Na 137, BUN 15, Cr 0.76, BG 97, K+ 3.3<L>, Phos 3.9  : HbA1c 6.2     Finger Sticks:  POCT Blood Glucose.: 101 mg/dL ( @ 06:08)  POCT Blood Glucose.: 172 mg/dL ( @ 23:48)  POCT Blood Glucose.: 154 mg/dL ( @ 17:25)  POCT Blood Glucose.: 154 mg/dL ( @ 12:09)  POCT Blood Glucose.: 64 mg/dL ( @ 11:32)  POCT Blood Glucose.: 63 mg/dL ( @ 11:30)      Skin per nursing documentation:   Unstageable pressure injury on sacrum, deep tissue injuries on L and R heel and back of head per nursing flow sheets.  Edema: +2 generalized per nursing flow sheets    Estimated Needs:   [x] no change since previous assessment  [ ] recalculated:     Previous Nutrition Diagnosis: Increased nutrient needs  Nutrition Diagnosis is: ongoing    New Nutrition Diagnosis: n/a     Recommend  1) Adjust TF regimen to Glucerna 1.2 @ 40mL/hr, increasing by 10mL every 4 hrs until goal rate of 85 mL/hr x 18hrs is achieved, to provide 1530mL, 1232mL water, 1836kcal (26kcal/kg) and 92g PRO (1.3g/kg) per day.   2) Jayro x2 daily to promote wound healing (180 kcal, 28g pro per day)  3) New weight  4) Nephro-glenny x1 daily    Monitoring and Evaluation:   Continue to monitor Nutritional intake, Tolerance to diet prescription, weights, labs, skin integrity    RD remains available upon request and will follow up per protocol Nutrition Follow Up Note  Patient seen for follow-up assessment. Per chart, 74 y/o male admitted for multifactorial shock (sepsis/hemorrhagic), GI bleed, STACEY in setting of sepsis, bacteremia and s/p EGD () with no evidence of active bleeding. 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     Source: Medical Record, RN    Diet : NPO/Enteral    Per chart, pt experienced 140cc residual on . Pt currently tolerating tube feed per RN, residuals 10mL on 12/15, 5mL on  and 10mL on . Most recent BM on 12/15. Noted hypoglycemic events on  and insulin adjustment.    Enteral /Parenteral Nutrition:   Glucerna 1.2 @40mL x18 hours. Providing 720mL, 864kcal (12kcal/kg), 43g PRO (0.6g/kg), 580mL free water per day.     Weight in k.6 ()  No new weights    Pertinent Medications: MEDICATIONS  (STANDING):  amLODIPine   Tablet 10 milliGRAM(s) Oral daily  artificial  tears Solution 1 Drop(s) Both EYES every 6 hours  cefepime   IVPB      cefepime   IVPB 2000 milliGRAM(s) IV Intermittent every 8 hours  Dakins Solution - 1/4 Strength 1 Application(s) Topical two times a day  dextrose 5%. 1000 milliLiter(s) (50 mL/Hr) IV Continuous <Continuous>  dextrose 50% Injectable 12.5 Gram(s) IV Push once  dextrose 50% Injectable 25 Gram(s) IV Push once  dextrose 50% Injectable 25 Gram(s) IV Push once  fluconAZOLE IVPB 200 milliGRAM(s) IV Intermittent every 24 hours  insulin lispro (HumaLOG) corrective regimen sliding scale   SubCutaneous every 6 hours  insulin NPH human recombinant 20 Unit(s) SubCutaneous every 12 hours  metoprolol tartrate 100 milliGRAM(s) Oral two times a day  metroNIDAZOLE  IVPB      metroNIDAZOLE  IVPB 500 milliGRAM(s) IV Intermittent every 8 hours  pantoprazole  Injectable 40 milliGRAM(s) IV Push every 12 hours  potassium chloride   Powder 40 milliEquivalent(s) Oral two times a day    MEDICATIONS  (PRN):  acetaminophen    Suspension .. 650 milliGRAM(s) Oral every 6 hours PRN Moderate Pain (4 - 6)  dextrose 40% Gel 15 Gram(s) Oral once PRN Blood Glucose LESS THAN 70 milliGRAM(s)/deciLiter  glucagon  Injectable 1 milliGRAM(s) IntraMuscular once PRN Glucose <70 milliGRAM(s)/deciLiter    Pertinent Labs:  @ 07:27: Na 137, BUN 15, Cr 0.76, BG 97, K+ 3.3<L>, Phos 3.9  : HbA1c 6.2     Finger Sticks:  POCT Blood Glucose.: 101 mg/dL ( @ 06:08)  POCT Blood Glucose.: 172 mg/dL ( @ 23:48)  POCT Blood Glucose.: 154 mg/dL ( @ 17:25)  POCT Blood Glucose.: 154 mg/dL ( @ 12:09)  POCT Blood Glucose.: 64 mg/dL ( @ 11:32)  POCT Blood Glucose.: 63 mg/dL ( @ 11:30)      Skin per nursing documentation:   Unstageable pressure injury on sacrum, deep tissue injuries on L and R heel and back of head per nursing flow sheets.  Edema: +2 generalized per nursing flow sheets    Estimated Needs:   [x] no change since previous assessment  [ ] recalculated:     Previous Nutrition Diagnosis: Increased nutrient needs  Nutrition Diagnosis is: ongoing    New Nutrition Diagnosis: n/a     Recommend  1) Adjust TF regimen to Glucerna 1.2 @ 40mL/hr, increasing by 10mL every 4 hrs until goal rate of 85 mL/hr x 18hrs is achieved, to provide 1530mL, 1232mL water, 1836kcal (26kcal/kg) and 92g PRO (1.3g/kg) per day.   2) Jayro x2 daily to promote wound healing (180 kcal, 28g pro per day)  3) New weight  -discussed with PA    Monitoring and Evaluation:   Continue to monitor Nutritional intake, Tolerance to diet prescription, weights, labs, skin integrity    RD remains available upon request and will follow up per protocol

## 2018-12-17 NOTE — PROGRESS NOTE ADULT - PROBLEM SELECTOR PLAN 1
Patient admitted with Multi- Factorial Shock ( Sepsis/ Hemorrhagic)   Sputum cx 12/11: Pseudomonas / NL christopher-> Possible colonization  12/11 BCx:  +Proteus  Per ID, should continue Cefepime and Flagyl until 12/26. Poor PO options for proteus spp.

## 2018-12-17 NOTE — PROGRESS NOTE ADULT - ASSESSMENT
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  Leukocytosis, fever  Sacral wound examined--unstageable, non-purulent  BCX with GNR, IDed as proteus  Pseudomonas from sputum CRE--S Zosyn, Cefepime  Elevated LFTs, improving--shock liver?  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  Leukocytosis, fever  Sacral wound examined--unstageable, non-purulent  BCX with proteus S Cefepime  Pseudomonas from sputum CRE--S Zosyn, Cefepime  Elevated LFTs, improving--shock liver?  Still leukocytosis, continues to improve on present therapy  Changed to cefepime over weekend for hypothermia  Overall, Gram negative bacteremia, fever, leukocytosis, sepsis, positive culture finding  - Cefepime 2g q 8 + Flagyl 500mg q 8  - Would DC Fluconazole (no cultures with yeast noted)  - Anticipate will need IV abx through 12/26 to complete treatment for Proteus bacteremia (poor PO options)  - Monitor for further signs hypothermia/worsening infection  - Trend LFTs, WBC  - Discussed with [facility] attending/team    Rodger Beasley MD  Pager [phone]  After 5pm and on weekends call [phone]

## 2018-12-17 NOTE — PROVIDER CONTACT NOTE (OTHER) - RECOMMENDATIONS
activate hypoglycemic orders
warming blankets
Baclofen 10 mg via peg x1
give dose of reglan to stop hiccups
hold feeds

## 2018-12-17 NOTE — PROGRESS NOTE ADULT - ASSESSMENT
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 Presented to ED from [facility] with fevers x 4d, and 4 episodes of vomiting on day of admission. En route to hospital in ambulance, patient became hypotensive and dopamine was started.  Pt arrived unresponsive with dark vomitus around mouth. In ED, pt was found to have melanotic stool and black vomitus. BP was 94/37; Hb was 5.7 and WBC 20.8 with lactate of 13.5. Pt was started on norepi, transfused 2U PRBC, started on cefepime and vanc, and given 2.2 L LR bolus w/ improvement in BP. Patient was admitted to MICU for further management. Patient was found to have a + UA positive for bacteria and budding yeast. Pt was started on meropenem, and diflucan. Hgb continued to trend down on MICU floor, pt required 1U prbc transfusion again on 12/12, Patient was placed on PPI gtt. GI performed upper endoscopy on 12/2 no source of bleeding visualized. TFs restarted. Urine culture contaminated, repeat culture sent. Sputum culture grew pseudomonas but no evidence of Infiltrate on CXR     12/12: Patient transferred to RCU this evening. Patient transfused 1 unit of PRBCs this morning. Patient S/p bedside EGD no evidence of active bleeding, TFs resumed. Patient transitioned off PPI drip to Protonix IVP Q 12 hrs  Patient remains on broad spec abx / antifungal for presumed UTI. Will attempt TOV this evening   12/12 Sodium 152, free water 250 ml q8, potassium 3.3  KCL 40 mg x1, H/H stable. Continue with Meropenem and Fluconazole. WBC on admission 32, now 17, positive blood cultures, GNR on 2 sets, on Meropenem, ID consulted.    12/14 Stable, cpap as tolerated. Per ID If any signs worsening/fever/resp failure, would change to Cefepime 2g q 8 + Flagyl 500mg q 8, continue on Meropenem for now.   12/15 Stable, will continue with weaning trials. Sputum culture with Pseudomonas aeruginosa. Hypertensive 150's on 12/14, home medications for BP restarted.   12/15  episode  of hypothermia and hypoglycemia- feeds continuos  ABX changed per ID and hypoglycemia protocol instituted.

## 2018-12-18 LAB
ALBUMIN SERPL ELPH-MCNC: 2.1 G/DL — LOW (ref 3.3–5)
ALP SERPL-CCNC: 138 U/L — HIGH (ref 40–120)
ALT FLD-CCNC: 261 U/L — HIGH (ref 10–45)
ANION GAP SERPL CALC-SCNC: 11 MMOL/L — SIGNIFICANT CHANGE UP (ref 5–17)
AST SERPL-CCNC: 54 U/L — HIGH (ref 10–40)
BILIRUB DIRECT SERPL-MCNC: 0.1 MG/DL — SIGNIFICANT CHANGE UP (ref 0–0.2)
BILIRUB INDIRECT FLD-MCNC: 0.3 MG/DL — SIGNIFICANT CHANGE UP (ref 0.2–1)
BILIRUB SERPL-MCNC: 0.4 MG/DL — SIGNIFICANT CHANGE UP (ref 0.2–1.2)
BUN SERPL-MCNC: 14 MG/DL — SIGNIFICANT CHANGE UP (ref 7–23)
CALCIUM SERPL-MCNC: 8.8 MG/DL — SIGNIFICANT CHANGE UP (ref 8.4–10.5)
CHLORIDE SERPL-SCNC: 105 MMOL/L — SIGNIFICANT CHANGE UP (ref 96–108)
CO2 SERPL-SCNC: 21 MMOL/L — LOW (ref 22–31)
CREAT SERPL-MCNC: 0.69 MG/DL — SIGNIFICANT CHANGE UP (ref 0.5–1.3)
CULTURE RESULTS: SIGNIFICANT CHANGE UP
CULTURE RESULTS: SIGNIFICANT CHANGE UP
GLUCOSE BLDC GLUCOMTR-MCNC: 140 MG/DL — HIGH (ref 70–99)
GLUCOSE BLDC GLUCOMTR-MCNC: 147 MG/DL — HIGH (ref 70–99)
GLUCOSE BLDC GLUCOMTR-MCNC: 175 MG/DL — HIGH (ref 70–99)
GLUCOSE SERPL-MCNC: 142 MG/DL — HIGH (ref 70–99)
HCT VFR BLD CALC: 27.1 % — LOW (ref 39–50)
HCT VFR BLD CALC: 29.1 % — LOW (ref 39–50)
HGB BLD-MCNC: 8.8 G/DL — LOW (ref 13–17)
HGB BLD-MCNC: 9.7 G/DL — LOW (ref 13–17)
MAGNESIUM SERPL-MCNC: 2 MG/DL — SIGNIFICANT CHANGE UP (ref 1.6–2.6)
MCHC RBC-ENTMCNC: 29.1 PG — SIGNIFICANT CHANGE UP (ref 27–34)
MCHC RBC-ENTMCNC: 29.6 PG — SIGNIFICANT CHANGE UP (ref 27–34)
MCHC RBC-ENTMCNC: 32.6 GM/DL — SIGNIFICANT CHANGE UP (ref 32–36)
MCHC RBC-ENTMCNC: 33.3 GM/DL — SIGNIFICANT CHANGE UP (ref 32–36)
MCV RBC AUTO: 89 FL — SIGNIFICANT CHANGE UP (ref 80–100)
MCV RBC AUTO: 89.3 FL — SIGNIFICANT CHANGE UP (ref 80–100)
PHOSPHATE SERPL-MCNC: 3.2 MG/DL — SIGNIFICANT CHANGE UP (ref 2.5–4.5)
PLATELET # BLD AUTO: 379 K/UL — SIGNIFICANT CHANGE UP (ref 150–400)
PLATELET # BLD AUTO: 412 K/UL — HIGH (ref 150–400)
POTASSIUM SERPL-MCNC: 3.9 MMOL/L — SIGNIFICANT CHANGE UP (ref 3.5–5.3)
POTASSIUM SERPL-SCNC: 3.9 MMOL/L — SIGNIFICANT CHANGE UP (ref 3.5–5.3)
PROT SERPL-MCNC: 6.4 G/DL — SIGNIFICANT CHANGE UP (ref 6–8.3)
RBC # BLD: 3.03 M/UL — LOW (ref 4.2–5.8)
RBC # BLD: 3.27 M/UL — LOW (ref 4.2–5.8)
RBC # FLD: 15.9 % — HIGH (ref 10.3–14.5)
RBC # FLD: 16.5 % — HIGH (ref 10.3–14.5)
SODIUM SERPL-SCNC: 137 MMOL/L — SIGNIFICANT CHANGE UP (ref 135–145)
SPECIMEN SOURCE: SIGNIFICANT CHANGE UP
SPECIMEN SOURCE: SIGNIFICANT CHANGE UP
WBC # BLD: 10.8 K/UL — HIGH (ref 3.8–10.5)
WBC # BLD: 11.7 K/UL — HIGH (ref 3.8–10.5)
WBC # FLD AUTO: 10.8 K/UL — HIGH (ref 3.8–10.5)
WBC # FLD AUTO: 11.7 K/UL — HIGH (ref 3.8–10.5)

## 2018-12-18 PROCEDURE — 93970 EXTREMITY STUDY: CPT | Mod: 26

## 2018-12-18 PROCEDURE — 99233 SBSQ HOSP IP/OBS HIGH 50: CPT

## 2018-12-18 PROCEDURE — 99232 SBSQ HOSP IP/OBS MODERATE 35: CPT

## 2018-12-18 RX ORDER — RIVAROXABAN 15 MG-20MG
20 KIT ORAL AT BEDTIME
Qty: 0 | Refills: 0 | Status: DISCONTINUED | OUTPATIENT
Start: 2018-12-18 | End: 2018-12-20

## 2018-12-18 RX ADMIN — Medication 1 APPLICATION(S): at 05:12

## 2018-12-18 RX ADMIN — HUMAN INSULIN 20 UNIT(S): 100 INJECTION, SUSPENSION SUBCUTANEOUS at 17:56

## 2018-12-18 RX ADMIN — CEFEPIME 100 MILLIGRAM(S): 1 INJECTION, POWDER, FOR SOLUTION INTRAMUSCULAR; INTRAVENOUS at 21:46

## 2018-12-18 RX ADMIN — CEFEPIME 100 MILLIGRAM(S): 1 INJECTION, POWDER, FOR SOLUTION INTRAMUSCULAR; INTRAVENOUS at 13:00

## 2018-12-18 RX ADMIN — Medication 1 DROP(S): at 11:04

## 2018-12-18 RX ADMIN — Medication 100 MILLIGRAM(S): at 05:12

## 2018-12-18 RX ADMIN — PANTOPRAZOLE SODIUM 40 MILLIGRAM(S): 20 TABLET, DELAYED RELEASE ORAL at 11:04

## 2018-12-18 RX ADMIN — Medication 100 MILLIGRAM(S): at 17:23

## 2018-12-18 RX ADMIN — PANTOPRAZOLE SODIUM 40 MILLIGRAM(S): 20 TABLET, DELAYED RELEASE ORAL at 21:46

## 2018-12-18 RX ADMIN — Medication 2: at 12:37

## 2018-12-18 RX ADMIN — Medication 1 DROP(S): at 05:11

## 2018-12-18 RX ADMIN — Medication 100 MILLIGRAM(S): at 07:41

## 2018-12-18 RX ADMIN — AMLODIPINE BESYLATE 10 MILLIGRAM(S): 2.5 TABLET ORAL at 05:11

## 2018-12-18 RX ADMIN — HUMAN INSULIN 20 UNIT(S): 100 INJECTION, SUSPENSION SUBCUTANEOUS at 06:19

## 2018-12-18 RX ADMIN — Medication 1 APPLICATION(S): at 17:23

## 2018-12-18 RX ADMIN — Medication 1 DROP(S): at 17:22

## 2018-12-18 RX ADMIN — CEFEPIME 100 MILLIGRAM(S): 1 INJECTION, POWDER, FOR SOLUTION INTRAMUSCULAR; INTRAVENOUS at 05:12

## 2018-12-18 NOTE — PROVIDER CONTACT NOTE (OTHER) - SITUATION
pt has a 95.5 rectal temp
PT had black pasty stool x 2.
Pt New to floor , during my assessment I received  130 ml residual from the peg tube. Also found 3 different spots of eschar on back of head. Surrounding area around ecschar is mushy and red in color.
pt had a blood sugar of 48, and then 50.
pt having hiccups
pt is having Hiccups,

## 2018-12-18 NOTE — PROGRESS NOTE ADULT - SUBJECTIVE AND OBJECTIVE BOX
CC: F/U for Bacteremia    Saw/spoke to patient. No fevers, no chills. Overall unchanged. No new complaints.    Allergies  penicillin (Unknown)    ANTIMICROBIALS:  cefepime   IVPB    cefepime   IVPB 2000 every 8 hours  metroNIDAZOLE  IVPB    metroNIDAZOLE  IVPB 500 every 8 hours    PE:    Vital Signs Last 24 Hrs  T(C): 36.6 (18 Dec 2018 09:19), Max: 36.9 (18 Dec 2018 00:35)  T(F): 97.9 (18 Dec 2018 09:19), Max: 98.5 (18 Dec 2018 04:32)  HR: 80 (18 Dec 2018 09:34) (67 - 87)  BP: 149/64 (18 Dec 2018 09:19) (121/68 - 149/64)  RR: 18 (18 Dec 2018 09:33) (16 - 18)  SpO2: 100% (18 Dec 2018 09:34) (100% - 100%)    Gen: AOx0-1, NAD, non-toxic  CV: S1+S2 normal, nontachycardic  Resp: Trach, no crackles, no wheeze  Abd: Soft, nontender, +BS, PEG  Ext: No LE edema, no wounds    LABS:                        8.8    10.8  )-----------( 379      ( 18 Dec 2018 07:08 )             27.1     12-18    137  |  105  |  14  ----------------------------<  142<H>  3.9   |  21<L>  |  0.69    Ca    8.8      18 Dec 2018 07:08  Phos  3.2     12-18  Mg     2.0     12-18    TPro  6.4  /  Alb  2.1<L>  /  TBili  0.4  /  DBili  0.1  /  AST  54<H>  /  ALT  261<H>  /  AlkPhos  138<H>  12-18    MICROBIOLOGY:    .Urine Catheterized  12-13-18   No growth    .Blood Blood-Peripheral  12-13-18   No growth at 5 days.    .Sputum Sputum/TRAP  12-11-18   Numerous Pseudomonas aeruginosa (Carbapenem Resistant)  Normal Respiratory Beatrice present  --  Pseudomonas aeruginosa (Carbapenem Resistant)    .Blood Blood-Peripheral  12-11-18   Growth in aerobic and anaerobic bottles: Proteus mirabilis    (otherwise reviewed)    RADIOLOGY:    12/11 USG:    FINDINGS:    There is normal compressibility of the bilateral common femoral, femoral   and popliteal veins. No calf vein thrombosis is detected.    Doppler examination shows normal spontaneous and phasic flow.    IMPRESSION:     No evidence of bilateral lower extremity deep venous thrombosis.

## 2018-12-18 NOTE — PROVIDER CONTACT NOTE (OTHER) - ACTION/TREATMENT ORDERED:
STAT CBC. Give 2200 Protonix now. Hold Xarelto until CBC results. No further change in treatment will continue to assess

## 2018-12-18 NOTE — PROGRESS NOTE ADULT - PROBLEM SELECTOR PLAN 1
Patient admitted with Multi- Factorial Shock ( Sepsis/ Hemorrhagic)   Sputum cx 12/11: Pseudomonas / NL christopher-> Possible colonization  12/11 BCx:  +Proteus  Per ID, should continue Cefepime and Flagyl until 12/26. Poor PO options for proteus spp. (sensitive to Augmentin but has low bioavailability for bacteremia per ID).  Awaiting PICC/Mdiline catheter placement

## 2018-12-18 NOTE — PROGRESS NOTE ADULT - ASSESSMENT
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  Leukocytosis, fever  Sacral wound examined--unstageable, non-purulent  BCX with proteus S Cefepime  Pseudomonas from sputum CRE--S Zosyn, Cefepime  Elevated LFTs, improving--shock liver?  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  Leukocytosis, fever  Sacral wound examined--unstageable, non-purulent  BCX with proteus S Cefepime  Pseudomonas from sputum CRE--S Zosyn, Cefepime  Elevated LFTs, improving--shock liver?  Resolving leukocytosis, continues to improve on present therapy  Stable on cefepime  Overall, Gram negative bacteremia, fever, leukocytosis, sepsis, positive culture finding  - Cefepime 2g q 8  - DC Flagyl  - Anticipate will need IV abx through 12/26 to complete treatment for Proteus bacteremia (poor PO options)  - Monitor for further signs hypothermia/worsening infection  - Trend LFTs, WBC    Rodger Beasley MD  Pager [phone]  After 5pm and on weekends call [phone]

## 2018-12-18 NOTE — PROGRESS NOTE ADULT - SUBJECTIVE AND OBJECTIVE BOX
Patient is a 73y old  Male who presents with a chief complaint of Multifactorial shock (17 Dec 2018 11:57)      Interval Events:    REVIEW OF SYSTEMS:  [ ] Positive  [ ] All other systems negative  [ ] Unable to assess ROS because ________    Vital Signs Last 24 Hrs  T(C): 36.9 (12-18-18 @ 04:32), Max: 36.9 (12-18-18 @ 00:35)  T(F): 98.5 (12-18-18 @ 04:32), Max: 98.5 (12-18-18 @ 04:32)  HR: 87 (12-18-18 @ 04:32) (67 - 87)  BP: 145/68 (12-18-18 @ 04:32) (121/68 - 145/68)  RR: 16 (12-18-18 @ 04:32) (16 - 18)  SpO2: 100% (12-18-18 @ 04:32) (100% - 100%)    PHYSICAL EXAM:  HEENT:   [ ]Tracheostomy:  [ ]Pupils equal  [ ]No oral lesions  [ ]Abnormal    SKIN  [ ]No Rash  [ ] Abnormal  [ ] pressure    CARDIAC  [ ]Regular  [ ]Abnormal    PULMONARY  [ ]Bilateral Clear Breath Sounds  [ ]Normal Excursion  [ ]Abnormal    GI  [ ]PEG      [ ] +BS		              [ ]Soft, nondistended, nontender	  [ ]Abnormal    MUSCULOSKELETAL                                   [ ]Bedbound                 [ ]Abnormal    [ ]Ambulatory/OOB to chair                           EXTREMITIES                                         [ ]Normal  [ ]Edema                           NEUROLOGIC  [ ] Normal, non focal  [ ] Focal findings:    PSYCHIATRIC  [ ]Alert and appropriate  [ ] Sedated	 [ ]Agitated    :  Guerra: [ ] Yes, if yes: Date of Placement:                   [  ] No    LINES: Central Lines [ ] Yes, if yes: Date of Placement                                     [  ] No    HOSPITAL MEDICATIONS:  MEDICATIONS  (STANDING):  amLODIPine   Tablet 10 milliGRAM(s) Oral daily  artificial  tears Solution 1 Drop(s) Both EYES every 6 hours  cefepime   IVPB      cefepime   IVPB 2000 milliGRAM(s) IV Intermittent every 8 hours  Dakins Solution - 1/4 Strength 1 Application(s) Topical two times a day  dextrose 5%. 1000 milliLiter(s) (50 mL/Hr) IV Continuous <Continuous>  dextrose 50% Injectable 12.5 Gram(s) IV Push once  dextrose 50% Injectable 25 Gram(s) IV Push once  dextrose 50% Injectable 25 Gram(s) IV Push once  insulin lispro (HumaLOG) corrective regimen sliding scale   SubCutaneous every 6 hours  insulin NPH human recombinant 20 Unit(s) SubCutaneous every 12 hours  metoprolol tartrate 100 milliGRAM(s) Oral two times a day  metroNIDAZOLE  IVPB      metroNIDAZOLE  IVPB 500 milliGRAM(s) IV Intermittent every 8 hours  pantoprazole  Injectable 40 milliGRAM(s) IV Push every 12 hours    MEDICATIONS  (PRN):  acetaminophen    Suspension .. 650 milliGRAM(s) Oral every 6 hours PRN Moderate Pain (4 - 6)  dextrose 40% Gel 15 Gram(s) Oral once PRN Blood Glucose LESS THAN 70 milliGRAM(s)/deciLiter  glucagon  Injectable 1 milliGRAM(s) IntraMuscular once PRN Glucose <70 milliGRAM(s)/deciLiter      LABS:                        8.6    11.3  )-----------( 318      ( 17 Dec 2018 07:27 )             26.7     12-17    137  |  105  |  15  ----------------------------<  97  3.3<L>   |  22  |  0.76    Ca    8.6      17 Dec 2018 07:27  Phos  3.9     12-17  Mg     2.1     12-17    TPro  6.1  /  Alb  2.2<L>  /  TBili  0.4  /  DBili  0.1  /  AST  79<H>  /  ALT  363<H>  /  AlkPhos  128<H>  12-17    PT/INR - ( 17 Dec 2018 07:27 )   PT: 13.7 sec;   INR: 1.19 ratio         PTT - ( 17 Dec 2018 07:27 )  PTT:23.0 sec        CAPILLARY BLOOD GLUCOSE    MICROBIOLOGY:     RADIOLOGY:  [ ] Reviewed and interpreted by me    Mode: AC/ CMV (Assist Control/ Continuous Mandatory Ventilation)  RR (machine): 16  TV (machine): 500  FiO2: 30  PEEP: 5  PS: 30  ITime: 1  MAP: 8  PC: 5  PIP: 16 Patient is a 73y old  Male who presents with a chief complaint of Multifactorial shock.......    Interval Events: No events reported over night    REVIEW OF SYSTEMS:  [ ] Positive  [ ] All other systems negative  [X ] Unable to assess ROS because __Non-verbal______    Vital Signs Last 24 Hrs  T(C): 36.9 (12-18-18 @ 04:32), Max: 36.9 (12-18-18 @ 00:35)  T(F): 98.5 (12-18-18 @ 04:32), Max: 98.5 (12-18-18 @ 04:32)  HR: 87 (12-18-18 @ 04:32) (67 - 87)  BP: 145/68 (12-18-18 @ 04:32) (121/68 - 145/68)  RR: 16 (12-18-18 @ 04:32) (16 - 18)  SpO2: 100% (12-18-18 @ 04:32) (100% - 100%)    REVIEW OF SYSTEMS:  [ ] Positive  [ ] All other systems negative  [X] Unable to assess ROS because ___non-verbal    Vital Signs Last 24 Hrs  T(C): 36.3 (12-17-18 @ 09:58), Max: 37.8 (12-16-18 @ 14:45)  T(F): 97.4 (12-17-18 @ 09:58), Max: 100 (12-16-18 @ 14:45)  HR: 69 (12-17-18 @ 09:58) (67 - 83)  BP: 127/67 (12-17-18 @ 09:58) (111/62 - 131/62)  RR: 16 (12-17-18 @ 09:58) (16 - 20)  SpO2: 100% (12-17-18 @ 09:58) (99% - 100%)    PHYSICAL EXAM:  HEENT:   [X ]Tracheostomy:  #8 Cuffed Shiley   [ ]Pupils equal  [ ]No oral lesions  [ ]Abnormal    SKIN  [X ]No Rash  [ ] Abnormal  [ ] pressure    CARDIAC  [X ]Regular  [ ]Abnormal    PULMONARY  [X ]Bilateral Clear Breath Sounds  [ ]Normal Excursion  [ ]Abnormal    GI  [X ]PEG      [X ] +BS		              [X ]Soft, nondistended, nontender	  [ ]Abnormal    MUSCULOSKELETAL                                   [X ]Bedbound                 [ ]Abnormal    [ X]Ambulatory/OOB to chair  with assist                      EXTREMITIES                                         [ X]Normal  [ ]Edema                           NEUROLOGIC  [ ] Normal, non focal  [X] Focal findings: not moving limbs, non-verbal    PSYCHIATRIC  [X} Unable to assess as non-verbal, does not respond or interact  [ ] Sedated	 [ ]Agitated    :  Guerra: [ ] Yes, if yes: Date of Placement:                   [X  ] No    LINES: Central Lines [ ] Yes, if yes: Date of Placement                                     [  X] No      HOSPITAL MEDICATIONS:  MEDICATIONS  (STANDING):  amLODIPine   Tablet 10 milliGRAM(s) Oral daily  artificial  tears Solution 1 Drop(s) Both EYES every 6 hours  cefepime   IVPB      cefepime   IVPB 2000 milliGRAM(s) IV Intermittent every 8 hours  Dakins Solution - 1/4 Strength 1 Application(s) Topical two times a day  dextrose 5%. 1000 milliLiter(s) (50 mL/Hr) IV Continuous <Continuous>  dextrose 50% Injectable 12.5 Gram(s) IV Push once  dextrose 50% Injectable 25 Gram(s) IV Push once  dextrose 50% Injectable 25 Gram(s) IV Push once  insulin lispro (HumaLOG) corrective regimen sliding scale   SubCutaneous every 6 hours  insulin NPH human recombinant 20 Unit(s) SubCutaneous every 12 hours  metoprolol tartrate 100 milliGRAM(s) Oral two times a day  metroNIDAZOLE  IVPB      metroNIDAZOLE  IVPB 500 milliGRAM(s) IV Intermittent every 8 hours  pantoprazole  Injectable 40 milliGRAM(s) IV Push every 12 hours    MEDICATIONS  (PRN):  acetaminophen    Suspension .. 650 milliGRAM(s) Oral every 6 hours PRN Moderate Pain (4 - 6)  dextrose 40% Gel 15 Gram(s) Oral once PRN Blood Glucose LESS THAN 70 milliGRAM(s)/deciLiter  glucagon  Injectable 1 milliGRAM(s) IntraMuscular once PRN Glucose <70 milliGRAM(s)/deciLiter      LABS:                                   8.8    10.8  )-----------( 379      ( 18 Dec 2018 07:08 )             27.1     12-18    137  |  105  |  14  ----------------------------<  142<H>  3.9   |  21<L>  |  0.69    Ca    8.8      18 Dec 2018 07:08  Phos  3.2     12-18  Mg     2.0     12-18    TPro  6.4  /  Alb  2.1<L>  /  TBili  0.4  /  DBili  0.1  /  AST  54<H>  /  ALT  261<H>  /  AlkPhos  138<H>  12-18        CAPILLARY BLOOD GLUCOSE    MICROBIOLOGY:     RADIOLOGY:  [ ] Reviewed and interpreted by me    Mode: AC/ CMV (Assist Control/ Continuous Mandatory Ventilation)  RR (machine): 16  TV (machine): 500  FiO2: 30  PEEP: 5  PS: 30  ITime: 1  MAP: 8  PC: 5  PIP: 16

## 2018-12-18 NOTE — PROGRESS NOTE ADULT - PROBLEM SELECTOR PLAN 5
Patient with HX of PE   Xarelto resumed 12/18  VA duplex 12/11: No evidence of LE DVT  Awaiting RUE Duplex for limb swelling

## 2018-12-18 NOTE — PROGRESS NOTE ADULT - PROBLEM SELECTOR PLAN 3
Patient with Melena / Coffee Ground Emesis on admission   EGD 12/12: No evidence of active bleeding   If H+H continues to downtrend GI will consider Colonoscopy   H/H stable, Cont to monitor CBC as restarting AC 12/18  Continue IVP Protonix q 12hrs

## 2018-12-18 NOTE — PROGRESS NOTE ADULT - ASSESSMENT
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 Presented to ED from [facility] with fevers x 4d, and 4 episodes of vomiting on day of admission. En route to hospital in ambulance, patient became hypotensive and dopamine was started.  Pt arrived unresponsive with dark vomitus around mouth. In ED, pt was found to have melanotic stool and black vomitus. BP was 94/37; Hb was 5.7 and WBC 20.8 with lactate of 13.5. Pt was started on norepi, transfused 2U PRBC, started on cefepime and vanc, and given 2.2 L LR bolus w/ improvement in BP. Patient was admitted to MICU for further management. Patient was found to have a + UA positive for bacteria and budding yeast. Pt was started on meropenem, and diflucan. Hgb continued to trend down on MICU floor, pt required 1U prbc transfusion again on 12/12, Patient was placed on PPI gtt. GI performed upper endoscopy on 12/2 no source of bleeding visualized. TFs restarted. Urine culture contaminated, repeat culture sent. Sputum culture grew pseudomonas but no evidence of Infiltrate on CXR     12/12: Patient transferred to RCU this evening. Patient transfused 1 unit of PRBCs this morning. Patient S/p bedside EGD no evidence of active bleeding, TFs resumed. Patient transitioned off PPI drip to Protonix IVP Q 12 hrs  Patient remains on broad spec abx / antifungal for presumed UTI. Will attempt TOV this evening   12/12 Sodium 152, free water 250 ml q8, potassium 3.3  KCL 40 mg x1, H/H stable. Continue with Meropenem and Fluconazole. WBC on admission 32, now 17, positive blood cultures, GNR on 2 sets, on Meropenem, ID consulted.    12/14 Stable, cpap as tolerated. Per ID If any signs worsening/fever/resp failure, would change to Cefepime 2g q 8 + Flagyl 500mg q 8, continue on Meropenem for now.   12/15 Stable, will continue with weaning trials. Sputum culture with Pseudomonas aeruginosa. Hypertensive 150's on 12/14, home medications for BP restarted.   12/15  episode  of hypothermia and hypoglycemia- feeds continuos  ABX changed per ID and hypoglycemia protocol instituted.    12/18: Plan is to continue Cefepime until 12/26. Midline Catheter requested. Flagyl DC'guzman. Duplex ordered for RUE swelling. Xarelto resumed for history of PE.

## 2018-12-18 NOTE — PROVIDER CONTACT NOTE (OTHER) - BACKGROUND
pt came to hospital septic
PT has a HX of GI bleed and transfusion during his course of therapy.
Pt admitted from nursing home with uro sepsis
admitted for septic/hemorrhagic shock, chronic vented and pegged pt.
pt has hiccups occaisionally

## 2018-12-18 NOTE — PROVIDER CONTACT NOTE (OTHER) - ASSESSMENT
vitals normal except temp
VS WNL
vital signs stable, abdomen soft, tolerating peg feeding, no residual,
vitals are stable, no acute distress, no vomiting
vitals normal

## 2018-12-19 LAB
ALBUMIN SERPL ELPH-MCNC: 2.2 G/DL — LOW (ref 3.3–5)
ALP SERPL-CCNC: 151 U/L — HIGH (ref 40–120)
ALT FLD-CCNC: 180 U/L — HIGH (ref 10–45)
ANION GAP SERPL CALC-SCNC: 10 MMOL/L — SIGNIFICANT CHANGE UP (ref 5–17)
APTT BLD: 40.1 SEC — HIGH (ref 27.5–36.3)
AST SERPL-CCNC: 42 U/L — HIGH (ref 10–40)
BILIRUB DIRECT SERPL-MCNC: 0.1 MG/DL — SIGNIFICANT CHANGE UP (ref 0–0.2)
BILIRUB INDIRECT FLD-MCNC: 0.2 MG/DL — SIGNIFICANT CHANGE UP (ref 0.2–1)
BILIRUB SERPL-MCNC: 0.3 MG/DL — SIGNIFICANT CHANGE UP (ref 0.2–1.2)
BLD GP AB SCN SERPL QL: NEGATIVE — SIGNIFICANT CHANGE UP
BUN SERPL-MCNC: 14 MG/DL — SIGNIFICANT CHANGE UP (ref 7–23)
CALCIUM SERPL-MCNC: 9.1 MG/DL — SIGNIFICANT CHANGE UP (ref 8.4–10.5)
CHLORIDE SERPL-SCNC: 103 MMOL/L — SIGNIFICANT CHANGE UP (ref 96–108)
CO2 SERPL-SCNC: 24 MMOL/L — SIGNIFICANT CHANGE UP (ref 22–31)
CREAT SERPL-MCNC: 0.67 MG/DL — SIGNIFICANT CHANGE UP (ref 0.5–1.3)
GLUCOSE BLDC GLUCOMTR-MCNC: 118 MG/DL — HIGH (ref 70–99)
GLUCOSE BLDC GLUCOMTR-MCNC: 165 MG/DL — HIGH (ref 70–99)
GLUCOSE BLDC GLUCOMTR-MCNC: 170 MG/DL — HIGH (ref 70–99)
GLUCOSE BLDC GLUCOMTR-MCNC: 196 MG/DL — HIGH (ref 70–99)
GLUCOSE BLDC GLUCOMTR-MCNC: 199 MG/DL — HIGH (ref 70–99)
GLUCOSE SERPL-MCNC: 158 MG/DL — HIGH (ref 70–99)
HCT VFR BLD CALC: 28.7 % — LOW (ref 39–50)
HGB BLD-MCNC: 8.6 G/DL — LOW (ref 13–17)
INR BLD: 1.51 RATIO — HIGH (ref 0.88–1.16)
MAGNESIUM SERPL-MCNC: 1.7 MG/DL — SIGNIFICANT CHANGE UP (ref 1.6–2.6)
MCHC RBC-ENTMCNC: 26.7 PG — LOW (ref 27–34)
MCHC RBC-ENTMCNC: 29.9 GM/DL — LOW (ref 32–36)
MCV RBC AUTO: 89 FL — SIGNIFICANT CHANGE UP (ref 80–100)
PHOSPHATE SERPL-MCNC: 3 MG/DL — SIGNIFICANT CHANGE UP (ref 2.5–4.5)
PLATELET # BLD AUTO: 480 K/UL — HIGH (ref 150–400)
POTASSIUM SERPL-MCNC: 4.2 MMOL/L — SIGNIFICANT CHANGE UP (ref 3.5–5.3)
POTASSIUM SERPL-SCNC: 4.2 MMOL/L — SIGNIFICANT CHANGE UP (ref 3.5–5.3)
PROT SERPL-MCNC: 6.4 G/DL — SIGNIFICANT CHANGE UP (ref 6–8.3)
PROTHROM AB SERPL-ACNC: 17.4 SEC — HIGH (ref 10–12.9)
RBC # BLD: 3.22 M/UL — LOW (ref 4.2–5.8)
RBC # FLD: 15.9 % — HIGH (ref 10.3–14.5)
RH IG SCN BLD-IMP: POSITIVE — SIGNIFICANT CHANGE UP
SODIUM SERPL-SCNC: 137 MMOL/L — SIGNIFICANT CHANGE UP (ref 135–145)
WBC # BLD: 13.3 K/UL — HIGH (ref 3.8–10.5)
WBC # FLD AUTO: 13.3 K/UL — HIGH (ref 3.8–10.5)

## 2018-12-19 PROCEDURE — 99233 SBSQ HOSP IP/OBS HIGH 50: CPT | Mod: GC

## 2018-12-19 PROCEDURE — 93010 ELECTROCARDIOGRAM REPORT: CPT

## 2018-12-19 PROCEDURE — 99232 SBSQ HOSP IP/OBS MODERATE 35: CPT

## 2018-12-19 RX ORDER — PANTOPRAZOLE SODIUM 20 MG/1
40 TABLET, DELAYED RELEASE ORAL
Qty: 0 | Refills: 0 | Status: DISCONTINUED | OUTPATIENT
Start: 2018-12-19 | End: 2018-12-20

## 2018-12-19 RX ADMIN — AMLODIPINE BESYLATE 10 MILLIGRAM(S): 2.5 TABLET ORAL at 06:35

## 2018-12-19 RX ADMIN — Medication 1 APPLICATION(S): at 06:35

## 2018-12-19 RX ADMIN — Medication 1 DROP(S): at 17:25

## 2018-12-19 RX ADMIN — Medication 2: at 17:53

## 2018-12-19 RX ADMIN — CEFEPIME 100 MILLIGRAM(S): 1 INJECTION, POWDER, FOR SOLUTION INTRAMUSCULAR; INTRAVENOUS at 06:35

## 2018-12-19 RX ADMIN — HUMAN INSULIN 20 UNIT(S): 100 INJECTION, SUSPENSION SUBCUTANEOUS at 06:36

## 2018-12-19 RX ADMIN — Medication 100 MILLIGRAM(S): at 06:35

## 2018-12-19 RX ADMIN — Medication 1 DROP(S): at 06:35

## 2018-12-19 RX ADMIN — Medication 2: at 06:36

## 2018-12-19 RX ADMIN — Medication 2: at 23:39

## 2018-12-19 RX ADMIN — PANTOPRAZOLE SODIUM 40 MILLIGRAM(S): 20 TABLET, DELAYED RELEASE ORAL at 17:53

## 2018-12-19 RX ADMIN — CEFEPIME 100 MILLIGRAM(S): 1 INJECTION, POWDER, FOR SOLUTION INTRAMUSCULAR; INTRAVENOUS at 23:38

## 2018-12-19 RX ADMIN — RIVAROXABAN 20 MILLIGRAM(S): KIT at 23:38

## 2018-12-19 RX ADMIN — HUMAN INSULIN 20 UNIT(S): 100 INJECTION, SUSPENSION SUBCUTANEOUS at 17:53

## 2018-12-19 RX ADMIN — Medication 1 APPLICATION(S): at 17:25

## 2018-12-19 RX ADMIN — Medication 1 DROP(S): at 23:39

## 2018-12-19 RX ADMIN — CEFEPIME 100 MILLIGRAM(S): 1 INJECTION, POWDER, FOR SOLUTION INTRAMUSCULAR; INTRAVENOUS at 14:30

## 2018-12-19 RX ADMIN — Medication 1 DROP(S): at 01:00

## 2018-12-19 RX ADMIN — Medication 2: at 12:26

## 2018-12-19 RX ADMIN — Medication 100 MILLIGRAM(S): at 17:26

## 2018-12-19 RX ADMIN — Medication 1 DROP(S): at 12:27

## 2018-12-19 NOTE — PROGRESS NOTE ADULT - PROBLEM SELECTOR PLAN 1
Patient admitted with Multi- Factorial Shock ( Sepsis/ Hemorrhagic)   Sputum cx 12/11: Pseudomonas / NL christopher  Blood Cxs 12/11: Proteus Mirabilis    Continue Cefepime until 12/26 as per ID recommendations (sensitive to Augmentin but has low bioavailability for bacteremia per ID).  Patient scheduled for Midline today

## 2018-12-19 NOTE — PROGRESS NOTE ADULT - SUBJECTIVE AND OBJECTIVE BOX
Patient is a 73y old  Male who presents with a chief complaint of Multifactorial shock (18 Dec 2018 07:17)      Interval Events: Patient with 2 episodes of Melena reported overnight     REVIEW OF SYSTEMS:  [ ] Positive  [ ] All other systems negative  [x] Unable to assess ROS because patient is Non-Verbal     Vital Signs Last 24 Hrs  T(C): 36.6 (12-19-18 @ 04:35), Max: 36.9 (12-18-18 @ 20:58)  T(F): 97.8 (12-19-18 @ 04:35), Max: 98.4 (12-18-18 @ 20:58)  HR: 66 (12-19-18 @ 08:40) (66 - 82)  BP: 136/60 (12-19-18 @ 04:35) (121/58 - 148/65)  RR: 18 (12-19-18 @ 04:35) (17 - 21)  SpO2: 100% (12-19-18 @ 08:40) (98% - 100%)    PHYSICAL EXAM:  HEENT:   [x]Tracheostomy: # 8 Cuffed Bellley   [x]Pupils equal  [ ]No oral lesions  [ ]Abnormal    SKIN  [ ]No Rash  [ ] Abnormal  [x] pressure    CARDIAC  [x]Regular  [ ]Abnormal    PULMONARY  [ ]Bilateral Clear Breath Sounds  [ ]Normal Excursion  [ ]Abnormal    GI  [ ]PEG      [ ] +BS		              [ ]Soft, nondistended, nontender	  [ ]Abnormal    MUSCULOSKELETAL                                   [ ]Bedbound                 [ ]Abnormal    [ ]Ambulatory/OOB to chair                           EXTREMITIES                                         [ ]Normal  [ ]Edema                           NEUROLOGIC  [ ] Normal, non focal  [ ] Focal findings:    PSYCHIATRIC  [ ]Alert and appropriate  [ ] Sedated	 [ ]Agitated    :  Guerra: [ ] Yes, if yes: Date of Placement:                   [  ] No    LINES: Central Lines [ ] Yes, if yes: Date of Placement                                     [  ] No    HOSPITAL MEDICATIONS:  MEDICATIONS  (STANDING):  amLODIPine   Tablet 10 milliGRAM(s) Oral daily  artificial  tears Solution 1 Drop(s) Both EYES every 6 hours  cefepime   IVPB      cefepime   IVPB 2000 milliGRAM(s) IV Intermittent every 8 hours  Dakins Solution - 1/4 Strength 1 Application(s) Topical two times a day  dextrose 5%. 1000 milliLiter(s) (50 mL/Hr) IV Continuous <Continuous>  dextrose 50% Injectable 12.5 Gram(s) IV Push once  dextrose 50% Injectable 25 Gram(s) IV Push once  dextrose 50% Injectable 25 Gram(s) IV Push once  insulin lispro (HumaLOG) corrective regimen sliding scale   SubCutaneous every 6 hours  insulin NPH human recombinant 20 Unit(s) SubCutaneous every 12 hours  metoprolol tartrate 100 milliGRAM(s) Oral two times a day  pantoprazole  Injectable 40 milliGRAM(s) IV Push every 12 hours  rivaroxaban 20 milliGRAM(s) Oral at bedtime    MEDICATIONS  (PRN):  acetaminophen    Suspension .. 650 milliGRAM(s) Oral every 6 hours PRN Moderate Pain (4 - 6)  dextrose 40% Gel 15 Gram(s) Oral once PRN Blood Glucose LESS THAN 70 milliGRAM(s)/deciLiter  glucagon  Injectable 1 milliGRAM(s) IntraMuscular once PRN Glucose <70 milliGRAM(s)/deciLiter      LABS:                        9.7    11.7  )-----------( 412      ( 18 Dec 2018 21:47 )             29.1     12-18    137  |  105  |  14  ----------------------------<  142<H>  3.9   |  21<L>  |  0.69    Ca    8.8      18 Dec 2018 07:08  Phos  3.2     12-18  Mg     2.0     12-18    TPro  6.4  /  Alb  2.1<L>  /  TBili  0.4  /  DBili  0.1  /  AST  54<H>  /  ALT  261<H>  /  AlkPhos  138<H>  12-18            CAPILLARY BLOOD GLUCOSE    MICROBIOLOGY:     RADIOLOGY:  [ ] Reviewed and interpreted by me    Mode: AC/ CMV (Assist Control/ Continuous Mandatory Ventilation)  RR (machine): 16  TV (machine): 500  FiO2: 30  PEEP: 5  ITime: 1  MAP: 8  PIP: 17 Patient is a 73y old  Male who presents with a chief complaint of Multifactorial shock (18 Dec 2018 07:17)      Interval Events: Patient with 2 episodes of Melena reported overnight, repeat cbc last night remained stable     REVIEW OF SYSTEMS:  [ ] Positive  [ ] All other systems negative  [x] Unable to assess ROS because patient is Non-Verbal     Vital Signs Last 24 Hrs  T(C): 36.6 (12-19-18 @ 04:35), Max: 36.9 (12-18-18 @ 20:58)  T(F): 97.8 (12-19-18 @ 04:35), Max: 98.4 (12-18-18 @ 20:58)  HR: 66 (12-19-18 @ 08:40) (66 - 82)  BP: 136/60 (12-19-18 @ 04:35) (121/58 - 148/65)  RR: 18 (12-19-18 @ 04:35) (17 - 21)  SpO2: 100% (12-19-18 @ 08:40) (98% - 100%)    PHYSICAL EXAM:  HEENT:   [x]Tracheostomy: # 8 Cuffed Shiley   [x]Pupils equal  [ ]No oral lesions  [ ]Abnormal    SKIN  [ ]No Rash  [ ] Abnormal  [x] pressure: Sacral Unstagable Pressure Injury     CARDIAC  [x]Regular  [ ]Abnormal    PULMONARY  [x]Bilateral Coarse Breath Sounds  [ ]Normal Excursion  [ ]Abnormal    GI  [x]PEG      [x] +BS		              [x]Soft, nondistended, nontender	  [ ]Abnormal    MUSCULOSKELETAL                                   [x]Bedbound                 [ ]Abnormal    [ ]Ambulatory/OOB to chair                           EXTREMITIES                                         [ ]Normal  [x]Edema: + Pedal Edema bilaterally                        NEUROLOGIC  [ ] Normal, non focal  [x] Focal findings: + Quadriplegic / No Withdrawal to pain      PSYCHIATRIC  [x]Alert   [ ] Sedated	 [ ]Agitated    :  Guerra: [ ] Yes, if yes: Date of Placement:                   [x] No Requiring Straight Cath atc     LINES: Central Lines [ ] Yes, if yes: Date of Placement                                     [ x] No    HOSPITAL MEDICATIONS:  MEDICATIONS  (STANDING):  amLODIPine   Tablet 10 milliGRAM(s) Oral daily  artificial  tears Solution 1 Drop(s) Both EYES every 6 hours  cefepime   IVPB      cefepime   IVPB 2000 milliGRAM(s) IV Intermittent every 8 hours  Dakins Solution - 1/4 Strength 1 Application(s) Topical two times a day  dextrose 5%. 1000 milliLiter(s) (50 mL/Hr) IV Continuous <Continuous>  dextrose 50% Injectable 12.5 Gram(s) IV Push once  dextrose 50% Injectable 25 Gram(s) IV Push once  dextrose 50% Injectable 25 Gram(s) IV Push once  insulin lispro (HumaLOG) corrective regimen sliding scale   SubCutaneous every 6 hours  insulin NPH human recombinant 20 Unit(s) SubCutaneous every 12 hours  metoprolol tartrate 100 milliGRAM(s) Oral two times a day  pantoprazole  Injectable 40 milliGRAM(s) IV Push every 12 hours  rivaroxaban 20 milliGRAM(s) Oral at bedtime    MEDICATIONS  (PRN):  acetaminophen    Suspension .. 650 milliGRAM(s) Oral every 6 hours PRN Moderate Pain (4 - 6)  dextrose 40% Gel 15 Gram(s) Oral once PRN Blood Glucose LESS THAN 70 milliGRAM(s)/deciLiter  glucagon  Injectable 1 milliGRAM(s) IntraMuscular once PRN Glucose <70 milliGRAM(s)/deciLiter      LABS:                        9.7    11.7  )-----------( 412      ( 18 Dec 2018 21:47 )             29.1     12-18    137  |  105  |  14  ----------------------------<  142<H>  3.9   |  21<L>  |  0.69    Ca    8.8      18 Dec 2018 07:08  Phos  3.2     12-18  Mg     2.0     12-18    TPro  6.4  /  Alb  2.1<L>  /  TBili  0.4  /  DBili  0.1  /  AST  54<H>  /  ALT  261<H>  /  AlkPhos  138<H>  12-18            CAPILLARY BLOOD GLUCOSE    MICROBIOLOGY:     RADIOLOGY:  [ ] Reviewed and interpreted by me    Mode: AC/ CMV (Assist Control/ Continuous Mandatory Ventilation)  RR (machine): 16  TV (machine): 500  FiO2: 30  PEEP: 5  ITime: 1  MAP: 8  PIP: 17

## 2018-12-19 NOTE — PROGRESS NOTE ADULT - PROBLEM SELECTOR PLAN 9
RN reports Unstageable Sacral Pressure wound   Wound care PA called family today to discuss bedside debridement  Wound Care team awaiting call back from family, message left

## 2018-12-19 NOTE — CHART NOTE - NSCHARTNOTEFT_GEN_A_CORE
CC: Melena      HPI:  Called by RN to relate pt noted to have had two large BM's that appeared black & pasty, suspicious for melena  Per RN, the last reported melanotic stool was noted several days ago  Stat CBC sent, revealed a stable H/H 9.7/29.1; VSS        ROS: Unobtainable - pt. non verbal  CONSTITUTIONAL:   CARDIOVASCULAR:    RESPIRATORY:     GASTROINTESTINAL:   EXTREMITIES:    GENITOURINARY:    NEUROLOGIC:    SKIN:       PAST MEDICAL & SURGICAL HISTORY:  Gastrointestinal hemorrhage  No pertinent family history in first degree relatives  Heart failure  Diabetic neuropathy  Hyperlipidemia  Quadriplegia  Hypertension  Stroke  Obstructive cardiomyopathy  GIB (gastrointestinal bleeding)  STACEY (acute kidney injury)  CVA (cerebral vascular accident)  Pulmonary emboli  DM (diabetes mellitus)  Hypernatremia  Respiratory failure  Sepsis  Fever  Hypotension  No significant past surgical history        Vital Signs Last 24 Hrs  T(C): 36.9 (18 Dec 2018 20:58), Max: 36.9 (18 Dec 2018 00:35)  T(F): 98.4 (18 Dec 2018 20:58), Max: 98.5 (18 Dec 2018 04:32)  HR: 76 (18 Dec 2018 20:58) (70 - 87)  BP: 121/58 (18 Dec 2018 20:58) (121/58 - 149/64)  BP(mean): --  RR: 20 (18 Dec 2018 20:58) (16 - 21)  SpO2: 100% (18 Dec 2018 20:58) (98% - 100%)      Physical Exam:  General: Elderly male, in NAD  Head:  NC/AT  CV: RRR, S1S2   Respiratory: CTA B/L, nonlabored; on ventilatory mechanical support  Abdominal: (+) bowel sounds x4. Soft, non tender, non distended; PEG in place, area dry & clean.   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 [facility]d, and 4 episodes of vomiting today.  En route to hospital in EMS, patient became hypotensive to 60/palp and dopamine was started.  Pt arrived unresponsive with dark vomitus around mouth. In ED, Pt was found to have melanotic stool and black vomitus. BP was 94/37; Hb was 5.7 and WBC 20.8 with lactate of 13.5. Pt was started on norepi, transfused 2U PRBC, started on  cefepime and vanc, and given 2.2 L LR bolus. Afterwards, BP improved to 125/56. Past hospitals were [facility] and [facility]. (11 Dec 2018 05:31)    Pt seen for reported + black stool suspicious for melena, as noted by RN  H/H stable at 9.7/29.1; VSS    PLAN:  -Continue to monitor    -Observe for further melanotic appearing BM's  -CBC in AM  -Continue PPI as ordered  -In light of recurrent melanotic stools, will hold Xarelto tonight, & consider resuming 12/19  -Continue Enteral tube feeds   -Primary Team to follow up in AM, attending to follow       Josee Singleton PA-C  Dept of Medicine

## 2018-12-19 NOTE — PROGRESS NOTE ADULT - PROBLEM SELECTOR PLAN 8
Hypotension in setting of shock on admission requiring pressors ( Resolved ) Metoprolol and Norvasc restarted 12/14

## 2018-12-19 NOTE — PROGRESS NOTE ADULT - ASSESSMENT
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 Presented to ED from [facility] with fevers x 4d, and 4 episodes of vomiting on day of admission. En route to hospital in ambulance, patient became hypotensive and dopamine was started.  Pt arrived unresponsive with dark vomitus around mouth. In ED, pt was found to have melanotic stool and black vomitus. BP was 94/37; Hb was 5.7 and WBC 20.8 with lactate of 13.5. Pt was started on norepi, transfused 2U PRBC, started on cefepime and vanc, and given 2.2 L LR bolus w/ improvement in BP. Patient was admitted to MICU for further management. Patient was found to have a + UA positive for bacteria and budding yeast. Pt was started on meropenem, and diflucan. Hgb continued to trend down on MICU floor, pt required 1U prbc transfusion again on 12/12, Patient was placed on PPI gtt. GI performed upper endoscopy on 12/2 no source of bleeding visualized. TFs restarted. Urine culture contaminated, repeat culture sent. Sputum culture grew CRE pseudomonas but no evidence of Infiltrate on CXR. Patient found to have Proteus Bacteremia and was treated with course of Cefepime based on Sensitivities     12/18: Plan is to continue Cefepime until 12/26. Midline Catheter requested. Flagyl DC'guzman. Duplex ordered for RUE swelling. Xarelto resumed for history of PE  12/19: Patient with reported episode of Melena overnight x 2 but with stable cbc today case d/w Dr. Mathews will resume Xarelto tonight. Patient scheduled for midline today

## 2018-12-19 NOTE — PROGRESS NOTE ADULT - PROBLEM SELECTOR PLAN 2
Detail Level: Detailed Patient with Tracheostomy at baseline   Continue Mechanical Ventilation, weaning trials as tolerated   Continue Chest PT and Suctioning PRN   CPAP as tolerated

## 2018-12-19 NOTE — PROGRESS NOTE ADULT - PROBLEM SELECTOR PLAN 5
Patient with HX of PE   Continue Xarelto H+H remains stable   VA duplex 12/11: No evidence of LE DVT  VA Duplex Bilateral: + RUE Superficial thrombophlebitis  affecting Cephalic and cubital vein, No evidence of  DVT b/l

## 2018-12-19 NOTE — PROGRESS NOTE ADULT - PROBLEM SELECTOR PLAN 3
Patient with Melena / Coffee Ground Emesis on admission   EGD 12/12: No evidence of active bleeding   If H+H continues to downtrend GI will consider Colonoscopy   Protonix IVP changed to Via PEG as per

## 2018-12-19 NOTE — PROGRESS NOTE ADULT - ASSESSMENT
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  Leukocytosis, fever  Sacral wound examined--unstageable, non-purulent  BCX with proteus S Cefepime  Pseudomonas from sputum CRE--S Zosyn, Cefepime  Elevated LFTs, improving--shock liver?  Improving on therapy but slight rise in WBC--monitor  Stable on cefepime  Overall, Gram negative bacteremia, fever, leukocytosis, sepsis, positive culture finding  - Cefepime 2g q 8  - Anticipate will need IV abx through 12/26 to complete treatment for Proteus bacteremia (poor PO options)  - Monitor for further signs hypothermia/worsening infection  - Trend LFTs, WBC    Rodger Beasley MD  Pager [phone]  After 5pm and on weekends call [phone]

## 2018-12-19 NOTE — PROGRESS NOTE ADULT - PROBLEM SELECTOR PLAN 2
Patient with Tracheostomy at baseline   Continue Mechanical Ventilation, weaning trials as tolerated   Continue Chest PT and Suctioning PRN   Continue CPAP as tolerated

## 2018-12-19 NOTE — PROGRESS NOTE ADULT - SUBJECTIVE AND OBJECTIVE BOX
CC: F/U for Bacteremia    Saw/spoke to patient. No fevers, no chills. No new complaints. Overall unchanged.     Allergies  penicillin (Unknown)    ANTIMICROBIALS:  cefepime   IVPB    cefepime   IVPB 2000 every 8 hours    PE:    Vital Signs Last 24 Hrs  T(C): 36.6 (19 Dec 2018 04:35), Max: 36.9 (18 Dec 2018 20:58)  T(F): 97.8 (19 Dec 2018 04:35), Max: 98.4 (18 Dec 2018 20:58)  HR: 66 (19 Dec 2018 08:40) (66 - 82)  BP: 136/60 (19 Dec 2018 04:35) (121/58 - 148/65)  RR: 18 (19 Dec 2018 04:35) (17 - 21)  SpO2: 100% (19 Dec 2018 08:40) (98% - 100%)    Gen: AOx3, NAD, non-toxic, pleasant  CV: S1+S2 normal, nontachycardic  Resp: Clear bilat, no resp distress, no crackles/wheezes  Abd: Soft, nontender, +BS  Ext: No LE edema, no wounds    LABS:                        8.6    13.3  )-----------( 480      ( 19 Dec 2018 10:49 )             28.7     12-18    137  |  105  |  14  ----------------------------<  142<H>  3.9   |  21<L>  |  0.69    Ca    8.8      18 Dec 2018 07:08  Phos  3.2     12-18  Mg     2.0     12-18    TPro  6.4  /  Alb  2.1<L>  /  TBili  0.4  /  DBili  0.1  /  AST  54<H>  /  ALT  261<H>  /  AlkPhos  138<H>  12-18    MICROBIOLOGY:    .Urine Catheterized  12-13-18   No growth    .Blood Blood-Peripheral  12-13-18   No growth at 5 days.     .Sputum Sputum/TRAP  12-11-18   Numerous Pseudomonas aeruginosa (Carbapenem Resistant)  Normal Respiratory Beatrice present  --  Pseudomonas aeruginosa (Carbapenem Resistant)    .Blood Blood-Peripheral  12-11-18   Growth in aerobic and anaerobic bottles: Proteus mirabilis    (otherwise reviewed)    RADIOLOGY:    USG 12/11:    FINDINGS:    There is normal compressibility of the bilateral common femoral, femoral   and popliteal veins. No calf vein thrombosis is detected.    Doppler examination shows normal spontaneous and phasic flow.    IMPRESSION:     No evidence of bilateral lower extremity deep venous thrombosis.

## 2018-12-20 ENCOUNTER — TRANSCRIPTION ENCOUNTER (OUTPATIENT)
Age: 73
End: 2018-12-20

## 2018-12-20 VITALS — HEART RATE: 81 BPM | OXYGEN SATURATION: 100 %

## 2018-12-20 LAB
ALBUMIN SERPL ELPH-MCNC: 2.1 G/DL — LOW (ref 3.3–5)
ALP SERPL-CCNC: 158 U/L — HIGH (ref 40–120)
ALT FLD-CCNC: 137 U/L — HIGH (ref 10–45)
ANION GAP SERPL CALC-SCNC: 11 MMOL/L — SIGNIFICANT CHANGE UP (ref 5–17)
AST SERPL-CCNC: 39 U/L — SIGNIFICANT CHANGE UP (ref 10–40)
BILIRUB DIRECT SERPL-MCNC: 0.1 MG/DL — SIGNIFICANT CHANGE UP (ref 0–0.2)
BILIRUB INDIRECT FLD-MCNC: 0.1 MG/DL — LOW (ref 0.2–1)
BILIRUB SERPL-MCNC: 0.2 MG/DL — SIGNIFICANT CHANGE UP (ref 0.2–1.2)
BUN SERPL-MCNC: 14 MG/DL — SIGNIFICANT CHANGE UP (ref 7–23)
CALCIUM SERPL-MCNC: 9 MG/DL — SIGNIFICANT CHANGE UP (ref 8.4–10.5)
CHLORIDE SERPL-SCNC: 100 MMOL/L — SIGNIFICANT CHANGE UP (ref 96–108)
CO2 SERPL-SCNC: 23 MMOL/L — SIGNIFICANT CHANGE UP (ref 22–31)
CREAT SERPL-MCNC: 0.66 MG/DL — SIGNIFICANT CHANGE UP (ref 0.5–1.3)
GLUCOSE BLDC GLUCOMTR-MCNC: 148 MG/DL — HIGH (ref 70–99)
GLUCOSE BLDC GLUCOMTR-MCNC: 205 MG/DL — HIGH (ref 70–99)
GLUCOSE SERPL-MCNC: 195 MG/DL — HIGH (ref 70–99)
HCT VFR BLD CALC: 26.3 % — LOW (ref 39–50)
HGB BLD-MCNC: 9.1 G/DL — LOW (ref 13–17)
MAGNESIUM SERPL-MCNC: 1.8 MG/DL — SIGNIFICANT CHANGE UP (ref 1.6–2.6)
MCHC RBC-ENTMCNC: 30.9 PG — SIGNIFICANT CHANGE UP (ref 27–34)
MCHC RBC-ENTMCNC: 34.8 GM/DL — SIGNIFICANT CHANGE UP (ref 32–36)
MCV RBC AUTO: 88.7 FL — SIGNIFICANT CHANGE UP (ref 80–100)
PHOSPHATE SERPL-MCNC: 3.2 MG/DL — SIGNIFICANT CHANGE UP (ref 2.5–4.5)
PLATELET # BLD AUTO: 515 K/UL — HIGH (ref 150–400)
POTASSIUM SERPL-MCNC: 4.3 MMOL/L — SIGNIFICANT CHANGE UP (ref 3.5–5.3)
POTASSIUM SERPL-SCNC: 4.3 MMOL/L — SIGNIFICANT CHANGE UP (ref 3.5–5.3)
PROT SERPL-MCNC: 6.5 G/DL — SIGNIFICANT CHANGE UP (ref 6–8.3)
RBC # BLD: 2.96 M/UL — LOW (ref 4.2–5.8)
RBC # FLD: 16.1 % — HIGH (ref 10.3–14.5)
SODIUM SERPL-SCNC: 134 MMOL/L — LOW (ref 135–145)
WBC # BLD: 11.3 K/UL — HIGH (ref 3.8–10.5)
WBC # FLD AUTO: 11.3 K/UL — HIGH (ref 3.8–10.5)

## 2018-12-20 PROCEDURE — 80053 COMPREHEN METABOLIC PANEL: CPT

## 2018-12-20 PROCEDURE — 36430 TRANSFUSION BLD/BLD COMPNT: CPT

## 2018-12-20 PROCEDURE — 87040 BLOOD CULTURE FOR BACTERIA: CPT

## 2018-12-20 PROCEDURE — 83605 ASSAY OF LACTIC ACID: CPT

## 2018-12-20 PROCEDURE — 82435 ASSAY OF BLOOD CHLORIDE: CPT

## 2018-12-20 PROCEDURE — 99232 SBSQ HOSP IP/OBS MODERATE 35: CPT | Mod: GC

## 2018-12-20 PROCEDURE — 85379 FIBRIN DEGRADATION QUANT: CPT

## 2018-12-20 PROCEDURE — 99232 SBSQ HOSP IP/OBS MODERATE 35: CPT

## 2018-12-20 PROCEDURE — 96374 THER/PROPH/DIAG INJ IV PUSH: CPT | Mod: XU

## 2018-12-20 PROCEDURE — 84295 ASSAY OF SERUM SODIUM: CPT

## 2018-12-20 PROCEDURE — 81001 URINALYSIS AUTO W/SCOPE: CPT

## 2018-12-20 PROCEDURE — 85730 THROMBOPLASTIN TIME PARTIAL: CPT

## 2018-12-20 PROCEDURE — C1751: CPT

## 2018-12-20 PROCEDURE — 93970 EXTREMITY STUDY: CPT

## 2018-12-20 PROCEDURE — 85384 FIBRINOGEN ACTIVITY: CPT

## 2018-12-20 PROCEDURE — 86923 COMPATIBILITY TEST ELECTRIC: CPT

## 2018-12-20 PROCEDURE — 85014 HEMATOCRIT: CPT

## 2018-12-20 PROCEDURE — 87070 CULTURE OTHR SPECIMN AEROBIC: CPT

## 2018-12-20 PROCEDURE — 84145 PROCALCITONIN (PCT): CPT

## 2018-12-20 PROCEDURE — 87150 DNA/RNA AMPLIFIED PROBE: CPT

## 2018-12-20 PROCEDURE — 83010 ASSAY OF HAPTOGLOBIN QUANT: CPT

## 2018-12-20 PROCEDURE — 82962 GLUCOSE BLOOD TEST: CPT

## 2018-12-20 PROCEDURE — P9016: CPT

## 2018-12-20 PROCEDURE — 80061 LIPID PANEL: CPT

## 2018-12-20 PROCEDURE — 82565 ASSAY OF CREATININE: CPT

## 2018-12-20 PROCEDURE — 36555 INSERT NON-TUNNEL CV CATH: CPT

## 2018-12-20 PROCEDURE — 83615 LACTATE (LD) (LDH) ENZYME: CPT

## 2018-12-20 PROCEDURE — 80048 BASIC METABOLIC PNL TOTAL CA: CPT

## 2018-12-20 PROCEDURE — 86901 BLOOD TYPING SEROLOGIC RH(D): CPT

## 2018-12-20 PROCEDURE — 85027 COMPLETE CBC AUTOMATED: CPT

## 2018-12-20 PROCEDURE — 71045 X-RAY EXAM CHEST 1 VIEW: CPT

## 2018-12-20 PROCEDURE — 85610 PROTHROMBIN TIME: CPT

## 2018-12-20 PROCEDURE — 93005 ELECTROCARDIOGRAM TRACING: CPT

## 2018-12-20 PROCEDURE — 76937 US GUIDE VASCULAR ACCESS: CPT

## 2018-12-20 PROCEDURE — 87186 SC STD MICRODIL/AGAR DIL: CPT

## 2018-12-20 PROCEDURE — 86850 RBC ANTIBODY SCREEN: CPT

## 2018-12-20 PROCEDURE — 84100 ASSAY OF PHOSPHORUS: CPT

## 2018-12-20 PROCEDURE — 86900 BLOOD TYPING SEROLOGIC ABO: CPT

## 2018-12-20 PROCEDURE — 96375 TX/PRO/DX INJ NEW DRUG ADDON: CPT | Mod: XU

## 2018-12-20 PROCEDURE — 82947 ASSAY GLUCOSE BLOOD QUANT: CPT

## 2018-12-20 PROCEDURE — 95819 EEG AWAKE AND ASLEEP: CPT

## 2018-12-20 PROCEDURE — 82803 BLOOD GASES ANY COMBINATION: CPT

## 2018-12-20 PROCEDURE — 84132 ASSAY OF SERUM POTASSIUM: CPT

## 2018-12-20 PROCEDURE — 82272 OCCULT BLD FECES 1-3 TESTS: CPT

## 2018-12-20 PROCEDURE — 99291 CRITICAL CARE FIRST HOUR: CPT | Mod: 25

## 2018-12-20 PROCEDURE — 82330 ASSAY OF CALCIUM: CPT

## 2018-12-20 PROCEDURE — 80076 HEPATIC FUNCTION PANEL: CPT

## 2018-12-20 PROCEDURE — 83036 HEMOGLOBIN GLYCOSYLATED A1C: CPT

## 2018-12-20 PROCEDURE — 87798 DETECT AGENT NOS DNA AMP: CPT

## 2018-12-20 PROCEDURE — 87486 CHLMYD PNEUM DNA AMP PROBE: CPT

## 2018-12-20 PROCEDURE — 87086 URINE CULTURE/COLONY COUNT: CPT

## 2018-12-20 PROCEDURE — 87633 RESP VIRUS 12-25 TARGETS: CPT

## 2018-12-20 PROCEDURE — 83735 ASSAY OF MAGNESIUM: CPT

## 2018-12-20 PROCEDURE — 36569 INSJ PICC 5 YR+ W/O IMAGING: CPT

## 2018-12-20 PROCEDURE — 94003 VENT MGMT INPAT SUBQ DAY: CPT

## 2018-12-20 PROCEDURE — 87581 M.PNEUMON DNA AMP PROBE: CPT

## 2018-12-20 PROCEDURE — 84550 ASSAY OF BLOOD/URIC ACID: CPT

## 2018-12-20 PROCEDURE — 94002 VENT MGMT INPAT INIT DAY: CPT

## 2018-12-20 RX ORDER — ATORVASTATIN CALCIUM 80 MG/1
1 TABLET, FILM COATED ORAL
Qty: 0 | Refills: 0 | COMMUNITY

## 2018-12-20 RX ORDER — ACETAMINOPHEN 500 MG
20.31 TABLET ORAL
Qty: 0 | Refills: 0 | DISCHARGE
Start: 2018-12-20

## 2018-12-20 RX ORDER — DOCUSATE SODIUM 100 MG
15 CAPSULE ORAL
Qty: 0 | Refills: 0 | COMMUNITY

## 2018-12-20 RX ORDER — ASPIRIN/CALCIUM CARB/MAGNESIUM 324 MG
1 TABLET ORAL
Qty: 0 | Refills: 0 | COMMUNITY

## 2018-12-20 RX ORDER — CEFEPIME 1 G/1
2 INJECTION, POWDER, FOR SOLUTION INTRAMUSCULAR; INTRAVENOUS
Qty: 0 | Refills: 0 | DISCHARGE
Start: 2018-12-20

## 2018-12-20 RX ORDER — PANTOPRAZOLE SODIUM 20 MG/1
40 TABLET, DELAYED RELEASE ORAL
Qty: 0 | Refills: 0 | DISCHARGE
Start: 2018-12-20

## 2018-12-20 RX ORDER — RIVAROXABAN 15 MG-20MG
1 KIT ORAL
Qty: 0 | Refills: 0 | COMMUNITY

## 2018-12-20 RX ORDER — METOPROLOL TARTRATE 50 MG
1 TABLET ORAL
Qty: 0 | Refills: 0 | COMMUNITY

## 2018-12-20 RX ORDER — AMLODIPINE BESYLATE 2.5 MG/1
1 TABLET ORAL
Qty: 0 | Refills: 0 | COMMUNITY

## 2018-12-20 RX ORDER — RIVAROXABAN 15 MG-20MG
1 KIT ORAL
Qty: 0 | Refills: 0 | DISCHARGE
Start: 2018-12-20

## 2018-12-20 RX ORDER — RANITIDINE HYDROCHLORIDE 150 MG/1
1 TABLET, FILM COATED ORAL
Qty: 0 | Refills: 0 | COMMUNITY

## 2018-12-20 RX ORDER — HUMAN INSULIN 100 [IU]/ML
38 INJECTION, SUSPENSION SUBCUTANEOUS
Qty: 0 | Refills: 0 | COMMUNITY

## 2018-12-20 RX ORDER — AMLODIPINE BESYLATE 2.5 MG/1
1 TABLET ORAL
Qty: 0 | Refills: 0 | DISCHARGE
Start: 2018-12-20

## 2018-12-20 RX ORDER — SODIUM HYPOCHLORITE 0.125 %
1 SOLUTION, NON-ORAL MISCELLANEOUS
Qty: 0 | Refills: 0 | DISCHARGE
Start: 2018-12-20

## 2018-12-20 RX ORDER — METOPROLOL TARTRATE 50 MG
1 TABLET ORAL
Qty: 0 | Refills: 0 | DISCHARGE
Start: 2018-12-20

## 2018-12-20 RX ORDER — HUMAN INSULIN 100 [IU]/ML
20 INJECTION, SUSPENSION SUBCUTANEOUS
Qty: 0 | Refills: 0 | DISCHARGE
Start: 2018-12-20

## 2018-12-20 RX ORDER — CEFEPIME 1 G/1
2000 INJECTION, POWDER, FOR SOLUTION INTRAMUSCULAR; INTRAVENOUS EVERY 8 HOURS
Qty: 0 | Refills: 0 | Status: DISCONTINUED | OUTPATIENT
Start: 2018-12-20 | End: 2018-12-20

## 2018-12-20 RX ADMIN — CEFEPIME 100 MILLIGRAM(S): 1 INJECTION, POWDER, FOR SOLUTION INTRAMUSCULAR; INTRAVENOUS at 12:39

## 2018-12-20 RX ADMIN — CEFEPIME 100 MILLIGRAM(S): 1 INJECTION, POWDER, FOR SOLUTION INTRAMUSCULAR; INTRAVENOUS at 06:54

## 2018-12-20 RX ADMIN — Medication 1 DROP(S): at 06:54

## 2018-12-20 RX ADMIN — Medication 1 APPLICATION(S): at 06:55

## 2018-12-20 RX ADMIN — PANTOPRAZOLE SODIUM 40 MILLIGRAM(S): 20 TABLET, DELAYED RELEASE ORAL at 08:54

## 2018-12-20 RX ADMIN — AMLODIPINE BESYLATE 10 MILLIGRAM(S): 2.5 TABLET ORAL at 06:54

## 2018-12-20 RX ADMIN — Medication 1 DROP(S): at 11:43

## 2018-12-20 RX ADMIN — Medication 100 MILLIGRAM(S): at 06:54

## 2018-12-20 RX ADMIN — HUMAN INSULIN 20 UNIT(S): 100 INJECTION, SUSPENSION SUBCUTANEOUS at 06:54

## 2018-12-20 RX ADMIN — Medication 4: at 06:54

## 2018-12-20 NOTE — DISCHARGE NOTE ADULT - CARE PLAN
Principal Discharge DX:	Bacteremia  Goal:	Continue Full Course of IV Cefepime to complete on 12/26  Assessment and plan of treatment:	Blood Cxs 12/11: Proteus Mirabilis    Continue Cefepime until 12/26 as per ID recommendations   Patient will be dcd with IV Abx to be administered via Midline, Midline placed 12/19  Secondary Diagnosis:	Respiratory failure  Goal:	Maintain o2 sat > 92%  Assessment and plan of treatment:	Patient with Tracheostomy at baseline   Continue Mechanical Ventilation, weaning trials as tolerated   Vent Settings: PRVC Tidal Volume 500 RR 16 PEEP 5  FIO2 30%   Continue Chest PT and Suctioning PRN  Secondary Diagnosis:	GI bleed  Goal:	Monitor hgb / Hct  Assessment and plan of treatment:	Patient with Melena / Coffee Ground Emesis on admission   EGD 12/12: No evidence of active bleeding    Continue Protonix Via PEG BID  Monitor CBC as outpatient  Secondary Diagnosis:	DM (diabetes mellitus)  Assessment and plan of treatment:	Continue NPH 20 Units  Q12H   Continue Insulin sliding scale Q 6 HRS  , Monitor Blood Glucose levels.  Secondary Diagnosis:	Pulmonary emboli  Assessment and plan of treatment:	Patient with HX of PE , Xarelto resumed as melena has resolved   VA duplex 12/11: No evidence of LE DVT  VA Duplex Upper Extremities Bilateral: + RUE Superficial thrombophlebitis  affecting Cephalic and cubital vein, No evidence of  DVT B/L  Secondary Diagnosis:	CVA (cerebral vascular accident)  Assessment and plan of treatment:	Patient with Hx of CVA resulting in " Locked in Syndrome"   ASA currently on held 2/2 Recent GI Bleed  Atorvastatin currently on hold in setting of shock liver which is improving  Monitor LFTS outpatient and consider restarting statin once resolved  Secondary Diagnosis:	Wound  Assessment and plan of treatment:	RN reports Unstageable Sacral Pressure wound   Wound Care PA Performed bedside debridement of eschar prior to dc today   Sacral Wound Care: Cleanse With Normal Saline pat Dry, apply Cavilon to yajaira wound, Wick/ Pack open area on inferior edge with Dakins Moistened gauze 2x per day, Cover with Dry Gauze and Tegaderm.

## 2018-12-20 NOTE — PROGRESS NOTE ADULT - PROBLEM SELECTOR PLAN 10
Patient on Xarelto   Continue Protonix BID Patient on Xarelto   Continue Protonix BID  Will Proceed with dc planning today

## 2018-12-20 NOTE — PROGRESS NOTE ADULT - PROBLEM SELECTOR PROBLEM 2
Respiratory failure

## 2018-12-20 NOTE — DISCHARGE NOTE ADULT - CARE PROVIDER_API CALL
Alberto Tineo), Gastroenterology; Internal Medicine  [address] [place] [id number]  Phone: [phone]  Fax: [phone] Alberto Tineo), Gastroenterology; Internal Medicine  [address] [place] [address]  Phone: [phone]  Fax: [phone]    [facility],   [phone]  Phone: (   )    -  Fax: (   )    -

## 2018-12-20 NOTE — PROGRESS NOTE ADULT - SUBJECTIVE AND OBJECTIVE BOX
Patient is a 73y old  Male who presents with a chief complaint of Multifactorial shock (19 Dec 2018 11:03)      Interval Events: No episodes of Melena reported overnight     REVIEW OF SYSTEMS:  [ ] Positive  [x] All other systems negative  [ ] Unable to assess ROS because ________    Vital Signs Last 24 Hrs  T(C): 36.8 (12-20-18 @ 08:48), Max: 36.8 (12-20-18 @ 04:12)  T(F): 98.3 (12-20-18 @ 08:48), Max: 98.3 (12-20-18 @ 04:12)  HR: 75 (12-20-18 @ 08:48) (70 - 86)  BP: 131/70 (12-20-18 @ 08:48) (125/62 - 158/66)  RR: 10 (12-20-18 @ 08:48) (10 - 18)  SpO2: 100% (12-20-18 @ 08:48) (99% - 100%)    PHYSICAL EXAM:  HEENT:   [x]Tracheostomy: # 8 Cuffed Shiley   [x]Pupils equal  [ ]No oral lesions  [ ]Abnormal    SKIN  [ ]No Rash  [ ] Abnormal  [x] pressure: Sacral Unstagable Pressure Injury     CARDIAC  [x]Regular  [ ]Abnormal    PULMONARY  [x]Bilateral Coarse Breath Sounds  [ ]Normal Excursion  [ ]Abnormal    GI  [x]PEG      [x] +BS		              [x]Soft, nondistended, nontender	  [ ]Abnormal    MUSCULOSKELETAL                                   [x]Bedbound                 [ ]Abnormal    [ ]Ambulatory/OOB to chair                           EXTREMITIES                                         [ ]Normal  [x]Edema: + Pedal Edema bilaterally                        NEUROLOGIC  [ ] Normal, non focal  [x] Focal findings: + Quadriplegic / No Withdrawal to pain      PSYCHIATRIC  [x]Alert   [ ] Sedated	 [ ]Agitated    :  Guerra: [ ] Yes, if yes: Date of Placement:                   [x] No Requiring Straight Cath atc     LINES: Central Lines [x] Yes, if yes: Date of Placement: Rt Upper extremity placed 12/19                                      [ ] No    HOSPITAL MEDICATIONS:  MEDICATIONS  (STANDING):  amLODIPine   Tablet 10 milliGRAM(s) Oral daily  artificial  tears Solution 1 Drop(s) Both EYES every 6 hours  cefepime   IVPB 2000 milliGRAM(s) IV Intermittent every 8 hours  Dakins Solution - 1/4 Strength 1 Application(s) Topical two times a day  dextrose 5%. 1000 milliLiter(s) (50 mL/Hr) IV Continuous <Continuous>  dextrose 50% Injectable 12.5 Gram(s) IV Push once  dextrose 50% Injectable 25 Gram(s) IV Push once  dextrose 50% Injectable 25 Gram(s) IV Push once  insulin lispro (HumaLOG) corrective regimen sliding scale   SubCutaneous every 6 hours  insulin NPH human recombinant 20 Unit(s) SubCutaneous every 12 hours  metoprolol tartrate 100 milliGRAM(s) Oral two times a day  pantoprazole   Suspension 40 milliGRAM(s) Oral two times a day before meals  rivaroxaban 20 milliGRAM(s) Oral at bedtime    MEDICATIONS  (PRN):  acetaminophen    Suspension .. 650 milliGRAM(s) Oral every 6 hours PRN Moderate Pain (4 - 6)  dextrose 40% Gel 15 Gram(s) Oral once PRN Blood Glucose LESS THAN 70 milliGRAM(s)/deciLiter  glucagon  Injectable 1 milliGRAM(s) IntraMuscular once PRN Glucose <70 milliGRAM(s)/deciLiter      LABS:                        9.1    11.3  )-----------( 515      ( 20 Dec 2018 06:41 )             26.3     12-20    134<L>  |  100  |  14  ----------------------------<  195<H>  4.3   |  23  |  0.66    Ca    9.0      20 Dec 2018 06:41  Phos  3.2     12-20  Mg     1.8     12-20    TPro  6.5  /  Alb  2.1<L>  /  TBili  0.2  /  DBili  0.1  /  AST  39  /  ALT  137<H>  /  AlkPhos  158<H>  12-20    PT/INR - ( 19 Dec 2018 10:49 )   PT: 17.4 sec;   INR: 1.51 ratio         PTT - ( 19 Dec 2018 10:49 )  PTT:40.1 sec        CAPILLARY BLOOD GLUCOSE    MICROBIOLOGY:     RADIOLOGY:  [ ] Reviewed and interpreted by me    Mode: CPAP with PS  FiO2: 30  PEEP: 5  PS: 12  MAP: 8  PIP: 17 Patient is a 73y old  Male who presents with a chief complaint of Multifactorial shock (19 Dec 2018 11:03)      Interval Events: No episodes of Melena reported overnight     REVIEW OF SYSTEMS:  [ ] Positive  [x] All other systems negative  [ ] Unable to assess ROS because ________    Vital Signs Last 24 Hrs  T(C): 36.8 (12-20-18 @ 08:48), Max: 36.8 (12-20-18 @ 04:12)  T(F): 98.3 (12-20-18 @ 08:48), Max: 98.3 (12-20-18 @ 04:12)  HR: 75 (12-20-18 @ 08:48) (70 - 86)  BP: 131/70 (12-20-18 @ 08:48) (125/62 - 158/66)  RR: 10 (12-20-18 @ 08:48) (10 - 18)  SpO2: 100% (12-20-18 @ 08:48) (99% - 100%)    PHYSICAL EXAM:  HEENT:   [x]Tracheostomy: # 8 Cuffed Shiley   [x]Pupils equal  [ ]No oral lesions  [ ]Abnormal    SKIN  [ ]No Rash  [ ] Abnormal  [x] pressure: Sacral Unstagable Pressure Injury     CARDIAC  [x]Regular  [ ]Abnormal    PULMONARY  [x]Bilateral Coarse Breath Sounds  [ ]Normal Excursion  [ ]Abnormal    GI  [x]PEG      [x] +BS		              [x]Soft, nondistended, nontender	  [ ]Abnormal    MUSCULOSKELETAL                                   [x]Bedbound                 [ ]Abnormal    [ ]Ambulatory/OOB to chair                           EXTREMITIES                                         [ ]Normal  [x]Edema: + Pedal Edema bilaterally                        NEUROLOGIC  [ ] Normal, non focal  [x] Focal findings: + Quadriplegic / No Withdrawal to pain      PSYCHIATRIC  [x]Alert   [ ] Sedated	 [ ]Agitated    :  Guerra: [ ] Yes, if yes: Date of Placement:                   [x] No Requiring Straight Cath atc     LINES: Central Lines [x] Yes, if yes: Date of Placement: Rt Upper extremity midline placed 12/19                                      [ ] No    HOSPITAL MEDICATIONS:  MEDICATIONS  (STANDING):  amLODIPine   Tablet 10 milliGRAM(s) Oral daily  artificial  tears Solution 1 Drop(s) Both EYES every 6 hours  cefepime   IVPB 2000 milliGRAM(s) IV Intermittent every 8 hours  Dakins Solution - 1/4 Strength 1 Application(s) Topical two times a day  dextrose 5%. 1000 milliLiter(s) (50 mL/Hr) IV Continuous <Continuous>  dextrose 50% Injectable 12.5 Gram(s) IV Push once  dextrose 50% Injectable 25 Gram(s) IV Push once  dextrose 50% Injectable 25 Gram(s) IV Push once  insulin lispro (HumaLOG) corrective regimen sliding scale   SubCutaneous every 6 hours  insulin NPH human recombinant 20 Unit(s) SubCutaneous every 12 hours  metoprolol tartrate 100 milliGRAM(s) Oral two times a day  pantoprazole   Suspension 40 milliGRAM(s) Oral two times a day before meals  rivaroxaban 20 milliGRAM(s) Oral at bedtime    MEDICATIONS  (PRN):  acetaminophen    Suspension .. 650 milliGRAM(s) Oral every 6 hours PRN Moderate Pain (4 - 6)  dextrose 40% Gel 15 Gram(s) Oral once PRN Blood Glucose LESS THAN 70 milliGRAM(s)/deciLiter  glucagon  Injectable 1 milliGRAM(s) IntraMuscular once PRN Glucose <70 milliGRAM(s)/deciLiter      LABS:                        9.1    11.3  )-----------( 515      ( 20 Dec 2018 06:41 )             26.3     12-20    134<L>  |  100  |  14  ----------------------------<  195<H>  4.3   |  23  |  0.66    Ca    9.0      20 Dec 2018 06:41  Phos  3.2     12-20  Mg     1.8     12-20    TPro  6.5  /  Alb  2.1<L>  /  TBili  0.2  /  DBili  0.1  /  AST  39  /  ALT  137<H>  /  AlkPhos  158<H>  12-20    PT/INR - ( 19 Dec 2018 10:49 )   PT: 17.4 sec;   INR: 1.51 ratio         PTT - ( 19 Dec 2018 10:49 )  PTT:40.1 sec        CAPILLARY BLOOD GLUCOSE    MICROBIOLOGY:     RADIOLOGY:  [ ] Reviewed and interpreted by me    Mode: CPAP with PS  FiO2: 30  PEEP: 5  PS: 12  MAP: 8  PIP: 17

## 2018-12-20 NOTE — PROGRESS NOTE ADULT - PROBLEM SELECTOR PLAN 9
RN reports Unstageable Sacral Pressure wound   Wound care PA called family today to discuss bedside debridement  Wound Care team awaiting call back from family, message left RN reports Unstageable Sacral Pressure wound   Wound Care PA to perform bedside debridement of Eschar today prior to DC   Sacral Wound Care: Cleanse With Normal Saline pat Dry, apply Cavilon to yajaira wound, Wick/ Pack open area on inferior edge with Dakins Moistened gauze 2x per day, Cover with Dry Gauze and Tegaderm

## 2018-12-20 NOTE — PROGRESS NOTE ADULT - PROBLEM SELECTOR PLAN 2
Patient with Tracheostomy at baseline   Continue Mechanical Ventilation, weaning trials as tolerated   Continue Chest PT and Suctioning PRN   Continue CPAP as tolerated Patient with Tracheostomy at baseline   Continue Mechanical Ventilation, weaning trials as tolerated   Continue Chest PT and Suctioning PRN   Continue CPAP trials as tolerated

## 2018-12-20 NOTE — PROGRESS NOTE ADULT - PROBLEM SELECTOR PLAN 7
Patient with Hx of CVA resulting in " Locked in Syndrome"   ASA currently on held 2/2 GI Bleed  Atorvastatin currently on hold in setting of shock liver  Continue supportive care Patient with Hx of CVA resulting in " Locked in Syndrome"   ASA currently on held 2/2 GI Bleed  Atorvastatin currently on hold in setting of shock liver which is improving  Monitor LFTS outpatient and consider restarting statin once resolved   Continue supportive care

## 2018-12-20 NOTE — DISCHARGE NOTE ADULT - PATIENT PORTAL LINK FT
You can access the [facility], offered by , by registering with the following website: [email or web address]

## 2018-12-20 NOTE — DISCHARGE NOTE ADULT - ADDITIONAL INSTRUCTIONS
Patient to have repeat BMP as outpatient to monitor sodium, Sodium on dc 134 patients IV ABx  changed to NS IVF    Monitor LFTS patient with shock liver on admission now resolving   Monitor cbc as outpatient, patient with recent gi bleed on Xarelto

## 2018-12-20 NOTE — DISCHARGE NOTE ADULT - PLAN OF CARE
Continue Full Course of IV Cefepime to complete on 12/26 Blood Cxs 12/11: Proteus Mirabilis    Continue Cefepime until 12/26 as per ID recommendations   Patient will be dcd with IV Abx to be administered via Midline, Midline placed 12/19 Maintain o2 sat > 92% Patient with Tracheostomy at baseline   Continue Mechanical Ventilation, weaning trials as tolerated   Vent Settings: PRVC Tidal Volume 500 RR 16 PEEP 5  FIO2 30%   Continue Chest PT and Suctioning PRN Monitor hgb / Hct Patient with Melena / Coffee Ground Emesis on admission   EGD 12/12: No evidence of active bleeding    Continue Protonix Via PEG BID  Monitor CBC as outpatient Continue NPH 20 Units  Q12H   Continue Insulin sliding scale Q 6 HRS  , Monitor Blood Glucose levels. Patient with HX of PE , Xarelto resumed as melena has resolved   VA duplex 12/11: No evidence of LE DVT  VA Duplex Upper Extremities Bilateral: + RUE Superficial thrombophlebitis  affecting Cephalic and cubital vein, No evidence of  DVT B/L Patient with Hx of CVA resulting in " Locked in Syndrome"   ASA currently on held 2/2 Recent GI Bleed  Atorvastatin currently on hold in setting of shock liver which is improving  Monitor LFTS outpatient and consider restarting statin once resolved RN reports Unstageable Sacral Pressure wound   Wound Care PA Performed bedside debridement of eschar prior to dc today   Sacral Wound Care: Cleanse With Normal Saline pat Dry, apply Cavilon to yajaira wound, Wick/ Pack open area on inferior edge with Dakins Moistened gauze 2x per day, Cover with Dry Gauze and Tegaderm.

## 2018-12-20 NOTE — PROGRESS NOTE ADULT - ASSESSMENT
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     Sacral Unstageable Pressure Injuries- DAKINS   RLE wounds- CAVILON  BLE elevation  Abx per Medicine/ ID  Moisturize intact skin w/ SWEEN cream BID  con't Nutrition (as tolerated), Nutrition Consult  con't Offloading   con't Pericare  Care as per medicine, will follow w/ you  Upon discharge f/u as outpatient at [facility] [address] [place], [place] [phone]  Seen w/ attng and D/w team  Daniela Lam PA-C [id number]  we spent 30  minutes w/ this pt of which more than 50% of the time was spent counseling & coordinating care of this pt.

## 2018-12-20 NOTE — PROGRESS NOTE ADULT - PROBLEM SELECTOR PLAN 4
NPH reduced to 20U Q12H due to hypoglycemic episodes  Continue Insulin sliding scale , Monitor Blood Glucose levels.  PEG feed rate increased 12/17. Adjust insulin as needed Continue NPH 20 Units  Q12H   Continue Insulin sliding scale , Monitor Blood Glucose levels.

## 2018-12-20 NOTE — PROGRESS NOTE ADULT - SUBJECTIVE AND OBJECTIVE BOX
SUBJECTIVE: Pt seen, chart reviewed.    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 Presented to ED from [facility] with fevers x 4d, and 4 episodes of vomiting on day of admission. En route to hospital in ambulance, patient became hypotensive and dopamine was started.  Pt arrived unresponsive with dark vomitus around mouth. In ED, pt was found to have melanotic stool and black vomitus. BP was 94/37; Hb was 5.7 and WBC 20.8 with lactate of 13.5. Pt was started on norepi, transfused 2U PRBC, started on cefepime and vanc, and given 2.2 L LR bolus w/ improvement in BP. Patient was admitted to MICU for further management. Patient was found to have a + UA positive for bacteria and budding yeast. Pt was started on meropenem, and diflucan. Hgb continued to trend down in MICU , pt required 1U prbc transfusion again on 12/12, Patient was placed on PPI gtt. GI performed upper endoscopy on 12/2 no source of bleeding visualized. TFs restarted. Initial Urine culture contaminated, repeat urine culture no growth. Sputum culture grew CRE pseudomonas but no evidence of Infiltrate on CXR. Patient found to have Proteus Bacteremia and was treated with course of Cefepime based on Sensitivities. Patient had Midline placed on 12/19 to complete long term abx for bacteremia full course to complete on 12/26. Xarelto restarted after Melena resolved.     12/20: Patient had Midline placed yesterday, no reports of Melena overnight will proceed with discharge planning     No redness, f/c/s, odor, excess drainage, pain noted  tolerating dressings  here for reeval    ROS pt unable to offer    cefepime   IVPB 2000 milliGRAM(s) IV Intermittent every 8 hours  rivaroxaban 20 milliGRAM(s) Oral at bedtime      Physical Exam:  Vital Signs Last 24 Hrs  T(C): 36.8 (20 Dec 2018 14:55), Max: 36.8 (20 Dec 2018 04:12)  T(F): 98.2 (20 Dec 2018 14:55), Max: 98.3 (20 Dec 2018 04:12)  HR: 85 (20 Dec 2018 14:55) (75 - 86)  BP: 136/63 (20 Dec 2018 14:55) (125/62 - 147/65)  BP(mean): --  RR: 16 (20 Dec 2018 14:55) (10 - 18)  SpO2: 100% (20 Dec 2018 14:55) (99% - 100%)  General Appearance:    NAD / Alert,but non verbal/ frail  WD/ WN/ WG  [employer]    Musculoskeletal/Vascular: passive ROM  mild BLE edema equal  no DP/PT pulses palpable  BLE equally warm  no acute ischemia noted  no deformities/ contractures  Dorsal RLE 3.4cm x 4cm x0 resolving intact dried  blister w/o drainage  Lateral RLE w/ 4cm x 1cm x 0.1cm nearly healed w/o drainage  No odor, erythema, increased warmth, tenderness, induration, fluctuance      Skin:  moist w/ good turgor    Sacral unstageable pressure Injury    8cm x 9m x 2m  soft eschar lifting in several places w/ tunneling under & deep  Procedure Note  Using aseptic technique sacral wound was excisionally debrided using a scissor and forceps through necrotic/ nonviable dermis subcutaneous fatty tissue to the gluteal muscle.  Pt tolerated procedure well.  Hemostasis was maintained throughout.    Debulking debridement of necrotic tissue.  Post measurements  9cm x 10cm x 4cm.  bone palpable but not exposed    (+) serosanguinous drainage  No odor, erythema, increased warmth, tenderness, induration, fluctuance        LABS:                        9.1    11.3  )-----------( 515      ( 20 Dec 2018 06:41 )             26.3     PT/INR - ( 19 Dec 2018 10:49 )   PT: 17.4 sec;   INR: 1.51 ratio         PTT - ( 19 Dec 2018 10:49 )  PTT:40.1 sec    RADIOLOGY & ADDITIONAL STUDIES:    < from: VA Duplex Upper Ext Vein Scan, Bilat (12.18.18 @ 17:33) >  FINDINGS: There is edema within the superficial soft tissues of the right   upper extremity.    There is an IV cannula in the right median cubital vein.    The right internal jugular, subclavian, axillary and brachial veins are   patent and compressible where applicable.     The left internal jugular, subclavian, axillary, and brachial veins are   patent and compressible where applicable.     Doppler examination shows normal spontaneous and phasic flow.    There is thrombus affecting the right cephalic vein and median cubital   vein, superficial veins.    The right basilic and the left basilic and cephalic veins, other   superficial veins, arefree of clot.    IMPRESSION:     No evidence of BILATERAL upper extremity deep venous thrombosis.    Superficial thrombophlebitis affects the right cephalic vein and median   cubital vein.

## 2018-12-20 NOTE — PROGRESS NOTE ADULT - PROBLEM SELECTOR PLAN 5
Patient with HX of PE   Continue Xarelto H+H remains stable   VA duplex 12/11: No evidence of LE DVT  VA Duplex Bilateral: + RUE Superficial thrombophlebitis  affecting Cephalic and cubital vein, No evidence of  DVT b/l Patient with HX of PE , Xarelto resumed   VA duplex 12/11: No evidence of LE DVT  VA Duplex Upper Extremities Bilateral: + RUE Superficial thrombophlebitis  affecting Cephalic and cubital vein, No evidence of  DVT b/l

## 2018-12-20 NOTE — CHART NOTE - NSCHARTNOTEFT_GEN_A_CORE
Nutrition Follow Up Note  Patient seen for follow-up assessment. Per chart, 74 y/o male admitted for multifactorial shock (sepsis/hemorrhagic), GI bleed, STACEY in setting of sepsis, bacteremia and s/p EGD () with no evidence of active bleeding. 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        Source: RN, medical record. Pt trached.    Per RN, pt tolerating feeding well. Two episodes of melena overnight on , no BM since. CBC repeated and remained stable. No recent hypoglycemic events noted, however note finger sticks (): 205 (): 118-199.     Diet : NPO/EN    Enteral Nutrition:   Glucerna 1.2 @  85 mL/hr x 18hrs to provide 1530mL, 1232mL water, 1836kcal (26kcal/kg) and 92g PRO (1.3g/kg) per day based on dosing wt of 71.6kg  Jayro x2 daily      Daily Weight in k.4 (-)  71.6kg dosing wt (-)  note fluid shifts    Pertinent Medications: MEDICATIONS  (STANDING):  amLODIPine   Tablet 10 milliGRAM(s) Oral daily  artificial  tears Solution 1 Drop(s) Both EYES every 6 hours  cefepime   IVPB 2000 milliGRAM(s) IV Intermittent every 8 hours  Dakins Solution - 1/4 Strength 1 Application(s) Topical two times a day  dextrose 5%. 1000 milliLiter(s) (50 mL/Hr) IV Continuous <Continuous>  dextrose 50% Injectable 12.5 Gram(s) IV Push once  dextrose 50% Injectable 25 Gram(s) IV Push once  dextrose 50% Injectable 25 Gram(s) IV Push once  insulin lispro (HumaLOG) corrective regimen sliding scale   SubCutaneous every 6 hours  insulin NPH human recombinant 20 Unit(s) SubCutaneous every 12 hours  metoprolol tartrate 100 milliGRAM(s) Oral two times a day  pantoprazole   Suspension 40 milliGRAM(s) Oral two times a day before meals  rivaroxaban 20 milliGRAM(s) Oral at bedtime    MEDICATIONS  (PRN):  acetaminophen    Suspension .. 650 milliGRAM(s) Oral every 6 hours PRN Moderate Pain (4 - 6)  dextrose 40% Gel 15 Gram(s) Oral once PRN Blood Glucose LESS THAN 70 milliGRAM(s)/deciLiter  glucagon  Injectable 1 milliGRAM(s) IntraMuscular once PRN Glucose <70 milliGRAM(s)/deciLiter    Pertinent Labs:  @ 06:41: Na 134<L>, BUN 14, Cr 0.66, <H>, K+ 4.3, Phos 3.2, Mg 1.8, Alk Phos 158<H>, ALT/SGPT 137<H>, AST/SGOT 39   @ 10:49: Na 137, BUN 14, Cr 0.67, <H>, K+ 4.2, Phos 3.0, Mg 1.7, Alk Phos 151<H>, ALT/SGPT 180<H>, AST/SGOT 42<H>    Finger Sticks:  POCT Blood Glucose.: 205 mg/dL ( @ 06:17)  POCT Blood Glucose.: 199 mg/dL ( @ 23:36)  POCT Blood Glucose.: 196 mg/dL ( @ 17:28)  POCT Blood Glucose.: 170 mg/dL ( @ 12:03)      Skin per nursing documentation:   Sacral spine- unstageable  right heel- Stage II  left heel- Stage I    Edema: +3 generalized edema per flow sheets    Estimated Needs:   [x] no change since previous assessment  [ ] recalculated:     Previous Nutrition Diagnosis: increased nutrient needs  Nutrition Diagnosis is: ongoing- being addressed with EN and Jayro x2 daily    New Nutrition Diagnosis: none  Related to:   As evidenced by:     Recommend  1) Continue Glucerna 1.2 @ 85x18 to provide 1530mL, 1232mL water, 1836kcal (26kcal/kg) and 92g PRO (1.3g/kg) per day based on dosing weight 71.6kg.  2) Continue Jayro x2 daily    Monitoring and Evaluation:     Continue to monitor Nutritional intake, Tolerance to diet prescription, weights, labs, skin integrity    RD remains available upon request and will follow up per protocol Nutrition Follow Up Note  Patient seen for follow-up assessment. Per chart, 74 y/o male admitted for multifactorial shock (sepsis/hemorrhagic), GI bleed, STACEY in setting of sepsis, bacteremia and s/p EGD () with no evidence of active bleeding. 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        Source: RN, medical record. Pt trached.    Per RN, pt tolerating feeding well. Two episodes of melena overnight on , no BM since. CBC repeated and remained stable. No recent hypoglycemic events noted, however note finger sticks (): 205 (): 118-199.     Diet : NPO/EN    Enteral Nutrition:   Glucerna 1.2 @  85 mL/hr x 18hrs to provide 1530mL, 1232mL water, 1836kcal (26kcal/kg) and 92g PRO (1.3g/kg) per day based on dosing wt of 71.6kg  Jayro x2 daily      Daily Weight in k.4 (-)  71.6kg dosing wt (-)  note fluid shifts    Pertinent Medications: MEDICATIONS  (STANDING):  amLODIPine   Tablet 10 milliGRAM(s) Oral daily  artificial  tears Solution 1 Drop(s) Both EYES every 6 hours  cefepime   IVPB 2000 milliGRAM(s) IV Intermittent every 8 hours  Dakins Solution - 1/4 Strength 1 Application(s) Topical two times a day  dextrose 5%. 1000 milliLiter(s) (50 mL/Hr) IV Continuous <Continuous>  dextrose 50% Injectable 12.5 Gram(s) IV Push once  dextrose 50% Injectable 25 Gram(s) IV Push once  dextrose 50% Injectable 25 Gram(s) IV Push once  insulin lispro (HumaLOG) corrective regimen sliding scale   SubCutaneous every 6 hours  insulin NPH human recombinant 20 Unit(s) SubCutaneous every 12 hours  metoprolol tartrate 100 milliGRAM(s) Oral two times a day  pantoprazole   Suspension 40 milliGRAM(s) Oral two times a day before meals  rivaroxaban 20 milliGRAM(s) Oral at bedtime    MEDICATIONS  (PRN):  acetaminophen    Suspension .. 650 milliGRAM(s) Oral every 6 hours PRN Moderate Pain (4 - 6)  dextrose 40% Gel 15 Gram(s) Oral once PRN Blood Glucose LESS THAN 70 milliGRAM(s)/deciLiter  glucagon  Injectable 1 milliGRAM(s) IntraMuscular once PRN Glucose <70 milliGRAM(s)/deciLiter    Pertinent Labs:  @ 06:41: Na 134<L>, BUN 14, Cr 0.66, <H>, K+ 4.3, Phos 3.2, Mg 1.8, Alk Phos 158<H>, ALT/SGPT 137<H>, AST/SGOT 39   @ 10:49: Na 137, BUN 14, Cr 0.67, <H>, K+ 4.2, Phos 3.0, Mg 1.7, Alk Phos 151<H>, ALT/SGPT 180<H>, AST/SGOT 42<H>    Finger Sticks:  POCT Blood Glucose.: 205 mg/dL ( @ 06:17)  POCT Blood Glucose.: 199 mg/dL ( @ 23:36)  POCT Blood Glucose.: 196 mg/dL ( @ 17:28)  POCT Blood Glucose.: 170 mg/dL ( @ 12:03)      Skin per nursing documentation: followed by wound care for dressings.  Sacral spine- unstageable  right heel- Stage II  left heel- Stage I, improving  back of head- wound improved.  Edema: +3 generalized edema per flow sheets    Estimated Needs:   [x] no change since previous assessment  [ ] recalculated:     Previous Nutrition Diagnosis: increased nutrient needs  Nutrition Diagnosis is: ongoing- being addressed with EN and Jayro x2 daily    New Nutrition Diagnosis: none  Related to:   As evidenced by:     Recommend  1) Continue Glucerna 1.2 @ 85x18 to provide 1530mL, 1232mL water, 1836kcal (26kcal/kg) and 92g PRO (1.3g/kg) per day based on dosing weight 71.6kg.  2) Continue Jayro x2 daily    Monitoring and Evaluation:     Continue to monitor Nutritional intake, Tolerance to diet prescription, weights, labs, skin integrity    RD remains available upon request and will follow up per protocol

## 2018-12-20 NOTE — PROGRESS NOTE ADULT - PROBLEM SELECTOR PLAN 6
Patient with STACEY in setting of Sepsis   Resolved with IVF  Continue to monitor BMP Patient with STACEY in setting of Sepsis ,Resolved with IVF Patient with STACEY in setting of Sepsis ,Resolved with IVF  Patient with mild hyponatremia 134  Will change Cefepime IVF to NS   Monitor BMP as outpatient Patient with STACEY in setting of Sepsis ,Resolved with IVF  Patient with mild hyponatremia today , Sodium 134  Will change Cefepime IVF to NS   Monitor BMP as outpatient

## 2018-12-20 NOTE — DISCHARGE NOTE ADULT - MEDICATION SUMMARY - MEDICATIONS TO CHANGE
I will SWITCH the dose or number of times a day I take the medications listed below when I get home from the hospital:    HumuLIN N 100 units/mL subcutaneous suspension  -- 38 unit(s) subcutaneous 2 times a day

## 2018-12-20 NOTE — PROGRESS NOTE ADULT - ASSESSMENT
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  Leukocytosis, fever  Sacral wound examined--unstageable, non-purulent  BCX with proteus S Cefepime  Pseudomonas from sputum CRE--S Zosyn, Cefepime  Elevated LFTs resolved--? shock liver  Improving on therapy WBC improved  Stable on cefepime  Overall, Gram negative bacteremia, fever, leukocytosis, sepsis, positive culture finding  - Cefepime 2g q 8  - Anticipate will need IV abx through 12/26 to complete treatment for Proteus bacteremia (poor PO options)  - Monitor for further signs hypothermia/worsening infection  - Trend LFTs, WBC  - Will sign off. Please call with further questions or change in status.    Rodger Beasley MD  Pager [phone]  After 5pm and on weekends call [phone]

## 2018-12-20 NOTE — PROGRESS NOTE ADULT - PROBLEM SELECTOR PLAN 8
Hypotension in setting of shock on admission requiring pressors ( Resolved ) Metoprolol and Norvasc restarted 12/14 Hypotension in setting of shock on admission requiring pressors ( Resolved ) Metoprolol and Norvasc restarted 12/14  Continue to monitor BP and Heart rate

## 2018-12-20 NOTE — PROGRESS NOTE ADULT - PROVIDER SPECIALTY LIST ADULT
Gastroenterology
Gastroenterology
Infectious Disease
MICU
Pulmonology
Wound Care
Pulmonology

## 2018-12-20 NOTE — PROGRESS NOTE ADULT - PROBLEM SELECTOR PLAN 3
Patient with Melena / Coffee Ground Emesis on admission   EGD 12/12: No evidence of active bleeding   If H+H continues to downtrend GI will consider Colonoscopy   Protonix IVP changed to Via PEG as per  Patient with Melena / Coffee Ground Emesis on admission   EGD 12/12: No evidence of active bleeding    Continue Protonix Via PEG BID

## 2018-12-20 NOTE — PROGRESS NOTE ADULT - PROBLEM SELECTOR PLAN 1
Patient admitted with Multi- Factorial Shock ( Sepsis/ Hemorrhagic)   Sputum cx 12/11: Pseudomonas / NL christopher  Blood Cxs 12/11: Proteus Mirabilis    Continue Cefepime until 12/26 as per ID recommendations (sensitive to Augmentin but has low bioavailability for bacteremia per ID).  Patient scheduled for Midline today Patient admitted with Multi- Factorial Shock ( Sepsis/ Hemorrhagic)   Sputum cx 12/11: Pseudomonas / NL christopher  Blood Cxs 12/11: Proteus Mirabilis    Continue Cefepime until 12/26 as per ID recommendations   Patient will be dcd with IV Abx to be administered via Midline

## 2018-12-20 NOTE — PROGRESS NOTE ADULT - ATTENDING COMMENTS
73M PMH prior bilateral medullary strokes with locked in syndrome presents with Klebseilla bacteremia and septic shock with shock liver, ATN, and lactic acidosis, initially requiring ICU and vasopressors. Now with resolved shock and multiorgan failure    - Neuro status stable, locked in, quadriplegic  - HD stable, continue amiodarone and metoprolol  - Continue lung protective ventilation, patient is vent-dependent given medullary strokes  - Restart rivaroxaban for h/o PE. Check UE dopplers r/o DVT  - Continue cefepime for klebsiella bacteremia, repeat cultures negative. Continue abx until 12/26. Needs PICC  - D/c metronidazone  - Resolving shock liver  - Tolerating PEG feeds  - ATN resolved, stable kidney function and lytes  - DM2, continue NPH 20 bid + insulin coverage scale  - Eventual discharge back to [facility] facility after PICC placement
Patient seen and examined. Patient with locked in syndrome after medullary strokes presenting with GN sepsis and shock intiially requiring ICU and vasopressors. Now transferred to RCU for further management and care.    1. Septic Shock - with resolution of shock state.  - Due to GN bacteremia - continue antibiotics as per ID. Most recent cultures have cleared  - Will f/u ID to determine duration of antibiotics  2. Chronic Respiratory Failure  - Continue mechanical ventilation and wean as tolerated  - Maintain O2 sats > 90  - Patient continues to require vent support  3. Oropharyngeal dysphagia  - PEG feeds  4. Locked In Syndrome  - prior medullary strokes  5. Dispo  - Eventual discharge back to vent facility  - Patient with known sacral wounds
73M PMH prior bilateral medullary strokes with locked in syndrome presents with Klebseilla bacteremia and septic shock with shock liver, ATN, and lactic acidosis, initially requiring ICU and vasopressors. Now with resolved shock and multiorgan failure.    - Neuro status stable, locked in, quadriplegic  - HD stable, continue amiodarone and metoprolol  - Episodes of melena overnight, but with stable H/H and hemodynamically stable. Restart rivaroxaban tonight, it was held yesterday. If unable to tolerate, then will recommend serial dopplers and if he develops a DVT, would require IVC filter  - Continue lung protective ventilation, patient is vent-dependent given medullary strokes  - Continue cefepime for klebsiella bacteremia, repeat cultures negative. Continue abx until 12/26. Needs midline  - Resolving shock liver  - Tolerating PEG feeds  - ATN resolved, stable kidney function and lytes  - DM2, continue NPH 20 bid + insulin coverage scale  - Eventual discharge back to vent facility after midline placement if no further bleeding on rivaroxaban
No overt bleeding  Hgb stable  EGD this week normal  Reconsult for any signs of recurrent bleeding
Please refer to separate attending ID note for final plan
GI bleeding, anemia  EGD today
1. Acute on chronic hypoxemic respiratory failure in setting of septic shock from UTI. Pt also with acute anemia. EGD today no Upper GI source. Pt transfused 1 PRBC last night. Continue current AC vent settings.  2. UTI: multiple organism sand yeast. Continue Diflucan.  Continue Meropenem for today. Consider changing to Ceftriaxone tomorrow. Off pressors.  3. Neuro: S/P bilateral medullary strokes with locked in syndrome.  4.Gi. No upper source of GI bleeding identified on EGD. If H/H continue to fall may need colonoscopy.  5. Acute renal failure from ATN improving.    cc time 35 min
Patient seen and examined. Patient with locked in syndrome after medullary strokes presenting with GN sepsis and shock intiially requiring ICU and vasopressors. Now transferred to RCU for further management and care.    1. Septic Shock - with resolution of shock state.  - Due to GN bacteremia await speciation - continue antibiotics as per ID. Most recent cultures have cleared  - No longer requiring vasopressor support  2. Chronic Respiratory Failure  - Continue mechanical ventilation and wean as tolerated  - Maintain O2 sats > 90  - Patient continues to require vent support  3. Oropharyngeal dysphagia  - PEG feeds  4. Locked In Syndrome  - prior medullary strokes  5. Dispo  - Eventual discharge back to vent facility  - Patient with known sacral wounds
I have seen and examined the patient. I agree with the above history, physical exam, and plan of care except for as detailed below.    Locked in syndrome, quadraplegic, admitted for hypotension (septic shock secondary to UTI, proteus bacteremia) acute on chronic respiratory failure. Tracheostomy dependemce. STACEY secondary to sepsis now resolved. GI bleed. Hypoglycemic today.    - Decrease NPH, D50, D10 gtt  - Monitor FSG  - Abx as per ID  - Wean from vent as tolerated  - Trend Cr avoid nephrotoxins  - Hold AC
I have seen and examined the patient. I agree with the above history, physical exam, and plan of care except for as detailed below.    Locked in syndrome, quadraplegic, admitted for hypotension (septic shock secondary to UTI, proteus bacteremia) acute on chronic respiratory failure. Tracheostomy dependemce. STACEY secondary to sepsis now resolved. GI bleed.    - Abx as per ID  - Wean from vent as tolerated  - Trend Cr avoid nephrotoxins  - Hold AC
Patient with 'locked in syndrome', admitted with gram negative sepsis. Blood cultures are positive, but no organism identified on results, will ask ID for advice, although clinically improving on current regimen.
73M PMH prior bilateral medullary strokes with locked in syndrome presents with Klebseilla bacteremia and septic shock with shock liver, ATN, and lactic acidosis, initially requiring ICU and vasopressors. Now with resolved shock and multiorgan failure.    - Neuro status stable, locked in, quadriplegic  - HD stable, continue amiodarone and metoprolol  - No further melena, stable H/H, HD stable. Continue rivaroxaban. Hold aspirin. If bleeds again, then will need to hold a/c and have serial dopplers  - Continue lung protective ventilation, patient is vent-dependent given medullary strokes  - Continue cefepime for klebsiella bacteremia, repeat cultures negative. Continue abx until 12/26. S/p midline  - Resolved shock liver  - Tolerating PEG feeds  - ATN resolved, stable kidney function and lytes  - DM2, continue NPH 20 bid + insulin coverage scale  - Stable for d/c to vent facility

## 2018-12-20 NOTE — DISCHARGE NOTE ADULT - MEDICATION SUMMARY - MEDICATIONS TO TAKE
I will START or STAY ON the medications listed below when I get home from the hospital:    Insulin Sliding Scale / Humalog   -- Insulin Sliding Scale /  Humalog   to be given q 6 hrs   2 Unit(s) if Glucose 151 - 200  4 Unit(s) if Glucose 201 - 250  6 Unit(s) if Glucose 251 - 300  8 Unit(s) if Glucose 301 - 350  10 Unit(s) if Glucose 351 - 400  12 Unit(s) if Glucose Greater Than 400    -- Indication: For Diabetes     acetaminophen 160 mg/5 mL oral suspension  -- 20.31 milliliter(s) by gastrostomy tube every 6 hours, As Needed - 6)  -- Indication: For Pain     rivaroxaban 20 mg oral tablet  -- 1 tab(s) by gastrostomy tube once a day (at bedtime)  -- Indication: For Pulmonary emboli    insulin isophane (NPH) 100 units/mL human recombinant subcutaneous suspension  -- 20 unit(s) subcutaneous every 12 hours  -- Indication: For Diabetes     sodium hypochlorite 0.125% topical solution  -- 1 application on skin 2 times a day  Irrigate w/ NS,Pat dry  CAVILON to periwound skin  Wick/Pack open area on inferior edge  w/DAKINs moistened gauze  Cover w/ gauze & tegaderm  -- Indication: For Sacral Wound     metoprolol tartrate 100 mg oral tablet  -- 1 tab(s) by gastrostomy tube 2 times a day  -- Indication: For HTN     amLODIPine 10 mg oral tablet  -- 1 tab(s) by gastrostomy tube once a day  -- Indication: For HTN     cefepime 2 g intravenous injection  -- 2 gram(s) intravenous every 8 hours  to be completed on 12/26   -- Indication: For Bacteremia     docusate sodium 50 mg/15 mL oral syrup  -- 15 milliliter(s) by gastrostomy tube once a day  -- Indication: For Constipation     ocular lubricant ophthalmic solution  -- 1 drop(s) to each affected eye every 6 hours  -- Indication: For Dry Eye     pantoprazole 40 mg oral granule, delayed release  -- 40 milligram(s) by gastrostomy tube 2 times a day  -- Indication: For Pud Prophylaxsis I will START or STAY ON the medications listed below when I get home from the hospital:    Insulin Sliding Scale / Humalog   -- Insulin Sliding Scale /  Humalog   to be given q 6 hrs   2 Unit(s) if Glucose 151 - 200  4 Unit(s) if Glucose 201 - 250  6 Unit(s) if Glucose 251 - 300  8 Unit(s) if Glucose 301 - 350  10 Unit(s) if Glucose 351 - 400  12 Unit(s) if Glucose Greater Than 400    -- Indication: For Diabetes    acetaminophen 160 mg/5 mL oral suspension  -- 20.31 milliliter(s) by gastrostomy tube every 6 hours, As Needed - 6)  -- Indication: For Pain     rivaroxaban 20 mg oral tablet  -- 1 tab(s) by gastrostomy tube once a day (at bedtime)  -- Indication: For Pulmonary emboli    insulin isophane (NPH) 100 units/mL human recombinant subcutaneous suspension  -- 20 unit(s) subcutaneous every 12 hours  -- Indication: For Diabetes     sodium hypochlorite 0.125% topical solution  -- 1 application on skin 2 times a day  Irrigate w/ NS,Pat dry  CAVILON to periwound skin  Wick/Pack open area on inferior edge  w/DAKINs moistened gauze  Cover w/ gauze & tegaderm  -- Indication: For Sacral wound     metoprolol tartrate 100 mg oral tablet  -- 1 tab(s) by gastrostomy tube 2 times a day  -- Indication: For HTN     amLODIPine 10 mg oral tablet  -- 1 tab(s) by gastrostomy tube once a day  -- Indication: For HTN     cefepime 2 g intravenous injection  -- 2 gram(s) intravenous every 8 hours  to be completed on 12/26   -- Indication: For Bacteremia     docusate sodium 50 mg/15 mL oral syrup  -- 15 milliliter(s) by gastrostomy tube once a day  -- Indication: For Constipation     ocular lubricant ophthalmic solution  -- 1 drop(s) to each affected eye every 6 hours  -- Indication: For Dry Eye     pantoprazole 40 mg oral granule, delayed release  -- 40 milligram(s) by gastrostomy tube 2 times a day  -- Indication: For Pud Prophylaxsis

## 2018-12-20 NOTE — PROGRESS NOTE ADULT - SUBJECTIVE AND OBJECTIVE BOX
CC: F/U Bacteremia    Saw/spoke to patient. No fevers, no chills. No new complaints. Overall well.    Allergies  penicillin (Unknown)    ANTIMICROBIALS:  cefepime   IVPB 2000 every 8 hours    PE:    Vital Signs Last 24 Hrs  T(C): 36.8 (20 Dec 2018 08:48), Max: 36.8 (20 Dec 2018 04:12)  T(F): 98.3 (20 Dec 2018 08:48), Max: 98.3 (20 Dec 2018 04:12)  HR: 79 (20 Dec 2018 11:13) (70 - 86)  BP: 131/70 (20 Dec 2018 08:48) (125/62 - 147/65)  RR: 10 (20 Dec 2018 08:48) (10 - 18)  SpO2: 100% (20 Dec 2018 11:13) (99% - 100%)    Gen: AOx0, NAD, chronically ill appearing  CV: S1+S2 normal, nontachycardic  Resp: Trach, no crackles, no wheeze  Abd: Soft, nontender, +BS, PEG  Ext: No LE edema, no wounds    LABS:                        9.1    11.3  )-----------( 515      ( 20 Dec 2018 06:41 )             26.3     12-20    134<L>  |  100  |  14  ----------------------------<  195<H>  4.3   |  23  |  0.66    Ca    9.0      20 Dec 2018 06:41  Phos  3.2     12-20  Mg     1.8     12-20    TPro  6.5  /  Alb  2.1<L>  /  TBili  0.2  /  DBili  0.1  /  AST  39  /  ALT  137<H>  /  AlkPhos  158<H>  12-20    MICROBIOLOGY:    .Urine Catheterized  12-13-18   No growth     .Blood Blood-Peripheral  12-13-18   No growth at 5 days.    .Sputum Sputum/TRAP  12-11-18   Numerous Pseudomonas aeruginosa (Carbapenem Resistant)  Normal Respiratory Beatrice present  --  Pseudomonas aeruginosa (Carbapenem Resistant)    .Blood Blood-Peripheral  12-11-18   Growth in aerobic and anaerobic bottles: Proteus mirabilis    (otherwise reviewed)    RADIOLOGY:    12/18 CXR:    IMPRESSION:     No evidence of BILATERAL upper extremity deep venous thrombosis.    Superficial thrombophlebitis affects the right cephalic vein and median   cubital vein.

## 2018-12-20 NOTE — DISCHARGE NOTE ADULT - HOSPITAL COURSE
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 Presented to ED from [facility] with fevers x 4d, and 4 episodes of vomiting on day of admission. En route to hospital in ambulance, patient became hypotensive and dopamine was started.  Pt arrived unresponsive with dark vomitus around mouth. In ED, pt was found to have melanotic stool and black vomitus. BP was 94/37; Hb was 5.7 and WBC 20.8 with lactate of 13.5. Pt was started on norepi, transfused 2U PRBC, started on cefepime and vanc, and given 2.2 L LR bolus w/ improvement in BP. Patient was admitted to MICU for further management. Patient was found to have a + UA positive for bacteria and budding yeast. Pt was started on meropenem, and diflucan. Hgb continued to trend down in MICU , pt required 1U prbc transfusion again on 12/12, Patient was placed on PPI gtt. GI performed upper endoscopy on 12/2 no source of bleeding visualized. TFs restarted. Initial Urine culture contaminated, repeat urine culture no growth. Sputum culture grew CRE pseudomonas but no evidence of Infiltrate on CXR. Patient found to have Proteus Bacteremia and was treated with course of Cefepime based on Sensitivities. Patient had Midline placed on 12/19 to complete long term abx for bacteremia full course to complete on 12/26. Xarelto restarted after Melena resolved.     Assessment and plan on discharge:        1: Sepsis / Bacteremia:   Patient admitted with Multi- Factorial Shock ( Sepsis/ Hemorrhagic)   Sputum cx 12/11: Pseudomonas / NL christopher  Blood Cxs 12/11: Proteus Mirabilis    Continue Cefepime until 12/26 as per ID recommendations   Patient will be dcd with IV Abx to be administered via Midline, remove midline once abx complete   2: Respiratory failure:   Patient with Tracheostomy at baseline   Continue Mechanical Ventilation, weaning trials as tolerated   Continue Chest PT and Suctioning PRN     3: GI bleed:   Patient with Melena / Coffee Ground Emesis on admission   EGD 12/12: No evidence of active bleeding    Continue Protonix Via PEG BID      4: DM (diabetes mellitus):  Continue NPH 20 Units  Q12H   Continue Insulin sliding scale q 6 hrs  , Monitor Blood Glucose levels    5: Hx of Pulmonary emboli:   Patient with HX of PE , Xarelto resumed   VA duplex 12/11: No evidence of LE DVT  VA Duplex Upper Extremities Bilateral: + RUE Superficial thrombophlebitis  affecting Cephalic and cubital vein, No evidence of  DVT b/l.     6:  STACEY:   Patient with STACEY in setting of Sepsis on admission ,Resolved with IVF  Patient with mild hyponatremia today , Sodium 134  Will change Cefepime IVF to NS   Monitor BMP as outpatient.    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     9: Wound:   Patient S/p Bedside debridement on 12/20   Sacral Wound Care: Cleanse With Normal Saline pat Dry, apply Cavilon to yajaira wound, Wick/ Pack open area on inferior edge with Dakins Moistened gauze 2x per day, Cover with Dry Gauze and Tegaderm    11: Urinary Retention:   Patient with Guerra on admission   TOV Attempted but patient requiring straight catheterization atc while inpatient   Will Place Guerra upon dc, attempt voiding trial if deemed medically appropriate     12: Prophylactic measure:   Patient on Xarelto   Continue Protonix BID    Patient stable for dc planning today

## 2018-12-20 NOTE — DISCHARGE NOTE ADULT - MEDICATION SUMMARY - MEDICATIONS TO STOP TAKING
I will STOP taking the medications listed below when I get home from the hospital:    raNITIdine 300 mg oral tablet  -- 1 tab(s) by mouth once a day (at bedtime)    Aspirin Low Dose 81 mg oral delayed release tablet  -- 1 tab(s) by mouth once a day    atorvastatin 10 mg oral tablet  -- 1 tab(s) by mouth once a day

## 2018-12-20 NOTE — PROGRESS NOTE ADULT - ASSESSMENT
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 Presented to ED from [facility] with fevers x 4d, and 4 episodes of vomiting on day of admission. En route to hospital in ambulance, patient became hypotensive and dopamine was started.  Pt arrived unresponsive with dark vomitus around mouth. In ED, pt was found to have melanotic stool and black vomitus. BP was 94/37; Hb was 5.7 and WBC 20.8 with lactate of 13.5. Pt was started on norepi, transfused 2U PRBC, started on cefepime and vanc, and given 2.2 L LR bolus w/ improvement in BP. Patient was admitted to MICU for further management. Patient was found to have a + UA positive for bacteria and budding yeast. Pt was started on meropenem, and diflucan. Hgb continued to trend down in MICU , pt required 1U prbc transfusion again on 12/12, Patient was placed on PPI gtt. GI performed upper endoscopy on 12/2 no source of bleeding visualized. TFs restarted. Initial Urine culture contaminated, repeat urine culture no growth. Sputum culture grew CRE pseudomonas but no evidence of Infiltrate on CXR. Patient found to have Proteus Bacteremia and was treated with course of Cefepime based on Sensitivities. Patient had Midline placed on 12/19  to complete long term abx to complete on 12/26. Patient    12/18: Plan is to continue Cefepime until 12/26. Midline Catheter requested. Flagyl DC'd. Duplex ordered for RUE swelling. Xarelto resumed for history of PE  12/19: Patient with reported episode of Melena overnight x 2 but with stable cbc today case d/w Dr. Mathews will resume Xarelto tonight. 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 Presented to ED from [facility] with fevers x 4d, and 4 episodes of vomiting on day of admission. En route to hospital in ambulance, patient became hypotensive and dopamine was started.  Pt arrived unresponsive with dark vomitus around mouth. In ED, pt was found to have melanotic stool and black vomitus. BP was 94/37; Hb was 5.7 and WBC 20.8 with lactate of 13.5. Pt was started on norepi, transfused 2U PRBC, started on cefepime and vanc, and given 2.2 L LR bolus w/ improvement in BP. Patient was admitted to MICU for further management. Patient was found to have a + UA positive for bacteria and budding yeast. Pt was started on meropenem, and diflucan. Hgb continued to trend down in MICU , pt required 1U prbc transfusion again on 12/12, Patient was placed on PPI gtt. GI performed upper endoscopy on 12/2 no source of bleeding visualized. TFs restarted. Initial Urine culture contaminated, repeat urine culture no growth. Sputum culture grew CRE pseudomonas but no evidence of Infiltrate on CXR. Patient found to have Proteus Bacteremia and was treated with course of Cefepime based on Sensitivities. Patient had Midline placed on 12/19 to complete long term abx for bacteremia full course to complete on 12/26. Xarelto restarted after Melena resolved.     12/20: Patient had Midline placed yesterday, no reports of Melena overnight will proceed with discharge planning

## 2018-12-20 NOTE — PROGRESS NOTE ADULT - REASON FOR ADMISSION
Multifactorial shock

## 2018-12-20 NOTE — DISCHARGE NOTE ADULT - PROVIDER TOKENS
TOKEN:'[id number]' TOKEN:'[id number]',FREE:[LAST:[[facility]],PHONE:[(   )    -],FAX:[(   )    -],ADDRESS:[[phone]]]

## 2018-12-27 NOTE — ED ADULT NURSE NOTE - PAIN RATING/NUMBER SCALE (0-10): REST
"Date:   Clinician: Jovanni Mcdonnell  Clinician NPI: [id number]  Patient: Leslie Wilkins  Patient : 1983  Patient Address: [address] [place] [address]  Patient Phone: [phone]  Visit Protocol: UTI  Patient Summary:  Leslie is a 35 year old ( : 1983 ) female who initiated a Visit for a presumed bladder infection. When asked the question \"Please sign me up to receive news, health information and promotions from [employer].\", Leslie responded \"No\".   Her symptoms started 1-3 days ago and consist of urinary frequency, feeling as if the bladder is never empty, urgency, foul-smelling urine, and dysuria.   Symptom details   Urine color: The color of her urine is orange.    Denied symptoms include urinary incontinence, vaginal discharge, abdominal pain, vomiting, flank pain, chills, vaginal itching, and nausea. She does not feel feverish.   Leslie has not used any over-the-counter medications or home remedies to relieve her current symptoms.  Precipitating events  Leslie denies having a sexually transmitted disease.  Pertinent medical history  Leslie has had a bladder infection before and has had 2 in the past 12 months. Her most recent bladder infection was not within the last 4 weeks. Her current symptoms are similar to her previous bladder infection symptoms.   She is not sure what antibiotics have been effective in treating her past bladder infections.   Leslie does not get yeast infections when she takes antibiotics and has not been prescribed antibiotics to prevent frequent or repeated bladder infections in the past. She has not experienced problems or side effects with any of the common antibiotics used to treat bladder infections.   Leslie does not have a history of kidney stones. She has not used a catheter or been a patient in a hospital or nursing home in the past 2 weeks.   Leslie does not smoke or use smokeless tobacco.   She denies pregnancy and denies breastfeeding. She has " menstruated in the past month.   Additional information as reported by the patient (free text): A few years ago I was prescribed a medication to help prevent future bladder infections.  I would take a pill after intercourse.  I am once again sexually active, and I have the symptoms of a bladder infection.  My doctor is out for the week... so, here I am.   MEDICATIONS: Junel FE 1.5/30 (28) oral, Ventolin HFA inhalation, Advair Diskus inhalation, sertraline oral, ALLERGIES: Percocet, Depo-Provera  Clinician Response:  Dear Leslie,  Based on the information you have provided, you likely have an acute urinary tract infection, also called a bladder infection. Bladder infections occur when bacteria from the outside of the body enters the urinary tract. Any part of the urinary system can be infected, but the bladder is the most common.  Medication information  I am prescribing:     Nitrofurantoin monohyd/m-cryst (Macrobid) 100 mg oral capsule. Take 1 capsule by mouth every 12 hours for 5 days. Take this medication with food. There are no refills with this prescription.   The medication I prescribed for your bladder infection is an antibiotic. Continue taking the medication until it is gone even if you feel better.   Yeast infections can be a common side effect of antibiotics. The most common symptom of a yeast infection is itchiness in and around the vagina. Other signs and symptoms include burning, redness, or a thick, white vaginal discharge that looks like cottage cheese and does not have a bad smell.  Self care  Urination helps to flush bacteria from the urinary tract. For this reason, drinking water and urinating often helps relieve some symptoms of a bladder infection and can decrease your risk of getting bladder infections in the future.  Other steps you can take to prevent future bladder infections include:     Wipe front to back after using the bathroom    Urinate after sexual intercourse    Avoid using deodorant  sprays, douches, or powders in the vaginal area     When to seek care  Please make an appointment to be seen in a clinic or urgent care if any of the following occur:     You develop new symptoms or your symptoms become worse    You have medication side effects that make it difficult to take them as prescribed    Your symptoms do not improve within 1-2 days of starting treatment    You have symptoms of a bladder infection that return shortly after completing treatment     It is possible to have an allergic reaction to an antibiotic even if you have not had one in the past. If you notice a new rash, significant swelling, or difficulty breathing, stop taking this medication immediately and go to a clinic or urgent care.   Diagnosis: Acute uncomplicated bladder infection  Diagnosis ICD: N39.0  Prescription: nitrofurantoin monohyd/m-cryst (Macrobid) 100 mg oral capsule 10 capsule, 5 days supply. Take 1 capsule by mouth every 12 hours for 5 days. Refills: 0, Refill as needed: no, Allow substitutions: yes  Pharmacy: [id number] - [phone] - [address] [place] [address]   0

## 2019-05-28 ENCOUNTER — INPATIENT (INPATIENT)
Facility: HOSPITAL | Age: 74
LOS: 19 days | Discharge: NURSING, REHAB OR LONG-TERM CARE | End: 2019-06-17
Attending: INTERNAL MEDICINE | Admitting: INTERNAL MEDICINE
Payer: COMMERCIAL

## 2019-05-28 VITALS — OXYGEN SATURATION: 100 % | RESPIRATION RATE: 16 BRPM | TEMPERATURE: 99 F | WEIGHT: 162.26 LBS

## 2019-05-28 LAB
ALBUMIN SERPL ELPH-MCNC: 1.7 G/DL — LOW (ref 3.3–5)
ALP SERPL-CCNC: 309 U/L — HIGH (ref 40–120)
ALT FLD-CCNC: 90 U/L — HIGH (ref 12–78)
ANION GAP SERPL CALC-SCNC: 4 MMOL/L — LOW (ref 5–17)
APPEARANCE UR: ABNORMAL
APTT BLD: 38.8 SEC — HIGH (ref 28.5–37)
AST SERPL-CCNC: 67 U/L — HIGH (ref 15–37)
BACTERIA # UR AUTO: ABNORMAL
BASE EXCESS BLDA CALC-SCNC: 3.9 MMOL/L — HIGH (ref -2–2)
BILIRUB SERPL-MCNC: 0.4 MG/DL — SIGNIFICANT CHANGE UP (ref 0.2–1.2)
BILIRUB UR-MCNC: NEGATIVE — SIGNIFICANT CHANGE UP
BLOOD GAS COMMENTS: SIGNIFICANT CHANGE UP
BLOOD GAS COMMENTS: SIGNIFICANT CHANGE UP
BLOOD GAS SOURCE: SIGNIFICANT CHANGE UP
BUN SERPL-MCNC: 124 MG/DL — HIGH (ref 7–23)
CALCIUM SERPL-MCNC: 13.4 MG/DL — CRITICAL HIGH (ref 8.5–10.1)
CHLORIDE SERPL-SCNC: 128 MMOL/L — HIGH (ref 96–108)
CK MB BLD-MCNC: 2 % — SIGNIFICANT CHANGE UP (ref 0–3.5)
CK MB CFR SERPL CALC: 2.7 NG/ML — SIGNIFICANT CHANGE UP (ref 0.5–3.6)
CK SERPL-CCNC: 135 U/L — SIGNIFICANT CHANGE UP (ref 26–308)
CO2 SERPL-SCNC: 30 MMOL/L — SIGNIFICANT CHANGE UP (ref 22–31)
COLOR SPEC: YELLOW — SIGNIFICANT CHANGE UP
CREAT SERPL-MCNC: 1.69 MG/DL — HIGH (ref 0.5–1.3)
DIFF PNL FLD: ABNORMAL
EPI CELLS # UR: SIGNIFICANT CHANGE UP
GLUCOSE BLDC GLUCOMTR-MCNC: 231 MG/DL — HIGH (ref 70–99)
GLUCOSE SERPL-MCNC: 230 MG/DL — HIGH (ref 70–99)
GLUCOSE UR QL: NEGATIVE MG/DL — SIGNIFICANT CHANGE UP
HCO3 BLDA-SCNC: 28 MMOL/L — SIGNIFICANT CHANGE UP (ref 21–29)
HCT VFR BLD CALC: 43.3 % — SIGNIFICANT CHANGE UP (ref 39–50)
HGB BLD-MCNC: 12.3 G/DL — LOW (ref 13–17)
HOROWITZ INDEX BLDA+IHG-RTO: 30 — SIGNIFICANT CHANGE UP
INR BLD: 1.29 RATIO — HIGH (ref 0.88–1.16)
KETONES UR-MCNC: NEGATIVE — SIGNIFICANT CHANGE UP
LACTATE SERPL-SCNC: 3.3 MMOL/L — HIGH (ref 0.7–2)
LEUKOCYTE ESTERASE UR-ACNC: ABNORMAL
LIDOCAIN IGE QN: 74 U/L — SIGNIFICANT CHANGE UP (ref 73–393)
MCHC RBC-ENTMCNC: 28.4 GM/DL — LOW (ref 32–36)
MCHC RBC-ENTMCNC: 29.4 PG — SIGNIFICANT CHANGE UP (ref 27–34)
MCV RBC AUTO: 103.3 FL — HIGH (ref 80–100)
NITRITE UR-MCNC: NEGATIVE — SIGNIFICANT CHANGE UP
PCO2 BLDA: 42 MMHG — SIGNIFICANT CHANGE UP (ref 32–46)
PH BLD: 7.44 — SIGNIFICANT CHANGE UP (ref 7.35–7.45)
PH UR: 5 — SIGNIFICANT CHANGE UP (ref 5–8)
PLATELET # BLD AUTO: 184 K/UL — SIGNIFICANT CHANGE UP (ref 150–400)
PO2 BLDA: 126 MMHG — HIGH (ref 74–108)
POTASSIUM SERPL-MCNC: 4.3 MMOL/L — SIGNIFICANT CHANGE UP (ref 3.5–5.3)
POTASSIUM SERPL-SCNC: 4.3 MMOL/L — SIGNIFICANT CHANGE UP (ref 3.5–5.3)
PROT SERPL-MCNC: 8.2 GM/DL — SIGNIFICANT CHANGE UP (ref 6–8.3)
PROT UR-MCNC: 100 MG/DL
PROTHROM AB SERPL-ACNC: 14.6 SEC — HIGH (ref 10–12.9)
RBC # BLD: 4.19 M/UL — LOW (ref 4.2–5.8)
RBC # FLD: 15.8 % — HIGH (ref 10.3–14.5)
RBC CASTS # UR COMP ASSIST: ABNORMAL /HPF (ref 0–4)
SAO2 % BLDA: 99 % — HIGH (ref 92–96)
SODIUM SERPL-SCNC: 162 MMOL/L — CRITICAL HIGH (ref 135–145)
SP GR SPEC: 1.01 — SIGNIFICANT CHANGE UP (ref 1.01–1.02)
TROPONIN I SERPL-MCNC: 0.06 NG/ML — HIGH (ref 0.01–0.04)
UROBILINOGEN FLD QL: NEGATIVE MG/DL — SIGNIFICANT CHANGE UP
WBC # BLD: 16.02 K/UL — HIGH (ref 3.8–10.5)
WBC # FLD AUTO: 16.02 K/UL — HIGH (ref 3.8–10.5)
WBC UR QL: >50

## 2019-05-28 PROCEDURE — 99285 EMERGENCY DEPT VISIT HI MDM: CPT

## 2019-05-28 PROCEDURE — 71045 X-RAY EXAM CHEST 1 VIEW: CPT | Mod: 26

## 2019-05-28 PROCEDURE — 93010 ELECTROCARDIOGRAM REPORT: CPT

## 2019-05-28 RX ORDER — SODIUM CHLORIDE 9 MG/ML
2200 INJECTION INTRAMUSCULAR; INTRAVENOUS; SUBCUTANEOUS ONCE
Refills: 0 | Status: COMPLETED | OUTPATIENT
Start: 2019-05-28 | End: 2019-05-28

## 2019-05-28 RX ORDER — DOCUSATE SODIUM 100 MG
15 CAPSULE ORAL
Qty: 0 | Refills: 0 | DISCHARGE

## 2019-05-28 NOTE — ED PROVIDER NOTE - CARE PLAN
Principal Discharge DX:	Dehydration Principal Discharge DX:	Dehydration  Secondary Diagnosis:	Hypercalcemia  Secondary Diagnosis:	Hypernatremia

## 2019-05-28 NOTE — ED PROVIDER NOTE - OBJECTIVE STATEMENT
Pertinent PMH/PSH/FHx/SHx and Review of Systems contained within: Pertinent PMH/PSH/FHx/SHx and Review of Systems contained within:    72yo M w PMH of CAD, CVA w quadraplegic locked in syndrome, vent dependent s/p trach (vent dependant), s/p PEG, PE (on Eliquis), HTN, HL, GERD, T2DM, mult sacral decub ulcers, Pertinent PMH/PSH/FHx/SHx and Review of Systems contained within:    74yo M w PMH of CAD, CVA w quadraplegic locked in syndrome, vent dependent s/p trach (vent dependant), s/p PEG, PE (on Eliquis), HTN, HL, GERD, T2DM, mult sacral decub ulcers,    Aguilar, Cr 5 Pertinent PMH/PSH/FHx/SHx and Review of Systems contained within:    72yo M w PMH of CAD, CVA w quadraplegic locked in syndrome, vent dependent s/p trach (vent dependant), s/p PEG, PE (on Eliquis), HTN, HL, GERD, T2DM, mult sacral decub ulcers,    Aguilar, Cr 2.5 Pertinent PMH/PSH/FHx/SHx and Review of Systems contained within:    74yo M w PMH of CAD, CVA w quadraplegic locked in syndrome, vent dependent s/p trach (vent dependant), s/p PEG, HTN, HL, GERD, T2DM, previous PE dc after GIB, mult sacral decub ulcers currently on Doxycycline after admission to OSH for PNA & GIB.    MAX- 200mL      Aguilar, Cr 2.5 Pertinent PMH/PSH/FHx/SHx and Review of Systems contained within:    72yo M w PMH of CAD, CVA w quadraplegic locked in syndrome, vent dependent s/p trach (vent dependant), s/p PEG, HTN, HL, GERD, T2DM, previous PE dc after GIB, mult sacral decub ulcers currently on Doxycycline after admission to OSH for PNA & GIB.    2 12microgram fentanyl patches (5/10/19 & 5/27/19) removed on arrival    MAX- 200mL      Aguilar, Cr 2.5 Pertinent PMH/PSH/FHx/SHx and Review of Systems contained within:    74yo M w PMH of CAD, CVA w quadraplegic locked in syndrome, vent dependent s/p trach (vent dependant), s/p PEG, HTN, HL, GERD, T2DM, previous PE dc after GIB, mult sacral decub ulcers currently on Doxycycline after admission to OSH for PNA & GIB, also w biliary drain presents to ED for eval of hypotension.  NH report pt BUN & Cr incr, EMS report pt hypotensive on their arrival.  On arrival to ED, removed two 12microgram fentanyl patches (5/10/19 & 5/27/19. Pertinent PMH/PSH/FHx/SHx and Review of Systems contained within:    72yo M w PMH of CAD, CVA w quadraplegic locked in syndrome, vent dependent s/p trach (vent dependant), s/p PEG, HTN, HL, GERD, T2DM, previous PE a/c dc after GIB, mult sacral decub ulcers currently on Doxycycline after admission to OSH for PNA & GIB, also w biliary drain presents to ED for eval of hypotension.  NH report pt BUN & Cr incr, EMS report pt hypotensive on their arrival.  On arrival to ED, removed two 12microgram fentanyl patches (5/10/19 & 5/27/19.

## 2019-05-28 NOTE — ED ADULT NURSE NOTE - OBJECTIVE STATEMENT
Received in Triage. as per ems patient being treated at [facility] rehab for renal failure and Sepsis at this time. Patient non-verbal and not moving any extremities. As per family-normal status except able to blink. Trach -vent in progress. Right brachial TLC noted. Right upper abd jtueb to MAX drain. upper abd PEG. Multiple pressure ulcers (see documentation). Rectal pouch removed and all dressings changed. Guerra 16fr replaced in ER upon arrival. Received in Triage. as per ems patient being treated at [facility] rehab for renal failure and Sepsis at this time. Patient non-verbal and not moving any extremities. As per family-normal status except able to blink. Trach -vent in progress. Right brachial TLC noted. Right upper abd jtueb to MAX drain. upper abd PEG. Multiple pressure ulcers (see documentation). Rectal pouch removed and all dressings changed. Guerra 16fr replaced in ER upon arrival. 12mcg Fentanyl patch on right LQabd removed (dated 5/10) and left ACW 12mcg Fentanyl dated (5/27) removed on arrival. Witnessed by Dr. Luis. Received in Triage. as per ems patient being treated at [facility] rehab for renal failure and Sepsis at this time. Patient non-verbal and not moving any extremities. As per family-normal status except able to blink. Trach #8-vent in progress. Right brachial TLC noted. Right upper abd jtueb to MAX drain. upper abd PEG. Multiple pressure ulcers (see documentation). Rectal pouch removed and all dressings changed. Guerra 16fr replaced in ER upon arrival. 12mcg Fentanyl patch on right LQabd removed (dated 5/10) and left ACW 12mcg Fentanyl dated (5/27) removed on arrival. Witnessed by Dr. Luis. Received in Triage. as per ems patient being treated at [facility] rehab for renal failure and Sepsis at this time. Patient non-verbal and not moving any extremities. As per family-normal status except able to blink. Trach #8-vent in progress. Right brachial TLC noted. Right upper abd jtueb to MAX drain. upper abd PEG. Rectal pouch removed and all dressings changed. Guerra 16fr replaced in ER upon arrival. 12mcg Fentanyl patch on right LQabd removed (dated 5/10) and left ACW 12mcg Fentanyl (dated 5/27) removed on arrival. Witnessed by Dr. Luis.  Multiple pressure ulcers (see documentation)  Right Foot- 2nd toe DM ulcer, outer 5th toe DTI, anterior ankle unstageable, calf stage 2, right out ankle DTi, Right heel unstageable.  Left foot- Big toe DM ulcer, inner ankle DTI, Heel unstageable, anterior ankle unstageable, left outer ankle DTi  Left hip stage 2, Left buttock unstageable, Left coccyx unstageable, left sacrum unstageable, Anal stage 2,   Right buttock stage 3. Bilat UE +3 edema. Received in Triage. as per ems patient being treated at [facility] rehab for renal failure and Sepsis at this time. Patient non-verbal and not moving any extremities. As per family-normal status except able to blink. Trach #8-vent in progress. Right brachial triple lumen PICC noted. Right upper abd jtueb to MAX drain. upper abd PEG. Rectal pouch removed and all dressings changed. Guerra 16fr replaced in ER upon arrival. 12mcg Fentanyl patch on right LQabd removed (dated 5/10) and left ACW 12mcg Fentanyl (dated 5/27) removed on arrival. Witnessed by Dr. Luis.  Multiple pressure ulcers (see documentation)  Right Foot- 2nd toe DM ulcer, outer 5th toe DTI, anterior ankle unstageable, calf stage 2, right out ankle DTi, Right heel unstageable.  Left foot- Big toe DM ulcer, inner ankle DTI, Heel unstageable, anterior ankle unstageable, left outer ankle DTi  Left hip stage 2, Left buttock unstageable, Left coccyx unstageable, left sacrum unstageable, Anal stage 2,   Right buttock stage 3. Bilat UE +3 edema.

## 2019-05-28 NOTE — ED ADULT NURSE NOTE - NSNUTRITIONRISK_GI_A_CORE
Enteral or parenteral nutrition prior to admission/Intubation greater than 48 hours/Pressure ulcer stage 2 or greater/Major decrease of PO intake greater than 5 days before admit

## 2019-05-28 NOTE — ED PROVIDER NOTE - PHYSICAL EXAMINATION
Gen: Awake, +trach, responsive to painful stim  Head: NC, AT, PERRL  ENT: patent oropharynx, dry MM  Neck: supple, size 8 trach in place  Pulm: Bilateral clear BS, normal resp effort, no wheeze/stridor/retractions, vent dependent  CV: tachycardic, no M/R/G, +radial pulses, decr DP pulses  Abd: soft, ND, PEG in place  Mskel: ++large sacral decub ulcers, +bilateral heel ulcers, +ulcer to anterior R ankle, +PICC to RUE  Skin: mult ulcers  Neuro: unable to assess, hx quadraplegia/locked-in syndrome

## 2019-05-28 NOTE — ED ADULT TRIAGE NOTE - CHIEF COMPLAINT QUOTE
as per ems patient being treated at [facility] rehab for renal failure, trach patient, hypotensive and tachycardic triple lumen R- arm PICC, cole, rectal tube, MAX drain

## 2019-05-28 NOTE — ED ADULT NURSE NOTE - NSIMPLEMENTINTERV_GEN_ALL_ED
Implemented All Fall with Harm Risk Interventions:  [place] to call system. Call bell, personal items and telephone within reach. Instruct patient to call for assistance. Room bathroom lighting operational. Non-slip footwear when patient is off stretcher. Physically safe environment: no spills, clutter or unnecessary equipment. Stretcher in lowest position, wheels locked, appropriate side rails in place. Provide visual cue, wrist band, yellow gown, etc. Monitor gait and stability. Monitor for mental status changes and reorient to person, place, and time. Review medications for side effects contributing to fall risk. Reinforce activity limits and safety measures with patient and family. Provide visual clues: red socks.

## 2019-05-28 NOTE — ED PROVIDER NOTE - CLINICAL SUMMARY MEDICAL DECISION MAKING FREE TEXT BOX
Pt w hypernatremia, hypercalcemia, hypotension, dehydration, ?sepsis due to ulcers.  Consulted ICU Pt w hypernatremia, hypercalcemia, hypotension, dehydration, ?sepsis due to ulcers.  Consulted ICU, pt admitted for further eval/mgmt.

## 2019-05-28 NOTE — ED ADULT NURSE NOTE - CHIEF COMPLAINT QUOTE
as per ems patient being treated at [facility] rehab for renal failure, trach patient, hypotensive and tachycardic triple lumen R- arm PICC, cole, rectal tube, MAX drain

## 2019-05-29 DIAGNOSIS — Z98.890 OTHER SPECIFIED POSTPROCEDURAL STATES: Chronic | ICD-10-CM

## 2019-05-29 DIAGNOSIS — N17.9 ACUTE KIDNEY FAILURE, UNSPECIFIED: ICD-10-CM

## 2019-05-29 DIAGNOSIS — Z93.1 GASTROSTOMY STATUS: Chronic | ICD-10-CM

## 2019-05-29 DIAGNOSIS — I69.391 DYSPHAGIA FOLLOWING CEREBRAL INFARCTION: ICD-10-CM

## 2019-05-29 DIAGNOSIS — I26.99 OTHER PULMONARY EMBOLISM WITHOUT ACUTE COR PULMONALE: ICD-10-CM

## 2019-05-29 DIAGNOSIS — E87.0 HYPEROSMOLALITY AND HYPERNATREMIA: ICD-10-CM

## 2019-05-29 DIAGNOSIS — J96.10 CHRONIC RESPIRATORY FAILURE, UNSPECIFIED WHETHER WITH HYPOXIA OR HYPERCAPNIA: ICD-10-CM

## 2019-05-29 DIAGNOSIS — L89.154 PRESSURE ULCER OF SACRAL REGION, STAGE 4: ICD-10-CM

## 2019-05-29 DIAGNOSIS — A41.9 SEPSIS, UNSPECIFIED ORGANISM: ICD-10-CM

## 2019-05-29 DIAGNOSIS — E83.52 HYPERCALCEMIA: ICD-10-CM

## 2019-05-29 PROBLEM — E78.5 HYPERLIPIDEMIA, UNSPECIFIED: Chronic | Status: ACTIVE | Noted: 2018-12-11

## 2019-05-29 PROBLEM — E11.40 TYPE 2 DIABETES MELLITUS WITH DIABETIC NEUROPATHY, UNSPECIFIED: Chronic | Status: ACTIVE | Noted: 2018-12-11

## 2019-05-29 PROBLEM — I63.9 CEREBRAL INFARCTION, UNSPECIFIED: Chronic | Status: ACTIVE | Noted: 2018-12-11

## 2019-05-29 PROBLEM — G82.50 QUADRIPLEGIA, UNSPECIFIED: Chronic | Status: ACTIVE | Noted: 2018-12-11

## 2019-05-29 PROBLEM — I10 ESSENTIAL (PRIMARY) HYPERTENSION: Chronic | Status: ACTIVE | Noted: 2018-12-11

## 2019-05-29 PROBLEM — I50.9 HEART FAILURE, UNSPECIFIED: Chronic | Status: ACTIVE | Noted: 2018-12-11

## 2019-05-29 PROBLEM — I42.8 OTHER CARDIOMYOPATHIES: Chronic | Status: ACTIVE | Noted: 2018-12-11

## 2019-05-29 LAB
ALBUMIN SERPL ELPH-MCNC: 1.5 G/DL — LOW (ref 3.3–5)
ALP SERPL-CCNC: 248 U/L — HIGH (ref 40–120)
ALT FLD-CCNC: 68 U/L — SIGNIFICANT CHANGE UP (ref 12–78)
ANION GAP SERPL CALC-SCNC: 4 MMOL/L — LOW (ref 5–17)
ANION GAP SERPL CALC-SCNC: 5 MMOL/L — SIGNIFICANT CHANGE UP (ref 5–17)
ANION GAP SERPL CALC-SCNC: 5 MMOL/L — SIGNIFICANT CHANGE UP (ref 5–17)
ANISOCYTOSIS BLD QL: SLIGHT — SIGNIFICANT CHANGE UP
AST SERPL-CCNC: 45 U/L — HIGH (ref 15–37)
BASE EXCESS BLDA CALC-SCNC: 2.2 MMOL/L — HIGH (ref -2–2)
BASOPHILS # BLD AUTO: 0 K/UL — SIGNIFICANT CHANGE UP (ref 0–0.2)
BASOPHILS NFR BLD AUTO: 0 % — SIGNIFICANT CHANGE UP (ref 0–2)
BILIRUB SERPL-MCNC: 0.4 MG/DL — SIGNIFICANT CHANGE UP (ref 0.2–1.2)
BLD GP AB SCN SERPL QL: SIGNIFICANT CHANGE UP
BLOOD GAS COMMENTS: SIGNIFICANT CHANGE UP
BLOOD GAS COMMENTS: SIGNIFICANT CHANGE UP
BLOOD GAS SOURCE: SIGNIFICANT CHANGE UP
BUN SERPL-MCNC: 111 MG/DL — HIGH (ref 7–23)
BUN SERPL-MCNC: 84 MG/DL — HIGH (ref 7–23)
BUN SERPL-MCNC: 94 MG/DL — HIGH (ref 7–23)
CALCIUM SERPL-MCNC: 11.3 MG/DL — HIGH (ref 8.5–10.1)
CALCIUM SERPL-MCNC: 11.7 MG/DL — HIGH (ref 8.5–10.1)
CALCIUM SERPL-MCNC: 11.8 MG/DL — HIGH (ref 8.5–10.1)
CHLORIDE SERPL-SCNC: 127 MMOL/L — HIGH (ref 96–108)
CHLORIDE SERPL-SCNC: 129 MMOL/L — HIGH (ref 96–108)
CHLORIDE SERPL-SCNC: 130 MMOL/L — HIGH (ref 96–108)
CO2 SERPL-SCNC: 26 MMOL/L — SIGNIFICANT CHANGE UP (ref 22–31)
CO2 SERPL-SCNC: 26 MMOL/L — SIGNIFICANT CHANGE UP (ref 22–31)
CO2 SERPL-SCNC: 28 MMOL/L — SIGNIFICANT CHANGE UP (ref 22–31)
CREAT SERPL-MCNC: 1.48 MG/DL — HIGH (ref 0.5–1.3)
CREAT SERPL-MCNC: 1.48 MG/DL — HIGH (ref 0.5–1.3)
CREAT SERPL-MCNC: 1.57 MG/DL — HIGH (ref 0.5–1.3)
EOSINOPHIL # BLD AUTO: 0.32 K/UL — SIGNIFICANT CHANGE UP (ref 0–0.5)
EOSINOPHIL NFR BLD AUTO: 2 % — SIGNIFICANT CHANGE UP (ref 0–6)
GLUCOSE BLDC GLUCOMTR-MCNC: 230 MG/DL — HIGH (ref 70–99)
GLUCOSE BLDC GLUCOMTR-MCNC: 276 MG/DL — HIGH (ref 70–99)
GLUCOSE BLDC GLUCOMTR-MCNC: 375 MG/DL — HIGH (ref 70–99)
GLUCOSE SERPL-MCNC: 224 MG/DL — HIGH (ref 70–99)
GLUCOSE SERPL-MCNC: 320 MG/DL — HIGH (ref 70–99)
GLUCOSE SERPL-MCNC: 329 MG/DL — HIGH (ref 70–99)
GRAM STN FLD: SIGNIFICANT CHANGE UP
HCO3 BLDA-SCNC: 26 MMOL/L — SIGNIFICANT CHANGE UP (ref 21–29)
HCT VFR BLD CALC: 30.4 % — LOW (ref 39–50)
HGB BLD-MCNC: 8.8 G/DL — LOW (ref 13–17)
HOROWITZ INDEX BLDA+IHG-RTO: 30 — SIGNIFICANT CHANGE UP
LACTATE SERPL-SCNC: 2.1 MMOL/L — HIGH (ref 0.7–2)
LYMPHOCYTES # BLD AUTO: 19 % — SIGNIFICANT CHANGE UP (ref 13–44)
LYMPHOCYTES # BLD AUTO: 3.04 K/UL — SIGNIFICANT CHANGE UP (ref 1–3.3)
MACROCYTES BLD QL: SLIGHT — SIGNIFICANT CHANGE UP
MAGNESIUM SERPL-MCNC: 2.6 MG/DL — SIGNIFICANT CHANGE UP (ref 1.6–2.6)
MANUAL SMEAR VERIFICATION: SIGNIFICANT CHANGE UP
MCHC RBC-ENTMCNC: 28.9 GM/DL — LOW (ref 32–36)
MCHC RBC-ENTMCNC: 29.9 PG — SIGNIFICANT CHANGE UP (ref 27–34)
MCV RBC AUTO: 103.4 FL — HIGH (ref 80–100)
MICROCYTES BLD QL: SLIGHT — SIGNIFICANT CHANGE UP
MONOCYTES # BLD AUTO: 0.8 K/UL — SIGNIFICANT CHANGE UP (ref 0–0.9)
MONOCYTES NFR BLD AUTO: 5 % — SIGNIFICANT CHANGE UP (ref 2–14)
MYELOCYTES NFR BLD: 2 % — HIGH (ref 0–0)
NEUTROPHILS # BLD AUTO: 11.53 K/UL — HIGH (ref 1.8–7.4)
NEUTROPHILS NFR BLD AUTO: 72 % — SIGNIFICANT CHANGE UP (ref 43–77)
NRBC # BLD: 0 /100 WBCS — SIGNIFICANT CHANGE UP (ref 0–0)
NRBC # BLD: 0 /100 — SIGNIFICANT CHANGE UP (ref 0–0)
NRBC # BLD: SIGNIFICANT CHANGE UP /100 WBCS (ref 0–0)
PCO2 BLDA: 37 MMHG — SIGNIFICANT CHANGE UP (ref 32–46)
PH BLD: 7.45 — SIGNIFICANT CHANGE UP (ref 7.35–7.45)
PHOSPHATE SERPL-MCNC: 2.8 MG/DL — SIGNIFICANT CHANGE UP (ref 2.5–4.5)
PLAT MORPH BLD: NORMAL — SIGNIFICANT CHANGE UP
PLATELET # BLD AUTO: 226 K/UL — SIGNIFICANT CHANGE UP (ref 150–400)
PO2 BLDA: 139 MMHG — HIGH (ref 74–108)
POTASSIUM SERPL-MCNC: 3.3 MMOL/L — LOW (ref 3.5–5.3)
POTASSIUM SERPL-MCNC: 3.4 MMOL/L — LOW (ref 3.5–5.3)
POTASSIUM SERPL-MCNC: 3.6 MMOL/L — SIGNIFICANT CHANGE UP (ref 3.5–5.3)
POTASSIUM SERPL-SCNC: 3.3 MMOL/L — LOW (ref 3.5–5.3)
POTASSIUM SERPL-SCNC: 3.4 MMOL/L — LOW (ref 3.5–5.3)
POTASSIUM SERPL-SCNC: 3.6 MMOL/L — SIGNIFICANT CHANGE UP (ref 3.5–5.3)
PROT SERPL-MCNC: 6.8 GM/DL — SIGNIFICANT CHANGE UP (ref 6–8.3)
RBC # BLD: 2.94 M/UL — LOW (ref 4.2–5.8)
RBC # FLD: 15.6 % — HIGH (ref 10.3–14.5)
RBC BLD AUTO: ABNORMAL
SAO2 % BLDA: 99 % — HIGH (ref 92–96)
SODIUM SERPL-SCNC: 159 MMOL/L — HIGH (ref 135–145)
SODIUM SERPL-SCNC: 160 MMOL/L — CRITICAL HIGH (ref 135–145)
SODIUM SERPL-SCNC: 161 MMOL/L — CRITICAL HIGH (ref 135–145)
SPECIMEN SOURCE: SIGNIFICANT CHANGE UP
SPHEROCYTES BLD QL SMEAR: SLIGHT — SIGNIFICANT CHANGE UP
TROPONIN I SERPL-MCNC: 0.07 NG/ML — HIGH (ref 0.01–0.04)
WBC # BLD: 15.85 K/UL — HIGH (ref 3.8–10.5)
WBC # FLD AUTO: 15.85 K/UL — HIGH (ref 3.8–10.5)

## 2019-05-29 PROCEDURE — 99291 CRITICAL CARE FIRST HOUR: CPT

## 2019-05-29 PROCEDURE — 76700 US EXAM ABDOM COMPLETE: CPT | Mod: 26

## 2019-05-29 RX ORDER — SODIUM CHLORIDE 9 MG/ML
1000 INJECTION, SOLUTION INTRAVENOUS
Refills: 0 | Status: DISCONTINUED | OUTPATIENT
Start: 2019-05-29 | End: 2019-06-11

## 2019-05-29 RX ORDER — GLUCAGON INJECTION, SOLUTION 0.5 MG/.1ML
1 INJECTION, SOLUTION SUBCUTANEOUS ONCE
Refills: 0 | Status: DISCONTINUED | OUTPATIENT
Start: 2019-05-29 | End: 2019-06-11

## 2019-05-29 RX ORDER — HUMAN INSULIN 100 [IU]/ML
0 INJECTION, SUSPENSION SUBCUTANEOUS
Qty: 0 | Refills: 0 | DISCHARGE

## 2019-05-29 RX ORDER — CEFEPIME 1 G/1
1000 INJECTION, POWDER, FOR SOLUTION INTRAMUSCULAR; INTRAVENOUS EVERY 8 HOURS
Refills: 0 | Status: DISCONTINUED | OUTPATIENT
Start: 2019-05-29 | End: 2019-06-01

## 2019-05-29 RX ORDER — SODIUM CHLORIDE 9 MG/ML
1000 INJECTION, SOLUTION INTRAVENOUS
Refills: 0 | Status: DISCONTINUED | OUTPATIENT
Start: 2019-05-29 | End: 2019-05-29

## 2019-05-29 RX ORDER — INSULIN LISPRO 100/ML
VIAL (ML) SUBCUTANEOUS EVERY 6 HOURS
Refills: 0 | Status: DISCONTINUED | OUTPATIENT
Start: 2019-05-29 | End: 2019-05-30

## 2019-05-29 RX ORDER — PANTOPRAZOLE SODIUM 20 MG/1
40 TABLET, DELAYED RELEASE ORAL ONCE
Refills: 0 | Status: COMPLETED | OUTPATIENT
Start: 2019-05-29 | End: 2019-05-29

## 2019-05-29 RX ORDER — RANITIDINE HYDROCHLORIDE 150 MG/1
1 TABLET, FILM COATED ORAL
Qty: 0 | Refills: 0 | DISCHARGE

## 2019-05-29 RX ORDER — CEFEPIME 1 G/1
1000 INJECTION, POWDER, FOR SOLUTION INTRAMUSCULAR; INTRAVENOUS ONCE
Refills: 0 | Status: COMPLETED | OUTPATIENT
Start: 2019-05-29 | End: 2019-05-29

## 2019-05-29 RX ORDER — DEXTROSE 50 % IN WATER 50 %
15 SYRINGE (ML) INTRAVENOUS ONCE
Refills: 0 | Status: DISCONTINUED | OUTPATIENT
Start: 2019-05-29 | End: 2019-06-11

## 2019-05-29 RX ORDER — INSULIN GLARGINE 100 [IU]/ML
0 INJECTION, SOLUTION SUBCUTANEOUS
Qty: 0 | Refills: 0 | DISCHARGE

## 2019-05-29 RX ORDER — INSULIN LISPRO 100/ML
3 VIAL (ML) SUBCUTANEOUS ONCE
Refills: 0 | Status: COMPLETED | OUTPATIENT
Start: 2019-05-29 | End: 2019-05-29

## 2019-05-29 RX ORDER — VANCOMYCIN HCL 1 G
1000 VIAL (EA) INTRAVENOUS EVERY 12 HOURS
Refills: 0 | Status: DISCONTINUED | OUTPATIENT
Start: 2019-05-29 | End: 2019-05-29

## 2019-05-29 RX ORDER — IPRATROPIUM/ALBUTEROL SULFATE 18-103MCG
3 AEROSOL WITH ADAPTER (GRAM) INHALATION
Qty: 0 | Refills: 0 | DISCHARGE

## 2019-05-29 RX ORDER — DEXTROSE 50 % IN WATER 50 %
25 SYRINGE (ML) INTRAVENOUS ONCE
Refills: 0 | Status: DISCONTINUED | OUTPATIENT
Start: 2019-05-29 | End: 2019-06-11

## 2019-05-29 RX ORDER — VANCOMYCIN HCL 1 G
750 VIAL (EA) INTRAVENOUS EVERY 12 HOURS
Refills: 0 | Status: DISCONTINUED | OUTPATIENT
Start: 2019-05-29 | End: 2019-06-01

## 2019-05-29 RX ORDER — CEFEPIME 1 G/1
INJECTION, POWDER, FOR SOLUTION INTRAMUSCULAR; INTRAVENOUS
Refills: 0 | Status: DISCONTINUED | OUTPATIENT
Start: 2019-05-29 | End: 2019-06-01

## 2019-05-29 RX ORDER — FENTANYL CITRATE 50 UG/ML
1 INJECTION INTRAVENOUS
Refills: 0 | Status: DISCONTINUED | OUTPATIENT
Start: 2019-05-29 | End: 2019-06-04

## 2019-05-29 RX ORDER — IPRATROPIUM/ALBUTEROL SULFATE 18-103MCG
3 AEROSOL WITH ADAPTER (GRAM) INHALATION EVERY 6 HOURS
Refills: 0 | Status: DISCONTINUED | OUTPATIENT
Start: 2019-05-29 | End: 2019-06-17

## 2019-05-29 RX ORDER — ALBUTEROL 90 UG/1
1 AEROSOL, METERED ORAL EVERY 4 HOURS
Refills: 0 | Status: DISCONTINUED | OUTPATIENT
Start: 2019-05-29 | End: 2019-06-17

## 2019-05-29 RX ORDER — VANCOMYCIN HCL 1 G
1000 VIAL (EA) INTRAVENOUS ONCE
Refills: 0 | Status: COMPLETED | OUTPATIENT
Start: 2019-05-29 | End: 2019-05-29

## 2019-05-29 RX ORDER — ACETAMINOPHEN 500 MG
650 TABLET ORAL EVERY 6 HOURS
Refills: 0 | Status: DISCONTINUED | OUTPATIENT
Start: 2019-05-29 | End: 2019-06-16

## 2019-05-29 RX ORDER — PANTOPRAZOLE SODIUM 20 MG/1
40 TABLET, DELAYED RELEASE ORAL DAILY
Refills: 0 | Status: DISCONTINUED | OUTPATIENT
Start: 2019-05-29 | End: 2019-06-17

## 2019-05-29 RX ORDER — SODIUM CHLORIDE 9 MG/ML
1000 INJECTION, SOLUTION INTRAVENOUS
Refills: 0 | Status: DISCONTINUED | OUTPATIENT
Start: 2019-05-29 | End: 2019-06-01

## 2019-05-29 RX ORDER — INSULIN GLARGINE 100 [IU]/ML
40 INJECTION, SOLUTION SUBCUTANEOUS EVERY MORNING
Refills: 0 | Status: DISCONTINUED | OUTPATIENT
Start: 2019-05-29 | End: 2019-06-11

## 2019-05-29 RX ORDER — POTASSIUM CHLORIDE 20 MEQ
40 PACKET (EA) ORAL ONCE
Refills: 0 | Status: COMPLETED | OUTPATIENT
Start: 2019-05-29 | End: 2019-05-29

## 2019-05-29 RX ORDER — DEXTROSE 50 % IN WATER 50 %
12.5 SYRINGE (ML) INTRAVENOUS ONCE
Refills: 0 | Status: DISCONTINUED | OUTPATIENT
Start: 2019-05-29 | End: 2019-06-11

## 2019-05-29 RX ORDER — NOREPINEPHRINE BITARTRATE/D5W 8 MG/250ML
0.05 PLASTIC BAG, INJECTION (ML) INTRAVENOUS
Qty: 8 | Refills: 0 | Status: DISCONTINUED | OUTPATIENT
Start: 2019-05-29 | End: 2019-05-30

## 2019-05-29 RX ORDER — SODIUM CHLORIDE 9 MG/ML
1000 INJECTION, SOLUTION INTRAVENOUS ONCE
Refills: 0 | Status: COMPLETED | OUTPATIENT
Start: 2019-05-29 | End: 2019-05-29

## 2019-05-29 RX ORDER — HEPARIN SODIUM 5000 [USP'U]/ML
5000 INJECTION INTRAVENOUS; SUBCUTANEOUS EVERY 12 HOURS
Refills: 0 | Status: DISCONTINUED | OUTPATIENT
Start: 2019-05-29 | End: 2019-06-17

## 2019-05-29 RX ORDER — TIOTROPIUM BROMIDE 18 UG/1
1 CAPSULE ORAL; RESPIRATORY (INHALATION) DAILY
Refills: 0 | Status: DISCONTINUED | OUTPATIENT
Start: 2019-05-29 | End: 2019-06-17

## 2019-05-29 RX ORDER — FENTANYL CITRATE 50 UG/ML
0.35 INJECTION INTRAVENOUS
Qty: 2500 | Refills: 0 | Status: DISCONTINUED | OUTPATIENT
Start: 2019-05-29 | End: 2019-05-29

## 2019-05-29 RX ADMIN — PANTOPRAZOLE SODIUM 40 MILLIGRAM(S): 20 TABLET, DELAYED RELEASE ORAL at 05:58

## 2019-05-29 RX ADMIN — Medication 3 MILLILITER(S): at 17:41

## 2019-05-29 RX ADMIN — Medication 40 MILLIEQUIVALENT(S): at 14:51

## 2019-05-29 RX ADMIN — Medication 6.9 MICROGRAM(S)/KG/MIN: at 06:23

## 2019-05-29 RX ADMIN — INSULIN GLARGINE 40 UNIT(S): 100 INJECTION, SOLUTION SUBCUTANEOUS at 13:06

## 2019-05-29 RX ADMIN — CEFEPIME 100 MILLIGRAM(S): 1 INJECTION, POWDER, FOR SOLUTION INTRAMUSCULAR; INTRAVENOUS at 21:35

## 2019-05-29 RX ADMIN — FENTANYL CITRATE 1 PATCH: 50 INJECTION INTRAVENOUS at 13:06

## 2019-05-29 RX ADMIN — Medication 250 MILLIGRAM(S): at 12:35

## 2019-05-29 RX ADMIN — Medication 10: at 17:43

## 2019-05-29 RX ADMIN — Medication 3 UNIT(S): at 19:02

## 2019-05-29 RX ADMIN — FENTANYL CITRATE 1 PATCH: 50 INJECTION INTRAVENOUS at 19:50

## 2019-05-29 RX ADMIN — CEFEPIME 100 MILLIGRAM(S): 1 INJECTION, POWDER, FOR SOLUTION INTRAMUSCULAR; INTRAVENOUS at 01:00

## 2019-05-29 RX ADMIN — PANTOPRAZOLE SODIUM 40 MILLIGRAM(S): 20 TABLET, DELAYED RELEASE ORAL at 11:09

## 2019-05-29 RX ADMIN — CEFEPIME 100 MILLIGRAM(S): 1 INJECTION, POWDER, FOR SOLUTION INTRAMUSCULAR; INTRAVENOUS at 14:35

## 2019-05-29 RX ADMIN — CEFEPIME 1000 MILLIGRAM(S): 1 INJECTION, POWDER, FOR SOLUTION INTRAMUSCULAR; INTRAVENOUS at 01:34

## 2019-05-29 RX ADMIN — Medication 1 APPLICATION(S): at 11:09

## 2019-05-29 RX ADMIN — SODIUM CHLORIDE 2200 MILLILITER(S): 9 INJECTION INTRAMUSCULAR; INTRAVENOUS; SUBCUTANEOUS at 00:04

## 2019-05-29 RX ADMIN — HEPARIN SODIUM 5000 UNIT(S): 5000 INJECTION INTRAVENOUS; SUBCUTANEOUS at 17:43

## 2019-05-29 RX ADMIN — Medication 250 MILLIGRAM(S): at 01:34

## 2019-05-29 RX ADMIN — Medication 1000 MILLIGRAM(S): at 03:00

## 2019-05-29 RX ADMIN — SODIUM CHLORIDE 2200 MILLILITER(S): 9 INJECTION INTRAMUSCULAR; INTRAVENOUS; SUBCUTANEOUS at 02:00

## 2019-05-29 RX ADMIN — Medication 6: at 11:21

## 2019-05-29 RX ADMIN — SODIUM CHLORIDE 1000 MILLILITER(S): 9 INJECTION, SOLUTION INTRAVENOUS at 05:59

## 2019-05-29 RX ADMIN — Medication 4: at 05:50

## 2019-05-29 RX ADMIN — SODIUM CHLORIDE 50 MILLILITER(S): 9 INJECTION, SOLUTION INTRAVENOUS at 08:50

## 2019-05-29 RX ADMIN — SODIUM CHLORIDE 4000 MILLILITER(S): 9 INJECTION, SOLUTION INTRAVENOUS at 08:50

## 2019-05-29 RX ADMIN — SODIUM CHLORIDE 100 MILLILITER(S): 9 INJECTION, SOLUTION INTRAVENOUS at 05:59

## 2019-05-29 NOTE — PHYSICAL THERAPY INITIAL EVALUATION ADULT - MODALITIES TREATMENT COMMENTS
Right anterior ankle ulcer explosed tendon and muscle, B heel unstageable, left anterior heel ulcer, communicating wounds on sacrum necrotic malodorous, unstageable B buttcheek, unstageable to B medial and lateral malleolus, right shin skin tear, wound on perianal from prior flexiseal

## 2019-05-29 NOTE — H&P ADULT - NSICDXPASTSURGICALHX_GEN_ALL_CORE_FT
PAST SURGICAL HISTORY:  History of tracheostomy     S/P percutaneous endoscopic gastrostomy (PEG) tube placement

## 2019-05-29 NOTE — H&P ADULT - HISTORY OF PRESENT ILLNESS
HPI: 72yo M w/ PMH of CAD, 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  NH report pt BUN & Cr incr, EMS report pt hypotensive on their arrival.  On arrival to ED, removed two 12microgram fentanyl patches (5/10/19 & 5/27/19)    In ED received 2200ml saline bolus.

## 2019-05-29 NOTE — CONSULT NOTE ADULT - SUBJECTIVE AND OBJECTIVE BOX
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  NH report pt BUN & Cr incr, EMS report pt hypotensive on their arrival.  On arrival to ED, removed two 12microgram fentanyl patches (5/10/19 & 19)  ------------------------ As Above --------------------------------------------  Patient can not give any history. Called to evaluate PEG tube. It is an original tube placed (?). Functioning well. No leakage  History of GI bleed. Has a cholecystostomy tube.     In ED received 2200ml saline bolus. (29 May 2019 05:43)      PAST MEDICAL & SURGICAL HISTORY:  DM (diabetes mellitus)  Heart failure  Diabetic neuropathy  Hyperlipidemia  Quadriplegia  Hypertension  Stroke  Hypertension  Obstructive cardiomyopathy  S/P percutaneous endoscopic gastrostomy (PEG) tube placement  History of tracheostomy      MEDICATIONS  (STANDING):  dextrose 5%. 1000 milliLiter(s) (50 mL/Hr) IV Continuous <Continuous>  dextrose 5%. 1000 milliLiter(s) (50 mL/Hr) IV Continuous <Continuous>  dextrose 50% Injectable 12.5 Gram(s) IV Push once  dextrose 50% Injectable 25 Gram(s) IV Push once  dextrose 50% Injectable 25 Gram(s) IV Push once  fentaNYL   Patch  12 MICROgram(s)/Hr 1 Patch Transdermal every 72 hours  heparin  Injectable 5000 Unit(s) SubCutaneous every 12 hours  insulin glargine Injectable (LANTUS) 40 Unit(s) SubCutaneous every morning  insulin lispro (HumaLOG) corrective regimen sliding scale   SubCutaneous every 6 hours  norepinephrine Infusion 0.05 MICROgram(s)/kG/Min (6.9 mL/Hr) IV Continuous <Continuous>  pantoprazole   Suspension 40 milliGRAM(s) Oral daily  petrolatum Ophthalmic Ointment 1 Application(s) Both EYES daily  vancomycin  IVPB 750 milliGRAM(s) IV Intermittent every 12 hours    MEDICATIONS  (PRN):  acetaminophen    Suspension .. 650 milliGRAM(s) Oral every 6 hours PRN Temp greater or equal to 38C (100.4F)  dextrose 40% Gel 15 Gram(s) Oral once PRN Blood Glucose LESS THAN 70 milliGRAM(s)/deciliter  glucagon  Injectable 1 milliGRAM(s) IntraMuscular once PRN Glucose LESS THAN 70 milligrams/deciliter      Allergies    penicillin (Unknown)    Intolerances        FAMILY HISTORY:  No pertinent family history in first degree relatives      REVIEW OF SYSTEMS:    CONSTITUTIONAL: No fever, weight loss, or fatigue  EYES: No eye pain, visual disturbances, or discharge  ENMT:  No difficulty hearing, tinnitus, vertigo; No sinus or throat pain  NECK: No pain or stiffness  BREASTS: No pain, masses, or nipple discharge  RESPIRATORY: No cough, wheezing, chills or hemoptysis; No shortness of breath  CARDIOVASCULAR: No chest pain, palpitations, dizziness, or leg swelling  GASTROINTESTINAL:  See above  GENITOURINARY: No dysuria, frequency, hematuria, or incontinence  NEUROLOGICAL: Uncoomunicative  SKIN: No itching, burning, rashes, or lesions   LYMPH NODES: No enlarged glands  ENDOCRINE: No heat or cold intolerance; No hair loss  MUSCULOSKELETAL: No joint pain or swelling; No muscle, back, or extremity pain  PSYCHIATRIC: No depression, anxiety, mood swings, or difficulty sleeping  HEME/LYMPH: No easy bruising, or bleeding gums  ALLERGY AND IMMUNOLOGIC: No hives or eczema          SOCIAL HISTORY:    FAMILY HISTORY:  No pertinent family history in first degree relatives      Vital Signs Last 24 Hrs  T(C): 36.6 (29 May 2019 12:00), Max: 37.3 (28 May 2019 21:31)  T(F): 97.8 (29 May 2019 12:00), Max: 99.2 (28 May 2019 21:31)  HR: 95 (29 May 2019 12:30) (93 - 129)  BP: 126/47 (29 May 2019 12:30) (74/39 - 140/40)  BP(mean): 58 (29 May 2019 12:30) (48 - 92)  RR: 15 (29 May 2019 12:30) (12 - 27)  SpO2: 100% (29 May 2019 12:30) (97% - 100%)    PHYSICAL EXAM:    GENERAL: NAD, well-groomed, well-developed  HEAD:  Atraumatic, Normocephalic  EYES: EOMI, PERRLA, conjunctiva and sclera clear  NECK: Supple, No JVD, Normal thyroid  NERVOUS SYSTEM:  Uncommunicative  CHEST/LUNG: Clear to percussion bilaterally; No rales, rhonchi, wheezing, or rubs  HEART: Regular rate and rhythm; No murmurs, rubs, or gallops  ABDOMEN: Soft, Nontender, Nondistended; Bowel sounds present. PEG tube functioning. Tube maintaining structure. No beading seen.   EXTREMITIES:  2+ Peripheral Pulses, No clubbing, cyanosis, or edema  LYMPH: No lymphadenopathy noted   RECTAL: Deferred   SKIN: No rashes or lesions    LABS:                        8.8    15.85 )-----------( 226      ( 29 May 2019 06:20 )             30.4       CBC:   @ 06:20  WBC  15.85  HGB 8.8  HCT 30.4 Plate 226  .4   @ 22:45  WBC  16.02  HGB 12.3  HCT 43.3 Plate 184  .3           29 May 2019 06:20    161    |  130    |  111    ----------------------------<  224    3.6     |  26     |  1.48   28 May 2019 22:45    162    |  128    |  124    ----------------------------<  230    4.3     |  30     |  1.69     Ca    11.8       29 May 2019 06:20  Ca    13.4       28 May 2019 22:45  Phos  2.8       29 May 2019 06:20  Mg     2.6       29 May 2019 06:20    TPro  6.8    /  Alb  1.5    /  TBili  0.4    /  DBili  x      /  AST  45     /  ALT  68     /  AlkPhos  248    29 May 2019 06:20  TPro  8.2    /  Alb  1.7    /  TBili  0.4    /  DBili  x      /  AST  67     /  ALT  90     /  AlkPhos  309    28 May 2019 22:45    PT/INR - ( 28 May 2019 22:45 )   PT: 14.6 sec;   INR: 1.29 ratio         PTT - ( 28 May 2019 22:45 )  PTT:38.8 sec  Urinalysis Basic - ( 28 May 2019 22:51 )    Color: Yellow / Appearance: Slightly Turbid / S.010 / pH: x  Gluc: x / Ketone: Negative  / Bili: Negative / Urobili: Negative mg/dL   Blood: x / Protein: 100 mg/dL / Nitrite: Negative   Leuk Esterase: Moderate / RBC: 3-5 /HPF / WBC >50   Sq Epi: x / Non Sq Epi: Few / Bacteria: Few          RADIOLOGY & ADDITIONAL STUDIES:

## 2019-05-29 NOTE — H&P ADULT - NSHPLABSRESULTS_GEN_ALL_CORE
12.3   16.02 )-----------( 184      ( 28 May 2019 22:45 )             43.3   05-28    162<HH>  |  128<H>  |  124<H>  ----------------------------<  230<H>  4.3   |  30  |  1.69<H>    Ca    13.4<HH>      28 May 2019 22:45    TPro  8.2  /  Alb  1.7<L>  /  TBili  0.4  /  DBili  x   /  AST  67<H>  /  ALT  90<H>  /  AlkPhos  309<H>  05-28    LA: 3.3    ABG - ( 28 May 2019 22:55 )  pH, Arterial: x     pH, Blood: 7.44  /  pCO2: 42    /  pO2: 126   / HCO3: 28    / Base Excess: 3.9   /  SaO2: 99

## 2019-05-29 NOTE — H&P ADULT - NSICDXPASTMEDICALHX_GEN_ALL_CORE_FT
PAST MEDICAL HISTORY:  Diabetic neuropathy     Heart failure     Hyperlipidemia     Hypertension     Hypertension     Obstructive cardiomyopathy     Quadriplegia     Stroke

## 2019-05-29 NOTE — PHYSICAL THERAPY INITIAL EVALUATION ADULT - CRITERIA FOR SKILLED THERAPEUTIC INTERVENTIONS
impairments found/Pt at this time is not a rehab candidate and is unable to actively participate in PT, therefore d/c from PT program at this time.

## 2019-05-29 NOTE — PROVIDER CONTACT NOTE (EICU) - ACTION/TREATMENT ORDERED:
Fentanyl drip  ordered for pain control (pt on patch at home)  Protonix for PPI prophylaxis (on home ppi)  Levophed for BP support in setting of septic shock.

## 2019-05-29 NOTE — PHYSICAL THERAPY INITIAL EVALUATION ADULT - GENERAL OBSERVATIONS, REHAB EVAL
Pt encountered sidelying with RN getting cleaned in NAD,wounds being assessed. Guerra, IV, PEG, all intact. Pt with poor dorsal pedal pulses B feet Pt with pitting edema throughout body

## 2019-05-29 NOTE — PATIENT PROFILE ADULT - LAST BOWEL MOVEMENT DATE
Problem: Altered physiologic condition related to postoperative  delivery  Goal: Patient physiologically stable as evidenced by normal lochia, palpable uterine involution and vital signs within normal limits  Outcome: PROGRESSING AS EXPECTED  Pt stable, normal lochia, firm fundus. Will continue to monitor.     Problem: Alteration in comfort related to surgical incision and/or after birth pains  Goal: Patient is able to ambulate, care for self and infant with acceptable pain level  Outcome: PROGRESSING AS EXPECTED  Pt is ambulating with minimal pain and frequents the NICU. Will continue to monitor.        28-May-2019

## 2019-05-29 NOTE — H&P ADULT - ASSESSMENT
ASSESSMENT: 73yMale with extensive history of quadriplegia, vent dependant, DM CHF presents with hypotension, hypernatremia, sepsis.     PLAN:   Neurologic: fentanyl prn, APAP    Respiratory: known to be vent dependant    Cardiovascular: hemodynamic monitoring levo prn    Gastrointestinal/Nutrition: NPO, free water (deficit 2.8L), protonix    Genitourinary/Renal: trend labs    Hematologic:  SQ heparin    Infectious Disease: Vanco, cefepime. f/u cultures    Endocrine: ISS    Disposition: ICU care

## 2019-05-29 NOTE — CONSULT NOTE ADULT - SUBJECTIVE AND OBJECTIVE BOX
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  NH report pt BUN & Cr incr, EMS report pt hypotensive on their arrival.  On arrival to ED, removed two 12microgram fentanyl patches (5/10/19 & 19)    In ED received 2200ml saline bolus. (29 May 2019 05:43)   functionally quadriplegic wit stage 4 decubitus and stage 2 heel ulcers.      PAST MEDICAL & SURGICAL HISTORY:  Heart failure  Diabetic neuropathy  Hyperlipidemia  Quadriplegia  Hypertension  Stroke  Hypertension  Obstructive cardiomyopathy  S/P percutaneous endoscopic gastrostomy (PEG) tube placement  History of tracheostomy      REVIEW OF SYSTEMS:    CONSTITUTIONAL: No fever, weight loss, or fatigue  EYES: No eye pain, visual disturbances, or discharge  ENMT:  No difficulty hearing, tinnitus, vertigo; No sinus or throat pain  NECK: No pain or stiffness  BREASTS: No pain, masses, or nipple discharge  RESPIRATORY: No cough, wheezing, chills or hemoptysis; No shortness of breath  CARDIOVASCULAR: No chest pain, palpitations, dizziness, or leg swelling  GASTROINTESTINAL: No abdominal or epigastric pain. No nausea, vomiting, or hematemesis; No diarrhea or constipation. No melena or hematochezia.  GENITOURINARY: No dysuria, frequency, hematuria, or incontinence  NEUROLOGICAL: No headaches, memory loss, loss of strength, numbness, or tremors  SKIN: No itching, burning, rashes, or lesions   LYMPH NODES: No enlarged glands  ENDOCRINE: No heat or cold intolerance; No hair loss  MUSCULOSKELETAL: No joint pain or swelling; No muscle, back, or extremity pain  PSYCHIATRIC: No depression, anxiety, mood swings, or difficulty sleeping  HEME/LYMPH: No easy bruising, or bleeding gums  ALLERY AND IMMUNOLOGIC: No hives or eczema      MEDICATIONS  (STANDING):  dextrose 5%. 1000 milliLiter(s) (50 mL/Hr) IV Continuous <Continuous>  dextrose 5%. 1000 milliLiter(s) (50 mL/Hr) IV Continuous <Continuous>  dextrose 50% Injectable 12.5 Gram(s) IV Push once  dextrose 50% Injectable 25 Gram(s) IV Push once  dextrose 50% Injectable 25 Gram(s) IV Push once  heparin  Injectable 5000 Unit(s) SubCutaneous every 12 hours  insulin lispro (HumaLOG) corrective regimen sliding scale   SubCutaneous every 6 hours  norepinephrine Infusion 0.05 MICROgram(s)/kG/Min (6.9 mL/Hr) IV Continuous <Continuous>  pantoprazole   Suspension 40 milliGRAM(s) Oral daily  petrolatum Ophthalmic Ointment 1 Application(s) Both EYES daily  vancomycin  IVPB 750 milliGRAM(s) IV Intermittent every 12 hours    MEDICATIONS  (PRN):  acetaminophen    Suspension .. 650 milliGRAM(s) Oral every 6 hours PRN Temp greater or equal to 38C (100.4F)  dextrose 40% Gel 15 Gram(s) Oral once PRN Blood Glucose LESS THAN 70 milliGRAM(s)/deciliter  glucagon  Injectable 1 milliGRAM(s) IntraMuscular once PRN Glucose LESS THAN 70 milligrams/deciliter      Allergies    penicillin (Unknown)    Intolerances        SOCIAL HISTORY:    FAMILY HISTORY:  No pertinent family history in first degree relatives      Vital Signs Last 24 Hrs  T(C): 36.4 (29 May 2019 08:12), Max: 37.3 (28 May 2019 21:31)  T(F): 97.5 (29 May 2019 08:12), Max: 99.2 (28 May 2019 21:31)  HR: 98 (29 May 2019 10:00) (93 - 129)  BP: 127/41 (29 May 2019 10:00) (74/39 - 140/40)  BP(mean): 61 (29 May 2019 10:00) (48 - 92)  RR: 17 (29 May 2019 10:00) (12 - 27)  SpO2: 100% (29 May 2019 10:00) (97% - 100%)    PHYSICAL EXAM:    GENERAL: NAD, well-groomed, well-developed  HEAD:  Atraumatic, Normocephalic  EYES: EOMI, PERRLA, conjunctiva and sclera clear  ENMT: No tonsillar erythema, exudates, or enlargement; Moist mucous membranes, Good dentition, No lesions  NECK: Supple, No JVD, Normal thyroid. s/p permanent tracheostomy  NERVOUS SYSTEM:  Alert & Oriented X3, Good concentration; Motor Strength 5/5 B/L upper and lower extremities; DTRs 2+ intact and symmetric  CHEST/LUNG: Clear to percussion bilaterally; No rales, rhonchi, wheezing, or rubs  HEART: Regular rate and rhythm; No murmurs, rubs, or gallops  ABDOMEN: Soft, Nontender, Nondistended; Bowel sounds present. Peg tube present, cholecystostomy drainage present  EXTREMITIES:  2+ Peripheral Pulses, No clubbing, cyanosis, or edema  LYMPH: No lymphadenopathy noted   SKIN: stage 4 decubitus sacrl area , stage 2 decubitus on the heels.   PICCline present  rt arm      LABS:                        8.8    15.85 )-----------( 226      ( 29 May 2019 06:20 )             30.4         161<HH>  |  130<H>  |  111<H>  ----------------------------<  224<H>  3.6   |  26  |  1.48<H>    Ca    11.8<H>      29 May 2019 06:20  Phos  2.8       Mg     2.6         TPro  6.8  /  Alb  1.5<L>  /  TBili  0.4  /  DBili  x   /  AST  45<H>  /  ALT  68  /  AlkPhos  248<H>      PT/INR - ( 28 May 2019 22:45 )   PT: 14.6 sec;   INR: 1.29 ratio         PTT - ( 28 May 2019 22:45 )  PTT:38.8 sec  Urinalysis Basic - ( 28 May 2019 22:51 )    Color: Yellow / Appearance: Slightly Turbid / S.010 / pH: x  Gluc: x / Ketone: Negative  / Bili: Negative / Urobili: Negative mg/dL   Blood: x / Protein: 100 mg/dL / Nitrite: Negative   Leuk Esterase: Moderate / RBC: 3-5 /HPF / WBC >50   Sq Epi: x / Non Sq Epi: Few / Bacteria: Few        CARDIAC MARKERS ( 29 May 2019 06:20 )  .071 ng/mL / x     / x     / x     / x      CARDIAC MARKERS ( 28 May 2019 22:45 )  .060 ng/mL / x     / 135 U/L / x     / 2.7 ng/mL      RADIOLOGY & ADDITIONAL STUDIES:   < from: Xray Chest 1 View- PORTABLE-Urgent (19 @ 22:53) >  EXAM:  XR CHEST PORTABLE URGENT 1V                            PROCEDURE DATE:  2019          INTERPRETATION:  PROCEDURE: AP view of the chest.    CLINICAL INFORMATION: Hypotension.    COMPARISON: 2018.    FINDINGS:     A tracheostomy tube and right PICC line are noted.    Lungs: There are no lung consolidations. Left base atelectasis is noted.  Heart: The heart is normal in size.  Mediastinum: The mediastinum is within normal limits.    IMPRESSION:    No lung consolidations.    < end of copied text >  < from: US Abdomen Complete (19 @ 00:26) >    EXAM:  US ABDOMINAL COMPLETE                            PROCEDURE DATE:  2019          INTERPRETATION:  CLINICAL INFORMATION: Respiratory distress    COMPARISON: None available.    TECHNIQUE: Sonography of the abdomen.     FINDINGS:    Liver:Within normal limits.    Bile ducts: Normal caliber. Common bile duct measures 5 mm.     Gallbladder: Partially contracted with internal pigtail drainage   catheter.        Pancreas: Visualized portions are within normal limits.    Spleen: 12.1 cm. Within normal limits.    Right kidney: 11.5 cm. No hydronephrosis.    Left kidney: 10.1 cm.  No hydronephrosis.    Ascites: None.    Aorta and IVC: Visualized portions are within normal limits.    IMPRESSION:     Gallbladder partially contracted with internal pigtail drainage catheter.   Normal caliber common bile duct.    < end of copied text >

## 2019-05-29 NOTE — H&P ADULT - NSHPPHYSICALEXAM_GEN_ALL_CORE
Constitutional: cacechtic on vent  HEENT: Head is normocephalic and atraumatic, EOMI b/l, pupils round and reactive to light, erythema  Neck: tracheostomy midline  Respiratory: Breath sounds course b/l respirations on vent  Cardiovascular: Regular rate & rhythm, +S1, S2, TIERRA  Chest: no subQ emphysema or crepitus palpated  Gastrointestinal: Abdomen soft, non-tender, non-distended, no rebound tenderness / guarding, PEG in place, RUQ biliary drain in place  Extremities: moves head to stimuli  Vascular: 1+ radial, femoral, and DP pulses b/l  Neurological: GCS: 5T (3/1/1). Quadriplegic  Skin: multiple skin ulcers: sacral, B/L heel, ankle

## 2019-05-29 NOTE — H&P ADULT - ATTENDING COMMENTS
I have seen and examined the patient. I agree with the above history, physical exam, and plan of care except for as detailed below.    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 STACEY likely combination of pre-renal and ATN. Hypernatremia secondary to dehydration.    Neuro:  - Sedation as needed    Resp:  - Continue mechanical ventilation    CV:  - Fluid bolus  - Wean pressors as tolerated goal MAP >= 65  - Mild troponin elevation likely demand ischemia    Reanl:  - Monitor Cr, avoid nephrotoxins    ID:  - Empiric broad spectrum abx  - Wound care/surgery consults for ulcers    FEN:  - D5w + free water flushes for hypernatremia  - Replete lytes PRN  - Tube feeds    PPx:  - HSC/PPI    Attending critical care time 45 minutes

## 2019-05-30 DIAGNOSIS — E11.9 TYPE 2 DIABETES MELLITUS WITHOUT COMPLICATIONS: ICD-10-CM

## 2019-05-30 LAB
ANION GAP SERPL CALC-SCNC: 4 MMOL/L — LOW (ref 5–17)
BUN SERPL-MCNC: 76 MG/DL — HIGH (ref 7–23)
CALCIUM SERPL-MCNC: 11.7 MG/DL — HIGH (ref 8.5–10.1)
CHLORIDE SERPL-SCNC: 126 MMOL/L — HIGH (ref 96–108)
CO2 SERPL-SCNC: 27 MMOL/L — SIGNIFICANT CHANGE UP (ref 22–31)
CREAT SERPL-MCNC: 1.42 MG/DL — HIGH (ref 0.5–1.3)
CRP SERPL-MCNC: 24.75 MG/DL — HIGH (ref 0–0.4)
ERYTHROCYTE [SEDIMENTATION RATE] IN BLOOD: 119 MM/HR — HIGH (ref 0–20)
GLUCOSE BLDC GLUCOMTR-MCNC: 180 MG/DL — HIGH (ref 70–99)
GLUCOSE BLDC GLUCOMTR-MCNC: 192 MG/DL — HIGH (ref 70–99)
GLUCOSE BLDC GLUCOMTR-MCNC: 197 MG/DL — HIGH (ref 70–99)
GLUCOSE BLDC GLUCOMTR-MCNC: 256 MG/DL — HIGH (ref 70–99)
GLUCOSE BLDC GLUCOMTR-MCNC: 329 MG/DL — HIGH (ref 70–99)
GLUCOSE BLDC GLUCOMTR-MCNC: 345 MG/DL — HIGH (ref 70–99)
GLUCOSE SERPL-MCNC: 324 MG/DL — HIGH (ref 70–99)
HBA1C BLD-MCNC: 7.4 % — HIGH (ref 4–5.6)
HCT VFR BLD CALC: 31.8 % — LOW (ref 39–50)
HGB BLD-MCNC: 9 G/DL — LOW (ref 13–17)
MAGNESIUM SERPL-MCNC: 2.8 MG/DL — HIGH (ref 1.6–2.6)
MCHC RBC-ENTMCNC: 28.3 GM/DL — LOW (ref 32–36)
MCHC RBC-ENTMCNC: 29.4 PG — SIGNIFICANT CHANGE UP (ref 27–34)
MCV RBC AUTO: 103.9 FL — HIGH (ref 80–100)
NRBC # BLD: 0 /100 WBCS — SIGNIFICANT CHANGE UP (ref 0–0)
PHOSPHATE SERPL-MCNC: 2.1 MG/DL — LOW (ref 2.5–4.5)
PLATELET # BLD AUTO: 230 K/UL — SIGNIFICANT CHANGE UP (ref 150–400)
POTASSIUM SERPL-MCNC: 3.2 MMOL/L — LOW (ref 3.5–5.3)
POTASSIUM SERPL-SCNC: 3.2 MMOL/L — LOW (ref 3.5–5.3)
RBC # BLD: 3.06 M/UL — LOW (ref 4.2–5.8)
RBC # FLD: 15.8 % — HIGH (ref 10.3–14.5)
SODIUM SERPL-SCNC: 157 MMOL/L — HIGH (ref 135–145)
VANCOMYCIN TROUGH SERPL-MCNC: 18 UG/ML — SIGNIFICANT CHANGE UP (ref 10–20)
WBC # BLD: 12.25 K/UL — HIGH (ref 3.8–10.5)
WBC # FLD AUTO: 12.25 K/UL — HIGH (ref 3.8–10.5)

## 2019-05-30 PROCEDURE — 71250 CT THORAX DX C-: CPT | Mod: 26

## 2019-05-30 PROCEDURE — 74176 CT ABD & PELVIS W/O CONTRAST: CPT | Mod: 26

## 2019-05-30 PROCEDURE — 99291 CRITICAL CARE FIRST HOUR: CPT

## 2019-05-30 RX ORDER — POTASSIUM CHLORIDE 20 MEQ
40 PACKET (EA) ORAL ONCE
Refills: 0 | Status: COMPLETED | OUTPATIENT
Start: 2019-05-30 | End: 2019-05-30

## 2019-05-30 RX ORDER — NOREPINEPHRINE BITARTRATE/D5W 8 MG/250ML
0.06 PLASTIC BAG, INJECTION (ML) INTRAVENOUS
Qty: 8 | Refills: 0 | Status: DISCONTINUED | OUTPATIENT
Start: 2019-05-30 | End: 2019-06-05

## 2019-05-30 RX ORDER — POTASSIUM PHOSPHATE, MONOBASIC POTASSIUM PHOSPHATE, DIBASIC 236; 224 MG/ML; MG/ML
15 INJECTION, SOLUTION INTRAVENOUS ONCE
Refills: 0 | Status: COMPLETED | OUTPATIENT
Start: 2019-05-30 | End: 2019-05-30

## 2019-05-30 RX ORDER — MIDODRINE HYDROCHLORIDE 2.5 MG/1
10 TABLET ORAL EVERY 8 HOURS
Refills: 0 | Status: DISCONTINUED | OUTPATIENT
Start: 2019-05-30 | End: 2019-06-01

## 2019-05-30 RX ORDER — INSULIN HUMAN 100 [IU]/ML
10 INJECTION, SOLUTION SUBCUTANEOUS ONCE
Refills: 0 | Status: COMPLETED | OUTPATIENT
Start: 2019-05-30 | End: 2019-05-30

## 2019-05-30 RX ORDER — INSULIN LISPRO 100/ML
VIAL (ML) SUBCUTANEOUS EVERY 4 HOURS
Refills: 0 | Status: DISCONTINUED | OUTPATIENT
Start: 2019-05-30 | End: 2019-06-02

## 2019-05-30 RX ADMIN — PANTOPRAZOLE SODIUM 40 MILLIGRAM(S): 20 TABLET, DELAYED RELEASE ORAL at 12:01

## 2019-05-30 RX ADMIN — INSULIN GLARGINE 40 UNIT(S): 100 INJECTION, SOLUTION SUBCUTANEOUS at 08:28

## 2019-05-30 RX ADMIN — Medication 1 APPLICATION(S): at 12:02

## 2019-05-30 RX ADMIN — MIDODRINE HYDROCHLORIDE 10 MILLIGRAM(S): 2.5 TABLET ORAL at 14:52

## 2019-05-30 RX ADMIN — POTASSIUM PHOSPHATE, MONOBASIC POTASSIUM PHOSPHATE, DIBASIC 62.5 MILLIMOLE(S): 236; 224 INJECTION, SOLUTION INTRAVENOUS at 06:17

## 2019-05-30 RX ADMIN — SODIUM CHLORIDE 75 MILLILITER(S): 9 INJECTION, SOLUTION INTRAVENOUS at 14:53

## 2019-05-30 RX ADMIN — Medication 2: at 17:25

## 2019-05-30 RX ADMIN — Medication 3 MILLILITER(S): at 11:27

## 2019-05-30 RX ADMIN — Medication 6.9 MICROGRAM(S)/KG/MIN: at 07:12

## 2019-05-30 RX ADMIN — HEPARIN SODIUM 5000 UNIT(S): 5000 INJECTION INTRAVENOUS; SUBCUTANEOUS at 17:26

## 2019-05-30 RX ADMIN — CEFEPIME 100 MILLIGRAM(S): 1 INJECTION, POWDER, FOR SOLUTION INTRAMUSCULAR; INTRAVENOUS at 05:18

## 2019-05-30 RX ADMIN — Medication 3 MILLILITER(S): at 05:35

## 2019-05-30 RX ADMIN — HEPARIN SODIUM 5000 UNIT(S): 5000 INJECTION INTRAVENOUS; SUBCUTANEOUS at 05:18

## 2019-05-30 RX ADMIN — Medication 250 MILLIGRAM(S): at 12:01

## 2019-05-30 RX ADMIN — Medication 3 MILLILITER(S): at 17:21

## 2019-05-30 RX ADMIN — Medication 2: at 21:42

## 2019-05-30 RX ADMIN — Medication 40 MILLIEQUIVALENT(S): at 06:17

## 2019-05-30 RX ADMIN — FENTANYL CITRATE 1 PATCH: 50 INJECTION INTRAVENOUS at 08:25

## 2019-05-30 RX ADMIN — Medication 8: at 00:52

## 2019-05-30 RX ADMIN — CEFEPIME 100 MILLIGRAM(S): 1 INJECTION, POWDER, FOR SOLUTION INTRAMUSCULAR; INTRAVENOUS at 21:42

## 2019-05-30 RX ADMIN — MIDODRINE HYDROCHLORIDE 10 MILLIGRAM(S): 2.5 TABLET ORAL at 21:42

## 2019-05-30 RX ADMIN — Medication 2: at 14:52

## 2019-05-30 RX ADMIN — Medication 250 MILLIGRAM(S): at 00:51

## 2019-05-30 RX ADMIN — Medication 6: at 09:44

## 2019-05-30 RX ADMIN — SODIUM CHLORIDE 75 MILLILITER(S): 9 INJECTION, SOLUTION INTRAVENOUS at 00:52

## 2019-05-30 RX ADMIN — Medication 6.9 MICROGRAM(S)/KG/MIN: at 09:12

## 2019-05-30 RX ADMIN — Medication 3 MILLILITER(S): at 00:01

## 2019-05-30 RX ADMIN — INSULIN HUMAN 10 UNIT(S): 100 INJECTION, SOLUTION SUBCUTANEOUS at 08:38

## 2019-05-30 RX ADMIN — CEFEPIME 100 MILLIGRAM(S): 1 INJECTION, POWDER, FOR SOLUTION INTRAMUSCULAR; INTRAVENOUS at 14:52

## 2019-05-30 RX ADMIN — FENTANYL CITRATE 1 PATCH: 50 INJECTION INTRAVENOUS at 19:49

## 2019-05-30 RX ADMIN — Medication 250 MILLIGRAM(S): at 23:24

## 2019-05-30 RX ADMIN — Medication 8: at 05:49

## 2019-05-30 NOTE — PROGRESS NOTE ADULT - ASSESSMENT
hypernatremia   rocco  Hypercalcemia   sepsis  quadriplegia with tracheostomy and on the vent  Gastrosomy status

## 2019-05-30 NOTE — DIETITIAN INITIAL EVALUATION ADULT. - PERTINENT LABORATORY DATA
05-30 Na 157   Glu 324   K+ 3/2   Cr 1.42   BUN 76   Phos 2.1   Alb 1.5(5/29)   PAB n/a   Hgb 9.0   Hct 31.8   HgA1C 7.4 %<H>  Glucose, Serum: 324 mg/dL <H>, WBC=12.25(5/30), RQKY=888, 329, 345, WBC12.25(5/20)

## 2019-05-30 NOTE — DIETITIAN INITIAL EVALUATION ADULT. - DIET TYPE
Glucerna 1.2 @ 30ml/hr to goal rate 55ml/jh=2659bo, 1584kcal & 73gm protein (5/29/19)/NPO with tube feedings Glucerna 1.2 @ 30ml/hr to goal rate 55ml/dj=0027dk, 1584kcal & 73gm protein (5/29/19) Free water 250ml q4h via GT/NPO with tube feedings

## 2019-05-30 NOTE — CONSULT NOTE ADULT - SUBJECTIVE AND OBJECTIVE BOX
Vascular Attendin-3-1  the pt is seen and evaluated by me in ICU for sacral and ischial pressure ulcers. 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  NH report pt BUN & Cr incr, EMS report pt hypotensive on their arrival.  On arrival to ED, removed two 12microgram fentanyl patches (5/10/19 & 19)    In ED received 2200ml saline bolus. (29 May 2019 05:43)      PAST MEDICAL & SURGICAL HISTORY:  DM (diabetes mellitus)  Heart failure  Diabetic neuropathy  Hyperlipidemia  Quadriplegia  Hypertension  Stroke  Hypertension  Obstructive cardiomyopathy  S/P percutaneous endoscopic gastrostomy (PEG) tube placement  History of tracheostomy        MEDICATIONS  (STANDING):  ALBUTerol    90 MICROgram(s) HFA Inhaler 1 Puff(s) Inhalation every 4 hours  ALBUTerol/ipratropium for Nebulization 3 milliLiter(s) Nebulizer every 6 hours  cefepime   IVPB      cefepime   IVPB 1000 milliGRAM(s) IV Intermittent every 8 hours  dextrose 5%. 1000 milliLiter(s) (75 mL/Hr) IV Continuous <Continuous>  dextrose 5%. 1000 milliLiter(s) (50 mL/Hr) IV Continuous <Continuous>  dextrose 50% Injectable 12.5 Gram(s) IV Push once  dextrose 50% Injectable 25 Gram(s) IV Push once  dextrose 50% Injectable 25 Gram(s) IV Push once  fentaNYL   Patch  12 MICROgram(s)/Hr 1 Patch Transdermal every 72 hours  heparin  Injectable 5000 Unit(s) SubCutaneous every 12 hours  insulin glargine Injectable (LANTUS) 40 Unit(s) SubCutaneous every morning  insulin lispro (HumaLOG) corrective regimen sliding scale   SubCutaneous every 4 hours  midodrine 10 milliGRAM(s) Oral every 8 hours  norepinephrine Infusion 0.06 MICROgram(s)/kG/Min (6.9 mL/Hr) IV Continuous <Continuous>  pantoprazole   Suspension 40 milliGRAM(s) Oral daily  petrolatum Ophthalmic Ointment 1 Application(s) Both EYES daily  tiotropium 18 MICROgram(s) Capsule 1 Capsule(s) Inhalation daily  vancomycin  IVPB 750 milliGRAM(s) IV Intermittent every 12 hours    MEDICATIONS  (PRN):  acetaminophen    Suspension .. 650 milliGRAM(s) Oral every 6 hours PRN Temp greater or equal to 38C (100.4F)  dextrose 40% Gel 15 Gram(s) Oral once PRN Blood Glucose LESS THAN 70 milliGRAM(s)/deciliter  glucagon  Injectable 1 milliGRAM(s) IntraMuscular once PRN Glucose LESS THAN 70 milligrams/deciliter      Allergies    penicillin (Unknown)    Intolerances        SOCIAL HISTORY:      Vital Signs Last 24 Hrs  T(C): 37.2 (30 May 2019 11:00), Max: 37.2 (30 May 2019 11:00)  T(F): 99 (30 May 2019 11:00), Max: 99 (30 May 2019 11:00)  HR: 101 (30 May 2019 13:00) (85 - 106)  BP: 111/40 (30 May 2019 13:00) (104/43 - 140/50)  BP(mean): 57 (30 May 2019 13:00) (55 - 88)  RR: 19 (30 May 2019 13:00) (12 - 21)  SpO2: 100% (30 May 2019 13:00) (99% - 100%)    P/E:- on vent throughj Trach, not awake,   CAROTIDS:- Bilateral carotids with no Bruits. No scars of previous catheterisation.  UPPER EXTREMITIES:- Bilateral radial artery pulses are normal and no ischemia of the Hands. No edema of the arms.    Wounds:- The pt has following wounds and measurements.  Sacrum:- Stage 4 , 10x8x2 cms with some residual necrotic slough and necrotic fascia, and muscles, the ulcers appears to have been debrided.  Ischial left--- stage 4 with necrotic slough,,residual, will benefit from debridement,  The Ankle and foot wounds are not evaluated by me, they were by podiatry service this AM.    LABS:                        9.0    12.25 )-----------( 230      ( 30 May 2019 04:15 )             31.8     05-30    157<H>  |  126<H>  |  76<H>  ----------------------------<  324<H>  3.2<L>   |  27  |  1.42<H>    Ca    11.7<H>      30 May 2019 04:15  Phos  2.1     05-30  Mg     2.8     05-30    TPro  6.8  /  Alb  1.5<L>  /  TBili  0.4  /  DBili  x   /  AST  45<H>  /  ALT  68  /  AlkPhos  248<H>  05-    PT/INR - ( 28 May 2019 22:45 )   PT: 14.6 sec;   INR: 1.29 ratio         PTT - ( 28 May 2019 22:45 )  PTT:38.8 sec  Urinalysis Basic - ( 28 May 2019 22:51 )    Color: Yellow / Appearance: Slightly Turbid / S.010 / pH: x  Gluc: x / Ketone: Negative  / Bili: Negative / Urobili: Negative mg/dL   Blood: x / Protein: 100 mg/dL / Nitrite: Negative   Leuk Esterase: Moderate / RBC: 3-5 /HPF / WBC >50   Sq Epi: x / Non Sq Epi: Few / Bacteria: Few        RADIOLOGY & ADDITIONAL STUDIES    Impression and Plan: Patient has stage 4 sacral and left Ishial ulcers that can benefit from some debridement. I made up consent and spoke with betty Orozco and who will give the consent on phone. Pts overall prognosis is guarded and the healing potential of the sacral ulcer is poor.

## 2019-05-30 NOTE — DIETITIAN INITIAL EVALUATION ADULT. - ETIOLOGY
Increased energy & protein needs related to multiple pressure ulcers & chronic respiratory failure on vent support

## 2019-05-30 NOTE — CONSULT NOTE ADULT - ATTENDING COMMENTS
Poor prognosis  Recommending palliative care  Goal: Keep ulcers as stable as possible but right foot needs to be amputated to prevent further use of long term antibiotics

## 2019-05-30 NOTE — PROGRESS NOTE ADULT - ASSESSMENT
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 STACEY likely combination of pre-renal and ATN. Hypernatremia secondary to dehydration.    Neuro:  - Sedation as needed    Resp:  - Continue mechanical ventilation    CV:  - Wean pressors as tolerated goal MAP >= 65  - Start midodrine 10mg q 8hrs  - Mild troponin elevation likely demand ischemia    Reanl:  - Monitor Cr, avoid nephrotoxins    ID:  - Empiric broad spectrum abx  - Wound care/Vascular consult for ulcers    FEN: Mild-Moderate hypercalcemia (bone breakdown vs malignancy)  - D5w + free water flushes for hypernatremia  - Replete lytes PRN  - Trend calcium, may require bisphosphonate if no improvement, check PTH, PTHrP  - Tube feeds    PPx:  - HSC/PPI    Attending critical care time 45 minutes

## 2019-05-30 NOTE — DIETITIAN INITIAL EVALUATION ADULT. - ADHERENCE
GT Feeding Perative 1.3 1500ml @ 85ml/hr & free water 150ml ac & pc feeding @ 5 [facility]/n/a

## 2019-05-30 NOTE — CHART NOTE - NSCHARTNOTEFT_GEN_A_CORE
Upon Nutritional Assessment by the Registered Dietitian your patient was determined to meet criteria / has evidence of the following diagnosis/diagnoses:          [x ]  Mild Protein Calorie Malnutrition        [ ]  Moderate Protein Calorie Malnutrition        [ ] Severe Protein Calorie Malnutrition        [ ] Unspecified Protein Calorie Malnutrition        [ ] Underweight / BMI <19        [ ] Morbid Obesity / BMI > 40      Findings as based on:  •  Comprehensive nutrition assessment and consultation  •  Calorie counts (nutrient intake analysis)  •  Food acceptance and intake status from observations by staff  •  Follow up  •  Patient education  •  Intervention secondary to interdisciplinary rounds  •   concerns      Treatment:    The following diet has been recommended:  Glucerna 1.5 @ 30ml/hr to goal rate 55ml/hr via YH=[id number], 1980kcal & 109gm protein    PROVIDER Section:     By signing this assessment you are acknowledging and agree with the diagnosis/diagnoses assigned by the Registered Dietitian    Comments:

## 2019-05-30 NOTE — DIETITIAN INITIAL EVALUATION ADULT. - NS AS NUTRI INTERV ENTERAL NUTRITION
Schedule/Concentration/Rate/Volume/Recommend Glucerna 1.5 @ 30ml/hr to goal rate 55ml/nu=5044us, 1980kcal & 109gm protein/Composition/Route Recommend Glucerna 1.5 @ 30ml/hr to goal rate 55ml/hr via PG=[id number], 1980kcal & 109gm protein/Composition/Concentration/Rate/Volume/Route/Schedule

## 2019-05-30 NOTE — DIETITIAN INITIAL EVALUATION ADULT. - PERTINENT MEDS FT
acetaminophen    Suspension .. PRN  ALBUTerol    90 MICROgram(s) HFA Inhaler  ALBUTerol/ipratropium for Nebulization  cefepime   IVPB  cefepime   IVPB  dextrose 40% Gel PRN  dextrose 5%.  dextrose 5%.  dextrose 50% Injectable  dextrose 50% Injectable  dextrose 50% Injectable  fentaNYL   Patch  12 MICROgram(s)/Hr  glucagon  Injectable PRN  heparin  Injectable  insulin glargine Injectable (LANTUS)  insulin lispro (HumaLOG) corrective regimen sliding scale  midodrine  norepinephrine Infusion  pantoprazole   Suspension  petrolatum Ophthalmic Ointment  tiotropium 18 MICROgram(s) Capsule  vancomycin  IVPB

## 2019-05-30 NOTE — PROGRESS NOTE ADULT - SUBJECTIVE AND OBJECTIVE BOX
Patient is a 73y old  Male who presents with a chief complaint of Sepsis (30 May 2019 08:56)  non verbal, tracheostomy , onthe vent.   quadriplegic  Electrolyte imbalance.   T max. 100    INTERVAL HPI/OVERNIGHT EVENTS:  PAST MEDICAL & SURGICAL HISTORY:  DM (diabetes mellitus)  Heart failure  Diabetic neuropathy  Hyperlipidemia  Quadriplegia  Hypertension  Stroke  Hypertension  Obstructive cardiomyopathy  S/P percutaneous endoscopic gastrostomy (PEG) tube placement  History of tracheostomy      MEDICATIONS  (STANDING):  ALBUTerol    90 MICROgram(s) HFA Inhaler 1 Puff(s) Inhalation every 4 hours  ALBUTerol/ipratropium for Nebulization 3 milliLiter(s) Nebulizer every 6 hours  cefepime   IVPB      cefepime   IVPB 1000 milliGRAM(s) IV Intermittent every 8 hours  dextrose 5%. 1000 milliLiter(s) (75 mL/Hr) IV Continuous <Continuous>  dextrose 5%. 1000 milliLiter(s) (50 mL/Hr) IV Continuous <Continuous>  dextrose 50% Injectable 12.5 Gram(s) IV Push once  dextrose 50% Injectable 25 Gram(s) IV Push once  dextrose 50% Injectable 25 Gram(s) IV Push once  fentaNYL   Patch  12 MICROgram(s)/Hr 1 Patch Transdermal every 72 hours  heparin  Injectable 5000 Unit(s) SubCutaneous every 12 hours  insulin glargine Injectable (LANTUS) 40 Unit(s) SubCutaneous every morning  insulin lispro (HumaLOG) corrective regimen sliding scale   SubCutaneous every 4 hours  midodrine 10 milliGRAM(s) Oral every 8 hours  norepinephrine Infusion 0.06 MICROgram(s)/kG/Min (6.9 mL/Hr) IV Continuous <Continuous>  pantoprazole   Suspension 40 milliGRAM(s) Oral daily  petrolatum Ophthalmic Ointment 1 Application(s) Both EYES daily  tiotropium 18 MICROgram(s) Capsule 1 Capsule(s) Inhalation daily  vancomycin  IVPB 750 milliGRAM(s) IV Intermittent every 12 hours    MEDICATIONS  (PRN):  acetaminophen    Suspension .. 650 milliGRAM(s) Oral every 6 hours PRN Temp greater or equal to 38C (100.4F)  dextrose 40% Gel 15 Gram(s) Oral once PRN Blood Glucose LESS THAN 70 milliGRAM(s)/deciliter  glucagon  Injectable 1 milliGRAM(s) IntraMuscular once PRN Glucose LESS THAN 70 milligrams/deciliter      Allergies    penicillin (Unknown)    Intolerances        REVIEW OF SYSTEMS:  CONSTITUTIONAL: No fever, weight loss, or fatigue  EYES: No eye pain, visual disturbances, or discharge  ENMT:  No difficulty hearing, tinnitus, vertigo; No sinus or throat pain  NECK: No pain or stiffness  BREASTS: No pain, masses, or nipple discharge  RESPIRATORY: No cough, wheezing, chills or hemoptysis; No shortness of breath  CARDIOVASCULAR: No chest pain, palpitations, dizziness, or leg swelling  GASTROINTESTINAL: No abdominal or epigastric pain. No nausea, vomiting, or hematemesis; No diarrhea or constipation. No melena or hematochezia.  GENITOURINARY: No dysuria, frequency, hematuria, or incontinence  NEUROLOGICAL: No headaches, memory loss, loss of strength, numbness, or tremors  SKIN: No itching, burning, rashes, or lesions   LYMPH NODES: No enlarged glands  ENDOCRINE: No heat or cold intolerance; No hair loss  MUSCULOSKELETAL: No joint pain or swelling; No muscle, back, or extremity pain  PSYCHIATRIC: No depression, anxiety, mood swings, or difficulty sleeping  HEME/LYMPH: No easy bruising, or bleeding gums  ALLERY AND IMMUNOLOGIC: No hives or eczema    Vital Signs Last 24 Hrs  T(C): 37.1 (30 May 2019 08:00), Max: 37.1 (30 May 2019 08:00)  T(F): 98.7 (30 May 2019 08:00), Max: 98.7 (30 May 2019 08:00)  HR: 99 (30 May 2019 09:00) (85 - 106)  BP: 123/44 (30 May 2019 09:00) (104/43 - 140/50)  BP(mean): 58 (30 May 2019 09:00) (55 - 88)  RR: 17 (30 May 2019 09:00) (12 - 21)  SpO2: 100% (30 May 2019 09:00) (99% - 100%)    PHYSICAL EXAM:  GENERAL: NAD, well-groomed, well-developed  HEAD:  Atraumatic, Normocephalic  EYES: EOMI, PERRLA, conjunctiva and sclera clear  ENMT: No tonsillar erythema, exudates, or enlargement; Moist mucous membranes, Good dentition, No lesions  NECK: Supple, No JVD, Normal thyroid. tracheotomy connected to vent  NERVOUS SYSTEM:  sedated, , quadriplegic  CHEST/LUNG: Clear to percussion bilaterally; No rales, rhonchi, wheezing, or rubs  HEART: Regular rate and rhythm; No murmurs, rubs, or gallops  ABDOMEN: Soft, Nontender, Nondistended; Bowel sounds present. peg tube present  EXTREMITIES:  2+ Peripheral Pulses, No clubbing, cyanosis, or edema  LYMPH: No lymphadenopathy noted  SKIN: Multiple decubiti, including stage 4 in sacral area    LABS:                        9.0    12.25 )-----------( 230      ( 30 May 2019 04:15 )             31.8     05-30    157<H>  |  126<H>  |  76<H>  ----------------------------<  324<H>  3.2<L>   |  27  |  1.42<H>    Ca    11.7<H>      30 May 2019 04:15  Phos  2.1     05-30  Mg     2.8     30    TPro  6.8  /  Alb  1.5<L>  /  TBili  0.4  /  DBili  x   /  AST  45<H>  /  ALT  68  /  AlkPhos  248<H>        PT/INR - ( 28 May 2019 22:45 )   PT: 14.6 sec;   INR: 1.29 ratio         PTT - ( 28 May 2019 22:45 )  PTT:38.8 sec  Urinalysis Basic - ( 28 May 2019 22:51 )    Color: Yellow / Appearance: Slightly Turbid / S.010 / pH: x  Gluc: x / Ketone: Negative  / Bili: Negative / Urobili: Negative mg/dL   Blood: x / Protein: 100 mg/dL / Nitrite: Negative   Leuk Esterase: Moderate / RBC: 3-5 /HPF / WBC >50   Sq Epi: x / Non Sq Epi: Few / Bacteria: Few      CAPILLARY BLOOD GLUCOSE      POCT Blood Glucose.: 256 mg/dL (30 May 2019 09:36)  POCT Blood Glucose.: 329 mg/dL (30 May 2019 05:26)  POCT Blood Glucose.: 345 mg/dL (30 May 2019 00:43)  POCT Blood Glucose.: 375 mg/dL (29 May 2019 17:34)  POCT Blood Glucose.: 276 mg/dL (29 May 2019 11:17)    ABG - ( 29 May 2019 14:19 )  pH, Arterial: x     pH, Blood: 7.45  /  pCO2: 37    /  pO2: 139   / HCO3: 26    / Base Excess: 2.2   /  SaO2: 99                CARDIAC MARKERS ( 29 May 2019 06:20 )  .071 ng/mL / x     / x     / x     / x      CARDIAC MARKERS ( 28 May 2019 22:45 )  .060 ng/mL / x     / 135 U/L / x     / 2.7 ng/mL      Hemoglobin A1C, Whole Blood: 7.4 % ( @ 08:57)        RADIOLOGY & ADDITIONAL TESTS:    Imaging Personally Reviewed:  [ ] YES  [ ] NO    Consultant(s) Notes Reviewed:  [ ] YES  [ ] NO    Care Discussed with Consultants/Other Providers [ ] YES  [ ] NO    Care discussed with family,         [  ]   yes  [  ]  No    imp:    stable[ ]    unstable[  ]     improving [ x  ]       unchanged  [  ]                Plans:  Continue present plans  [x  ] as per critical care               New consult [  ]   specialty  .......               order test[  ]    test name.                  Discharge Planning  [  ]

## 2019-05-30 NOTE — CONSULT NOTE ADULT - SUBJECTIVE AND OBJECTIVE BOX
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  NH report pt BUN & Cr incr, EMS report pt hypotensive on their arrival.  On arrival to ED, removed two 12microgram fentanyl patches (5/10/19 & 5/27/19)    In ED received 2200ml saline bolus. (29 May 2019 05:43)      PAST MEDICAL & SURGICAL HISTORY:  DM (diabetes mellitus)  Heart failure  Diabetic neuropathy  Hyperlipidemia  Quadriplegia  Hypertension  Stroke  Hypertension  Obstructive cardiomyopathy  S/P percutaneous endoscopic gastrostomy (PEG) tube placement  History of tracheostomy      MEDICATIONS  (STANDING):  ALBUTerol    90 MICROgram(s) HFA Inhaler 1 Puff(s) Inhalation every 4 hours  ALBUTerol/ipratropium for Nebulization 3 milliLiter(s) Nebulizer every 6 hours  cefepime   IVPB      cefepime   IVPB 1000 milliGRAM(s) IV Intermittent every 8 hours  dextrose 5%. 1000 milliLiter(s) (75 mL/Hr) IV Continuous <Continuous>  dextrose 5%. 1000 milliLiter(s) (50 mL/Hr) IV Continuous <Continuous>  dextrose 50% Injectable 12.5 Gram(s) IV Push once  dextrose 50% Injectable 25 Gram(s) IV Push once  dextrose 50% Injectable 25 Gram(s) IV Push once  fentaNYL   Patch  12 MICROgram(s)/Hr 1 Patch Transdermal every 72 hours  heparin  Injectable 5000 Unit(s) SubCutaneous every 12 hours  insulin glargine Injectable (LANTUS) 40 Unit(s) SubCutaneous every morning  insulin lispro (HumaLOG) corrective regimen sliding scale   SubCutaneous every 4 hours  midodrine 10 milliGRAM(s) Oral every 8 hours  norepinephrine Infusion 0.06 MICROgram(s)/kG/Min (6.9 mL/Hr) IV Continuous <Continuous>  pantoprazole   Suspension 40 milliGRAM(s) Oral daily  petrolatum Ophthalmic Ointment 1 Application(s) Both EYES daily  tiotropium 18 MICROgram(s) Capsule 1 Capsule(s) Inhalation daily  vancomycin  IVPB 750 milliGRAM(s) IV Intermittent every 12 hours    MEDICATIONS  (PRN):  acetaminophen    Suspension .. 650 milliGRAM(s) Oral every 6 hours PRN Temp greater or equal to 38C (100.4F)  dextrose 40% Gel 15 Gram(s) Oral once PRN Blood Glucose LESS THAN 70 milliGRAM(s)/deciliter  glucagon  Injectable 1 milliGRAM(s) IntraMuscular once PRN Glucose LESS THAN 70 milligrams/deciliter      Allergies    penicillin (Unknown)    Intolerances        VITALS:    Vital Signs Last 24 Hrs  T(C): 37.2 (30 May 2019 11:00), Max: 37.2 (30 May 2019 11:00)  T(F): 99 (30 May 2019 11:00), Max: 99 (30 May 2019 11:00)  HR: 100 (30 May 2019 14:47) (85 - 106)  BP: 124/51 (30 May 2019 14:47) (104/43 - 140/50)  BP(mean): 63 (30 May 2019 14:47) (55 - 88)  RR: 16 (30 May 2019 14:47) (12 - 21)  SpO2: 100% (30 May 2019 14:47) (99% - 100%)    LABS:                          9.0    12.25 )-----------( 230      ( 30 May 2019 04:15 )             31.8       05-30    157<H>  |  126<H>  |  76<H>  ----------------------------<  324<H>  3.2<L>   |  27  |  1.42<H>    Ca    11.7<H>      30 May 2019 04:15  Phos  2.1     05-30  Mg     2.8     05-30    TPro  6.8  /  Alb  1.5<L>  /  TBili  0.4  /  DBili  x   /  AST  45<H>  /  ALT  68  /  AlkPhos  248<H>  05-29      CAPILLARY BLOOD GLUCOSE      POCT Blood Glucose.: 192 mg/dL (30 May 2019 14:30)  POCT Blood Glucose.: 256 mg/dL (30 May 2019 09:36)  POCT Blood Glucose.: 329 mg/dL (30 May 2019 05:26)  POCT Blood Glucose.: 345 mg/dL (30 May 2019 00:43)  POCT Blood Glucose.: 375 mg/dL (29 May 2019 17:34)      PT/INR - ( 28 May 2019 22:45 )   PT: 14.6 sec;   INR: 1.29 ratio         PTT - ( 28 May 2019 22:45 )  PTT:38.8 sec    LOWER EXTREMITY PHYSICAL EXAM:    Vascular: DP/PT 2/4, B/L, CFT <3 seconds B/L, Temperature gradient warm to cool, B/L.   Neuro: Epicritic sensation inact to the level of toes, B/L.  Musculoskeletal/Ortho: Quadriplegic   Skin: RF medial ankle wound with ankle joint exposed and surrounding fibrotic and necrotic tissue measuring 5 cm X 4 cm X 2 cm, no malodor or purulence expressed. B/l  necrotic heel eschars well adhered, LF superficial anterior ankle wound with no signs of infection       RADIOLOGY & ADDITIONAL STUDIES:

## 2019-05-30 NOTE — DIETITIAN INITIAL EVALUATION ADULT. - PHYSICAL APPEARANCE
underweight/debilitated/BMI=20.2; +2 gen edema & +3 edema Rt hand; Nutrition focused physical exam conducted; Subcutaneous fat loss: [WNL ] Orbital fat pads region, [Mild ]Buccal fat region, [Mild ]Triceps region,  [Mild ]Ribs region.  Muscle wasting: [Mild ]Temples region, [WNL ]Clavicle region, [WNL ]Shoulder region, [WNL ]Scapula region, [Rt hand edematous & Lt hand WNL ]Interosseous region,  [WNL ]thigh region, [WNL ]Calf region

## 2019-05-30 NOTE — PROGRESS NOTE ADULT - SUBJECTIVE AND OBJECTIVE BOX
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  NH report pt BUN & Cr incr, EMS report pt hypotensive on their arrival.  On arrival to ED, removed two 12microgram fentanyl patches (5/10/19 & 19)    In ED received 2200ml saline bolus. (29 May 2019 05:43)      24 hr events: No acute events. Remains on pressors. Bedside debridement of some wounds. Unable to provide history.    ## ROS:  [x ] unable to obtain    ## Vitals  ICU Vital Signs Last 24 Hrs  T(C): 37.1 (30 May 2019 08:00), Max: 37.1 (30 May 2019 08:00)  T(F): 98.7 (30 May 2019 08:00), Max: 98.7 (30 May 2019 08:00)  HR: 99 (30 May 2019 08:41) (85 - 106)  BP: 122/43 (30 May 2019 08:30) (104/43 - 140/50)  BP(mean): 61 (30 May 2019 08:30) (55 - 88)  ABP: --  ABP(mean): --  RR: 16 (30 May 2019 08:30) (12 - 21)  SpO2: 100% (30 May 2019 08:46) (99% - 100%)      ## Physical Exam:  Gen: Elderly male lying in bed, NAD, trached on vent  HEENT: NC/AT, sclerae anicteric, trach in place  Resp: Mechanical breath sounds b/l, overbreathing vent  CV: S1, S2  Abd: Soft, + BS  Ext: WWP  Neuro: Awake, does not follow commands, does not move extremities  Skin: Numerous tunneling ulcers    ## Vent Data  Mode: AC/ CMV (Assist Control/ Continuous Mandatory Ventilation)  RR (machine): 12  TV (machine): 500  FiO2: 30  PEEP: 5  ITime: 1  MAP: 10  PIP: 15      ## Labs:  Chem:  05-    157<H>  |  126<H>  |  76<H>  ----------------------------<  324<H>  3.2<L>   |  27  |  1.42<H>    Ca    11.7<H>      30 May 2019 04:15  Phos  2.1     05-30  Mg     2.8         TPro  6.8  /  Alb  1.5<L>  /  TBili  0.4  /  DBili  x   /  AST  45<H>  /  ALT  68  /  AlkPhos  248<H>      LIVER FUNCTIONS - ( 29 May 2019 06:20 )  Alb: 1.5 g/dL / Pro: 6.8 gm/dL / ALK PHOS: 248 U/L / ALT: 68 U/L / AST: 45 U/L / GGT: x           CBC:                        9.0    12.25 )-----------( 230      ( 30 May 2019 04:15 )             31.8     Coags:  PT/INR - ( 28 May 2019 22:45 )   PT: 14.6 sec;   INR: 1.29 ratio         PTT - ( 28 May 2019 22:45 )  PTT:38.8 sec    Urinalysis Basic - ( 28 May 2019 22:51 )    Color: Yellow / Appearance: Slightly Turbid / S.010 / pH: x  Gluc: x / Ketone: Negative  / Bili: Negative / Urobili: Negative mg/dL   Blood: x / Protein: 100 mg/dL / Nitrite: Negative   Leuk Esterase: Moderate / RBC: 3-5 /HPF / WBC >50   Sq Epi: x / Non Sq Epi: Few / Bacteria: Few        ## Cardiac  CARDIAC MARKERS ( 29 May 2019 06:20 )  .071 ng/mL / x     / x     / x     / x      CARDIAC MARKERS ( 28 May 2019 22:45 )  .060 ng/mL / x     / 135 U/L / x     / 2.7 ng/mL        ## Blood Gas  ABG - ( 29 May 2019 14:19 )  pH, Arterial: x     pH, Blood: 7.45  /  pCO2: 37    /  pO2: 139   / HCO3: 26    / Base Excess: 2.2   /  SaO2: 99                  #I/Os  I&O's Detail    29 May 2019 07:01  -  30 May 2019 07:00  --------------------------------------------------------  IN:    dextrose 5%.: 400 mL    dextrose 5%.: 1050 mL    Free Water: 1500 mL    Glucerna: 645 mL    IV PiggyBack: 300 mL    Lactated Ringers IV Bolus: 1000 mL    lactated ringers.: 100 mL    norepinephrine Infusion: 515.2 mL    Vital HN: 200 mL  Total IN: 5710.2 mL    OUT:    Bulb: 10 mL    Indwelling Catheter - Urethral: 3050 mL  Total OUT: 3060 mL    Total NET: 2650.2 mL      30 May 2019 07:  -  30 May 2019 08:56  --------------------------------------------------------  IN:    dextrose 5%.: 75 mL    Glucerna: 55 mL    norepinephrine Infusion: 23 mL  Total IN: 153 mL    OUT:    Indwelling Catheter - Urethral: 150 mL  Total OUT: 150 mL    Total NET: 3 mL          ## Imaging:    ## Medications:  MEDICATIONS  (STANDING):  ALBUTerol    90 MICROgram(s) HFA Inhaler 1 Puff(s) Inhalation every 4 hours  ALBUTerol/ipratropium for Nebulization 3 milliLiter(s) Nebulizer every 6 hours  cefepime   IVPB      cefepime   IVPB 1000 milliGRAM(s) IV Intermittent every 8 hours  dextrose 5%. 1000 milliLiter(s) (75 mL/Hr) IV Continuous <Continuous>  dextrose 5%. 1000 milliLiter(s) (50 mL/Hr) IV Continuous <Continuous>  dextrose 50% Injectable 12.5 Gram(s) IV Push once  dextrose 50% Injectable 25 Gram(s) IV Push once  dextrose 50% Injectable 25 Gram(s) IV Push once  fentaNYL   Patch  12 MICROgram(s)/Hr 1 Patch Transdermal every 72 hours  heparin  Injectable 5000 Unit(s) SubCutaneous every 12 hours  insulin glargine Injectable (LANTUS) 40 Unit(s) SubCutaneous every morning  insulin lispro (HumaLOG) corrective regimen sliding scale   SubCutaneous every 4 hours  midodrine 10 milliGRAM(s) Oral every 8 hours  norepinephrine Infusion 0.06 MICROgram(s)/kG/Min (6.9 mL/Hr) IV Continuous <Continuous>  pantoprazole   Suspension 40 milliGRAM(s) Oral daily  petrolatum Ophthalmic Ointment 1 Application(s) Both EYES daily  tiotropium 18 MICROgram(s) Capsule 1 Capsule(s) Inhalation daily  vancomycin  IVPB 750 milliGRAM(s) IV Intermittent every 12 hours    MEDICATIONS  (PRN):  acetaminophen    Suspension .. 650 milliGRAM(s) Oral every 6 hours PRN Temp greater or equal to 38C (100.4F)  dextrose 40% Gel 15 Gram(s) Oral once PRN Blood Glucose LESS THAN 70 milliGRAM(s)/deciliter  glucagon  Injectable 1 milliGRAM(s) IntraMuscular once PRN Glucose LESS THAN 70 milligrams/deciliter

## 2019-05-30 NOTE — CONSULT NOTE ADULT - SUBJECTIVE AND OBJECTIVE BOX
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  NH report pt BUN & Cr incr, EMS report pt hypotensive on their arrival.  On arrival to ED, removed two 12microgram fentanyl patches (5/10/19 & 19)  In ED received 2200ml saline bolus. (29 May 2019 05:43)      PAST MEDICAL & SURGICAL HISTORY:  DM (diabetes mellitus)  Heart failure  Diabetic neuropathy  Hyperlipidemia  Quadriplegia  Hypertension  Stroke  Hypertension  Obstructive cardiomyopathy  S/P percutaneous endoscopic gastrostomy (PEG) tube placement  History of tracheostomy      SOCHX:  unable to obtain    tobacco,  -  alcohol    FMHX: FA/MO  non- contributory       Recent Travel:    Immunizations:    Allergies    penicillin (Unknown)    Intolerances        MEDICATIONS  (STANDING):  ALBUTerol    90 MICROgram(s) HFA Inhaler 1 Puff(s) Inhalation every 4 hours  ALBUTerol/ipratropium for Nebulization 3 milliLiter(s) Nebulizer every 6 hours  cefepime   IVPB      cefepime   IVPB 1000 milliGRAM(s) IV Intermittent every 8 hours  dextrose 5%. 1000 milliLiter(s) (75 mL/Hr) IV Continuous <Continuous>  dextrose 5%. 1000 milliLiter(s) (50 mL/Hr) IV Continuous <Continuous>  dextrose 50% Injectable 12.5 Gram(s) IV Push once  dextrose 50% Injectable 25 Gram(s) IV Push once  dextrose 50% Injectable 25 Gram(s) IV Push once  fentaNYL   Patch  12 MICROgram(s)/Hr 1 Patch Transdermal every 72 hours  heparin  Injectable 5000 Unit(s) SubCutaneous every 12 hours  insulin glargine Injectable (LANTUS) 40 Unit(s) SubCutaneous every morning  insulin lispro (HumaLOG) corrective regimen sliding scale   SubCutaneous every 4 hours  midodrine 10 milliGRAM(s) Oral every 8 hours  norepinephrine Infusion 0.06 MICROgram(s)/kG/Min (6.9 mL/Hr) IV Continuous <Continuous>  pantoprazole   Suspension 40 milliGRAM(s) Oral daily  petrolatum Ophthalmic Ointment 1 Application(s) Both EYES daily  tiotropium 18 MICROgram(s) Capsule 1 Capsule(s) Inhalation daily  vancomycin  IVPB 750 milliGRAM(s) IV Intermittent every 12 hours    MEDICATIONS  (PRN):  acetaminophen    Suspension .. 650 milliGRAM(s) Oral every 6 hours PRN Temp greater or equal to 38C (100.4F)  dextrose 40% Gel 15 Gram(s) Oral once PRN Blood Glucose LESS THAN 70 milliGRAM(s)/deciliter  glucagon  Injectable 1 milliGRAM(s) IntraMuscular once PRN Glucose LESS THAN 70 milligrams/deciliter      REVIEW OF SYSTEMS:  unable to obtain     VITAL SIGNS:    T(C): 36.9 (19 @ 19:08), Max: 37.2 (19 @ 11:00)  T(F): 98.4 (19 @ 19:08), Max: 99 (19 @ 11:00)  HR: 90 (19 @ 20:00) (86 - 105)  BP: 120/45 (19 @ 20:00) (104/43 - 147/43)  RR: 15 (19 @ 20:00) (12 - 20)  SpO2: 100% (19 @ 20:00) (97% - 100%)    PHYSICAL EXAM:    GENERAL: NAD, tracheostomy to vent   HEAD:  Atraumatic, Normocephalic  EYES: EOMI, PERRLA, conjunctiva and sclera clear  ENMT: No tonsillar erythema, exudates, or enlargement; Moist mucous membranes, Good dentition, No lesions  NECK: Supple, No JVD, Normal thyroid  NERVOUS SYSTEM:  Alert & Oriented X3, Good concentration; Motor Strength 5/5 B/L upper and lower extremities; DTRs 2+ intact and symmetric  CHEST/LUNG: Clear to auscultation bilaterally; No rales, rhonchi, wheezing bilaterally  HEART: Regular rate and rhythm; No murmurs, rubs, or gallops  ABDOMEN: Soft, Nontender, Nondistended; Bowel sounds present  EXTREMITIES:  2+ Peripheral Pulses, No clubbing, cyanosis, or edema  LYMPH: No lymphadenopathy noted  SKIN: No rashes or lesions  BACK: no pressor sore     LABS:                         9.0    12.25 )-----------( 230      ( 30 May 2019 04:15 )             31.8     05-30    157<H>  |  126<H>  |  76<H>  ----------------------------<  324<H>  3.2<L>   |  27  |  1.42<H>    Ca    11.7<H>      30 May 2019 04:15  Phos  2.1       Mg     2.8         TPro  6.8  /  Alb  1.5<L>  /  TBili  0.4  /  DBili  x   /  AST  45<H>  /  ALT  68  /  AlkPhos  248<H>      LIVER FUNCTIONS - ( 29 May 2019 06:20 )  Alb: 1.5 g/dL / Pro: 6.8 gm/dL / ALK PHOS: 248 U/L / ALT: 68 U/L / AST: 45 U/L / GGT: x           PT/INR - ( 28 May 2019 22:45 )   PT: 14.6 sec;   INR: 1.29 ratio         PTT - ( 28 May 2019 22:45 )  PTT:38.8 sec  Urinalysis Basic - ( 28 May 2019 22:51 )    Color: Yellow / Appearance: Slightly Turbid / S.010 / pH: x  Gluc: x / Ketone: Negative  / Bili: Negative / Urobili: Negative mg/dL   Blood: x / Protein: 100 mg/dL / Nitrite: Negative   Leuk Esterase: Moderate / RBC: 3-5 /HPF / WBC >50   Sq Epi: x / Non Sq Epi: Few / Bacteria: Few      ABG - ( 29 May 2019 14:19 )  pH, Arterial: x     pH, Blood: 7.45  /  pCO2: 37    /  pO2: 139   / HCO3: 26    / Base Excess: 2.2   /  SaO2: 99                CARDIAC MARKERS ( 29 May 2019 06:20 )  .071 ng/mL / x     / x     / x     / x      CARDIAC MARKERS ( 28 May 2019 22:45 )  .060 ng/mL / x     / 135 U/L / x     / 2.7 ng/mL          Sedimentation Rate, Erythrocyte: 119 mm/hr ( @ 15:34)      Vancomycin Level, Trough: 18.0 ug/mL ( @ 11:31)        Culture Results:   Numerous Gram Negative Rods ( @ 17:42)  Culture Results:   No growth to date. ( @ 10:45)  Culture Results:   No growth to date. ( @ 10:45)  Culture Results:   10,000 - 49,000 CFU/mL Gram positive organisms  <10,000 CFU/ml Normal Urogenital christopher present ( @ 10:21)                Radiology:    < from: Xray Chest 1 View- PORTABLE-Urgent (19 @ 22:53) >  MPRESSION:    No lung consolidations.                FAN PIZARRO M.D., ATTENDING RADIOLOGIST  This document has been electronically signed. May 29 2019  8:42AM              < end of copied text >  < from: CT Abdomen and Pelvis No Cont (19 @ 14:25) >  IMPRESSION:     1. Emphysema.  2. Nonspecific right middle and lower lobe nodules measuring 4 mm.  3. Bilateral lower lobe nodular opacities, possibly infectious or   inflammatory.  4. Drainage catheter near the gallbladder fundus, question within or just   outside of the gallbladder.  5. Left lower pole renal lesion, which may represent known renal mass.  6. No hydronephrosis.  7. Sacral and left ischial decubitus ulcers.                FAN PIZARRO M.D., ATTENDING RADIOLOGIST  This document has been electronically signed. May 30 2019  2:50PM                < end of copied text >

## 2019-05-31 LAB
-  AMIKACIN: SIGNIFICANT CHANGE UP
-  AMPICILLIN: SIGNIFICANT CHANGE UP
-  AZTREONAM: SIGNIFICANT CHANGE UP
-  CEFEPIME: SIGNIFICANT CHANGE UP
-  CEFTAZIDIME: SIGNIFICANT CHANGE UP
-  CIPROFLOXACIN: SIGNIFICANT CHANGE UP
-  CIPROFLOXACIN: SIGNIFICANT CHANGE UP
-  GENTAMICIN: SIGNIFICANT CHANGE UP
-  IMIPENEM: SIGNIFICANT CHANGE UP
-  LEVOFLOXACIN: SIGNIFICANT CHANGE UP
-  LEVOFLOXACIN: SIGNIFICANT CHANGE UP
-  MEROPENEM: SIGNIFICANT CHANGE UP
-  NITROFURANTOIN: SIGNIFICANT CHANGE UP
-  PIPERACILLIN/TAZOBACTAM: SIGNIFICANT CHANGE UP
-  TETRACYCLINE: SIGNIFICANT CHANGE UP
-  TOBRAMYCIN: SIGNIFICANT CHANGE UP
-  VANCOMYCIN: SIGNIFICANT CHANGE UP
ANION GAP SERPL CALC-SCNC: 9 MMOL/L — SIGNIFICANT CHANGE UP (ref 5–17)
BUN SERPL-MCNC: 50 MG/DL — HIGH (ref 7–23)
CALCIUM SERPL-MCNC: 10.5 MG/DL — SIGNIFICANT CHANGE UP (ref 8.4–10.5)
CALCIUM SERPL-MCNC: 10.9 MG/DL — HIGH (ref 8.5–10.1)
CHLORIDE SERPL-SCNC: 118 MMOL/L — HIGH (ref 96–108)
CO2 SERPL-SCNC: 27 MMOL/L — SIGNIFICANT CHANGE UP (ref 22–31)
CREAT SERPL-MCNC: 1.25 MG/DL — SIGNIFICANT CHANGE UP (ref 0.5–1.3)
CULTURE RESULTS: SIGNIFICANT CHANGE UP
CULTURE RESULTS: SIGNIFICANT CHANGE UP
GLUCOSE BLDC GLUCOMTR-MCNC: 166 MG/DL — HIGH (ref 70–99)
GLUCOSE BLDC GLUCOMTR-MCNC: 172 MG/DL — HIGH (ref 70–99)
GLUCOSE BLDC GLUCOMTR-MCNC: 212 MG/DL — HIGH (ref 70–99)
GLUCOSE BLDC GLUCOMTR-MCNC: 216 MG/DL — HIGH (ref 70–99)
GLUCOSE BLDC GLUCOMTR-MCNC: 220 MG/DL — HIGH (ref 70–99)
GLUCOSE BLDC GLUCOMTR-MCNC: 246 MG/DL — HIGH (ref 70–99)
GLUCOSE SERPL-MCNC: 198 MG/DL — HIGH (ref 70–99)
GRAM STN FLD: SIGNIFICANT CHANGE UP
HCT VFR BLD CALC: 28.3 % — LOW (ref 39–50)
HGB BLD-MCNC: 8.1 G/DL — LOW (ref 13–17)
MAGNESIUM SERPL-MCNC: 2.6 MG/DL — SIGNIFICANT CHANGE UP (ref 1.6–2.6)
MCHC RBC-ENTMCNC: 28.6 GM/DL — LOW (ref 32–36)
MCHC RBC-ENTMCNC: 29.5 PG — SIGNIFICANT CHANGE UP (ref 27–34)
MCV RBC AUTO: 102.9 FL — HIGH (ref 80–100)
METHOD TYPE: SIGNIFICANT CHANGE UP
METHOD TYPE: SIGNIFICANT CHANGE UP
NRBC # BLD: 0 /100 WBCS — SIGNIFICANT CHANGE UP (ref 0–0)
ORGANISM # SPEC MICROSCOPIC CNT: SIGNIFICANT CHANGE UP
PHOSPHATE SERPL-MCNC: 3 MG/DL — SIGNIFICANT CHANGE UP (ref 2.5–4.5)
PLATELET # BLD AUTO: 200 K/UL — SIGNIFICANT CHANGE UP (ref 150–400)
POTASSIUM SERPL-MCNC: 3.7 MMOL/L — SIGNIFICANT CHANGE UP (ref 3.5–5.3)
POTASSIUM SERPL-SCNC: 3.7 MMOL/L — SIGNIFICANT CHANGE UP (ref 3.5–5.3)
PTH-INTACT FLD-MCNC: 17 PG/ML — SIGNIFICANT CHANGE UP (ref 15–65)
RBC # BLD: 2.75 M/UL — LOW (ref 4.2–5.8)
RBC # FLD: 15.9 % — HIGH (ref 10.3–14.5)
SODIUM SERPL-SCNC: 154 MMOL/L — HIGH (ref 135–145)
SPECIMEN SOURCE: SIGNIFICANT CHANGE UP
SPECIMEN SOURCE: SIGNIFICANT CHANGE UP
WBC # BLD: 12.91 K/UL — HIGH (ref 3.8–10.5)
WBC # FLD AUTO: 12.91 K/UL — HIGH (ref 3.8–10.5)

## 2019-05-31 PROCEDURE — 99291 CRITICAL CARE FIRST HOUR: CPT

## 2019-05-31 RX ORDER — PAMIDRONATE DISODIUM 9 MG/ML
60 INJECTION, SOLUTION INTRAVENOUS ONCE
Refills: 0 | Status: COMPLETED | OUTPATIENT
Start: 2019-05-31 | End: 2019-05-31

## 2019-05-31 RX ADMIN — Medication 3 MILLILITER(S): at 17:52

## 2019-05-31 RX ADMIN — PANTOPRAZOLE SODIUM 40 MILLIGRAM(S): 20 TABLET, DELAYED RELEASE ORAL at 11:39

## 2019-05-31 RX ADMIN — PAMIDRONATE DISODIUM 67.5 MILLIGRAM(S): 9 INJECTION, SOLUTION INTRAVENOUS at 10:05

## 2019-05-31 RX ADMIN — HEPARIN SODIUM 5000 UNIT(S): 5000 INJECTION INTRAVENOUS; SUBCUTANEOUS at 05:35

## 2019-05-31 RX ADMIN — MIDODRINE HYDROCHLORIDE 10 MILLIGRAM(S): 2.5 TABLET ORAL at 05:35

## 2019-05-31 RX ADMIN — Medication 6.9 MICROGRAM(S)/KG/MIN: at 00:00

## 2019-05-31 RX ADMIN — CEFEPIME 100 MILLIGRAM(S): 1 INJECTION, POWDER, FOR SOLUTION INTRAMUSCULAR; INTRAVENOUS at 14:16

## 2019-05-31 RX ADMIN — Medication 3 MILLILITER(S): at 23:43

## 2019-05-31 RX ADMIN — Medication 3 MILLILITER(S): at 11:02

## 2019-05-31 RX ADMIN — SODIUM CHLORIDE 75 MILLILITER(S): 9 INJECTION, SOLUTION INTRAVENOUS at 02:32

## 2019-05-31 RX ADMIN — MIDODRINE HYDROCHLORIDE 10 MILLIGRAM(S): 2.5 TABLET ORAL at 22:20

## 2019-05-31 RX ADMIN — Medication 4: at 14:17

## 2019-05-31 RX ADMIN — Medication 4: at 17:10

## 2019-05-31 RX ADMIN — Medication 4: at 05:35

## 2019-05-31 RX ADMIN — Medication 2: at 22:20

## 2019-05-31 RX ADMIN — CEFEPIME 100 MILLIGRAM(S): 1 INJECTION, POWDER, FOR SOLUTION INTRAMUSCULAR; INTRAVENOUS at 22:20

## 2019-05-31 RX ADMIN — Medication 3 MILLILITER(S): at 00:08

## 2019-05-31 RX ADMIN — INSULIN GLARGINE 40 UNIT(S): 100 INJECTION, SOLUTION SUBCUTANEOUS at 08:17

## 2019-05-31 RX ADMIN — Medication 250 MILLIGRAM(S): at 11:39

## 2019-05-31 RX ADMIN — MIDODRINE HYDROCHLORIDE 10 MILLIGRAM(S): 2.5 TABLET ORAL at 14:17

## 2019-05-31 RX ADMIN — Medication 1 APPLICATION(S): at 11:39

## 2019-05-31 RX ADMIN — HEPARIN SODIUM 5000 UNIT(S): 5000 INJECTION INTRAVENOUS; SUBCUTANEOUS at 17:11

## 2019-05-31 RX ADMIN — CEFEPIME 100 MILLIGRAM(S): 1 INJECTION, POWDER, FOR SOLUTION INTRAMUSCULAR; INTRAVENOUS at 05:35

## 2019-05-31 RX ADMIN — Medication 2: at 09:32

## 2019-05-31 RX ADMIN — FENTANYL CITRATE 1 PATCH: 50 INJECTION INTRAVENOUS at 07:25

## 2019-05-31 RX ADMIN — FENTANYL CITRATE 1 PATCH: 50 INJECTION INTRAVENOUS at 19:13

## 2019-05-31 RX ADMIN — Medication 3 MILLILITER(S): at 05:07

## 2019-05-31 RX ADMIN — Medication 4: at 02:32

## 2019-05-31 NOTE — PROGRESS NOTE ADULT - ASSESSMENT
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 Now noted with previously known renal lesion and pulmonary nodules.     1. NEURO  - s/p CVA 2018 with locked in syndrome, quadriplegic  - unresponsive    2. PULM  - chronic vent  - cont mechanical ventilation  - follow up sputum cx    3. CV  - remains in shock state  - cont midodrine for hemodynamic support  - wean off levophed as BP tolerates    4. RENAL  - ARF with ATN in setting of sepsis +/- initial dehydration  - Cr improving  - cont to trend  - hypernatremia improving: cont free water and D5 gtt    5. GI  - cont PEG feeds  - abdominal CT reviewed: question if biliary drain is in correct position, no documentation from rehab as to why drain was placed originally  - spoke with IR: will need to do a tube check, await IR availability for tube check and possible readjustment of tube    6. ID  - sepsis likely from right ankle osteomyelitis + infected decub ulcers  - follow up sputum cx, follow up sacral wound cx, follow up right foot/ankle culture  - cont vanco and cefepime for now  - ID follow up  - leukocytosis overall is improving  - however with right foot/ankle wound and osteomyelitis with exposed joint pt likely will  need amputation BKA    7. ONC  - pt with known renal lesion/mass  - hypercalcemia may be in setting of underlying possible malignancy  - appreciate onc eval    8. GEN  - pt with advanced illness and chronic comorbidities  - spoke with wife at bedside today and she was not able to provide too much history, asked for us to speak with her daughter in law Deepa  - spoke with Deepa over the phone: discussed GOC, advanced directives, renal lesion and pulmonary nodule, possibility of underlying  malignancy and if malignancy present pt likely not a candidate for any treatment with poor functional status. we discussed possibility for need of amputation of lower extremity.   - daughter in law states she will talk to rest of family, they will be here tomorrow for family meeting to address DNR status, aggressiveness of care etc    Critical Care Time: 50 min

## 2019-05-31 NOTE — CONSULT NOTE ADULT - SUBJECTIVE AND OBJECTIVE BOX
REASON FOR CONSULTATION:  Kidney Lesion.  Hyper Calcemia.    INTERVAL HISTORY:  Trach:- On Ventilator,  PEG      Allergies    penicillin (Unknown)    Intolerances        MEDICATIONS  (STANDING):  ALBUTerol    90 MICROgram(s) HFA Inhaler 1 Puff(s) Inhalation every 4 hours  ALBUTerol/ipratropium for Nebulization 3 milliLiter(s) Nebulizer every 6 hours  cefepime   IVPB      cefepime   IVPB 1000 milliGRAM(s) IV Intermittent every 8 hours  dextrose 5%. 1000 milliLiter(s) (75 mL/Hr) IV Continuous <Continuous>  dextrose 5%. 1000 milliLiter(s) (50 mL/Hr) IV Continuous <Continuous>  dextrose 50% Injectable 12.5 Gram(s) IV Push once  dextrose 50% Injectable 25 Gram(s) IV Push once  dextrose 50% Injectable 25 Gram(s) IV Push once  fentaNYL   Patch  12 MICROgram(s)/Hr 1 Patch Transdermal every 72 hours  heparin  Injectable 5000 Unit(s) SubCutaneous every 12 hours  insulin glargine Injectable (LANTUS) 40 Unit(s) SubCutaneous every morning  insulin lispro (HumaLOG) corrective regimen sliding scale   SubCutaneous every 4 hours  midodrine 10 milliGRAM(s) Oral every 8 hours  norepinephrine Infusion 0.06 MICROgram(s)/kG/Min (6.9 mL/Hr) IV Continuous <Continuous>  pamidronate IVPB 60 milliGRAM(s) IV Intermittent once  pantoprazole   Suspension 40 milliGRAM(s) Oral daily  petrolatum Ophthalmic Ointment 1 Application(s) Both EYES daily  tiotropium 18 MICROgram(s) Capsule 1 Capsule(s) Inhalation daily  vancomycin  IVPB 750 milliGRAM(s) IV Intermittent every 12 hours    MEDICATIONS  (PRN):  acetaminophen    Suspension .. 650 milliGRAM(s) Oral every 6 hours PRN Temp greater or equal to 38C (100.4F)  dextrose 40% Gel 15 Gram(s) Oral once PRN Blood Glucose LESS THAN 70 milliGRAM(s)/deciliter  glucagon  Injectable 1 milliGRAM(s) IntraMuscular once PRN Glucose LESS THAN 70 milligrams/deciliter      Vital Signs Last 24 Hrs  T(C): 36.8 (31 May 2019 04:25), Max: 37.2 (30 May 2019 11:00)  T(F): 98.2 (31 May 2019 04:25), Max: 99 (30 May 2019 11:00)  HR: 85 (31 May 2019 07:00) (82 - 105)  BP: 124/41 (31 May 2019 07:00) (105/46 - 147/43)  BP(mean): 63 (31 May 2019 07:00) (55 - 75)  RR: 15 (31 May 2019 07:00) (12 - 20)  SpO2: 97% (31 May 2019 07:00) (96% - 100%)    PHYSICAL EXAM:  Trach:-++  EYES: EOMI, PERRLA, conjunctiva and sclera clear  CHEST/LUNG: Clear to percussion bilaterally;   HEART: Regular rate and rhythm;   ABDOMEN: PEG:-++  Biliary Drainage.  Decubitus Ulcers.        LABS:                        8.1    12.91 )-----------( 200      ( 31 May 2019 04:43 )             28.3     05-31    154<H>  |  118<H>  |  50<H>  ----------------------------<  198<H>  3.7   |  27  |  1.25    Ca    10.9<H>      31 May 2019 04:43  Phos  3.0     05-31  Mg     2.6     05-31              RADIOLOGY & ADDITIONAL STUDIES:    PATHOLOGY:

## 2019-05-31 NOTE — PROGRESS NOTE ADULT - SUBJECTIVE AND OBJECTIVE BOX
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  EMS reported pt was hypotensive at nursing facility. Pt had two 12microgram fentanyl patches (5/10/19 & 5/27/19) on patient which was removed. Admitted to ICU for severe sepsis, septic shock, dehydration, hypernatremia, acute renal failure, multiple decubitus ulcers (sacrum, hip, bilateral lower extremities), osteomyelitis of right ankle, hypercalcemia.         24 hr events:  remains on pressors  leukocytosis improving  Cr improving  hypernatremia improving    ## ROS:  [x] unable to obtain due to mechanical ventilation      ## Labs:  CBC:                        8.1    12.91 )-----------( 200      ( 31 May 2019 04:43 )             28.3     Chem:  05-31    154<H>  |  118<H>  |  50<H>  -------------------------------------<  198<H>  3.7          |  27          |  1.25    Ca    10.9<H>      31 May 2019 04:43  Phos  3.0     05-31  Mg     2.6     05-31      Lactate, Blood (05.29.19 @ 06:20)    Lactate, Blood: 2.1 mmol/L    Lactate, Blood (05.28.19 @ 22:45)    Lactate, Blood: 3.3 mmol/L      Culture - Blood (05.29.19 @ 10:45)    Specimen Source: .Blood    Culture Results: No growth to date.      Culture - Blood (05.29.19 @ 10:45)    Specimen Source: .Blood    Culture Results: No growth to date.      Culture - Urine (05.29.19 @ 10:21)     Culture Results: 10,000 - 49,000 CFU/mL Enterococcus faecalis          ## Imaging:  CT chest/abdomen/pelvis < from: CT Chest No Cont (05.30.19 @ 14:24) >  CHEST:     LUNGS AND LARGE AIRWAYS: Tracheostomy tube. Centrilobular emphysema.   Bilateral dependent and basilar atelectasis. Bilateral lower lobe nodular   opacities. Nonspecific right middle and right lower lobe nodules   measuring 4 mm.  PLEURA: Trace bilateral pleural effusions.  VESSELS: Atherosclerotic calcifications of thoracic aorta and coronary   arteries.  HEART: Heart size is normal. No pericardial effusion.  MEDIASTINUM AND GUERA: No lymphadenopathy.  CHEST WALL AND LOWER NECK: Subcutaneous soft tissue edema.    ABDOMEN AND PELVIS:    LIVER: Within normal limits.  BILE DUCTS: Minimal dilatation.  GALLBLADDER: Drainage catheter near the gallbladder fundus, question   within or just outside of the gallbladder.  SPLEEN: Within normal limits.  PANCREAS: Within normal limits.  ADRENALS: Within normal limits.  KIDNEYS/URETERS: Nonspecific perinephric stranding. 2.7 cm heterogeneous   left lower pole renal lesion. No hydronephrosis.    BLADDER: Partially decompressed around Cole catheter. Mild wall   thickening.  REPRODUCTIVE ORGANS: Prostate within normal limits.    BOWEL: Gastrostomy tube. No bowel obstruction. Appendix within normal   limits. Colonic diverticula.  PERITONEUM: No ascites.  VESSELS:  Atherosclerotic calcifications.  RETROPERITONEUM: No lymphadenopathy.    ABDOMINAL WALL: Subcutaneous soft tissue edema. Sacral and left ischial   decubitus ulcers.  BONES: Hip and spine degenerative changes.    IMPRESSION:     1. Emphysema.  2. Nonspecific right middle and lower lobe nodules measuring 4 mm.  3. Bilateral lower lobe nodular opacities, possibly infectious or inflammatory.  4. Drainage catheter near the gallbladder fundus, question within or just outside of the gallbladder.  5. Left lower pole renal lesion, which may represent known renal mass.  6. No hydronephrosis.  7. Sacral and left ischial decubitus ulcers.          ## Medications:   cefepime   IVPB 1000 milliGRAM(s) IV Intermittent every 8 hours  vancomycin  IVPB 750 milliGRAM(s) IV Intermittent every 12 hours    midodrine 10 milliGRAM(s) Oral every 8 hours  norepinephrine Infusion 0.06 MICROgram(s)/kG/Min IV Continuous <Continuous>    ALBUTerol    90 MICROgram(s) HFA Inhaler 1 Puff(s) Inhalation every 4 hours  ALBUTerol/ipratropium for Nebulization 3 milliLiter(s) Nebulizer every 6 hours  tiotropium 18 MICROgram(s) Capsule 1 Capsule(s) Inhalation daily      insulin glargine Injectable (LANTUS) 40 Unit(s) SubCutaneous every morning  insulin lispro (HumaLOG) corrective regimen sliding scale   SubCutaneous every 4 hours    heparin  Injectable 5000 Unit(s) SubCutaneous every 12 hours    pantoprazole   Suspension 40 milliGRAM(s) Oral daily    acetaminophen    Suspension .. 650 milliGRAM(s) Oral every 6 hours PRN  fentaNYL   Patch  12 MICROgram(s)/Hr 1 Patch Transdermal every 72 hours      ## Vitals:  T(C): 37.2 (05-31-19 @ 16:00), Max: 37.9 (05-31-19 @ 12:00)  HR: 84 (05-31-19 @ 18:13) (80 - 96)  BP: 129/44 (05-31-19 @ 18:00) (90/36 - 139/49)  BP(mean): 64 (05-31-19 @ 18:00) (48 - 75)  RR: 12 (05-31-19 @ 18:00) (12 - 19)  SpO2: 99% (05-31-19 @ 18:13) (96% - 100%)    Vent: Mode: AC/ CMV (Assist Control/ Continuous Mandatory Ventilation), RR (machine): 12, RR (patient): 17, TV (machine): 500, FiO2: 30, PEEP: 5, PIP: 18        05-30 @ 07:01  -  05-31 @ 07:00  --------------------------------------------------------  IN: 5626.1 mL / OUT: 2600 mL / NET: 3026.1 mL    05-31 @ 07:01  -  05-31 @ 18:56  --------------------------------------------------------  IN: 2910.4 mL / OUT: 1240 mL / NET: 1670.4 mL          ## P/E:  Gen: lying comfortably in bed, trached on vent  HEENT: trach in place, sclera white, pupils reactive (sluggish)  Resp: CTA B/L, no wheeze, no rales  CVS: RRR  Abd: soft ND +BS  +PEG  Ext: no clubbing, no cyanosis, bilateral necrotic eschar of heels, right ankle joint exposed with necrotic tissue   Neuro: unresponsive, baseline quadriplegic   Skin: as above, large sacral decubitus ulcer    CENTRAL LINE: [ ] YES [x] NO  LOCATION:   DATE INSERTED:  REMOVE: [ ] YES [ ] NO      COLE: [x] YES [ ] NO    DATE INSERTED:  REMOVE:  [ ] YES [x] NO  - chronic cole per family, changed on admission    A-LINE:  [ ] YES [x] NO  LOCATION:   DATE INSERTED:  REMOVE:  [ ] YES [ ] NO  EXPLAIN:    GLOBAL ISSUE/BEST PRACTICE:  Analgesia: fentanyl patch  Sedation: n/a  HOB elevation: yes  Stress ulcer prophylaxis: protonix  VTE prophylaxis: heparin sc  Oral Care: n/a  Glycemic control: lantus, sliding scale coverage  Nutrition: PEG feeds    CODE STATUS: [ ] full code  [ ] DNR  [ ] DNI  [ ] MOLST  Goals of care discussion: [ ] yes

## 2019-05-31 NOTE — PROGRESS NOTE ADULT - ASSESSMENT
74 yo M w/ multiple foot and ankle wounds.   - Pt seen and evaluated in ICU  - spiked to 99.9 w/ leukocytosis, clinically remains stable  - OM of RF ankle + septic ankle joint  - RF ankle wound flushed and culture taken.   - Foot is not salvageable pt has already has attempts at long term abx for OM,   - Would rec discussion with family about goals of care- proximal leg amputation vs palliative care   - Will continue to keep wounds dry with betadine and DSD daily. If pt becomes septic pt will need BKA emergently  - Wound care: Betadine to bilateral foot wounds daily.   - Pod will follow periodically to monitor

## 2019-05-31 NOTE — PROGRESS NOTE ADULT - ASSESSMENT
sepsis   chronic resp failure   quadriplegic vent dependent   multiple decubiti   Electrolyte imbalance   diabetes.   Delbert on ckd

## 2019-05-31 NOTE — PROGRESS NOTE ADULT - SUBJECTIVE AND OBJECTIVE BOX
Patient is a 73y old  Male who presents with a chief complaint of Sepsis (31 May 2019 10:21)  still in ICU on Levophed infusion   septic from   decubiti   Hypernatrermia, hypercalcemia    INTERVAL HPI/OVERNIGHT EVENTS:  PAST MEDICAL & SURGICAL HISTORY:  DM (diabetes mellitus)  Heart failure  Diabetic neuropathy  Hyperlipidemia  Quadriplegia  Hypertension  Stroke  Hypertension  Obstructive cardiomyopathy  S/P percutaneous endoscopic gastrostomy (PEG) tube placement  History of tracheostomy      MEDICATIONS  (STANDING):  ALBUTerol    90 MICROgram(s) HFA Inhaler 1 Puff(s) Inhalation every 4 hours  ALBUTerol/ipratropium for Nebulization 3 milliLiter(s) Nebulizer every 6 hours  cefepime   IVPB      cefepime   IVPB 1000 milliGRAM(s) IV Intermittent every 8 hours  dextrose 5%. 1000 milliLiter(s) (75 mL/Hr) IV Continuous <Continuous>  dextrose 5%. 1000 milliLiter(s) (50 mL/Hr) IV Continuous <Continuous>  dextrose 50% Injectable 12.5 Gram(s) IV Push once  dextrose 50% Injectable 25 Gram(s) IV Push once  dextrose 50% Injectable 25 Gram(s) IV Push once  fentaNYL   Patch  12 MICROgram(s)/Hr 1 Patch Transdermal every 72 hours  heparin  Injectable 5000 Unit(s) SubCutaneous every 12 hours  insulin glargine Injectable (LANTUS) 40 Unit(s) SubCutaneous every morning  insulin lispro (HumaLOG) corrective regimen sliding scale   SubCutaneous every 4 hours  midodrine 10 milliGRAM(s) Oral every 8 hours  norepinephrine Infusion 0.06 MICROgram(s)/kG/Min (6.9 mL/Hr) IV Continuous <Continuous>  pantoprazole   Suspension 40 milliGRAM(s) Oral daily  petrolatum Ophthalmic Ointment 1 Application(s) Both EYES daily  tiotropium 18 MICROgram(s) Capsule 1 Capsule(s) Inhalation daily  vancomycin  IVPB 750 milliGRAM(s) IV Intermittent every 12 hours    MEDICATIONS  (PRN):  acetaminophen    Suspension .. 650 milliGRAM(s) Oral every 6 hours PRN Temp greater or equal to 38C (100.4F)  dextrose 40% Gel 15 Gram(s) Oral once PRN Blood Glucose LESS THAN 70 milliGRAM(s)/deciliter  glucagon  Injectable 1 milliGRAM(s) IntraMuscular once PRN Glucose LESS THAN 70 milligrams/deciliter      Allergies    penicillin (Unknown)    Intolerances        REVIEW OF SYSTEMS:  CONSTITUTIONAL: No fever, weight loss, or fatigue  EYES: No eye pain, visual disturbances, or discharge  ENMT:  No difficulty hearing, tinnitus, vertigo; No sinus or throat pain  NECK: No pain or stiffness  BREASTS: No pain, masses, or nipple discharge  RESPIRATORY: No cough, wheezing, chills or hemoptysis; No shortness of breath  CARDIOVASCULAR: No chest pain, palpitations, dizziness, or leg swelling  GASTROINTESTINAL: No abdominal or epigastric pain. No nausea, vomiting, or hematemesis; No diarrhea or constipation. No melena or hematochezia.  GENITOURINARY: No dysuria, frequency, hematuria, or incontinence  NEUROLOGICAL: No headaches, memory loss, loss of strength, numbness, or tremors  SKIN: No itching, burning, rashes, or lesions   LYMPH NODES: No enlarged glands  ENDOCRINE: No heat or cold intolerance; No hair loss  MUSCULOSKELETAL: No joint pain or swelling; No muscle, back, or extremity pain  PSYCHIATRIC: No depression, anxiety, mood swings, or difficulty sleeping  HEME/LYMPH: No easy bruising, or bleeding gums  ALLERY AND IMMUNOLOGIC: No hives or eczema    Vital Signs Last 24 Hrs  T(C): 37.7 (31 May 2019 08:00), Max: 37.7 (31 May 2019 08:00)  T(F): 99.9 (31 May 2019 08:00), Max: 99.9 (31 May 2019 08:00)  HR: 86 (31 May 2019 10:30) (82 - 104)  BP: 118/44 (31 May 2019 10:30) (101/39 - 147/43)  BP(mean): 63 (31 May 2019 10:30) (53 - 75)  RR: 16 (31 May 2019 10:30) (12 - 20)  SpO2: 99% (31 May 2019 10:30) (96% - 100%)    PHYSICAL EXAM:  GENERAL: NAD, well-groomed, well-developed  HEAD:  Atraumatic, Normocephalic  EYES: EOMI, PERRLA, conjunctiva and sclera clear  ENMT: No tonsillar erythema, exudates, or enlargement; Moist mucous membranes, Good dentition, No lesions  NECK: Supple, No JVD, Normal thyroid. tracheostomy connected to vent  NERVOUS SYSTEM:  quadriplegic , non verbal  CHEST/LUNG: Clear to percussion bilaterally; No rales, rhonchi, wheezing, or rubs  HEART: Regular rate and rhythm; No murmurs, rubs, or gallops  ABDOMEN: Soft, Nontender, Nondistended; Bowel sounds present. peg tube in place  EXTREMITIES:  2+ Peripheral Pulses, No clubbing, cyanosis, or edema  LYMPH: No lymphadenopathy noted  SKIN: No rashes or lesions    LABS:                        8.1    12.91 )-----------( 200      ( 31 May 2019 04:43 )             28.3     05-31    154<H>  |  118<H>  |  50<H>  ----------------------------<  198<H>  3.7   |  27  |  1.25    Ca    10.9<H>      31 May 2019 04:43  Phos  3.0     05-31  Mg     2.6     05-31            CAPILLARY BLOOD GLUCOSE      POCT Blood Glucose.: 166 mg/dL (31 May 2019 09:30)  POCT Blood Glucose.: 220 mg/dL (31 May 2019 05:29)  POCT Blood Glucose.: 246 mg/dL (31 May 2019 02:30)  POCT Blood Glucose.: 197 mg/dL (30 May 2019 21:29)  POCT Blood Glucose.: 180 mg/dL (30 May 2019 17:22)  POCT Blood Glucose.: 192 mg/dL (30 May 2019 14:30)    ABG - ( 29 May 2019 14:19 )  pH, Arterial: x     pH, Blood: 7.45  /  pCO2: 37    /  pO2: 139   / HCO3: 26    / Base Excess: 2.2   /  SaO2: 99                    Hemoglobin A1C, Whole Blood: 7.4 % (05-30 @ 08:57)        RADIOLOGY & ADDITIONAL TESTS:    Imaging Personally Reviewed:  [ ] YES  [ ] NO    Consultant(s) Notes Reviewed:  [ ] YES  [ ] NO    Care Discussed with Consultants/Other Providers [ ] YES  [ ] NO    Care discussed with family,         [  ]   yes  [  ]  No    imp:    stable[ ]    unstable[  ]     improving [   ]       unchanged  [ x ]                Plans:  Continue present plans  [ x ] as per critical care               New consult [  ]   specialty  .......               order test[  ]    test name.                  Discharge Planning  [  ]

## 2019-05-31 NOTE — PROGRESS NOTE ADULT - ATTENDING COMMENTS
Will follow while in House  Discussed with [facility]  Cont local wouodcare but because of multiple ulcers ad comorbidities recommendig palliative care  Right leg amp recommended

## 2019-05-31 NOTE — PROGRESS NOTE ADULT - SUBJECTIVE AND OBJECTIVE BOX
Patient is a 73y old  Male who presents with a chief complaint of Sepsis (31 May 2019 08:09)       INTERVAL HPI/OVERNIGHT EVENTS:  Patient seen and evaluated at bedside.  Pt is resting comfortable in NAD. Denies N/V/F/C.      Allergies    penicillin (Unknown)    Intolerances        Vital Signs Last 24 Hrs  T(C): 37.7 (31 May 2019 08:00), Max: 37.7 (31 May 2019 08:00)  T(F): 99.9 (31 May 2019 08:00), Max: 99.9 (31 May 2019 08:00)  HR: 89 (31 May 2019 10:00) (82 - 105)  BP: 125/44 (31 May 2019 10:00) (101/39 - 147/43)  BP(mean): 62 (31 May 2019 10:00) (53 - 75)  RR: 18 (31 May 2019 10:00) (12 - 20)  SpO2: 100% (31 May 2019 10:00) (96% - 100%)    LABS:                        8.1    12.91 )-----------( 200      ( 31 May 2019 04:43 )             28.3     05-31    154<H>  |  118<H>  |  50<H>  ----------------------------<  198<H>  3.7   |  27  |  1.25    Ca    10.9<H>      31 May 2019 04:43  Phos  3.0     05-31  Mg     2.6     05-31          CAPILLARY BLOOD GLUCOSE      POCT Blood Glucose.: 166 mg/dL (31 May 2019 09:30)  POCT Blood Glucose.: 220 mg/dL (31 May 2019 05:29)  POCT Blood Glucose.: 246 mg/dL (31 May 2019 02:30)  POCT Blood Glucose.: 197 mg/dL (30 May 2019 21:29)  POCT Blood Glucose.: 180 mg/dL (30 May 2019 17:22)  POCT Blood Glucose.: 192 mg/dL (30 May 2019 14:30)      Lower Extremity Physical Exam:    Vascular: DP/PT 2/4, B/L, CFT <3 seconds B/L, Temperature gradient warm to cool, B/L.   Neuro: Epicritic sensation inact to the level of toes, B/L.  Musculoskeletal/Ortho: Quadriplegic   Skin: RF medial ankle wound with ankle joint exposed and surrounding fibrotic and necrotic tissue measuring 5 cm X 4 cm X 2 cm, no malodor or purulence expressed. B/l  necrotic heel eschars well adhered, LF superficial anterior ankle wound with no signs of infection     RADIOLOGY & ADDITIONAL TESTS:

## 2019-06-01 LAB
-  AMIKACIN: SIGNIFICANT CHANGE UP
-  AMPICILLIN/SULBACTAM: SIGNIFICANT CHANGE UP
-  AMPICILLIN: SIGNIFICANT CHANGE UP
-  AZTREONAM: SIGNIFICANT CHANGE UP
-  CEFAZOLIN: SIGNIFICANT CHANGE UP
-  CEFEPIME: SIGNIFICANT CHANGE UP
-  CEFOTAXIME: SIGNIFICANT CHANGE UP
-  CEFOXITIN: SIGNIFICANT CHANGE UP
-  CEFTAZIDIME: SIGNIFICANT CHANGE UP
-  CEFTRIAXONE: SIGNIFICANT CHANGE UP
-  CEFUROXIME: SIGNIFICANT CHANGE UP
-  CIPROFLOXACIN: SIGNIFICANT CHANGE UP
-  CLINDAMYCIN: SIGNIFICANT CHANGE UP
-  DAPTOMYCIN: SIGNIFICANT CHANGE UP
-  ERTAPENEM: SIGNIFICANT CHANGE UP
-  ERYTHROMYCIN: SIGNIFICANT CHANGE UP
-  GENTAMICIN: SIGNIFICANT CHANGE UP
-  IMIPENEM: SIGNIFICANT CHANGE UP
-  IMIPENEM: SIGNIFICANT CHANGE UP
-  LEVOFLOXACIN: SIGNIFICANT CHANGE UP
-  LINEZOLID: SIGNIFICANT CHANGE UP
-  MEROPENEM: SIGNIFICANT CHANGE UP
-  OXACILLIN: SIGNIFICANT CHANGE UP
-  PENICILLIN: SIGNIFICANT CHANGE UP
-  PIPERACILLIN/TAZOBACTAM: SIGNIFICANT CHANGE UP
-  RIFAMPIN: SIGNIFICANT CHANGE UP
-  TETRACYCLINE: SIGNIFICANT CHANGE UP
-  TOBRAMYCIN: SIGNIFICANT CHANGE UP
-  TRIMETHOPRIM/SULFAMETHOXAZOLE: SIGNIFICANT CHANGE UP
-  VANCOMYCIN: SIGNIFICANT CHANGE UP
ANION GAP SERPL CALC-SCNC: 6 MMOL/L — SIGNIFICANT CHANGE UP (ref 5–17)
BUN SERPL-MCNC: 43 MG/DL — HIGH (ref 7–23)
CALCIUM SERPL-MCNC: 10.4 MG/DL — HIGH (ref 8.5–10.1)
CHLORIDE SERPL-SCNC: 113 MMOL/L — HIGH (ref 96–108)
CO2 SERPL-SCNC: 29 MMOL/L — SIGNIFICANT CHANGE UP (ref 22–31)
CREAT SERPL-MCNC: 1.38 MG/DL — HIGH (ref 0.5–1.3)
CULTURE RESULTS: SIGNIFICANT CHANGE UP
CULTURE RESULTS: SIGNIFICANT CHANGE UP
GLUCOSE BLDC GLUCOMTR-MCNC: 194 MG/DL — HIGH (ref 70–99)
GLUCOSE BLDC GLUCOMTR-MCNC: 208 MG/DL — HIGH (ref 70–99)
GLUCOSE BLDC GLUCOMTR-MCNC: 216 MG/DL — HIGH (ref 70–99)
GLUCOSE BLDC GLUCOMTR-MCNC: 219 MG/DL — HIGH (ref 70–99)
GLUCOSE BLDC GLUCOMTR-MCNC: 223 MG/DL — HIGH (ref 70–99)
GLUCOSE BLDC GLUCOMTR-MCNC: 256 MG/DL — HIGH (ref 70–99)
GLUCOSE SERPL-MCNC: 219 MG/DL — HIGH (ref 70–99)
HCT VFR BLD CALC: 27.3 % — LOW (ref 39–50)
HGB BLD-MCNC: 7.9 G/DL — LOW (ref 13–17)
MAGNESIUM SERPL-MCNC: 2.8 MG/DL — HIGH (ref 1.6–2.6)
MCHC RBC-ENTMCNC: 28.9 GM/DL — LOW (ref 32–36)
MCHC RBC-ENTMCNC: 29.4 PG — SIGNIFICANT CHANGE UP (ref 27–34)
MCV RBC AUTO: 101.5 FL — HIGH (ref 80–100)
METHOD TYPE: SIGNIFICANT CHANGE UP
NRBC # BLD: 0 /100 WBCS — SIGNIFICANT CHANGE UP (ref 0–0)
ORGANISM # SPEC MICROSCOPIC CNT: SIGNIFICANT CHANGE UP
PHOSPHATE SERPL-MCNC: 3.1 MG/DL — SIGNIFICANT CHANGE UP (ref 2.5–4.5)
PLATELET # BLD AUTO: 191 K/UL — SIGNIFICANT CHANGE UP (ref 150–400)
POTASSIUM SERPL-MCNC: 3.9 MMOL/L — SIGNIFICANT CHANGE UP (ref 3.5–5.3)
POTASSIUM SERPL-SCNC: 3.9 MMOL/L — SIGNIFICANT CHANGE UP (ref 3.5–5.3)
RBC # BLD: 2.69 M/UL — LOW (ref 4.2–5.8)
RBC # FLD: 15.8 % — HIGH (ref 10.3–14.5)
SODIUM SERPL-SCNC: 148 MMOL/L — HIGH (ref 135–145)
SPECIMEN SOURCE: SIGNIFICANT CHANGE UP
SPECIMEN SOURCE: SIGNIFICANT CHANGE UP
VANCOMYCIN TROUGH SERPL-MCNC: 27.2 UG/ML — CRITICAL HIGH (ref 10–20)
WBC # BLD: 10 K/UL — SIGNIFICANT CHANGE UP (ref 3.8–10.5)
WBC # FLD AUTO: 10 K/UL — SIGNIFICANT CHANGE UP (ref 3.8–10.5)

## 2019-06-01 PROCEDURE — 99291 CRITICAL CARE FIRST HOUR: CPT

## 2019-06-01 RX ORDER — SODIUM CHLORIDE 9 MG/ML
1000 INJECTION, SOLUTION INTRAVENOUS
Refills: 0 | Status: DISCONTINUED | OUTPATIENT
Start: 2019-06-01 | End: 2019-06-02

## 2019-06-01 RX ORDER — LINEZOLID 600 MG/300ML
600 INJECTION, SOLUTION INTRAVENOUS EVERY 12 HOURS
Refills: 0 | Status: DISCONTINUED | OUTPATIENT
Start: 2019-06-02 | End: 2019-06-12

## 2019-06-01 RX ORDER — MEROPENEM 1 G/30ML
INJECTION INTRAVENOUS
Refills: 0 | Status: DISCONTINUED | OUTPATIENT
Start: 2019-06-01 | End: 2019-06-07

## 2019-06-01 RX ORDER — LINEZOLID 600 MG/300ML
INJECTION, SOLUTION INTRAVENOUS
Refills: 0 | Status: DISCONTINUED | OUTPATIENT
Start: 2019-06-01 | End: 2019-06-12

## 2019-06-01 RX ORDER — MEROPENEM 1 G/30ML
1000 INJECTION INTRAVENOUS ONCE
Refills: 0 | Status: COMPLETED | OUTPATIENT
Start: 2019-06-01 | End: 2019-06-01

## 2019-06-01 RX ORDER — LINEZOLID 600 MG/300ML
600 INJECTION, SOLUTION INTRAVENOUS ONCE
Refills: 0 | Status: COMPLETED | OUTPATIENT
Start: 2019-06-01 | End: 2019-06-01

## 2019-06-01 RX ORDER — MEROPENEM 1 G/30ML
1000 INJECTION INTRAVENOUS EVERY 12 HOURS
Refills: 0 | Status: DISCONTINUED | OUTPATIENT
Start: 2019-06-02 | End: 2019-06-07

## 2019-06-01 RX ORDER — MIDODRINE HYDROCHLORIDE 2.5 MG/1
20 TABLET ORAL EVERY 8 HOURS
Refills: 0 | Status: DISCONTINUED | OUTPATIENT
Start: 2019-06-01 | End: 2019-06-17

## 2019-06-01 RX ORDER — LINEZOLID 600 MG/300ML
INJECTION, SOLUTION INTRAVENOUS
Refills: 0 | Status: DISCONTINUED | OUTPATIENT
Start: 2019-06-01 | End: 2019-06-01

## 2019-06-01 RX ADMIN — Medication 4: at 17:44

## 2019-06-01 RX ADMIN — MIDODRINE HYDROCHLORIDE 20 MILLIGRAM(S): 2.5 TABLET ORAL at 13:51

## 2019-06-01 RX ADMIN — SODIUM CHLORIDE 75 MILLILITER(S): 9 INJECTION, SOLUTION INTRAVENOUS at 06:24

## 2019-06-01 RX ADMIN — Medication 6.9 MICROGRAM(S)/KG/MIN: at 08:04

## 2019-06-01 RX ADMIN — Medication 4: at 13:51

## 2019-06-01 RX ADMIN — Medication 2: at 09:28

## 2019-06-01 RX ADMIN — FENTANYL CITRATE 1 PATCH: 50 INJECTION INTRAVENOUS at 19:52

## 2019-06-01 RX ADMIN — FENTANYL CITRATE 1 PATCH: 50 INJECTION INTRAVENOUS at 08:01

## 2019-06-01 RX ADMIN — Medication 250 MILLIGRAM(S): at 00:31

## 2019-06-01 RX ADMIN — LINEZOLID 300 MILLIGRAM(S): 600 INJECTION, SOLUTION INTRAVENOUS at 20:41

## 2019-06-01 RX ADMIN — Medication 3 MILLILITER(S): at 16:29

## 2019-06-01 RX ADMIN — MIDODRINE HYDROCHLORIDE 10 MILLIGRAM(S): 2.5 TABLET ORAL at 06:13

## 2019-06-01 RX ADMIN — Medication 4: at 06:13

## 2019-06-01 RX ADMIN — Medication 1 APPLICATION(S): at 12:03

## 2019-06-01 RX ADMIN — FENTANYL CITRATE 1 PATCH: 50 INJECTION INTRAVENOUS at 13:54

## 2019-06-01 RX ADMIN — INSULIN GLARGINE 40 UNIT(S): 100 INJECTION, SOLUTION SUBCUTANEOUS at 08:03

## 2019-06-01 RX ADMIN — Medication 250 MILLIGRAM(S): at 12:03

## 2019-06-01 RX ADMIN — Medication 6: at 04:00

## 2019-06-01 RX ADMIN — HEPARIN SODIUM 5000 UNIT(S): 5000 INJECTION INTRAVENOUS; SUBCUTANEOUS at 17:45

## 2019-06-01 RX ADMIN — SODIUM CHLORIDE 30 MILLILITER(S): 9 INJECTION, SOLUTION INTRAVENOUS at 20:50

## 2019-06-01 RX ADMIN — CEFEPIME 100 MILLIGRAM(S): 1 INJECTION, POWDER, FOR SOLUTION INTRAMUSCULAR; INTRAVENOUS at 06:13

## 2019-06-01 RX ADMIN — PANTOPRAZOLE SODIUM 40 MILLIGRAM(S): 20 TABLET, DELAYED RELEASE ORAL at 12:03

## 2019-06-01 RX ADMIN — Medication 3 MILLILITER(S): at 11:48

## 2019-06-01 RX ADMIN — Medication 3 MILLILITER(S): at 05:33

## 2019-06-01 RX ADMIN — MEROPENEM 100 MILLIGRAM(S): 1 INJECTION INTRAVENOUS at 19:53

## 2019-06-01 RX ADMIN — CEFEPIME 100 MILLIGRAM(S): 1 INJECTION, POWDER, FOR SOLUTION INTRAMUSCULAR; INTRAVENOUS at 13:50

## 2019-06-01 RX ADMIN — HEPARIN SODIUM 5000 UNIT(S): 5000 INJECTION INTRAVENOUS; SUBCUTANEOUS at 06:13

## 2019-06-01 RX ADMIN — Medication 4: at 21:35

## 2019-06-01 RX ADMIN — FENTANYL CITRATE 1 PATCH: 50 INJECTION INTRAVENOUS at 13:47

## 2019-06-01 RX ADMIN — MIDODRINE HYDROCHLORIDE 20 MILLIGRAM(S): 2.5 TABLET ORAL at 21:19

## 2019-06-01 NOTE — PROGRESS NOTE ADULT - SUBJECTIVE AND OBJECTIVE BOX
Patient is a 73y old  Male who presents with a chief complaint of Sepsis (31 May 2019 18:56)  full code patient, quadriplegic with sepsis,  decubiti   hypernatremic, hypercalcemic   tracheotomy on the vent.    still on pressors.    INTERVAL HPI/OVERNIGHT EVENTS:  PAST MEDICAL & SURGICAL HISTORY:  DM (diabetes mellitus)  Heart failure  Diabetic neuropathy  Hyperlipidemia  Quadriplegia  Hypertension  Stroke  Hypertension  Obstructive cardiomyopathy  S/P percutaneous endoscopic gastrostomy (PEG) tube placement  History of tracheostomy      MEDICATIONS  (STANDING):  ALBUTerol    90 MICROgram(s) HFA Inhaler 1 Puff(s) Inhalation every 4 hours  ALBUTerol/ipratropium for Nebulization 3 milliLiter(s) Nebulizer every 6 hours  cefepime   IVPB      cefepime   IVPB 1000 milliGRAM(s) IV Intermittent every 8 hours  dextrose 5%. 1000 milliLiter(s) (75 mL/Hr) IV Continuous <Continuous>  dextrose 5%. 1000 milliLiter(s) (50 mL/Hr) IV Continuous <Continuous>  dextrose 50% Injectable 12.5 Gram(s) IV Push once  dextrose 50% Injectable 25 Gram(s) IV Push once  dextrose 50% Injectable 25 Gram(s) IV Push once  fentaNYL   Patch  12 MICROgram(s)/Hr 1 Patch Transdermal every 72 hours  heparin  Injectable 5000 Unit(s) SubCutaneous every 12 hours  insulin glargine Injectable (LANTUS) 40 Unit(s) SubCutaneous every morning  insulin lispro (HumaLOG) corrective regimen sliding scale   SubCutaneous every 4 hours  midodrine 10 milliGRAM(s) Oral every 8 hours  norepinephrine Infusion 0.06 MICROgram(s)/kG/Min (6.9 mL/Hr) IV Continuous <Continuous>  pantoprazole   Suspension 40 milliGRAM(s) Oral daily  petrolatum Ophthalmic Ointment 1 Application(s) Both EYES daily  tiotropium 18 MICROgram(s) Capsule 1 Capsule(s) Inhalation daily  vancomycin  IVPB 750 milliGRAM(s) IV Intermittent every 12 hours    MEDICATIONS  (PRN):  acetaminophen    Suspension .. 650 milliGRAM(s) Oral every 6 hours PRN Temp greater or equal to 38C (100.4F)  dextrose 40% Gel 15 Gram(s) Oral once PRN Blood Glucose LESS THAN 70 milliGRAM(s)/deciliter  glucagon  Injectable 1 milliGRAM(s) IntraMuscular once PRN Glucose LESS THAN 70 milligrams/deciliter      Allergies    penicillin (Unknown)    Intolerances        REVIEW OF SYSTEMS:  CONSTITUTIONAL: No fever, weight loss, or fatigue  EYES: No eye pain, visual disturbances, or discharge  ENMT:  No difficulty hearing, tinnitus, vertigo; No sinus or throat pain  NECK: No pain or stiffness  BREASTS: No pain, masses, or nipple discharge  RESPIRATORY: No cough, wheezing, chills or hemoptysis; No shortness of breath  CARDIOVASCULAR: No chest pain, palpitations, dizziness, or leg swelling  GASTROINTESTINAL: No abdominal or epigastric pain. No nausea, vomiting, or hematemesis; No diarrhea or constipation. No melena or hematochezia.  GENITOURINARY: No dysuria, frequency, hematuria, or incontinence  NEUROLOGICAL: No headaches, memory loss, loss of strength, numbness, or tremors  SKIN: No itching, burning, rashes, or lesions   LYMPH NODES: No enlarged glands  ENDOCRINE: No heat or cold intolerance; No hair loss  MUSCULOSKELETAL: No joint pain or swelling; No muscle, back, or extremity pain  PSYCHIATRIC: No depression, anxiety, mood swings, or difficulty sleeping  HEME/LYMPH: No easy bruising, or bleeding gums  ALLERY AND IMMUNOLOGIC: No hives or eczema    Vital Signs Last 24 Hrs  T(C): 37.8 (01 Jun 2019 08:00), Max: 37.9 (31 May 2019 12:00)  T(F): 100 (01 Jun 2019 08:00), Max: 100.2 (31 May 2019 12:00)  HR: 82 (01 Jun 2019 09:00) (78 - 96)  BP: 136/35 (01 Jun 2019 09:00) (90/36 - 149/44)  BP(mean): 58 (01 Jun 2019 09:00) (48 - 76)  RR: 17 (01 Jun 2019 09:00) (12 - 20)  SpO2: 100% (01 Jun 2019 09:00) (97% - 100%)    PHYSICAL EXAM:  GENERAL: NAD, well-groomed, well-developed  HEAD:  Atraumatic, Normocephalic  EYES: EOMI, PERRLA, conjunctiva and sclera clear  ENMT: No tonsillar erythema, exudates, or enlargement; Moist mucous membranes, Good dentition, No lesions  NECK: Supple, No JVD, Normal thyroid. trcheotomy connected to vent.  NERVOUS SYSTEM:  quadriplegic.   CHEST/LUNG: Clear to percussion bilaterally; No rales, rhonchi, wheezing, or rubs  HEART: Regular rate and rhythm; No murmurs, rubs, or gallops  ABDOMEN: Soft, Nontender, Nondistended; Bowel sounds present  EXTREMITIES:  2+ Peripheral Pulses, No clubbing, cyanosis, or edema  LYMPH: No lymphadenopathy noted  SKIN:  deep sacral and heel decubiti    LABS:                        7.9    10.00 )-----------( 191      ( 01 Jun 2019 05:00 )             27.3     06-01    148<H>  |  113<H>  |  43<H>  ----------------------------<  219<H>  3.9   |  29  |  1.38<H>    Ca    10.4<H>      01 Jun 2019 05:00  Phos  3.1     06-01  Mg     2.8     06-01            CAPILLARY BLOOD GLUCOSE      POCT Blood Glucose.: 223 mg/dL (01 Jun 2019 06:10)  POCT Blood Glucose.: 256 mg/dL (01 Jun 2019 03:49)  POCT Blood Glucose.: 172 mg/dL (31 May 2019 22:17)  POCT Blood Glucose.: 216 mg/dL (31 May 2019 17:09)  POCT Blood Glucose.: 212 mg/dL (31 May 2019 14:10)  POCT Blood Glucose.: 166 mg/dL (31 May 2019 09:30)          Hemoglobin A1C, Whole Blood: 7.4 % (05-30 @ 08:57)        RADIOLOGY & ADDITIONAL TESTS:    Imaging Personally Reviewed:  [ ] YES  [ ] NO    Consultant(s) Notes Reviewed:  [ ] YES  [ ] NO    Care Discussed with Consultants/Other Providers [ ] YES  [ ] NO    Care discussed with family,         [  ]   yes  [  ]  No    imp:    stable[ ]    unstable[  ]     improving [ x  ]       unchanged  [  ]                Plans:  Continue present plans  [ x ] as per critical care               New consult [  ]   specialty  .......               order test[  ]    test name.                  Discharge Planning  [  ]

## 2019-06-01 NOTE — PROGRESS NOTE ADULT - SUBJECTIVE AND OBJECTIVE BOX
INTERVAL History:  Trach  PEG    Allergies    penicillin (Unknown)    Intolerances        MEDICATIONS  (STANDING):  ALBUTerol    90 MICROgram(s) HFA Inhaler 1 Puff(s) Inhalation every 4 hours  ALBUTerol/ipratropium for Nebulization 3 milliLiter(s) Nebulizer every 6 hours  cefepime   IVPB      cefepime   IVPB 1000 milliGRAM(s) IV Intermittent every 8 hours  dextrose 5%. 1000 milliLiter(s) (75 mL/Hr) IV Continuous <Continuous>  dextrose 5%. 1000 milliLiter(s) (50 mL/Hr) IV Continuous <Continuous>  dextrose 50% Injectable 12.5 Gram(s) IV Push once  dextrose 50% Injectable 25 Gram(s) IV Push once  dextrose 50% Injectable 25 Gram(s) IV Push once  fentaNYL   Patch  12 MICROgram(s)/Hr 1 Patch Transdermal every 72 hours  heparin  Injectable 5000 Unit(s) SubCutaneous every 12 hours  insulin glargine Injectable (LANTUS) 40 Unit(s) SubCutaneous every morning  insulin lispro (HumaLOG) corrective regimen sliding scale   SubCutaneous every 4 hours  midodrine 20 milliGRAM(s) Oral every 8 hours  norepinephrine Infusion 0.06 MICROgram(s)/kG/Min (6.9 mL/Hr) IV Continuous <Continuous>  pantoprazole   Suspension 40 milliGRAM(s) Oral daily  petrolatum Ophthalmic Ointment 1 Application(s) Both EYES daily  tiotropium 18 MICROgram(s) Capsule 1 Capsule(s) Inhalation daily  vancomycin  IVPB 750 milliGRAM(s) IV Intermittent every 12 hours    MEDICATIONS  (PRN):  acetaminophen    Suspension .. 650 milliGRAM(s) Oral every 6 hours PRN Temp greater or equal to 38C (100.4F)  dextrose 40% Gel 15 Gram(s) Oral once PRN Blood Glucose LESS THAN 70 milliGRAM(s)/deciliter  glucagon  Injectable 1 milliGRAM(s) IntraMuscular once PRN Glucose LESS THAN 70 milligrams/deciliter      Vital Signs Last 24 Hrs  T(C): 37.4 (01 Jun 2019 12:00), Max: 37.8 (01 Jun 2019 08:00)  T(F): 99.3 (01 Jun 2019 12:00), Max: 100 (01 Jun 2019 08:00)  HR: 84 (01 Jun 2019 12:30) (78 - 96)  BP: 114/34 (01 Jun 2019 12:00) (90/36 - 149/44)  BP(mean): 49 (01 Jun 2019 12:00) (48 - 76)  RR: 16 (01 Jun 2019 12:30) (12 - 20)  SpO2: 100% (01 Jun 2019 12:30) (97% - 100%)    PHYSICAL EXAM:    Quadriplegia    NECK: Trach  CHEST/LUNG: Clear to percussion bilaterally; No rales, rhonchi,   HEART: Regular rate and rhythm;   ABDOMEN: PEG  Decubitus Ulcers.        LABS:                        7.9    10.00 )-----------( 191      ( 01 Jun 2019 05:00 )             27.3     06-01    148<H>  |  113<H>  |  43<H>  ----------------------------<  219<H>  3.9   |  29  |  1.38<H>    Ca    10.4<H>      01 Jun 2019 05:00  Phos  3.1     06-01  Mg     2.8     06-01              RADIOLOGY & ADDITIONAL STUDIES:    PATHOLOGY:

## 2019-06-01 NOTE — PROGRESS NOTE ADULT - ASSESSMENT
sepsis   rocco on ckd   electrolyte imbalance   quadriplegic   chronic respiratory failure with tracheotomy and on the vent

## 2019-06-01 NOTE — PROGRESS NOTE ADULT - ASSESSMENT
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 Now noted with previously known renal lesion and pulmonary nodules.     1. 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 PULM  - chronic vent  - cont mechanical ventilation  - cont antibx for likely VAP which pt arrived with vs colonization    3. CV  - remains in shock state  - cont midodrine for hemodynamic support, increased to 20mg q8 in attempt to wean off IV vasopressor  - wean off levophed as BP tolerates    4. RENAL  - ARF with ATN in setting of sepsis +/- initial dehydration  - Cr improving  - cont to trend  - hypernatremia improving: cont free water and D5 gtt    5. GI  - cont PEG feeds  - abdominal CT reviewed: question if biliary drain is in correct position, no documentation from rehab as to why drain was placed originally  - spoke with IR: will need to do a tube check, await IR availability for tube check and possible readjustment of tube    6. ID  - polymicrobial sepsis likely from right ankle wound and osteomyelitis + infected decub ulcers +/- PNA  - has been on vanco and cefepime however culture results show resistance to vanco and cefepime, will change to linezolid for VRE and meropenem  - will need ID follow up  - clinically leukocytosis overall is improving  - with right foot/ankle wound and osteomyelitis with exposed joint pt likely will  need below knee amputation for source control    7. ONC  - pt with known renal lesion/mass  - hypercalcemia may be in setting of underlying possible malignancy  - appreciate onc eval s/p pamidronate for hypercalcemia    8. GEN  - pt with advanced illness and chronic comorbidities  - family meeting today with son and daughter in law whom wife has asked us to speak with. multiple infected decubitus disucssed with family, possiblity for amputation addressed, a questionable dislodged perc biliary drain addressed ,sepsis with shock state, chronic vent and quadriplegic state discussed. overall prognosis is poor. we discussed quality of life. options to remain full code or to transition to comfort, DNR with conservative management of medical condition. Family was not aware pt even had a perc rory drain. reports pt must have had that placed at [facility]. At present time family overwhelmed and unable to make any decisions. Pt remains full code. Son and daughter in law returning tomorrow and will readdress with family.     Critical Care Time: 50 min

## 2019-06-01 NOTE — PROVIDER CONTACT NOTE (CRITICAL VALUE NOTIFICATION) - TEST AND RESULT REPORTED:
Sputum Cx 5/29- Numerous Pseudomonas Aeruginosa, carbepenum resistant, normal respitory christopher present

## 2019-06-02 DIAGNOSIS — G82.50 QUADRIPLEGIA, UNSPECIFIED: ICD-10-CM

## 2019-06-02 LAB
-  AMIKACIN: SIGNIFICANT CHANGE UP
-  AMPICILLIN/SULBACTAM: SIGNIFICANT CHANGE UP
-  AMPICILLIN: SIGNIFICANT CHANGE UP
-  AZTREONAM: SIGNIFICANT CHANGE UP
-  CEFEPIME: SIGNIFICANT CHANGE UP
-  CEFOXITIN: SIGNIFICANT CHANGE UP
-  CEFTRIAXONE: SIGNIFICANT CHANGE UP
-  CIPROFLOXACIN: SIGNIFICANT CHANGE UP
-  ERTAPENEM: SIGNIFICANT CHANGE UP
-  GENTAMICIN: SIGNIFICANT CHANGE UP
-  LEVOFLOXACIN: SIGNIFICANT CHANGE UP
-  MEROPENEM: SIGNIFICANT CHANGE UP
-  PIPERACILLIN/TAZOBACTAM: SIGNIFICANT CHANGE UP
-  TOBRAMYCIN: SIGNIFICANT CHANGE UP
-  TRIMETHOPRIM/SULFAMETHOXAZOLE: SIGNIFICANT CHANGE UP
ANION GAP SERPL CALC-SCNC: 5 MMOL/L — SIGNIFICANT CHANGE UP (ref 5–17)
BUN SERPL-MCNC: 38 MG/DL — HIGH (ref 7–23)
CALCIUM SERPL-MCNC: 9.7 MG/DL — SIGNIFICANT CHANGE UP (ref 8.5–10.1)
CHLORIDE SERPL-SCNC: 112 MMOL/L — HIGH (ref 96–108)
CO2 SERPL-SCNC: 29 MMOL/L — SIGNIFICANT CHANGE UP (ref 22–31)
CREAT SERPL-MCNC: 1.28 MG/DL — SIGNIFICANT CHANGE UP (ref 0.5–1.3)
CULTURE RESULTS: SIGNIFICANT CHANGE UP
GLUCOSE BLDC GLUCOMTR-MCNC: 140 MG/DL — HIGH (ref 70–99)
GLUCOSE BLDC GLUCOMTR-MCNC: 163 MG/DL — HIGH (ref 70–99)
GLUCOSE BLDC GLUCOMTR-MCNC: 168 MG/DL — HIGH (ref 70–99)
GLUCOSE BLDC GLUCOMTR-MCNC: 175 MG/DL — HIGH (ref 70–99)
GLUCOSE BLDC GLUCOMTR-MCNC: 248 MG/DL — HIGH (ref 70–99)
GLUCOSE SERPL-MCNC: 144 MG/DL — HIGH (ref 70–99)
HCT VFR BLD CALC: 26.9 % — LOW (ref 39–50)
HGB BLD-MCNC: 7.9 G/DL — LOW (ref 13–17)
MAGNESIUM SERPL-MCNC: 2.8 MG/DL — HIGH (ref 1.6–2.6)
MCHC RBC-ENTMCNC: 29.4 GM/DL — LOW (ref 32–36)
MCHC RBC-ENTMCNC: 29.4 PG — SIGNIFICANT CHANGE UP (ref 27–34)
MCV RBC AUTO: 100 FL — SIGNIFICANT CHANGE UP (ref 80–100)
METHOD TYPE: SIGNIFICANT CHANGE UP
NRBC # BLD: 0 /100 WBCS — SIGNIFICANT CHANGE UP (ref 0–0)
ORGANISM # SPEC MICROSCOPIC CNT: SIGNIFICANT CHANGE UP
PHOSPHATE SERPL-MCNC: 2.6 MG/DL — SIGNIFICANT CHANGE UP (ref 2.5–4.5)
PLATELET # BLD AUTO: 201 K/UL — SIGNIFICANT CHANGE UP (ref 150–400)
POTASSIUM SERPL-MCNC: 3.8 MMOL/L — SIGNIFICANT CHANGE UP (ref 3.5–5.3)
POTASSIUM SERPL-SCNC: 3.8 MMOL/L — SIGNIFICANT CHANGE UP (ref 3.5–5.3)
RBC # BLD: 2.69 M/UL — LOW (ref 4.2–5.8)
RBC # FLD: 15.9 % — HIGH (ref 10.3–14.5)
SODIUM SERPL-SCNC: 146 MMOL/L — HIGH (ref 135–145)
SPECIMEN SOURCE: SIGNIFICANT CHANGE UP
WBC # BLD: 10.14 K/UL — SIGNIFICANT CHANGE UP (ref 3.8–10.5)
WBC # FLD AUTO: 10.14 K/UL — SIGNIFICANT CHANGE UP (ref 3.8–10.5)

## 2019-06-02 PROCEDURE — 99291 CRITICAL CARE FIRST HOUR: CPT

## 2019-06-02 RX ORDER — INSULIN LISPRO 100/ML
VIAL (ML) SUBCUTANEOUS EVERY 6 HOURS
Refills: 0 | Status: DISCONTINUED | OUTPATIENT
Start: 2019-06-02 | End: 2019-06-11

## 2019-06-02 RX ORDER — ONDANSETRON 8 MG/1
4 TABLET, FILM COATED ORAL ONCE
Refills: 0 | Status: COMPLETED | OUTPATIENT
Start: 2019-06-02 | End: 2019-06-02

## 2019-06-02 RX ADMIN — Medication 3 MILLILITER(S): at 05:37

## 2019-06-02 RX ADMIN — MIDODRINE HYDROCHLORIDE 20 MILLIGRAM(S): 2.5 TABLET ORAL at 15:20

## 2019-06-02 RX ADMIN — Medication 2: at 23:15

## 2019-06-02 RX ADMIN — ONDANSETRON 4 MILLIGRAM(S): 8 TABLET, FILM COATED ORAL at 23:15

## 2019-06-02 RX ADMIN — FENTANYL CITRATE 1 PATCH: 50 INJECTION INTRAVENOUS at 20:12

## 2019-06-02 RX ADMIN — Medication 6.9 MICROGRAM(S)/KG/MIN: at 06:34

## 2019-06-02 RX ADMIN — Medication 3 MILLILITER(S): at 11:43

## 2019-06-02 RX ADMIN — HEPARIN SODIUM 5000 UNIT(S): 5000 INJECTION INTRAVENOUS; SUBCUTANEOUS at 05:55

## 2019-06-02 RX ADMIN — MEROPENEM 100 MILLIGRAM(S): 1 INJECTION INTRAVENOUS at 06:34

## 2019-06-02 RX ADMIN — Medication 1 APPLICATION(S): at 12:31

## 2019-06-02 RX ADMIN — Medication 4: at 12:30

## 2019-06-02 RX ADMIN — Medication 3 MILLILITER(S): at 17:21

## 2019-06-02 RX ADMIN — PANTOPRAZOLE SODIUM 40 MILLIGRAM(S): 20 TABLET, DELAYED RELEASE ORAL at 12:30

## 2019-06-02 RX ADMIN — MEROPENEM 100 MILLIGRAM(S): 1 INJECTION INTRAVENOUS at 18:51

## 2019-06-02 RX ADMIN — Medication 650 MILLIGRAM(S): at 23:51

## 2019-06-02 RX ADMIN — LINEZOLID 300 MILLIGRAM(S): 600 INJECTION, SOLUTION INTRAVENOUS at 08:23

## 2019-06-02 RX ADMIN — INSULIN GLARGINE 40 UNIT(S): 100 INJECTION, SOLUTION SUBCUTANEOUS at 08:24

## 2019-06-02 RX ADMIN — MIDODRINE HYDROCHLORIDE 20 MILLIGRAM(S): 2.5 TABLET ORAL at 22:06

## 2019-06-02 RX ADMIN — Medication 2: at 02:02

## 2019-06-02 RX ADMIN — MIDODRINE HYDROCHLORIDE 20 MILLIGRAM(S): 2.5 TABLET ORAL at 05:55

## 2019-06-02 RX ADMIN — LINEZOLID 300 MILLIGRAM(S): 600 INJECTION, SOLUTION INTRAVENOUS at 20:13

## 2019-06-02 RX ADMIN — HEPARIN SODIUM 5000 UNIT(S): 5000 INJECTION INTRAVENOUS; SUBCUTANEOUS at 17:37

## 2019-06-02 RX ADMIN — Medication 3 MILLILITER(S): at 00:26

## 2019-06-02 RX ADMIN — Medication 2: at 05:56

## 2019-06-02 NOTE — PROGRESS NOTE ADULT - ASSESSMENT
rocco improved.    electrolyte imbalance improving.   chronic resp failure on the vent   dyaphagic   quadriplegia   sepsis   decubiti

## 2019-06-02 NOTE — PROGRESS NOTE ADULT - SUBJECTIVE AND OBJECTIVE BOX
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  NH report pt BUN & Cr incr, EMS report pt hypotensive on their arrival.  On arrival to ED, removed two 12microgram fentanyl patches (5/10/19 & 19)    In ED received 2200ml saline bolus. (29 May 2019 05:43)      24 hr events:      ## ROS:  [ ] unable to obtain  CONSTITUTIONAL: No fever, weight loss, or fatigue  EYES: No eye pain, visual disturbances, or discharge  ENMT:  No difficulty hearing, tinnitus, vertigo; No sinus or throat pain  NECK: No pain or stiffness  RESPIRATORY: No cough, wheezing, chills or hemoptysis; No shortness of breath  CARDIOVASCULAR: No chest pain, palpitations, dizziness, or leg swelling  GASTROINTESTINAL: No abdominal or epigastric pain. No nausea, vomiting, or hematemesis; No diarrhea or constipation. No melena or hematochezia.  GENITOURINARY: No dysuria, frequency, hematuria, or incontinence  NEUROLOGICAL: No headaches, memory loss, loss of strength, numbness, or tremors  SKIN: No itching, burning, rashes, or lesions   LYMPH NODES: No enlarged glands  ENDOCRINE: No heat or cold intolerance; No hair loss  MUSCULOSKELETAL: No joint pain or swelling; No muscle, back, or extremity pain  PSYCHIATRIC: No depression, anxiety, mood swings, or difficulty sleeping  HEME/LYMPH: No easy bruising, or bleeding gums  ALLERGY AND IMMUNOLOGIC: No hives or eczema    ## Labs:  CBC:                        7.9    10.14 )-----------( 201      ( 2019 03:22 )             26.9     Chem:  06-02    146<H>  |  112<H>  |  38<H>  ----------------------------<  144<H>  3.8   |  29  |  1.28    Ca    9.7      2019 03:22  Phos  2.6     06-  Mg     2.8           Coags:          ## Imaging:    ## Medications:  linezolid  IVPB 600 milliGRAM(s) IV Intermittent every 12 hours  linezolid  IVPB      meropenem  IVPB      meropenem  IVPB 1000 milliGRAM(s) IV Intermittent every 12 hours    midodrine 20 milliGRAM(s) Oral every 8 hours  norepinephrine Infusion 0.06 MICROgram(s)/kG/Min IV Continuous <Continuous>    ALBUTerol    90 MICROgram(s) HFA Inhaler 1 Puff(s) Inhalation every 4 hours  ALBUTerol/ipratropium for Nebulization 3 milliLiter(s) Nebulizer every 6 hours  tiotropium 18 MICROgram(s) Capsule 1 Capsule(s) Inhalation daily    dextrose 40% Gel 15 Gram(s) Oral once PRN  dextrose 50% Injectable 12.5 Gram(s) IV Push once  dextrose 50% Injectable 25 Gram(s) IV Push once  dextrose 50% Injectable 25 Gram(s) IV Push once  glucagon  Injectable 1 milliGRAM(s) IntraMuscular once PRN  insulin glargine Injectable (LANTUS) 40 Unit(s) SubCutaneous every morning  insulin lispro (HumaLOG) corrective regimen sliding scale   SubCutaneous every 6 hours    heparin  Injectable 5000 Unit(s) SubCutaneous every 12 hours    pantoprazole   Suspension 40 milliGRAM(s) Oral daily    acetaminophen    Suspension .. 650 milliGRAM(s) Oral every 6 hours PRN  fentaNYL   Patch  12 MICROgram(s)/Hr 1 Patch Transdermal every 72 hours      ## Vitals:  T(C): 38 (19 @ 15:40), Max: 38 (19 @ 15:40)  HR: 88 (19 @ 19:25) (75 - 91)  BP: 110/36 (19 @ 19:00) (94/35 - 149/37)  BP(mean): 53 (19 @ 19:00) (48 - 69)  RR: 17 (19 @ 19:25) (12 - 34)  SpO2: 99% (19 @ 19:25) (94% - 100%)  Wt(kg): --  Vent: Mode: AC/ CMV (Assist Control/ Continuous Mandatory Ventilation), RR (machine): 12, RR (patient): 12, TV (machine): 500, FiO2: 30, PEEP: 5, PIP: 24  AB-01 @ 07: @ 07:00  --------------------------------------------------------  IN: 3512.8 mL / OUT: 2555 mL / NET: 957.8 mL     @ 07: @ 19:34  --------------------------------------------------------  IN: 1884.8 mL / OUT: 1640 mL / NET: 244.8 mL          ## P/E:  Gen: lying comfortably in bed in no apparent distress  HEENT: PERRL, EOMI  Resp: CTA B/L no c/r/w  CVS: S1S2 no m/r/g  Abd: soft NT/ND +BS  Ext: no c/c/e  Neuro: A&Ox3    CENTRAL LINE: [ ] YES [ ] NO  LOCATION:   DATE INSERTED:  REMOVE: [ ] YES [ ] NO      TAHIR: [ ] YES [ ] NO    DATE INSERTED:  REMOVE:  [ ] YES [ ] NO      A-LINE:  [ ] YES [ ] NO  LOCATION:   DATE INSERTED:  REMOVE:  [ ] YES [ ] NO  EXPLAIN:    GLOBAL ISSUE/BEST PRACTICE:  Analgesia:  Sedation:  HOB elevation: yes  Stress ulcer prophylaxis:  VTE prophylaxis:  Oral Care:  Glycemic control:  Nutrition:    CODE STATUS: [ ] full code  [ ] DNR  [ ] DNI  [ ] [facility]  Goals of care discussion: [ ] yes HPI:  73M PMH of CAD, 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  EMS reported pt was hypotensive at nursing facility. Pt had two 12microgram fentanyl patches (5/10/19 & 5/27/19) on patient which was removed. Admitted to ICU for severe sepsis, septic shock, dehydration, hypernatremia, acute renal failure, multiple decubitus ulcers (sacrum, hip, bilateral lower extremities), osteomyelitis of right ankle, hypercalcemia.         24 hr events:  no acute events  remains on levophed  attempted weaning, pt failed weaning    ## ROS:  [x] unable to obtain due mechanical ventilation, non verbal state    ## Labs:  CBC:                        7.9    10.14 )-----------( 201      ( 02 Jun 2019 03:22 )             26.9     Chem:  06-02    146<H>  |  112<H>  |  38<H>  ----------------------------<  144<H>  3.8   |  29  |  1.28    Ca    9.7      02 Jun 2019 03:22  Phos  2.6     06-02  Mg     2.8     06-02      Culture - Other (05.30.19 @ 18:37)    Specimen Source: Skin    Culture Results:   Moderate Klebsiella pneumoniae ESBL  Moderate Enterococcus faecium (vancomycin resistant)  Moderate Proteus mirabilis        Culture - Other (05.30.19 @ 18:36)    Specimen Source: Skin    Culture Results:   Few Methicillin resistant Staphylococcus aureus        Culture - Other (05.30.19 @ 18:35)    Specimen Source: Skin    Culture Results:   Moderate Klebsiella pneumoniae ESBL  Moderate Enterococcus faecalis  Moderate Proteus mirabilis  Moderate Enterococcus faecium (vancomycin resistant)        Culture - Sputum . (05.29.19 @ 17:42)    Specimen Source: .Sputum    Culture Results:   Numerous Pseudomonas aeruginosa (Carbapenem Resistant)      ## Medications:  linezolid  IVPB 600 milliGRAM(s) IV Intermittent every 12 hours    meropenem  IVPB 1000 milliGRAM(s) IV Intermittent every 12 hours    midodrine 20 milliGRAM(s) Oral every 8 hours  norepinephrine Infusion 0.06 MICROgram(s)/kG/Min IV Continuous <Continuous>      ALBUTerol/ipratropium for Nebulization 3 milliLiter(s) Nebulizer every 6 hours      insulin glargine Injectable (LANTUS) 40 Unit(s) SubCutaneous every morning  insulin lispro (HumaLOG) corrective regimen sliding scale   SubCutaneous every 6 hours    heparin  Injectable 5000 Unit(s) SubCutaneous every 12 hours    pantoprazole   Suspension 40 milliGRAM(s) Oral daily    acetaminophen    Suspension .. 650 milliGRAM(s) Oral every 6 hours PRN  fentaNYL   Patch  12 MICROgram(s)/Hr 1 Patch Transdermal every 72 hours      ## Vitals:  T(C): 38 (06-02-19 @ 15:40), Max: 38 (06-02-19 @ 15:40)  HR: 88 (06-02-19 @ 19:25) (75 - 91)  BP: 110/36 (06-02-19 @ 19:00) (94/35 - 149/37)  BP(mean): 53 (06-02-19 @ 19:00) (48 - 69)  RR: 17 (06-02-19 @ 19:25) (12 - 34)  SpO2: 99% (06-02-19 @ 19:25) (94% - 100%)    Vent: Mode: AC/ CMV (Assist Control/ Continuous Mandatory Ventilation), RR (machine): 12, RR (patient): 12, TV (machine): 500, FiO2: 30, PEEP: 5, PIP: 24        06-01 @ 07:01  -  06-02 @ 07:00  --------------------------------------------------------  IN: 3512.8 mL / OUT: 2555 mL / NET: 957.8 mL    06-02 @ 07:01  -  06-02 @ 19:34  --------------------------------------------------------  IN: 1884.8 mL / OUT: 1640 mL / NET: 244.8 mL          ## P/E:  Gen: elderly male, trached to vent. quadriplegic  HEENT: trach in place  Resp: scattered rhonchi , no wheeze  CVS: RRR  Abd: soft ND +BS + PEG + RUQ drain  Ext: right ankle wound with exposed bone, right lateral leg wound, bilateral heel eschar  Neuro: quadriplegic, occasionally blinks eyes and grimaces    CENTRAL LINE: [ ] YES [x] NO  LOCATION:   DATE INSERTED:  REMOVE: [ ] YES [ ] NO      COLE: [x] YES [ ] NO    DATE INSERTED:  REMOVE:  [ ] YES [x] NO  - chronic cole, cole was changed on admission    A-LINE:  [ ] YES [x] NO  LOCATION:   DATE INSERTED:  REMOVE:  [ ] YES [ ] NO  EXPLAIN:    GLOBAL ISSUE/BEST PRACTICE:  Analgesia: fentanyl  Sedation: n/a  HOB elevation: yes  Stress ulcer prophylaxis: protonix  VTE prophylaxis: heparin sc  Oral Care: n/a  Glycemic control: lantus and sliding scale coverage  Nutrition: PEG feeds    CODE STATUS: [x] full code  [ ] DNR  [ ] DNI  [ ] [facility]  Goals of care discussion: [x] yes

## 2019-06-02 NOTE — PROGRESS NOTE ADULT - ASSESSMENT
sepsis   multiple decubiti   vent dependant   cultures noted ; decubiti noted  sputum culture noted   no xray abn   need to treat ?   continue current treatment

## 2019-06-02 NOTE — PROGRESS NOTE ADULT - SUBJECTIVE AND OBJECTIVE BOX
Patient is a 73y old  Male who presents with a chief complaint of Sepsis (02 Jun 2019 07:57)  quadriplegic , , with chronic resp failure and multiple comorbidities    INTERVAL HPI/OVERNIGHT EVENTS:  PAST MEDICAL & SURGICAL HISTORY:  DM (diabetes mellitus)  Heart failure  Diabetic neuropathy  Hyperlipidemia  Quadriplegia  Hypertension  Stroke  Hypertension  Obstructive cardiomyopathy  S/P percutaneous endoscopic gastrostomy (PEG) tube placement  History of tracheostomy      MEDICATIONS  (STANDING):  ALBUTerol    90 MICROgram(s) HFA Inhaler 1 Puff(s) Inhalation every 4 hours  ALBUTerol/ipratropium for Nebulization 3 milliLiter(s) Nebulizer every 6 hours  dextrose 5%. 1000 milliLiter(s) (50 mL/Hr) IV Continuous <Continuous>  dextrose 5%. 1000 milliLiter(s) (30 mL/Hr) IV Continuous <Continuous>  dextrose 50% Injectable 12.5 Gram(s) IV Push once  dextrose 50% Injectable 25 Gram(s) IV Push once  dextrose 50% Injectable 25 Gram(s) IV Push once  fentaNYL   Patch  12 MICROgram(s)/Hr 1 Patch Transdermal every 72 hours  heparin  Injectable 5000 Unit(s) SubCutaneous every 12 hours  insulin glargine Injectable (LANTUS) 40 Unit(s) SubCutaneous every morning  insulin lispro (HumaLOG) corrective regimen sliding scale   SubCutaneous every 6 hours  linezolid  IVPB 600 milliGRAM(s) IV Intermittent every 12 hours  linezolid  IVPB      meropenem  IVPB      meropenem  IVPB 1000 milliGRAM(s) IV Intermittent every 12 hours  midodrine 20 milliGRAM(s) Oral every 8 hours  norepinephrine Infusion 0.06 MICROgram(s)/kG/Min (6.9 mL/Hr) IV Continuous <Continuous>  pantoprazole   Suspension 40 milliGRAM(s) Oral daily  petrolatum Ophthalmic Ointment 1 Application(s) Both EYES daily  tiotropium 18 MICROgram(s) Capsule 1 Capsule(s) Inhalation daily    MEDICATIONS  (PRN):  acetaminophen    Suspension .. 650 milliGRAM(s) Oral every 6 hours PRN Temp greater or equal to 38C (100.4F)  dextrose 40% Gel 15 Gram(s) Oral once PRN Blood Glucose LESS THAN 70 milliGRAM(s)/deciliter  glucagon  Injectable 1 milliGRAM(s) IntraMuscular once PRN Glucose LESS THAN 70 milligrams/deciliter      Allergies    penicillin (Unknown)    Intolerances        REVIEW OF SYSTEMS:  CONSTITUTIONAL: No fever, weight loss, or fatigue  EYES: No eye pain, visual disturbances, or discharge  ENMT:  No difficulty hearing, tinnitus, vertigo; No sinus or throat pain  NECK: No pain or stiffness  BREASTS: No pain, masses, or nipple discharge  RESPIRATORY: No cough, wheezing, chills or hemoptysis; No shortness of breath  CARDIOVASCULAR: No chest pain, palpitations, dizziness, or leg swelling  GASTROINTESTINAL: No abdominal or epigastric pain. No nausea, vomiting, or hematemesis; No diarrhea or constipation. No melena or hematochezia.  GENITOURINARY: No dysuria, frequency, hematuria, or incontinence  NEUROLOGICAL: No headaches, memory loss, loss of strength, numbness, or tremors  SKIN: No itching, burning, rashes, or lesions   LYMPH NODES: No enlarged glands  ENDOCRINE: No heat or cold intolerance; No hair loss  MUSCULOSKELETAL: No joint pain or swelling; No muscle, back, or extremity pain  PSYCHIATRIC: No depression, anxiety, mood swings, or difficulty sleeping  HEME/LYMPH: No easy bruising, or bleeding gums  ALLERY AND IMMUNOLOGIC: No hives or eczema    Vital Signs Last 24 Hrs  T(C): 37.3 (02 Jun 2019 06:00), Max: 37.4 (01 Jun 2019 12:00)  T(F): 99.1 (02 Jun 2019 06:00), Max: 99.3 (01 Jun 2019 12:00)  HR: 79 (02 Jun 2019 07:30) (75 - 100)  BP: 109/37 (02 Jun 2019 07:30) (90/31 - 136/42)  BP(mean): 56 (02 Jun 2019 07:30) (45 - 77)  RR: 21 (02 Jun 2019 07:30) (12 - 24)  SpO2: 99% (02 Jun 2019 07:30) (97% - 100%)    PHYSICAL EXAM:  GENERAL: NAD, well-groomed, well-developed  HEAD:  Atraumatic, Normocephalic  EYES: EOMI, PERRLA, conjunctiva and sclera clear  ENMT: No tonsillar erythema, exudates, or enlargement; Moist mucous membranes, Good dentition, No lesions  NECK: Supple, No JVD, Normal thyroid. trach connected to vent  NERVOUS SYSTEM:  Quadriplegic  CHEST/LUNG: Clear to percussion bilaterally; No rales, rhonchi, wheezing, or rubs  HEART: Regular rate and rhythm; No murmurs, rubs, or gallops  ABDOMEN: Soft, Nontender, Nondistended; Bowel sounds present. peg tube and  cholecystostomy tubes present  EXTREMITIES:  2+ Peripheral Pulses, No clubbing, cyanosis, or edema  LYMPH: No lymphadenopathy noted  SKIN:  stage 4 decubiti in sacral area. stage 3 in heels.    LABS:                        7.9    10.14 )-----------( 201      ( 02 Jun 2019 03:22 )             26.9     06-02    146<H>  |  112<H>  |  38<H>  ----------------------------<  144<H>  3.8   |  29  |  1.28    Ca    9.7      02 Jun 2019 03:22  Phos  2.6     06-02  Mg     2.8     06-02            CAPILLARY BLOOD GLUCOSE      POCT Blood Glucose.: 168 mg/dL (02 Jun 2019 05:17)  POCT Blood Glucose.: 163 mg/dL (02 Jun 2019 01:56)  POCT Blood Glucose.: 219 mg/dL (01 Jun 2019 21:30)  POCT Blood Glucose.: 216 mg/dL (01 Jun 2019 17:43)  POCT Blood Glucose.: 208 mg/dL (01 Jun 2019 13:45)  POCT Blood Glucose.: 194 mg/dL (01 Jun 2019 09:25)          Hemoglobin A1C, Whole Blood: 7.4 % (05-30 @ 08:57)        RADIOLOGY & ADDITIONAL TESTS:    Imaging Personally Reviewed:  [ ] YES  [ ] NO    Consultant(s) Notes Reviewed:  [ ] YES  [ ] NO    Care Discussed with Consultants/Other Providers [ ] YES  [ ] NO    Care discussed with family,         [  ]   yes  [  ]  No    imp:    stable[ ]    unstable[ x ]     improving [   ]       unchanged  [  ]                Plans:  Continue present plans  [x  ] as per critical care               New consult [  ]   specialty  .......               order test[  ]    test name.                  Discharge Planning  [  ]

## 2019-06-02 NOTE — PROGRESS NOTE ADULT - ASSESSMENT
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 Now noted with previously known renal lesion and pulmonary nodules.     1. 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 PULM  - chronic vent  - cont mechanical ventilation  - cont antibx for likely VAP which pt arrived with vs colonization  - daily weaning as tolerates    3. CV  - remains in shock state  - cont midodrine for hemodynamic support, in attempt to wean off IV vasopressor  - wean off levophed as BP tolerates    4. RENAL  - ARF with ATN in setting of sepsis +/- initial dehydration  - Cr improving  - cont to trend  - hypernatremia improving: cont free water can d/c D5 gtt and cont to trend Na levels    5. GI  - cont PEG feeds  - abdominal CT reviewed: question if biliary drain is in correct position  - await IR tube check tomorrow and possible readjustment of tube    6. ID  - polymicrobial sepsis with multidrug resistant organisms likely from right ankle wound and osteomyelitis + infected decub ulcers +/- PNA  - had been on vanco and cefepime however culture results show resistance to vanco and cefepime, will change to linezolid for VRE and meropenem  - ID follow up for antibiotic management  - clinically leukocytosis improved, low grade fever  - with right foot/ankle wound and osteomyelitis with exposed joint pt likely will  need eventual below knee amputation for source control    7. ONC  - pt with known renal lesion/mass  - hypercalcemia may be in setting of underlying possible malignancy  - appreciate onc eval s/p pamidronate for hypercalcemia     8. GEN  - pt with advanced illness and chronic comorbidities  - family meeting yesterday with son and daughter in law whom wife has asked us to speak with. family made aware of possible need for amputation, polymicrobial infection, poor overall condition, shock state still on pressors, renal lesion with possibility of underlying malignancy, possible dislodged perc rory catheter. at present time pt remains full code, family seems divided on pursing comfort and DNR, some family members want full code, others are willing to lean towards comfort. await family decision    Critical Care Time: 35 min

## 2019-06-02 NOTE — PROGRESS NOTE ADULT - SUBJECTIVE AND OBJECTIVE BOX
INTERVAL History:  On Ventilator.    Allergies    penicillin (Unknown)    Intolerances        MEDICATIONS  (STANDING):  ALBUTerol    90 MICROgram(s) HFA Inhaler 1 Puff(s) Inhalation every 4 hours  ALBUTerol/ipratropium for Nebulization 3 milliLiter(s) Nebulizer every 6 hours  dextrose 5%. 1000 milliLiter(s) (50 mL/Hr) IV Continuous <Continuous>  dextrose 50% Injectable 12.5 Gram(s) IV Push once  dextrose 50% Injectable 25 Gram(s) IV Push once  dextrose 50% Injectable 25 Gram(s) IV Push once  fentaNYL   Patch  12 MICROgram(s)/Hr 1 Patch Transdermal every 72 hours  heparin  Injectable 5000 Unit(s) SubCutaneous every 12 hours  insulin glargine Injectable (LANTUS) 40 Unit(s) SubCutaneous every morning  insulin lispro (HumaLOG) corrective regimen sliding scale   SubCutaneous every 6 hours  linezolid  IVPB 600 milliGRAM(s) IV Intermittent every 12 hours  linezolid  IVPB      meropenem  IVPB      meropenem  IVPB 1000 milliGRAM(s) IV Intermittent every 12 hours  midodrine 20 milliGRAM(s) Oral every 8 hours  norepinephrine Infusion 0.06 MICROgram(s)/kG/Min (6.9 mL/Hr) IV Continuous <Continuous>  pantoprazole   Suspension 40 milliGRAM(s) Oral daily  petrolatum Ophthalmic Ointment 1 Application(s) Both EYES daily  tiotropium 18 MICROgram(s) Capsule 1 Capsule(s) Inhalation daily    MEDICATIONS  (PRN):  acetaminophen    Suspension .. 650 milliGRAM(s) Oral every 6 hours PRN Temp greater or equal to 38C (100.4F)  dextrose 40% Gel 15 Gram(s) Oral once PRN Blood Glucose LESS THAN 70 milliGRAM(s)/deciliter  glucagon  Injectable 1 milliGRAM(s) IntraMuscular once PRN Glucose LESS THAN 70 milligrams/deciliter      Vital Signs Last 24 Hrs  T(C): 36.7 (02 Jun 2019 08:00), Max: 37.4 (01 Jun 2019 16:00)  T(F): 98 (02 Jun 2019 08:00), Max: 99.3 (01 Jun 2019 16:00)  HR: 76 (02 Jun 2019 11:45) (75 - 100)  BP: 131/37 (02 Jun 2019 11:30) (90/31 - 149/37)  BP(mean): 59 (02 Jun 2019 11:30) (45 - 77)  RR: 34 (02 Jun 2019 11:30) (12 - 34)  SpO2: 100% (02 Jun 2019 11:45) (94% - 100%)    PHYSICAL EXAM:      EYES: EOMI, PERRLA, conjunctiva and sclera clear  NECK: Trach  CHEST/LUNG: Clear to percussion bilaterally; No rales, rhonchi,   HEART: Regular rate and rhythm;   ABDOMEN:   PEG  Decubitus Ulcer.        LABS:                        7.9    10.14 )-----------( 201      ( 02 Jun 2019 03:22 )             26.9     06-02    146<H>  |  112<H>  |  38<H>  ----------------------------<  144<H>  3.8   |  29  |  1.28    Ca    9.7      02 Jun 2019 03:22  Phos  2.6     06-02  Mg     2.8     06-02              RADIOLOGY & ADDITIONAL STUDIES:    PATHOLOGY:

## 2019-06-02 NOTE — PROGRESS NOTE ADULT - SUBJECTIVE AND OBJECTIVE BOX
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  NH report pt BUN & Cr incr, EMS report pt hypotensive on their arrival.  On arrival to ED, removed two 12microgram fentanyl patches (5/10/19 & 5/27/19)  In ED received 2200ml saline bolus. (29 May 2019 05:43)  all events noted   all culture noted   high vanco level yesterday and now on linezolid noted     Allergies    penicillin (Unknown)    Intolerances        MEDICATIONS  (STANDING):  ALBUTerol    90 MICROgram(s) HFA Inhaler 1 Puff(s) Inhalation every 4 hours  ALBUTerol/ipratropium for Nebulization 3 milliLiter(s) Nebulizer every 6 hours  dextrose 5%. 1000 milliLiter(s) (50 mL/Hr) IV Continuous <Continuous>  dextrose 5%. 1000 milliLiter(s) (30 mL/Hr) IV Continuous <Continuous>  dextrose 50% Injectable 12.5 Gram(s) IV Push once  dextrose 50% Injectable 25 Gram(s) IV Push once  dextrose 50% Injectable 25 Gram(s) IV Push once  fentaNYL   Patch  12 MICROgram(s)/Hr 1 Patch Transdermal every 72 hours  heparin  Injectable 5000 Unit(s) SubCutaneous every 12 hours  insulin glargine Injectable (LANTUS) 40 Unit(s) SubCutaneous every morning  insulin lispro (HumaLOG) corrective regimen sliding scale   SubCutaneous every 6 hours  linezolid  IVPB 600 milliGRAM(s) IV Intermittent every 12 hours  linezolid  IVPB      meropenem  IVPB      meropenem  IVPB 1000 milliGRAM(s) IV Intermittent every 12 hours  midodrine 20 milliGRAM(s) Oral every 8 hours  norepinephrine Infusion 0.06 MICROgram(s)/kG/Min (6.9 mL/Hr) IV Continuous <Continuous>  pantoprazole   Suspension 40 milliGRAM(s) Oral daily  petrolatum Ophthalmic Ointment 1 Application(s) Both EYES daily  tiotropium 18 MICROgram(s) Capsule 1 Capsule(s) Inhalation daily    MEDICATIONS  (PRN):  acetaminophen    Suspension .. 650 milliGRAM(s) Oral every 6 hours PRN Temp greater or equal to 38C (100.4F)  dextrose 40% Gel 15 Gram(s) Oral once PRN Blood Glucose LESS THAN 70 milliGRAM(s)/deciliter  glucagon  Injectable 1 milliGRAM(s) IntraMuscular once PRN Glucose LESS THAN 70 milligrams/deciliter      REVIEW OF SYSTEMS:    unable to obtain     VITAL SIGNS:  T(C): 37.3 (06-02-19 @ 06:00), Max: 37.8 (06-01-19 @ 08:00)  T(F): 99.1 (06-02-19 @ 06:00), Max: 100 (06-01-19 @ 08:00)  HR: 79 (06-02-19 @ 07:30) (75 - 100)  BP: 109/37 (06-02-19 @ 07:30) (90/31 - 136/42)  RR: 21 (06-02-19 @ 07:30) (12 - 24)  SpO2: 99% (06-02-19 @ 07:30) (97% - 100%)  Wt(kg): --    PHYSICAL EXAM:    GENERAL: not in any distress  HEENT: Neck is supple, normocephalic, atraumatic   CHEST/LUNG: Clear to auscultation bilaterally; No rales, rhonchi, wheezing  HEART: Regular rate and rhythm; No murmurs, rubs, or gallops  ABDOMEN: Soft, Nontender, Nondistended; Bowel sounds present, no rebound   EXTREMITIES:  2+ Peripheral Pulses, No clubbing, cyanosis, or edema  GENITOURINARY:   SKIN: No rashes or lesions  BACK: no pressor sore   NERVOUS SYSTEM:  Alert & Oriented X3, Good concentration  PSYCH: normal affect     LABS:                         7.9    10.14 )-----------( 201      ( 02 Jun 2019 03:22 )             26.9     06-02    146<H>  |  112<H>  |  38<H>  ----------------------------<  144<H>  3.8   |  29  |  1.28    Ca    9.7      02 Jun 2019 03:22  Phos  2.6     06-02  Mg     2.8     06-02                      Sedimentation Rate, Erythrocyte: 119 mm/hr (05-30 @ 15:34)      Vancomycin Level, Trough: 27.2 ug/mL (06-01 @ 12:22)        Culture Results:   Moderate Klebsiella pneumoniae ESBL  Moderate Enterococcus faecium (vancomycin resistant) (05-30 @ 18:37)  Culture Results:   Few Methicillin resistant Staphylococcus aureus (05-30 @ 18:36)  Culture Results:   Moderate Klebsiella pneumoniae ESBL  Moderate Enterococcus faecalis  Moderate Proteus mirabilis  Moderate Enterococcus faecium (vancomycin resistant) (05-30 @ 18:35)  Culture Results:   Numerous Pseudomonas aeruginosa (Carbapenem Resistant)  Normal Respiratory Christopher present (05-29 @ 17:42)  Culture Results:   No growth to date. (05-29 @ 10:45)  Culture Results:   No growth to date. (05-29 @ 10:45)  Culture Results:   10,000 - 49,000 CFU/mL Enterococcus faecalis  <10,000 CFU/ml Normal Urogenital christopher present (05-29 @ 10:21)                Radiology:      < from: Xray Chest 1 View- PORTABLE-Urgent (05.28.19 @ 22:53) >    PROCEDURE DATE:  05/28/2019          INTERPRETATION:  PROCEDURE: AP view of the chest.    CLINICAL INFORMATION: Hypotension.    COMPARISON: 12/11/2018.    FINDINGS:     A tracheostomy tube and right PICC line are noted.    Lungs: There are no lung consolidations. Left base atelectasis is noted.  Heart: The heart is normal in size.  Mediastinum: The mediastinum is within normal limits.    IMPRESSION:    No lung consolidations.                FAN PIZARRO M.D., ATTENDING RADIOLOGIST  This document has been electronically signed. 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  NH report pt BUN & Cr incr, EMS report pt hypotensive on their arrival.  On arrival to ED, removed two 12microgram fentanyl patches (5/10/19 & 5/27/19)  In ED received 2200ml saline bolus. (29 May 2019 05:43)  all events noted   all culture noted   high vanco level yesterday and now on linezolid noted     Allergies    penicillin (Unknown)    Intolerances        MEDICATIONS  (STANDING):  ALBUTerol    90 MICROgram(s) HFA Inhaler 1 Puff(s) Inhalation every 4 hours  ALBUTerol/ipratropium for Nebulization 3 milliLiter(s) Nebulizer every 6 hours  dextrose 5%. 1000 milliLiter(s) (50 mL/Hr) IV Continuous <Continuous>  dextrose 5%. 1000 milliLiter(s) (30 mL/Hr) IV Continuous <Continuous>  dextrose 50% Injectable 12.5 Gram(s) IV Push once  dextrose 50% Injectable 25 Gram(s) IV Push once  dextrose 50% Injectable 25 Gram(s) IV Push once  fentaNYL   Patch  12 MICROgram(s)/Hr 1 Patch Transdermal every 72 hours  heparin  Injectable 5000 Unit(s) SubCutaneous every 12 hours  insulin glargine Injectable (LANTUS) 40 Unit(s) SubCutaneous every morning  insulin lispro (HumaLOG) corrective regimen sliding scale   SubCutaneous every 6 hours  linezolid  IVPB 600 milliGRAM(s) IV Intermittent every 12 hours  linezolid  IVPB      meropenem  IVPB      meropenem  IVPB 1000 milliGRAM(s) IV Intermittent every 12 hours  midodrine 20 milliGRAM(s) Oral every 8 hours  norepinephrine Infusion 0.06 MICROgram(s)/kG/Min (6.9 mL/Hr) IV Continuous <Continuous>  pantoprazole   Suspension 40 milliGRAM(s) Oral daily  petrolatum Ophthalmic Ointment 1 Application(s) Both EYES daily  tiotropium 18 MICROgram(s) Capsule 1 Capsule(s) Inhalation daily    MEDICATIONS  (PRN):  acetaminophen    Suspension .. 650 milliGRAM(s) Oral every 6 hours PRN Temp greater or equal to 38C (100.4F)  dextrose 40% Gel 15 Gram(s) Oral once PRN Blood Glucose LESS THAN 70 milliGRAM(s)/deciliter  glucagon  Injectable 1 milliGRAM(s) IntraMuscular once PRN Glucose LESS THAN 70 milligrams/deciliter      REVIEW OF SYSTEMS:    unable to obtain     VITAL SIGNS:  T(C): 37.3 (06-02-19 @ 06:00), Max: 37.8 (06-01-19 @ 08:00)  T(F): 99.1 (06-02-19 @ 06:00), Max: 100 (06-01-19 @ 08:00)  HR: 79 (06-02-19 @ 07:30) (75 - 100)  BP: 109/37 (06-02-19 @ 07:30) (90/31 - 136/42)  RR: 21 (06-02-19 @ 07:30) (12 - 24)  SpO2: 99% (06-02-19 @ 07:30) (97% - 100%)  Wt(kg): --    PHYSICAL EXAM:    GENERAL: not in any distress  tracheostomy to vent   HEENT: Neck is supple, normocephalic, atraumatic   CHEST/LUNG: Clear to auscultation bilaterally; No rales, rhonchi, wheezing  HEART: Regular rate and rhythm; No murmurs, rubs, or gallops  ABDOMEN: Soft, Nontender, Nondistended; Bowel sounds present, no rebound   EXTREMITIES:  2+ Peripheral Pulses, No clubbing, cyanosis, or edema  GENITOURINARY: cole   SKIN: multiple issues with skin   BACK: huge pressor sore ; not infected  NERVOUS SYSTEM:  Alert & Oriented X2      LABS:                         7.9    10.14 )-----------( 201      ( 02 Jun 2019 03:22 )             26.9     06-02    146<H>  |  112<H>  |  38<H>  ----------------------------<  144<H>  3.8   |  29  |  1.28    Ca    9.7      02 Jun 2019 03:22  Phos  2.6     06-02  Mg     2.8     06-02                      Sedimentation Rate, Erythrocyte: 119 mm/hr (05-30 @ 15:34)      Vancomycin Level, Trough: 27.2 ug/mL (06-01 @ 12:22)        Culture Results:   Moderate Klebsiella pneumoniae ESBL  Moderate Enterococcus faecium (vancomycin resistant) (05-30 @ 18:37)  Culture Results:   Few Methicillin resistant Staphylococcus aureus (05-30 @ 18:36)  Culture Results:   Moderate Klebsiella pneumoniae ESBL  Moderate Enterococcus faecalis  Moderate Proteus mirabilis  Moderate Enterococcus faecium (vancomycin resistant) (05-30 @ 18:35)  Culture Results:   Numerous Pseudomonas aeruginosa (Carbapenem Resistant)  Normal Respiratory Christopher present (05-29 @ 17:42)  Culture Results:   No growth to date. (05-29 @ 10:45)  Culture Results:   No growth to date. (05-29 @ 10:45)  Culture Results:   10,000 - 49,000 CFU/mL Enterococcus faecalis  <10,000 CFU/ml Normal Urogenital christopher present (05-29 @ 10:21)                Radiology:      < from: Xray Chest 1 View- PORTABLE-Urgent (05.28.19 @ 22:53) >    PROCEDURE DATE:  05/28/2019          INTERPRETATION:  PROCEDURE: AP view of the chest.    CLINICAL INFORMATION: Hypotension.    COMPARISON: 12/11/2018.    FINDINGS:     A tracheostomy tube and right PICC line are noted.    Lungs: There are no lung consolidations. Left base atelectasis is noted.  Heart: The heart is normal in size.  Mediastinum: The mediastinum is within normal limits.    IMPRESSION:    No lung consolidations.                FAN PIZARRO M.D., ATTENDING RADIOLOGIST  This document has been electronically signed. May 29 2019  8:42AM                < end of copied text >

## 2019-06-02 NOTE — PROVIDER CONTACT NOTE (CRITICAL VALUE NOTIFICATION) - TEST AND RESULT REPORTED:
wound culture 1 mod klebsiella pneumoniae and mod enetroccocus fasium, 2 mod kleb pneumoniae mod eneteroccocus fascium vanc resistant plus mod enteroccocus facales mod proteus maribellus 3 few MRSA

## 2019-06-03 DIAGNOSIS — A41.9 SEPSIS, UNSPECIFIED ORGANISM: ICD-10-CM

## 2019-06-03 LAB
ANION GAP SERPL CALC-SCNC: 7 MMOL/L — SIGNIFICANT CHANGE UP (ref 5–17)
BUN SERPL-MCNC: 36 MG/DL — HIGH (ref 7–23)
CALCIUM SERPL-MCNC: 9 MG/DL — SIGNIFICANT CHANGE UP (ref 8.5–10.1)
CHLORIDE SERPL-SCNC: 112 MMOL/L — HIGH (ref 96–108)
CO2 SERPL-SCNC: 26 MMOL/L — SIGNIFICANT CHANGE UP (ref 22–31)
CREAT SERPL-MCNC: 1.37 MG/DL — HIGH (ref 0.5–1.3)
CULTURE RESULTS: SIGNIFICANT CHANGE UP
CULTURE RESULTS: SIGNIFICANT CHANGE UP
GLUCOSE BLDC GLUCOMTR-MCNC: 134 MG/DL — HIGH (ref 70–99)
GLUCOSE BLDC GLUCOMTR-MCNC: 157 MG/DL — HIGH (ref 70–99)
GLUCOSE BLDC GLUCOMTR-MCNC: 174 MG/DL — HIGH (ref 70–99)
GLUCOSE BLDC GLUCOMTR-MCNC: 190 MG/DL — HIGH (ref 70–99)
GLUCOSE BLDC GLUCOMTR-MCNC: 52 MG/DL — LOW (ref 70–99)
GLUCOSE BLDC GLUCOMTR-MCNC: 55 MG/DL — LOW (ref 70–99)
GLUCOSE SERPL-MCNC: 140 MG/DL — HIGH (ref 70–99)
HCT VFR BLD CALC: 27.2 % — LOW (ref 39–50)
HGB BLD-MCNC: 8.1 G/DL — LOW (ref 13–17)
MAGNESIUM SERPL-MCNC: 3.1 MG/DL — HIGH (ref 1.6–2.6)
MCHC RBC-ENTMCNC: 29.8 GM/DL — LOW (ref 32–36)
MCHC RBC-ENTMCNC: 29.9 PG — SIGNIFICANT CHANGE UP (ref 27–34)
MCV RBC AUTO: 100.4 FL — HIGH (ref 80–100)
NRBC # BLD: 0 /100 WBCS — SIGNIFICANT CHANGE UP (ref 0–0)
PHOSPHATE SERPL-MCNC: 2.6 MG/DL — SIGNIFICANT CHANGE UP (ref 2.5–4.5)
PLATELET # BLD AUTO: 232 K/UL — SIGNIFICANT CHANGE UP (ref 150–400)
POTASSIUM SERPL-MCNC: 4.2 MMOL/L — SIGNIFICANT CHANGE UP (ref 3.5–5.3)
POTASSIUM SERPL-SCNC: 4.2 MMOL/L — SIGNIFICANT CHANGE UP (ref 3.5–5.3)
RBC # BLD: 2.71 M/UL — LOW (ref 4.2–5.8)
RBC # FLD: 16.4 % — HIGH (ref 10.3–14.5)
SODIUM SERPL-SCNC: 145 MMOL/L — SIGNIFICANT CHANGE UP (ref 135–145)
SPECIMEN SOURCE: SIGNIFICANT CHANGE UP
SPECIMEN SOURCE: SIGNIFICANT CHANGE UP
WBC # BLD: 12.24 K/UL — HIGH (ref 3.8–10.5)
WBC # FLD AUTO: 12.24 K/UL — HIGH (ref 3.8–10.5)

## 2019-06-03 PROCEDURE — 76705 ECHO EXAM OF ABDOMEN: CPT | Mod: 26

## 2019-06-03 PROCEDURE — 47537 REMOVAL BILIARY DRG CATH: CPT | Mod: 79

## 2019-06-03 PROCEDURE — 99291 CRITICAL CARE FIRST HOUR: CPT

## 2019-06-03 PROCEDURE — 99221 1ST HOSP IP/OBS SF/LOW 40: CPT | Mod: 25

## 2019-06-03 RX ORDER — DEXTROSE 50 % IN WATER 50 %
50 SYRINGE (ML) INTRAVENOUS ONCE
Refills: 0 | Status: COMPLETED | OUTPATIENT
Start: 2019-06-03 | End: 2019-06-03

## 2019-06-03 RX ORDER — CHLORHEXIDINE GLUCONATE 213 G/1000ML
1 SOLUTION TOPICAL
Refills: 0 | Status: DISCONTINUED | OUTPATIENT
Start: 2019-06-03 | End: 2019-06-17

## 2019-06-03 RX ADMIN — Medication 3 MILLILITER(S): at 12:31

## 2019-06-03 RX ADMIN — FENTANYL CITRATE 1 PATCH: 50 INJECTION INTRAVENOUS at 07:51

## 2019-06-03 RX ADMIN — LINEZOLID 300 MILLIGRAM(S): 600 INJECTION, SOLUTION INTRAVENOUS at 20:37

## 2019-06-03 RX ADMIN — MIDODRINE HYDROCHLORIDE 20 MILLIGRAM(S): 2.5 TABLET ORAL at 05:21

## 2019-06-03 RX ADMIN — Medication 650 MILLIGRAM(S): at 01:31

## 2019-06-03 RX ADMIN — Medication 3 MILLILITER(S): at 05:22

## 2019-06-03 RX ADMIN — HEPARIN SODIUM 5000 UNIT(S): 5000 INJECTION INTRAVENOUS; SUBCUTANEOUS at 05:21

## 2019-06-03 RX ADMIN — Medication 2: at 05:21

## 2019-06-03 RX ADMIN — FENTANYL CITRATE 1 PATCH: 50 INJECTION INTRAVENOUS at 20:00

## 2019-06-03 RX ADMIN — HEPARIN SODIUM 5000 UNIT(S): 5000 INJECTION INTRAVENOUS; SUBCUTANEOUS at 17:25

## 2019-06-03 RX ADMIN — LINEZOLID 300 MILLIGRAM(S): 600 INJECTION, SOLUTION INTRAVENOUS at 07:50

## 2019-06-03 RX ADMIN — MIDODRINE HYDROCHLORIDE 20 MILLIGRAM(S): 2.5 TABLET ORAL at 21:54

## 2019-06-03 RX ADMIN — INSULIN GLARGINE 40 UNIT(S): 100 INJECTION, SOLUTION SUBCUTANEOUS at 07:49

## 2019-06-03 RX ADMIN — MEROPENEM 100 MILLIGRAM(S): 1 INJECTION INTRAVENOUS at 06:29

## 2019-06-03 RX ADMIN — Medication 50 MILLILITER(S): at 17:25

## 2019-06-03 RX ADMIN — MEROPENEM 100 MILLIGRAM(S): 1 INJECTION INTRAVENOUS at 20:37

## 2019-06-03 RX ADMIN — MIDODRINE HYDROCHLORIDE 20 MILLIGRAM(S): 2.5 TABLET ORAL at 14:29

## 2019-06-03 RX ADMIN — Medication 3 MILLILITER(S): at 18:03

## 2019-06-03 RX ADMIN — Medication 6.9 MICROGRAM(S)/KG/MIN: at 06:28

## 2019-06-03 RX ADMIN — Medication 3 MILLILITER(S): at 00:45

## 2019-06-03 RX ADMIN — CHLORHEXIDINE GLUCONATE 1 APPLICATION(S): 213 SOLUTION TOPICAL at 08:50

## 2019-06-03 RX ADMIN — Medication 1 APPLICATION(S): at 12:19

## 2019-06-03 NOTE — PROGRESS NOTE ADULT - SUBJECTIVE AND OBJECTIVE BOX
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  NH report pt BUN & Cr incr, EMS report pt hypotensive on their arrival.  On arrival to ED, removed two 12microgram fentanyl patches (5/10/19 & 5/27/19)    In ED received 2200ml saline bolus. (29 May 2019 05:43)  PAST MEDICAL & SURGICAL HISTORY:  DM (diabetes mellitus)  Heart failure  Diabetic neuropathy  Hyperlipidemia  Quadriplegia  Hypertension  Stroke  Hypertension  Obstructive cardiomyopathy  S/P percutaneous endoscopic gastrostomy (PEG) tube placement  History of tracheostomy  HPI:        SYMPTOMS---	                   DIDILITY    OTHER REVIEW OF SYSTEMS:  UNABLE TO OBTAIN  due to: SEDATION, CONFUSION    Baseline ADLs (prior to admission):  Independent [ ] moderately [ ] fully   Dependent   [ ] moderately [ ]fully    	                 T(C): 36.4 (06-03-19 @ 12:32), Max: 38.4 (06-02-19 @ 23:50)  T(F): 97.5 (06-03-19 @ 12:32), Max: 101.2 (06-02-19 @ 23:50)  HR: 76 (06-03-19 @ 18:15) (71 - 90)  BP: 100/45 (06-03-19 @ 14:00) (89/43 - 146/51)  RR: 16 (06-03-19 @ 14:30) (8 - 23)  SpO2: 100% (06-03-19 @ 18:15) (91% - 100%)  Wt(kg): --      GENERAL:    EYES : non icteric :               Other:     HEENT:      	atraumatic, normocephalic, PEERLA, sclera anicteric, dry oral mucosa   NECK:          Trach:        Vent:  CVS:          Tachypnic:               Bradycardic:              Normal:		   RESP:        tachypnic:                Dyspenic :                  Comfortable:	  GI:          NGT/PEG 	  :      cole      	  MUSC:       	Normal	Weakness	  Edema	   NEURO:     	No focal deficit	  Focal  PSYCH:           Alert	Oriented	  Lethargic     SKIN:         	Normal	  Other  LYMPH:      	Normal	  Other  	                     ALLERGIES: penicillin (Unknown)    MEDICATIONS  (STANDING):  ALBUTerol    90 MICROgram(s) HFA Inhaler 1 Puff(s) Inhalation every 4 hours  ALBUTerol/ipratropium for Nebulization 3 milliLiter(s) Nebulizer every 6 hours  chlorhexidine 4% Liquid 1 Application(s) Topical <User Schedule>  dextrose 5%. 1000 milliLiter(s) (50 mL/Hr) IV Continuous <Continuous>  dextrose 50% Injectable 12.5 Gram(s) IV Push once  dextrose 50% Injectable 25 Gram(s) IV Push once  dextrose 50% Injectable 25 Gram(s) IV Push once  fentaNYL   Patch  12 MICROgram(s)/Hr 1 Patch Transdermal every 72 hours  heparin  Injectable 5000 Unit(s) SubCutaneous every 12 hours  insulin glargine Injectable (LANTUS) 40 Unit(s) SubCutaneous every morning  insulin lispro (HumaLOG) corrective regimen sliding scale   SubCutaneous every 6 hours  linezolid  IVPB 600 milliGRAM(s) IV Intermittent every 12 hours  linezolid  IVPB      meropenem  IVPB      meropenem  IVPB 1000 milliGRAM(s) IV Intermittent every 12 hours  midodrine 20 milliGRAM(s) Oral every 8 hours  norepinephrine Infusion 0.06 MICROgram(s)/kG/Min (6.9 mL/Hr) IV Continuous <Continuous>  pantoprazole   Suspension 40 milliGRAM(s) Oral daily  petrolatum Ophthalmic Ointment 1 Application(s) Both EYES daily  tiotropium 18 MICROgram(s) Capsule 1 Capsule(s) Inhalation daily    MEDICATIONS  (PRN):  acetaminophen    Suspension .. 650 milliGRAM(s) Oral every 6 hours PRN Temp greater or equal to 38C (100.4F)  dextrose 40% Gel 15 Gram(s) Oral once PRN Blood Glucose LESS THAN 70 milliGRAM(s)/deciliter  glucagon  Injectable 1 milliGRAM(s) IntraMuscular once PRN Glucose LESS THAN 70 milligrams/deciliter    	                   LABS REVIEWED    H/H: 8.1/27.2   06-03 @ 03:25    BUN/CR: 36/1.37  06-03 @ 03:25    Albumin: --  06-03 @ 03:25    CRP/SED RATE: --/-- 06-03 @ 03:25    Sodium: 145  06-03 @ 03:25                  	  ADVANCED DIRECTIVES:   FULL CODE [ x  ]  DNR [  ]    DNI [  ]     MOLST [  ]  PSYCHOSOCIAL-SPIRITUAL ASSESSMENT:       Reviewed       GOALS OF CARE DISCUSSION       Palliative care info/counseling provided	           Family meeting       Advanced Directives addressed	           See previous Palliative Medicine Note  	        REFERRALS	        Palliative Med        Unit SW/Case Mgmt              Speech/Swallow        Hospice             Nutrition         Functional Assessment: KPS: <20              PPS:vv poor      FINAL IMPRESSION AND RECOMMENDATIONS: patient and family well known to me , they are unrealistic about prognosis , I have made it clear to them that best course for patient is to do terminal wean, but they have refused many times , hate everyone who talks about wean.    overall prognosis v poor

## 2019-06-03 NOTE — PROGRESS NOTE ADULT - SUBJECTIVE AND OBJECTIVE BOX
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  EMS reported pt was hypotensive at nursing facility. Pt had two 12microgram fentanyl patches (5/10/19 & 5/27/19) on patient which was removed. Admitted to ICU for severe sepsis, septic shock, dehydration, hypernatremia, acute renal failure, multiple decubitus ulcers (sacrum, hip, bilateral lower extremities), osteomyelitis of right ankle, hypercalcemia.     24 hr events:  Overnight, there were no acute events. Pt remains on levophed.    ## ROS:  [x] unable to obtain due mechanical ventilation, non verbal state    ## Labs:  CBC:                        8.1  12.24 )-----------( 232 ( 03 Jun 2019 03:25 )             27.2     Chem:  06-03    145  |  112<H>  |  36<H>  ----------------------------<  140<H>  4.2   |  26 |  1.37 <H>    Ca    9.7      02 Jun 2019 03:22  Phos  2.6     06-02  Mg     2.8     06-02    Culture - Other (05.30.19 @ 18:37)    Specimen Source: Skin    Culture Results:   Moderate Klebsiella pneumoniae ESBL  Moderate Enterococcus faecium (vancomycin resistant)  Moderate Proteus mirabilis        Culture - Other (05.30.19 @ 18:36)    Specimen Source: Skin    Culture Results:   Few Methicillin resistant Staphylococcus aureus        Culture - Other (05.30.19 @ 18:35)    Specimen Source: Skin    Culture Results:   Moderate Klebsiella pneumoniae ESBL  Moderate Enterococcus faecalis  Moderate Proteus mirabilis  Moderate Enterococcus faecium (vancomycin resistant)        Culture - Sputum . (05.29.19 @ 17:42)    Specimen Source: .Sputum    Culture Results:   Numerous Pseudomonas aeruginosa (Carbapenem Resistant)    ## Medications:  linezolid  IVPB 600 milliGRAM(s) IV Intermittent every 12 hours    meropenem  IVPB 1000 milliGRAM(s) IV Intermittent every 12 hours    midodrine 20 milliGRAM(s) Oral every 8 hours  norepinephrine Infusion 0.06 MICROgram(s)/kG/Min IV Continuous <Continuous>      ALBUTerol/ipratropium for Nebulization 3 milliLiter(s) Nebulizer every 6 hours      insulin glargine Injectable (LANTUS) 40 Unit(s) SubCutaneous every morning  insulin lispro (HumaLOG) corrective regimen sliding scale   SubCutaneous every 6 hours    heparin  Injectable 5000 Unit(s) SubCutaneous every 12 hours    pantoprazole   Suspension 40 milliGRAM(s) Oral daily    acetaminophen    Suspension .. 650 milliGRAM(s) Oral every 6 hours PRN  fentaNYL   Patch  12 MICROgram(s)/Hr 1 Patch Transdermal every 72 hours    ## Vitals:  T(C): 36.4C (06/03/19 12:32)  HR: 73 (06/03/19 12:35)  BP: 114/47 (06/03/19 12:35)  BP(mean): 62 (06/03/19 12:35)  RR: 13 (06/03/19 12:35)  SpO2: 100% (06/03/19 12:35)    ## P/E:  Gen: elderly male, trached to vent. quadriplegic, non-responsive  HEENT: trach in place, trachea midline, no JVD  Resp: scattered rhonchi , no wheeze, no rhales  CVS: RRR, no murmurs/rubs/gallops  Abd: soft, ND, +BS, + PEG, + RUQ drain  Ext: right ankle wound with exposed bone, right lateral leg wound, bilateral heel eschar, multiple sacral decubitus ulcers, b/l 2+ DP, b/l 2+ RP, no cyanosis, no clubbing, capillary refill <2 sec  Neuro: quadriplegic, occasionally blinks eyes and grimaces  Genitourinary: indwelling cole catheter in place      CENTRAL LINE: [ ] YES [x] NO  LOCATION:   DATE INSERTED:  REMOVE: [ ] YES [ ] NO      COLE: [x] YES [ ] NO    DATE INSERTED:  REMOVE:  [ ] YES [x] NO  - chronic cole, cole was changed on admission    A-LINE:  [ ] YES [x] NO  LOCATION:   DATE INSERTED:  REMOVE:  [ ] YES [ ] NO  EXPLAIN:    GLOBAL ISSUE/BEST PRACTICE:  Analgesia: fentanyl  Sedation: n/a  HOB elevation: yes  Stress ulcer prophylaxis: protonix  VTE prophylaxis: heparin sc  Oral Care: n/a  Glycemic control: lantus and sliding scale coverage  Nutrition: PEG feeds    CODE STATUS: [x] full code  [ ] DNR  [ ] DNI  [ ] MOLST  Goals of care discussion: [x] yes

## 2019-06-03 NOTE — PROGRESS NOTE ADULT - ASSESSMENT
74 yo M w/ multiple foot and ankle wounds.   - Pt seen and evaluated in ICU  - OM of RF ankle + septic ankle joint  - Foot is not salvageable pt has already has attempts at long term abx for OM,   - discussed with CCU attending need for discussion with family about goals of care- proximal leg amputation vs more likely palliative care   - Will continue to keep wounds dry with betadine and DSD daily. If pt becomes septic pt will need BKA emergently  - Wound care: Betadine to bilateral foot wounds daily.   - Pod will follow periodically to monitor

## 2019-06-03 NOTE — PROGRESS NOTE ADULT - SUBJECTIVE AND OBJECTIVE BOX
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  NH report pt BUN & Cr incr, EMS report pt hypotensive on their arrival.  On arrival to ED, removed two 12microgram fentanyl patches (5/10/19 & 5/27/19)    In ED received 2200ml saline bolus. (29 May 2019 05:43)    OTHER REVIEW OF SYSTEMS:  UNABLE TO OBTAIN  due to: SEDATION, CONFUSION    Baseline ADLs (prior to admission):  Independent [ ] moderately [ ] fully   Dependent   [ ] moderately [ ]fully    	                 T(C): 36.4 (06-03-19 @ 12:32), Max: 38.4 (06-02-19 @ 23:50)  T(F): 97.5 (06-03-19 @ 12:32), Max: 101.2 (06-02-19 @ 23:50)  HR: 76 (06-03-19 @ 18:15) (71 - 90)  BP: 100/45 (06-03-19 @ 14:00) (89/43 - 146/51)  RR: 16 (06-03-19 @ 14:30) (8 - 23)  SpO2: 100% (06-03-19 @ 18:15) (91% - 100%)  Wt(kg): --      GENERAL:    EYES : non icteric :               Other:     HEENT:      	atraumatic, normocephalic, PEERLA, sclera anicteric, dry oral mucosa   NECK:          Trach:        Vent:  CVS:          Tachypnic:               Bradycardic:              Normal:		   RESP:        tachypnic:                Dyspenic :                  Comfortable:	  GI:          NGT/PEG 	  :      cole      	  MUSC:       	Normal	Weakness	  Edema	   NEURO:     	No focal deficit	  Focal  PSYCH:           Alert	Oriented	  Lethargic     SKIN:         	Normal	  Other  LYMPH:      	Normal	  Other  	                     ALLERGIES: penicillin (Unknown)    MEDICATIONS  (STANDING):  ALBUTerol    90 MICROgram(s) HFA Inhaler 1 Puff(s) Inhalation every 4 hours  ALBUTerol/ipratropium for Nebulization 3 milliLiter(s) Nebulizer every 6 hours  chlorhexidine 4% Liquid 1 Application(s) Topical <User Schedule>  dextrose 5%. 1000 milliLiter(s) (50 mL/Hr) IV Continuous <Continuous>  dextrose 50% Injectable 12.5 Gram(s) IV Push once  dextrose 50% Injectable 25 Gram(s) IV Push once  dextrose 50% Injectable 25 Gram(s) IV Push once  fentaNYL   Patch  12 MICROgram(s)/Hr 1 Patch Transdermal every 72 hours  heparin  Injectable 5000 Unit(s) SubCutaneous every 12 hours  insulin glargine Injectable (LANTUS) 40 Unit(s) SubCutaneous every morning  insulin lispro (HumaLOG) corrective regimen sliding scale   SubCutaneous every 6 hours  linezolid  IVPB 600 milliGRAM(s) IV Intermittent every 12 hours  linezolid  IVPB      meropenem  IVPB      meropenem  IVPB 1000 milliGRAM(s) IV Intermittent every 12 hours  midodrine 20 milliGRAM(s) Oral every 8 hours  norepinephrine Infusion 0.06 MICROgram(s)/kG/Min (6.9 mL/Hr) IV Continuous <Continuous>  pantoprazole   Suspension 40 milliGRAM(s) Oral daily  petrolatum Ophthalmic Ointment 1 Application(s) Both EYES daily  tiotropium 18 MICROgram(s) Capsule 1 Capsule(s) Inhalation daily    MEDICATIONS  (PRN):  acetaminophen    Suspension .. 650 milliGRAM(s) Oral every 6 hours PRN Temp greater or equal to 38C (100.4F)  dextrose 40% Gel 15 Gram(s) Oral once PRN Blood Glucose LESS THAN 70 milliGRAM(s)/deciliter  glucagon  Injectable 1 milliGRAM(s) IntraMuscular once PRN Glucose LESS THAN 70 milligrams/deciliter    	                   LABS REVIEWED    H/H: 8.1/27.2   06-03 @ 03:25    BUN/CR: 36/1.37  06-03 @ 03:25    Albumin: --  06-03 @ 03:25    CRP/SED RATE: --/-- 06-03 @ 03:25    Sodium: 145  06-03 @ 03:25                  	  ADVANCED DIRECTIVES:   FULL CODE [  x ]  DNR [  ]    DNI [  ]     MOLST [  ]  PSYCHOSOCIAL-SPIRITUAL ASSESSMENT:       Reviewed       GOALS OF CARE DISCUSSION       Palliative care info/counseling provided	           Family meeting       Advanced Directives addressed	           See previous Palliative Medicine Note  	        REFERRALS	        Palliative Med        Unit SW/Case Mgmt              Speech/Swallow        Hospice             Nutrition         Functional Assessment: KPS: <20              PPS: v poor      FINAL IMPRESSION AND RECOMMENDATIONS: had many meeting w dghtr , refuses comfort care   i Suggest terminal wean, and sx care

## 2019-06-03 NOTE — PROGRESS NOTE ADULT - SUBJECTIVE AND OBJECTIVE BOX
Patient is a 73y old  Male who presents with a chief complaint of Sepsis (02 Jun 2019 19:34)  still hypotensive. on pressor.   rocco resolved otherwise unchanged. patient has poor prognosis and should be in palliative and hospice care.    INTERVAL HPI/OVERNIGHT EVENTS:  PAST MEDICAL & SURGICAL HISTORY:  DM (diabetes mellitus)  Heart failure  Diabetic neuropathy  Hyperlipidemia  Quadriplegia  Hypertension  Stroke  Hypertension  Obstructive cardiomyopathy  S/P percutaneous endoscopic gastrostomy (PEG) tube placement  History of tracheostomy      MEDICATIONS  (STANDING):  ALBUTerol    90 MICROgram(s) HFA Inhaler 1 Puff(s) Inhalation every 4 hours  ALBUTerol/ipratropium for Nebulization 3 milliLiter(s) Nebulizer every 6 hours  chlorhexidine 4% Liquid 1 Application(s) Topical <User Schedule>  dextrose 5%. 1000 milliLiter(s) (50 mL/Hr) IV Continuous <Continuous>  dextrose 50% Injectable 12.5 Gram(s) IV Push once  dextrose 50% Injectable 25 Gram(s) IV Push once  dextrose 50% Injectable 25 Gram(s) IV Push once  fentaNYL   Patch  12 MICROgram(s)/Hr 1 Patch Transdermal every 72 hours  heparin  Injectable 5000 Unit(s) SubCutaneous every 12 hours  insulin glargine Injectable (LANTUS) 40 Unit(s) SubCutaneous every morning  insulin lispro (HumaLOG) corrective regimen sliding scale   SubCutaneous every 6 hours  linezolid  IVPB 600 milliGRAM(s) IV Intermittent every 12 hours  linezolid  IVPB      meropenem  IVPB      meropenem  IVPB 1000 milliGRAM(s) IV Intermittent every 12 hours  midodrine 20 milliGRAM(s) Oral every 8 hours  norepinephrine Infusion 0.06 MICROgram(s)/kG/Min (6.9 mL/Hr) IV Continuous <Continuous>  pantoprazole   Suspension 40 milliGRAM(s) Oral daily  petrolatum Ophthalmic Ointment 1 Application(s) Both EYES daily  tiotropium 18 MICROgram(s) Capsule 1 Capsule(s) Inhalation daily    MEDICATIONS  (PRN):  acetaminophen    Suspension .. 650 milliGRAM(s) Oral every 6 hours PRN Temp greater or equal to 38C (100.4F)  dextrose 40% Gel 15 Gram(s) Oral once PRN Blood Glucose LESS THAN 70 milliGRAM(s)/deciliter  glucagon  Injectable 1 milliGRAM(s) IntraMuscular once PRN Glucose LESS THAN 70 milligrams/deciliter      Allergies    penicillin (Unknown)    Intolerances        REVIEW OF SYSTEMS:  CONSTITUTIONAL: No fever, weight loss, or fatigue  EYES: No eye pain, visual disturbances, or discharge  ENMT:  No difficulty hearing, tinnitus, vertigo; No sinus or throat pain  NECK: No pain or stiffness  BREASTS: No pain, masses, or nipple discharge  RESPIRATORY: No cough, wheezing, chills or hemoptysis; No shortness of breath  CARDIOVASCULAR: No chest pain, palpitations, dizziness, or leg swelling  GASTROINTESTINAL: No abdominal or epigastric pain. No nausea, vomiting, or hematemesis; No diarrhea or constipation. No melena or hematochezia.  GENITOURINARY: No dysuria, frequency, hematuria, or incontinence  NEUROLOGICAL: No headaches, memory loss, loss of strength, numbness, or tremors  SKIN: No itching, burning, rashes, or lesions   LYMPH NODES: No enlarged glands  ENDOCRINE: No heat or cold intolerance; No hair loss  MUSCULOSKELETAL: No joint pain or swelling; No muscle, back, or extremity pain  PSYCHIATRIC: No depression, anxiety, mood swings, or difficulty sleeping  HEME/LYMPH: No easy bruising, or bleeding gums  ALLERY AND IMMUNOLOGIC: No hives or eczema    Vital Signs Last 24 Hrs  T(C): 36.7 (03 Jun 2019 08:00), Max: 38.4 (02 Jun 2019 23:50)  T(F): 98.1 (03 Jun 2019 08:00), Max: 101.2 (02 Jun 2019 23:50)  HR: 78 (03 Jun 2019 10:00) (75 - 91)  BP: 105/41 (03 Jun 2019 10:00) (94/35 - 146/51)  BP(mean): 57 (03 Jun 2019 10:00) (5 - 70)  RR: 21 (03 Jun 2019 10:00) (8 - 34)  SpO2: 100% (03 Jun 2019 10:00) (91% - 100%)    PHYSICAL EXAM:  GENERAL: NAD, well-groomed, well-developed  HEAD:  Atraumatic, Normocephalic  EYES: EOMI, PERRLA, conjunctiva and sclera clear  ENMT: No tonsillar erythema, exudates, or enlargement; Moist mucous membranes, Good dentition, No lesions  NECK: Supple, No JVD, Normal thyroid. tracheostomy , on the vent.   NERVOUS SYSTEM:  quadriplegic. .   CHEST/LUNG: Clear to percussion bilaterally; No rales, rhonchi, wheezing, or rubs  HEART: Regular rate and rhythm; No murmurs, rubs, or gallops  ABDOMEN: Soft, Nontender, Nondistended; Bowel sounds present. cholecystostomy tube present, Peg tube present  Guerra cath  in place draining clear urine  EXTREMITIES:  2+ Peripheral Pulses, No clubbing, cyanosis, or edema  LYMPH: No lymphadenopathy noted  SKIN:Multiple decubiti in sacral area and heels    LABS:                        8.1    12.24 )-----------( 232      ( 03 Jun 2019 03:25 )             27.2     06-03    145  |  112<H>  |  36<H>  ----------------------------<  140<H>  4.2   |  26  |  1.37<H>    Ca    9.0      03 Jun 2019 03:25  Phos  2.6     06-03  Mg     3.1     06-03            CAPILLARY BLOOD GLUCOSE      POCT Blood Glucose.: 190 mg/dL (03 Jun 2019 07:48)  POCT Blood Glucose.: 174 mg/dL (03 Jun 2019 05:19)  POCT Blood Glucose.: 175 mg/dL (02 Jun 2019 23:14)  POCT Blood Glucose.: 140 mg/dL (02 Jun 2019 17:31)  POCT Blood Glucose.: 248 mg/dL (02 Jun 2019 12:26)          Hemoglobin A1C, Whole Blood: 7.4 % (05-30 @ 08:57)        RADIOLOGY & ADDITIONAL TESTS:    Imaging Personally Reviewed:  [ ] YES  [ ] NO    Consultant(s) Notes Reviewed:  [ ] YES  [ ] NO    Care Discussed with Consultants/Other Providers [ ] YES  [ ] NO    Care discussed with family,         [  ]   yes  [  ]  No    imp:    stable[ ]    unstable[  ]     improving [   ]       unchanged  [x  ]                Plans:  Continue present plans  [x  ] as per family and critical care.               New consult [  ]   specialty  .......               order test[  ]    test name.                  Discharge Planning  [  ]

## 2019-06-03 NOTE — PROGRESS NOTE ADULT - SUBJECTIVE AND OBJECTIVE BOX
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  NH report pt BUN & Cr incr, EMS report pt hypotensive on their arrival.  On arrival to ED, removed two 12microgram fentanyl patches (5/10/19 & 5/27/19)  In ED received 2200ml saline bolus. (29 May 2019 05:43)  all events noted   all culture noted   Allergies    penicillin (Unknown)    Intolerances        MEDICATIONS  (STANDING):  ALBUTerol    90 MICROgram(s) HFA Inhaler 1 Puff(s) Inhalation every 4 hours  ALBUTerol/ipratropium for Nebulization 3 milliLiter(s) Nebulizer every 6 hours  chlorhexidine 4% Liquid 1 Application(s) Topical <User Schedule>  dextrose 5%. 1000 milliLiter(s) (50 mL/Hr) IV Continuous <Continuous>  dextrose 50% Injectable 12.5 Gram(s) IV Push once  dextrose 50% Injectable 25 Gram(s) IV Push once  dextrose 50% Injectable 25 Gram(s) IV Push once  fentaNYL   Patch  12 MICROgram(s)/Hr 1 Patch Transdermal every 72 hours  heparin  Injectable 5000 Unit(s) SubCutaneous every 12 hours  insulin glargine Injectable (LANTUS) 40 Unit(s) SubCutaneous every morning  insulin lispro (HumaLOG) corrective regimen sliding scale   SubCutaneous every 6 hours  linezolid  IVPB 600 milliGRAM(s) IV Intermittent every 12 hours  linezolid  IVPB      meropenem  IVPB      meropenem  IVPB 1000 milliGRAM(s) IV Intermittent every 12 hours  midodrine 20 milliGRAM(s) Oral every 8 hours  norepinephrine Infusion 0.06 MICROgram(s)/kG/Min (6.9 mL/Hr) IV Continuous <Continuous>  pantoprazole   Suspension 40 milliGRAM(s) Oral daily  petrolatum Ophthalmic Ointment 1 Application(s) Both EYES daily  tiotropium 18 MICROgram(s) Capsule 1 Capsule(s) Inhalation daily    MEDICATIONS  (PRN):  acetaminophen    Suspension .. 650 milliGRAM(s) Oral every 6 hours PRN Temp greater or equal to 38C (100.4F)  dextrose 40% Gel 15 Gram(s) Oral once PRN Blood Glucose LESS THAN 70 milliGRAM(s)/deciliter  glucagon  Injectable 1 milliGRAM(s) IntraMuscular once PRN Glucose LESS THAN 70 milligrams/deciliter      REVIEW OF SYSTEMS:    unable to obtain   on peg feeds   all wounds seen with nursing   VITAL SIGNS:  T(C): 36.7 (06-03-19 @ 08:00), Max: 38.4 (06-02-19 @ 23:50)  T(F): 98.1 (06-03-19 @ 08:00), Max: 101.2 (06-02-19 @ 23:50)  HR: 78 (06-03-19 @ 10:00) (75 - 91)  BP: 105/41 (06-03-19 @ 10:00) (94/35 - 146/51)  RR: 21 (06-03-19 @ 10:00) (8 - 34)  SpO2: 100% (06-03-19 @ 10:00) (91% - 100%)  Wt(kg): --    PHYSICAL EXAM:    GENERAL: not in any distress  HEENT: tracheostomy to vent stable   CHEST/LUNG: Clear to auscultation bilaterally; No rales, rhonchi, wheezing  HEART: Regular rate and rhythm; No murmurs, rubs, or gallops  ABDOMEN: Soft, Nontender, Nondistended; Bowel sounds present, no rebound   EXTREMITIES:  2+ Peripheral Pulses, No clubbing, cyanosis, or edema  GENITOURINARY: cole  SKIN: all decubiti seen with nursing today   BACK: no pressor sore   NERVOUS SYSTEM:  unresponsive             8.1    12.24 )-----------( 232      ( 03 Jun 2019 03:25 )             27.2     06-03    145  |  112<H>  |  36<H>  ----------------------------<  140<H>  4.2   |  26  |  1.37<H>    Ca    9.0      03 Jun 2019 03:25  Phos  2.6     06-03  Mg     3.1     06-03                      Sedimentation Rate, Erythrocyte: 119 mm/hr (05-30 @ 15:34)      Vancomycin Level, Trough: 27.2 ug/mL (06-01 @ 12:22)        Culture Results:   Moderate Klebsiella pneumoniae ESBL  Moderate Enterococcus faecium (vancomycin resistant)  Moderate Proteus mirabilis (05-30 @ 18:37)  Culture Results:   Few Methicillin resistant Staphylococcus aureus (05-30 @ 18:36)  Culture Results:   Moderate Klebsiella pneumoniae ESBL  Moderate Enterococcus faecalis  Moderate Proteus mirabilis  Moderate Enterococcus faecium (vancomycin resistant) (05-30 @ 18:35)  Culture Results:   Numerous Pseudomonas aeruginosa (Carbapenem Resistant)  Normal Respiratory Christopher present (05-29 @ 17:42)  Culture Results:   No growth to date. (05-29 @ 10:45)  Culture Results:   No growth to date. (05-29 @ 10:45)  Culture Results:   10,000 - 49,000 CFU/mL Enterococcus faecalis  <10,000 CFU/ml Normal Urogenital christopher present (05-29 @ 10:21)                Radiology:

## 2019-06-03 NOTE — PROGRESS NOTE ADULT - ATTENDING COMMENTS
William: I have seen and examined the patient face to face, have reviewed and addended the HPI, PE and a/p as necessary.     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    ICU Vital Signs Last 24 Hrs  T(C): 36.4 (03 Jun 2019 12:32), Max: 38.4 (02 Jun 2019 23:50); HR: 74 (03 Jun 2019 13:38) (71 - 91); BP: 105/44 (03 Jun 2019 13:00) (89/43 - 146/51); BP(mean): 58 (03 Jun 2019 13:00) (5 - 70); RR: 13 (03 Jun 2019 13:00) (8 - 23) SpO2: 100% (03 Jun 2019 13:38) (91% - 100%)    GEN - Chronically ill, non-responsive, trach in place to vent  CARD -s1s2, RRR, no M,G,R;   PULM - Coarse breath sounds, minimal secretions from trach;   ABD:  +BS, soft, PEG in place, RUQ drain noted.    BACK: no CVA tenderness, sacral decub noted  EXT:  right ankle wound with exposed bone, right lateral leg wound, bilateral heel eschar, multiple sacral decubitus ulcers,      Neuro: s/p CVA with Locked In Syndrome, currently chronically trach to vent and PEG in place.  Fentanyl patch 12 mcg/hr q 72h for pain control.  Monitor for signs of pain.    CV: Remains in shock state with polymicrobial sepsis.  Continue pressor support to maintain MAP >65.  Midodrine 20mg Q8H.  Wean levophed as tolerated  Pulm: Chronic Respiratory Failure on chronic ventilator, cont mechanical ventilation, cont duoneb treatment q4h for bronchodilation,  Hold meropenem and continue linezolid, minimal secretions and suctioning requirements  GI: ?biliary drain CT reviewed, concern for placement, NPO for IR check   Renal/Metabolic: ARF with ATN in setting of sepsis +/- initial dehydration; trend creatinine.  Hypernatremia improving.  Cont with free water 250 Q8H  ID: Polymicrobial and MDR sepsis with osteo of R medial ankle with exposed R med mallelolus and multiple decub ulcers and possible PNA with MDR organisms.  Currently leukocysosis is improving, noted to have CRE in sputum with minimal secretions, discussed with ID to hold treatment for now as likely colonizations.  Noted to have VRE and ESBL, will continue linzeloid and meropenem.  Follow up ID reqs.  If family decides continued aggressive care patient will likely require at least BKA for source control of R ankle decubiti/osteo.    Heme: Hypercalcemia with renal and pulmonary lesions noted possible underlying malignancy; appreciate onc eval s/p pamidronate for hypercalcemia   Dispo: Patient remains critically ill.  Discussion as per Dr. Mata notes family meeting with son and daughter in law.  Family was informed of patient's medical issues, and made aware of possible need for amputation, polymicrobial infection, poor overall condition, shock state still on pressors, renal lesion with possibility of underlying malignancy, possible dislodged perc rory catheter. At that time, patient remains full code, family seems divided on pursing comfort and DNR, some family members want full code, others are willing to lean towards comfort.  Will readdress goals of care upon family Arrival.    Critical Care Attending Time Spent 40 Minutes

## 2019-06-03 NOTE — CONSULT NOTE ADULT - ASSESSMENT
sepsis septic shock on levaphed   await cultures   source ?? multiple potential sources   decubiti noted   chest xray clear   vent  stable   agree with current treatment   supportive care   patient is a full code noted ;; consider hospice and palliation  discussed with nursing   will follow with you thanks
74 yo with a cholecystostomy tube not draining, elevating white count and febrile  -IR consulted for a rory tube check, possible new stick/revision/upsize
hypernatremia   rocco  Hypercalcemia   sepsis  quadriplegia with tracheostomy and on the vent  Gastrosomy status   sacral and heel decubitus.
74 yo M w/ multiple foot and ankle wounds.   - Pt seen and evaluated in ICU  - afebrile w/ leukocytosis   - High clinical suspicion for OM of RF ankle + septic ankle joint  - RF ankle wound flushed and culture taken.   - Foot is unlikely salvageably pt has already has attempts at long term abx for OM.   - Would rec discussion with family about goals of care- proximal leg amputation vs palliative care   - Will continue to keep wounds dry with betadine and DSD daily. If pt becomes septic will consider amputation for source control  - Wound care: Betadine to bilateral foot wounds daily.   - Pod will follow   - seen with attending
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  NH report pt BUN & Cr incr, EMS report pt hypotensive on their arrival.  On arrival to ED, removed two 12microgram fentanyl patches (5/10/19 & 5/27/19)  ------------------------ As Above --------------------------------------------  Patient can not give any history. Called to evaluate PEG tube. It is an original tube placed (?). Functioning well. No leakage  History of GI bleed. Has a cholecystostomy tube.     In ED received 2200ml saline bolus. (29 May 2019 05:43)    Decreased H/H - No signs of active bleeding. 1) PPi 2) suppoertive care  PEG tube - Although tube appears old, would not change it. There is no beading and tube is functioning well. There is a significantly higher risk for tube displacement once the original tube is replaced. D/C PEG tube dressing. Clean with H2O2 : H2O 1:1 BID

## 2019-06-03 NOTE — PROGRESS NOTE ADULT - SUBJECTIVE AND OBJECTIVE BOX
Patient is a 73y old  Male who presents with a chief complaint of Sepsis (03 Jun 2019 11:18)       INTERVAL HPI/OVERNIGHT EVENTS:  Patient seen and evaluated at bedside.  Pt is resting comfortable in NAD. Denies N/V/F/C.     Allergies    penicillin (Unknown)    Intolerances        Vital Signs Last 24 Hrs  T(C): 36.7 (03 Jun 2019 08:00), Max: 38.4 (02 Jun 2019 23:50)  T(F): 98.1 (03 Jun 2019 08:00), Max: 101.2 (02 Jun 2019 23:50)  HR: 71 (03 Jun 2019 12:00) (71 - 91)  BP: 103/40 (03 Jun 2019 11:30) (94/35 - 146/51)  BP(mean): 55 (03 Jun 2019 11:30) (5 - 70)  RR: 20 (03 Jun 2019 12:00) (8 - 32)  SpO2: 100% (03 Jun 2019 12:00) (91% - 100%)    LABS:                        8.1    12.24 )-----------( 232      ( 03 Jun 2019 03:25 )             27.2     06-03    145  |  112<H>  |  36<H>  ----------------------------<  140<H>  4.2   |  26  |  1.37<H>    Ca    9.0      03 Jun 2019 03:25  Phos  2.6     06-03  Mg     3.1     06-03          CAPILLARY BLOOD GLUCOSE      POCT Blood Glucose.: 190 mg/dL (03 Jun 2019 07:48)  POCT Blood Glucose.: 174 mg/dL (03 Jun 2019 05:19)  POCT Blood Glucose.: 175 mg/dL (02 Jun 2019 23:14)  POCT Blood Glucose.: 140 mg/dL (02 Jun 2019 17:31)  POCT Blood Glucose.: 248 mg/dL (02 Jun 2019 12:26)      Lower Extremity Physical Exam:  dressing was just changed, clean dry and intact

## 2019-06-03 NOTE — CHART NOTE - NSCHARTNOTEFT_GEN_A_CORE
Fluoroscopic tube check shows catheter was dislodged and was removed.  Abdominal ultrasound shows the gallbladder is decompressed.  Cholecystostomy tube cancelled.

## 2019-06-03 NOTE — PROGRESS NOTE ADULT - ASSESSMENT
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    1. NEURO  - s/p CVA 2018 with locked in syndrome, quadriplegic  - sometimes blinks on command  - Fentanyl patch 12 mcg/hr q 72h for pain control.  - cont monitor for signs of pain or agitation    2. PULM  - Chronic Respiratory Failure on chronic ventilator  - cont mechanical ventilation  - cont duoneb treatment q4h for bronchodilation  - cont meropenem and linezolid for likely VAP which pt arrived with vs colonization  - daily weaning as tolerates    3. CV  - remains in shock state  - cont midodrine 20mg q8h for hemodynamic support, in attempt to wean off IV vasopressor  - wean off levophed as BP tolerates    4. RENAL  - ARF with ATN in setting of sepsis +/- initial dehydration  - Cr levels fluctuating up and down, cont to trend  - hypernatremia improving: cont free water at 250ml q8h, cont to trend Na levels    5. GI  - abdominal CT reviewed: question if biliary drain is in correct position  - cont PEG feeds  - await IR tube check today and possible readjustment of tube    6. ID  - polymicrobial sepsis with multidrug resistant organisms likely from right ankle wound and osteomyelitis + infected decub ulcers +/- PNA  - linezolid for VRE and meropenem for EBSL  - ID follow up for antibiotic management  - clinically leukocytosis improved, low grade fever  - with right foot/ankle wound and osteomyelitis with exposed joint pt likely will  need eventual below knee amputation for source control    7. ONC  - pt with known renal lesion/mass  - hypercalcemia may be in setting of underlying possible malignancy  - appreciate onc eval s/p pamidronate for hypercalcemia     8. GEN  - pt with advanced illness and chronic comorbidities  - family meeting two days ago with son and daughter in law whom wife has asked us to speak with. family made aware of possible need for amputation, polymicrobial infection, poor overall condition, shock state still on pressors, renal lesion with possibility of underlying malignancy, possible dislodged perc rory catheter. at present time pt remains full code, family seems divided on pursing comfort and DNR, some family members want full code, others are willing to lean towards comfort. pending family decision

## 2019-06-03 NOTE — PROVIDER CONTACT NOTE (CRITICAL VALUE NOTIFICATION) - TEST AND RESULT REPORTED:
Wound C & S Moderate Klebsiella Pneumoniae,  Moderate Enterococcus Faecium, VRE Positive,  Moderate Proteus Mirabilis

## 2019-06-03 NOTE — PROGRESS NOTE ADULT - SUBJECTIVE AND OBJECTIVE BOX
INTERVAL History:  Anemia.  Sepsis  Kidney Lesion.    Allergies    penicillin (Unknown)    Intolerances        MEDICATIONS  (STANDING):  ALBUTerol    90 MICROgram(s) HFA Inhaler 1 Puff(s) Inhalation every 4 hours  ALBUTerol/ipratropium for Nebulization 3 milliLiter(s) Nebulizer every 6 hours  chlorhexidine 4% Liquid 1 Application(s) Topical <User Schedule>  dextrose 5%. 1000 milliLiter(s) (50 mL/Hr) IV Continuous <Continuous>  dextrose 50% Injectable 12.5 Gram(s) IV Push once  dextrose 50% Injectable 25 Gram(s) IV Push once  dextrose 50% Injectable 25 Gram(s) IV Push once  fentaNYL   Patch  12 MICROgram(s)/Hr 1 Patch Transdermal every 72 hours  heparin  Injectable 5000 Unit(s) SubCutaneous every 12 hours  insulin glargine Injectable (LANTUS) 40 Unit(s) SubCutaneous every morning  insulin lispro (HumaLOG) corrective regimen sliding scale   SubCutaneous every 6 hours  linezolid  IVPB 600 milliGRAM(s) IV Intermittent every 12 hours  linezolid  IVPB      meropenem  IVPB      meropenem  IVPB 1000 milliGRAM(s) IV Intermittent every 12 hours  midodrine 20 milliGRAM(s) Oral every 8 hours  norepinephrine Infusion 0.06 MICROgram(s)/kG/Min (6.9 mL/Hr) IV Continuous <Continuous>  pantoprazole   Suspension 40 milliGRAM(s) Oral daily  petrolatum Ophthalmic Ointment 1 Application(s) Both EYES daily  tiotropium 18 MICROgram(s) Capsule 1 Capsule(s) Inhalation daily    MEDICATIONS  (PRN):  acetaminophen    Suspension .. 650 milliGRAM(s) Oral every 6 hours PRN Temp greater or equal to 38C (100.4F)  dextrose 40% Gel 15 Gram(s) Oral once PRN Blood Glucose LESS THAN 70 milliGRAM(s)/deciliter  glucagon  Injectable 1 milliGRAM(s) IntraMuscular once PRN Glucose LESS THAN 70 milligrams/deciliter      Vital Signs Last 24 Hrs  T(C): 36.7 (03 Jun 2019 08:00), Max: 38.4 (02 Jun 2019 23:50)  T(F): 98.1 (03 Jun 2019 08:00), Max: 101.2 (02 Jun 2019 23:50)  HR: 78 (03 Jun 2019 10:00) (75 - 91)  BP: 105/41 (03 Jun 2019 10:00) (94/35 - 146/51)  BP(mean): 57 (03 Jun 2019 10:00) (5 - 70)  RR: 21 (03 Jun 2019 10:00) (8 - 34)  SpO2: 100% (03 Jun 2019 10:00) (91% - 100%)    PHYSICAL EXAM:    Quadriplegia    NECK:   Trach:- Ventilator.  CHEST/LUNG: rales, rhonchi,   HEART: Regular rate and rhythm;   ABDOMEN:   PEg  Biliary Drainage.  Foot Ulcers.  Decubitus Ulcers.        LABS:                        8.1    12.24 )-----------( 232      ( 03 Jun 2019 03:25 )             27.2     06-03    145  |  112<H>  |  36<H>  ----------------------------<  140<H>  4.2   |  26  |  1.37<H>    Ca    9.0      03 Jun 2019 03:25  Phos  2.6     06-03  Mg     3.1     06-03              RADIOLOGY & ADDITIONAL STUDIES:    PATHOLOGY:

## 2019-06-03 NOTE — CONSULT NOTE ADULT - SUBJECTIVE AND OBJECTIVE BOX
Patient is a 73y old  Male who presents with a chief complaint of Sepsis (03 Jun 2019 10:40)    IR consulted for cholecystostomy tube check.  As per the patients son states, "I was not aware the tube was placed, nobody was notified".  This was performed at [facility].  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  EMS reported pt was hypotensive at nursing facility. Pt had two 12microgram fentanyl patches (5/10/19 & 5/27/19) on patient which was removed. Admitted to ICU for severe sepsis, septic shock, dehydration, hypernatremia, acute renal failure, multiple decubitus ulcers (sacrum, hip, bilateral lower extremities), osteomyelitis of right ankle, hypercalcemia.         PAST MEDICAL & SURGICAL HISTORY:  DM (diabetes mellitus)  Heart failure  Diabetic neuropathy  Hyperlipidemia  Quadriplegia  Hypertension  Stroke  Hypertension  Obstructive cardiomyopathy  S/P percutaneous endoscopic gastrostomy (PEG) tube placement  History of tracheostomy      Allergies    penicillin (Unknown)    Intolerances        MEDICATIONS  (STANDING):  ALBUTerol    90 MICROgram(s) HFA Inhaler 1 Puff(s) Inhalation every 4 hours  ALBUTerol/ipratropium for Nebulization 3 milliLiter(s) Nebulizer every 6 hours  chlorhexidine 4% Liquid 1 Application(s) Topical <User Schedule>  dextrose 5%. 1000 milliLiter(s) (50 mL/Hr) IV Continuous <Continuous>  dextrose 50% Injectable 12.5 Gram(s) IV Push once  dextrose 50% Injectable 25 Gram(s) IV Push once  dextrose 50% Injectable 25 Gram(s) IV Push once  fentaNYL   Patch  12 MICROgram(s)/Hr 1 Patch Transdermal every 72 hours  heparin  Injectable 5000 Unit(s) SubCutaneous every 12 hours  insulin glargine Injectable (LANTUS) 40 Unit(s) SubCutaneous every morning  insulin lispro (HumaLOG) corrective regimen sliding scale   SubCutaneous every 6 hours  linezolid  IVPB 600 milliGRAM(s) IV Intermittent every 12 hours  linezolid  IVPB      meropenem  IVPB      meropenem  IVPB 1000 milliGRAM(s) IV Intermittent every 12 hours  midodrine 20 milliGRAM(s) Oral every 8 hours  norepinephrine Infusion 0.06 MICROgram(s)/kG/Min (6.9 mL/Hr) IV Continuous <Continuous>  pantoprazole   Suspension 40 milliGRAM(s) Oral daily  petrolatum Ophthalmic Ointment 1 Application(s) Both EYES daily  tiotropium 18 MICROgram(s) Capsule 1 Capsule(s) Inhalation daily    MEDICATIONS  (PRN):  acetaminophen    Suspension .. 650 milliGRAM(s) Oral every 6 hours PRN Temp greater or equal to 38C (100.4F)  dextrose 40% Gel 15 Gram(s) Oral once PRN Blood Glucose LESS THAN 70 milliGRAM(s)/deciliter  glucagon  Injectable 1 milliGRAM(s) IntraMuscular once PRN Glucose LESS THAN 70 milligrams/deciliter        SOCIAL HISTORY:    FAMILY HISTORY:  No pertinent family history in first degree relatives        PHYSICAL EXAM:    Vital Signs Last 24 Hrs  T(C): 36.7 (03 Jun 2019 08:00), Max: 38.4 (02 Jun 2019 23:50)  T(F): 98.1 (03 Jun 2019 08:00), Max: 101.2 (02 Jun 2019 23:50)  HR: 78 (03 Jun 2019 10:00) (75 - 91)  BP: 105/41 (03 Jun 2019 10:00) (94/35 - 146/51)  BP(mean): 57 (03 Jun 2019 10:00) (5 - 70)  RR: 21 (03 Jun 2019 10:00) (8 - 34)  SpO2: 100% (03 Jun 2019 10:00) (91% - 100%)      ## P/E:  Gen: elderly male, trached to vent. quadriplegic  HEENT: trach in place  Resp: scattered rhonchi , no wheeze  CVS: RRR  Abd: soft ND +BS + PEG + RUQ drain  Ext: right ankle wound with exposed bone, right lateral leg wound, bilateral heel eschar  Neuro: quadriplegic, occasionally blinks eyes and grimaces    LABS:                        8.1    12.24 )-----------( 232      ( 03 Jun 2019 03:25 )             27.2     06-03    145  |  112<H>  |  36<H>  ----------------------------<  140<H>  4.2   |  26  |  1.37<H>    Ca    9.0      03 Jun 2019 03:25  Phos  2.6     06-03  Mg     3.1     06-03            RADIOLOGY & ADDITIONAL STUDIES:

## 2019-06-03 NOTE — PROGRESS NOTE ADULT - ASSESSMENT
quadriplegic patient,    chronic resp failure ,with tracheotomy and on the vent.  sepsis with MDR organisms from decubiti   Multiple decubiti  prognosis poor.

## 2019-06-04 LAB
ANION GAP SERPL CALC-SCNC: 6 MMOL/L — SIGNIFICANT CHANGE UP (ref 5–17)
BUN SERPL-MCNC: 33 MG/DL — HIGH (ref 7–23)
CALCIUM SERPL-MCNC: 8.8 MG/DL — SIGNIFICANT CHANGE UP (ref 8.5–10.1)
CHLORIDE SERPL-SCNC: 111 MMOL/L — HIGH (ref 96–108)
CO2 SERPL-SCNC: 30 MMOL/L — SIGNIFICANT CHANGE UP (ref 22–31)
CREAT SERPL-MCNC: 1.15 MG/DL — SIGNIFICANT CHANGE UP (ref 0.5–1.3)
GLUCOSE BLDC GLUCOMTR-MCNC: 113 MG/DL — HIGH (ref 70–99)
GLUCOSE BLDC GLUCOMTR-MCNC: 143 MG/DL — HIGH (ref 70–99)
GLUCOSE BLDC GLUCOMTR-MCNC: 206 MG/DL — HIGH (ref 70–99)
GLUCOSE BLDC GLUCOMTR-MCNC: 288 MG/DL — HIGH (ref 70–99)
GLUCOSE BLDC GLUCOMTR-MCNC: 291 MG/DL — HIGH (ref 70–99)
GLUCOSE BLDC GLUCOMTR-MCNC: 319 MG/DL — HIGH (ref 70–99)
GLUCOSE BLDC GLUCOMTR-MCNC: 320 MG/DL — HIGH (ref 70–99)
GLUCOSE SERPL-MCNC: 116 MG/DL — HIGH (ref 70–99)
HCT VFR BLD CALC: 27.3 % — LOW (ref 39–50)
HGB BLD-MCNC: 8.2 G/DL — LOW (ref 13–17)
MAGNESIUM SERPL-MCNC: 2.7 MG/DL — HIGH (ref 1.6–2.6)
MCHC RBC-ENTMCNC: 29.8 PG — SIGNIFICANT CHANGE UP (ref 27–34)
MCHC RBC-ENTMCNC: 30 GM/DL — LOW (ref 32–36)
MCV RBC AUTO: 99.3 FL — SIGNIFICANT CHANGE UP (ref 80–100)
NRBC # BLD: 0 /100 WBCS — SIGNIFICANT CHANGE UP (ref 0–0)
PHOSPHATE SERPL-MCNC: 2.6 MG/DL — SIGNIFICANT CHANGE UP (ref 2.5–4.5)
PLATELET # BLD AUTO: 278 K/UL — SIGNIFICANT CHANGE UP (ref 150–400)
POTASSIUM SERPL-MCNC: 4.2 MMOL/L — SIGNIFICANT CHANGE UP (ref 3.5–5.3)
POTASSIUM SERPL-SCNC: 4.2 MMOL/L — SIGNIFICANT CHANGE UP (ref 3.5–5.3)
RBC # BLD: 2.75 M/UL — LOW (ref 4.2–5.8)
RBC # FLD: 16.3 % — HIGH (ref 10.3–14.5)
SODIUM SERPL-SCNC: 147 MMOL/L — HIGH (ref 135–145)
WBC # BLD: 12.85 K/UL — HIGH (ref 3.8–10.5)
WBC # FLD AUTO: 12.85 K/UL — HIGH (ref 3.8–10.5)

## 2019-06-04 PROCEDURE — 99233 SBSQ HOSP IP/OBS HIGH 50: CPT

## 2019-06-04 RX ORDER — FENTANYL CITRATE 50 UG/ML
1 INJECTION INTRAVENOUS
Refills: 0 | Status: DISCONTINUED | OUTPATIENT
Start: 2019-06-04 | End: 2019-06-08

## 2019-06-04 RX ORDER — INSULIN HUMAN 100 [IU]/ML
5 INJECTION, SOLUTION SUBCUTANEOUS ONCE
Refills: 0 | Status: COMPLETED | OUTPATIENT
Start: 2019-06-04 | End: 2019-06-04

## 2019-06-04 RX ADMIN — CHLORHEXIDINE GLUCONATE 1 APPLICATION(S): 213 SOLUTION TOPICAL at 05:22

## 2019-06-04 RX ADMIN — Medication 8: at 12:42

## 2019-06-04 RX ADMIN — LINEZOLID 300 MILLIGRAM(S): 600 INJECTION, SOLUTION INTRAVENOUS at 08:57

## 2019-06-04 RX ADMIN — Medication 1 APPLICATION(S): at 12:41

## 2019-06-04 RX ADMIN — Medication 3 MILLILITER(S): at 23:48

## 2019-06-04 RX ADMIN — Medication 3 MILLILITER(S): at 00:48

## 2019-06-04 RX ADMIN — FENTANYL CITRATE 1 PATCH: 50 INJECTION INTRAVENOUS at 19:36

## 2019-06-04 RX ADMIN — Medication 40 MILLIGRAM(S): at 13:21

## 2019-06-04 RX ADMIN — Medication 3 MILLILITER(S): at 11:21

## 2019-06-04 RX ADMIN — Medication 3 MILLILITER(S): at 05:18

## 2019-06-04 RX ADMIN — INSULIN HUMAN 5 UNIT(S): 100 INJECTION, SOLUTION SUBCUTANEOUS at 15:36

## 2019-06-04 RX ADMIN — INSULIN GLARGINE 40 UNIT(S): 100 INJECTION, SOLUTION SUBCUTANEOUS at 08:03

## 2019-06-04 RX ADMIN — MIDODRINE HYDROCHLORIDE 20 MILLIGRAM(S): 2.5 TABLET ORAL at 05:23

## 2019-06-04 RX ADMIN — FENTANYL CITRATE 1 PATCH: 50 INJECTION INTRAVENOUS at 13:00

## 2019-06-04 RX ADMIN — LINEZOLID 300 MILLIGRAM(S): 600 INJECTION, SOLUTION INTRAVENOUS at 21:57

## 2019-06-04 RX ADMIN — MEROPENEM 100 MILLIGRAM(S): 1 INJECTION INTRAVENOUS at 18:30

## 2019-06-04 RX ADMIN — HEPARIN SODIUM 5000 UNIT(S): 5000 INJECTION INTRAVENOUS; SUBCUTANEOUS at 18:17

## 2019-06-04 RX ADMIN — MEROPENEM 100 MILLIGRAM(S): 1 INJECTION INTRAVENOUS at 06:16

## 2019-06-04 RX ADMIN — Medication 3 MILLILITER(S): at 17:08

## 2019-06-04 RX ADMIN — Medication 6: at 18:16

## 2019-06-04 RX ADMIN — HEPARIN SODIUM 5000 UNIT(S): 5000 INJECTION INTRAVENOUS; SUBCUTANEOUS at 05:22

## 2019-06-04 RX ADMIN — Medication 40 MILLIGRAM(S): at 05:22

## 2019-06-04 RX ADMIN — Medication 6: at 23:17

## 2019-06-04 RX ADMIN — Medication 6.9 MICROGRAM(S)/KG/MIN: at 05:23

## 2019-06-04 RX ADMIN — PANTOPRAZOLE SODIUM 40 MILLIGRAM(S): 20 TABLET, DELAYED RELEASE ORAL at 12:41

## 2019-06-04 RX ADMIN — FENTANYL CITRATE 1 PATCH: 50 INJECTION INTRAVENOUS at 08:03

## 2019-06-04 RX ADMIN — MIDODRINE HYDROCHLORIDE 20 MILLIGRAM(S): 2.5 TABLET ORAL at 21:59

## 2019-06-04 RX ADMIN — FENTANYL CITRATE 1 PATCH: 50 INJECTION INTRAVENOUS at 13:21

## 2019-06-04 NOTE — PROGRESS NOTE ADULT - SUBJECTIVE AND OBJECTIVE BOX
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    24 hour events: Noted to have IR procedure, noted to pull biliary drain as in peritoneal cavity, GB was noted to be contracted.  Off pressors this AM.      CENTRAL LINE: [ ] YES [x ] NO  LOCATION:   DATE INSERTED:  REMOVE: [ ] YES [ ] NO  EXPLAIN:    COLE: [x] YES [ ] NO    DATE INSERTED:  REMOVE:  [ ] YES [x] NO  - chronic cole, cole was changed on admission  A-LINE:  [ ] YES [ ] NO  LOCATION:   DATE INSERTED:  REMOVE:  [ ] YES [ ] NO  EXPLAIN:    REVIEW OF SYSTEMS: [x] Unable to obtain because: 2/2 locked in syndrome CVA    ICU Vital Signs Last 24 Hrs  T(C): 37.3 (04 Jun 2019 15:57), Max: 37.7 (04 Jun 2019 06:00)  T(F): 99.1 (04 Jun 2019 15:57), Max: 99.8 (04 Jun 2019 06:00)  HR: 83 (04 Jun 2019 17:09) (75 - 94)  BP: 156/57 (04 Jun 2019 13:30) (109/45 - 168/55)  BP(mean): 82 (04 Jun 2019 13:30) (59 - 86)  ABP: --  ABP(mean): --  RR: 20 (04 Jun 2019 13:30) (12 - 29)  SpO2: 100% (04 Jun 2019 17:09) (100% - 100%)      I&O's Detail    03 Jun 2019 07:01  -  04 Jun 2019 07:00  --------------------------------------------------------  IN:    Enteral Tube Flush: 50 mL    Free Water: 500 mL    Glucerna 1.5: 1245 mL    norepinephrine Infusion: 46.6 mL    Solution: 600 mL    Solution: 50 mL  Total IN: 2491.6 mL    OUT:    Indwelling Catheter - Urethral: 1565 mL  Total OUT: 1565 mL    Total NET: 926.6 mL      04 Jun 2019 07:01  -  04 Jun 2019 17:58  --------------------------------------------------------  IN:    Enteral Tube Flush: 50 mL    Free Water: 250 mL    Glucerna 1.5: 390 mL    Solution: 300 mL  Total IN: 990 mL    OUT:    Indwelling Catheter - Urethral: 335 mL  Total OUT: 335 mL    Total NET: 655 mL          Mode: AC/ CMV (Assist Control/ Continuous Mandatory Ventilation)  RR (machine): 12  TV (machine): 500  FiO2: 30  PEEP: 5  ITime: 1  MAP: 10  PIP: 17      MEDICATIONS  NEURO  Meds: acetaminophen    Suspension .. 650 milliGRAM(s) Oral every 6 hours PRN Temp greater or equal to 38C (100.4F)  fentaNYL   Patch  12 MICROgram(s)/Hr 1 Patch Transdermal every 72 hours    RESPIRATORY    Meds: ALBUTerol    90 MICROgram(s) HFA Inhaler 1 Puff(s) Inhalation every 4 hours  ALBUTerol/ipratropium for Nebulization 3 milliLiter(s) Nebulizer every 6 hours  tiotropium 18 MICROgram(s) Capsule 1 Capsule(s) Inhalation daily    CARDIOVASCULAR  Meds: midodrine 20 milliGRAM(s) Oral every 8 hours  norepinephrine Infusion 0.06 MICROgram(s)/kG/Min (6.9 mL/Hr) IV Continuous <Continuous>    GI/NUTRITION  Meds: pantoprazole   Suspension 40 milliGRAM(s) Oral daily    GENITOURINARY  Meds: dextrose 5%. 1000 milliLiter(s) IV Continuous <Continuous>    HEMATOLOGIC  Meds: heparin  Injectable 5000 Unit(s) SubCutaneous every 12 hours    [x] VTE Prophylaxis  INFECTIOUS DISEASES  Meds: linezolid  IVPB 600 milliGRAM(s) IV Intermittent every 12 hours  linezolid  IVPB      meropenem  IVPB 1000 milliGRAM(s) IV Intermittent every 12 hours  meropenem  IVPB        ENDOCRINE  CAPILLARY BLOOD GLUCOSE      POCT Blood Glucose.: 320 mg/dL (04 Jun 2019 15:35)  POCT Blood Glucose.: 319 mg/dL (04 Jun 2019 12:39)  POCT Blood Glucose.: 206 mg/dL (04 Jun 2019 08:01)  POCT Blood Glucose.: 143 mg/dL (04 Jun 2019 04:59)  POCT Blood Glucose.: 113 mg/dL (04 Jun 2019 00:03)    Meds: dextrose 40% Gel 15 Gram(s) Oral once PRN  dextrose 50% Injectable 12.5 Gram(s) IV Push once  dextrose 50% Injectable 25 Gram(s) IV Push once  dextrose 50% Injectable 25 Gram(s) IV Push once  glucagon  Injectable 1 milliGRAM(s) IntraMuscular once PRN  insulin glargine Injectable (LANTUS) 40 Unit(s) SubCutaneous every morning  insulin lispro (HumaLOG) corrective regimen sliding scale   SubCutaneous every 6 hours    OTHER MEDICATIONS:  chlorhexidine 4% Liquid 1 Application(s) Topical <User Schedule>  petrolatum Ophthalmic Ointment 1 Application(s) Both EYES daily  :    PHYSICAL EXAM:  EN - Chronically ill, non-responsive, trach in place to vent  CARD -s1s2, RRR, no M,G,R;   PULM - Coarse breath sounds, minimal secretions from trach;   ABD:  +BS, soft, PEG in place, RUQ drain now removed  BACK: no CVA tenderness, sacral decub noted  EXT:  right ankle wound with exposed bone, right lateral leg wound, bilateral heel eschar, multiple sacral decubitus ulcers,    LABS:                        8.2    12.85 )-----------( 278      ( 04 Jun 2019 04:37 )             27.3      06-04    147<H>  |  111<H>  |  33<H>  ----------------------------<  116<H>  4.2   |  30  |  1.15    Ca    8.8      04 Jun 2019 04:37  Phos  2.6     06-04  Mg     2.7     06-04    Mode: AC/ CMV (Assist Control/ Continuous Mandatory Ventilation), RR (machine): 12, TV (machine): 500, FiO2: 30, PEEP: 5, ITime: 1, MAP: 10, PIP: 17    GLOBAL ISSUE/BEST PRACTICE:  Analgesia: fentanyl  Sedation: n/a  HOB elevation: yes  Stress ulcer prophylaxis: protonix  VTE prophylaxis: heparin sc  Oral Care: n/a  Glycemic control: lantus and sliding scale coverage  Nutrition: PEG feeds

## 2019-06-04 NOTE — PHARMACOTHERAPY INTERVENTION NOTE - COMMENTS
fentanyl 12mcg patch was automatically discontinued. Spoke to Shashi Castro and had fentanyl patch renewed.

## 2019-06-04 NOTE — PROGRESS NOTE ADULT - SUBJECTIVE AND OBJECTIVE BOX
Patient is a 73y old  Male who presents with a chief complaint of Sepsis (04 Jun 2019 07:29)  BP has stabilized. . no fefver.   otherwise essentially unchanged. prognosis still grim    INTERVAL HPI/OVERNIGHT EVENTS:  PAST MEDICAL & SURGICAL HISTORY:  DM (diabetes mellitus)  Heart failure  Diabetic neuropathy  Hyperlipidemia  Quadriplegia  Hypertension  Stroke  Hypertension  Obstructive cardiomyopathy  S/P percutaneous endoscopic gastrostomy (PEG) tube placement  History of tracheostomy      MEDICATIONS  (STANDING):  ALBUTerol    90 MICROgram(s) HFA Inhaler 1 Puff(s) Inhalation every 4 hours  ALBUTerol/ipratropium for Nebulization 3 milliLiter(s) Nebulizer every 6 hours  chlorhexidine 4% Liquid 1 Application(s) Topical <User Schedule>  dextrose 5%. 1000 milliLiter(s) (50 mL/Hr) IV Continuous <Continuous>  dextrose 50% Injectable 12.5 Gram(s) IV Push once  dextrose 50% Injectable 25 Gram(s) IV Push once  dextrose 50% Injectable 25 Gram(s) IV Push once  fentaNYL   Patch  12 MICROgram(s)/Hr 1 Patch Transdermal every 72 hours  heparin  Injectable 5000 Unit(s) SubCutaneous every 12 hours  insulin glargine Injectable (LANTUS) 40 Unit(s) SubCutaneous every morning  insulin lispro (HumaLOG) corrective regimen sliding scale   SubCutaneous every 6 hours  linezolid  IVPB 600 milliGRAM(s) IV Intermittent every 12 hours  linezolid  IVPB      meropenem  IVPB      meropenem  IVPB 1000 milliGRAM(s) IV Intermittent every 12 hours  methylPREDNISolone sodium succinate Injectable 40 milliGRAM(s) IV Push every 8 hours  midodrine 20 milliGRAM(s) Oral every 8 hours  norepinephrine Infusion 0.06 MICROgram(s)/kG/Min (6.9 mL/Hr) IV Continuous <Continuous>  pantoprazole   Suspension 40 milliGRAM(s) Oral daily  petrolatum Ophthalmic Ointment 1 Application(s) Both EYES daily  tiotropium 18 MICROgram(s) Capsule 1 Capsule(s) Inhalation daily    MEDICATIONS  (PRN):  acetaminophen    Suspension .. 650 milliGRAM(s) Oral every 6 hours PRN Temp greater or equal to 38C (100.4F)  dextrose 40% Gel 15 Gram(s) Oral once PRN Blood Glucose LESS THAN 70 milliGRAM(s)/deciliter  glucagon  Injectable 1 milliGRAM(s) IntraMuscular once PRN Glucose LESS THAN 70 milligrams/deciliter      Allergies    penicillin (Unknown)    Intolerances        REVIEW OF SYSTEMS:  CONSTITUTIONAL: No fever, weight loss, or fatigue  EYES: No eye pain, visual disturbances, or discharge  ENMT:  No difficulty hearing, tinnitus, vertigo; No sinus or throat pain  NECK: No pain or stiffness  BREASTS: No pain, masses, or nipple discharge  RESPIRATORY: No cough, wheezing, chills or hemoptysis; No shortness of breath  CARDIOVASCULAR: No chest pain, palpitations, dizziness, or leg swelling  GASTROINTESTINAL: No abdominal or epigastric pain. No nausea, vomiting, or hematemesis; No diarrhea or constipation. No melena or hematochezia.  GENITOURINARY: No dysuria, frequency, hematuria, or incontinence  NEUROLOGICAL: No headaches, memory loss, loss of strength, numbness, or tremors  SKIN: No itching, burning, rashes, or lesions   LYMPH NODES: No enlarged glands  ENDOCRINE: No heat or cold intolerance; No hair loss  MUSCULOSKELETAL: No joint pain or swelling; No muscle, back, or extremity pain  PSYCHIATRIC: No depression, anxiety, mood swings, or difficulty sleeping  HEME/LYMPH: No easy bruising, or bleeding gums  ALLERY AND IMMUNOLOGIC: No hives or eczema    Vital Signs Last 24 Hrs  T(C): 37.4 (04 Jun 2019 07:30), Max: 37.7 (04 Jun 2019 06:00)  T(F): 99.3 (04 Jun 2019 07:30), Max: 99.8 (04 Jun 2019 06:00)  HR: 86 (04 Jun 2019 09:04) (71 - 94)  BP: 146/59 (04 Jun 2019 09:00) (89/43 - 168/55)  BP(mean): 80 (04 Jun 2019 09:00) (55 - 80)  RR: 29 (04 Jun 2019 09:00) (12 - 29)  SpO2: 100% (04 Jun 2019 09:04) (100% - 100%)    PHYSICAL EXAM:  GENERAL: NAD, well-groomed, well-developed  HEAD:  Atraumatic, Normocephalic  EYES: EOMI, PERRLA, conjunctiva and sclera clear  ENMT: No tonsillar erythema, exudates, or enlargement; Moist mucous membranes, Good dentition, No lesions  NECK: Supple, No JVD, Normal thyroid. tracheostomy with vent  NERVOUS SYSTEM:  Quadriplegic ,  CHEST/LUNG: Clear to percussion bilaterally; No rales, rhonchi, wheezing, or rubs  HEART: Regular rate and rhythm; No murmurs, rubs, or gallops  ABDOMEN: Soft, Nontender, Nondistended; Bowel sounds present. peg tube in place  EXTREMITIES:  2+ Peripheral Pulses, No clubbing, cyanosis, or edema  LYMPH: No lymphadenopathy noted  SKIN: multiple deep infected decubiti    LABS:                        8.2    12.85 )-----------( 278      ( 04 Jun 2019 04:37 )             27.3     06-04    147<H>  |  111<H>  |  33<H>  ----------------------------<  116<H>  4.2   |  30  |  1.15    Ca    8.8      04 Jun 2019 04:37  Phos  2.6     06-04  Mg     2.7     06-04            CAPILLARY BLOOD GLUCOSE      POCT Blood Glucose.: 206 mg/dL (04 Jun 2019 08:01)  POCT Blood Glucose.: 143 mg/dL (04 Jun 2019 04:59)  POCT Blood Glucose.: 113 mg/dL (04 Jun 2019 00:03)  POCT Blood Glucose.: 157 mg/dL (03 Jun 2019 17:46)  POCT Blood Glucose.: 55 mg/dL (03 Jun 2019 17:19)  POCT Blood Glucose.: 52 mg/dL (03 Jun 2019 17:17)  POCT Blood Glucose.: 134 mg/dL (03 Jun 2019 12:16)          Hemoglobin A1C, Whole Blood: 7.4 % (05-30 @ 08:57)        RADIOLOGY & ADDITIONAL TESTS:    Imaging Personally Reviewed:  [ ] YES  [ ] NO    Consultant(s) Notes Reviewed:  [ ] YES  [ ] NO    Care Discussed with Consultants/Other Providers [ ] YES  [ ] NO    Care discussed with family,         [  ]   yes  [  ]  No    imp:    stable[ ]    unstable[  ]     improving [ x  ]       unchanged  [  ]                Plans:  Continue present plans  [ x ] as per family and critical care team               New consult [  ]   specialty  .......               order test[  ]    test name.                  Discharge Planning  [  ]

## 2019-06-04 NOTE — PROGRESS NOTE ADULT - SUBJECTIVE AND OBJECTIVE BOX
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   Noted to have IR procedure, noted to pull biliary drain as in peritoneal cavity, GB was noted to be contracted.  Off pressors this AM.       Allergies    penicillin (Unknown)    Intolerances        MEDICATIONS  (STANDING):  ALBUTerol    90 MICROgram(s) HFA Inhaler 1 Puff(s) Inhalation every 4 hours  ALBUTerol/ipratropium for Nebulization 3 milliLiter(s) Nebulizer every 6 hours  chlorhexidine 4% Liquid 1 Application(s) Topical <User Schedule>  dextrose 5%. 1000 milliLiter(s) (50 mL/Hr) IV Continuous <Continuous>  dextrose 50% Injectable 12.5 Gram(s) IV Push once  dextrose 50% Injectable 25 Gram(s) IV Push once  dextrose 50% Injectable 25 Gram(s) IV Push once  fentaNYL   Patch  12 MICROgram(s)/Hr 1 Patch Transdermal every 72 hours  heparin  Injectable 5000 Unit(s) SubCutaneous every 12 hours  insulin glargine Injectable (LANTUS) 40 Unit(s) SubCutaneous every morning  insulin lispro (HumaLOG) corrective regimen sliding scale   SubCutaneous every 6 hours  linezolid  IVPB 600 milliGRAM(s) IV Intermittent every 12 hours  linezolid  IVPB      meropenem  IVPB 1000 milliGRAM(s) IV Intermittent every 12 hours  meropenem  IVPB      midodrine 20 milliGRAM(s) Oral every 8 hours  norepinephrine Infusion 0.06 MICROgram(s)/kG/Min (6.9 mL/Hr) IV Continuous <Continuous>  pantoprazole   Suspension 40 milliGRAM(s) Oral daily  petrolatum Ophthalmic Ointment 1 Application(s) Both EYES daily  tiotropium 18 MICROgram(s) Capsule 1 Capsule(s) Inhalation daily    MEDICATIONS  (PRN):  acetaminophen    Suspension .. 650 milliGRAM(s) Oral every 6 hours PRN Temp greater or equal to 38C (100.4F)  dextrose 40% Gel 15 Gram(s) Oral once PRN Blood Glucose LESS THAN 70 milliGRAM(s)/deciliter  glucagon  Injectable 1 milliGRAM(s) IntraMuscular once PRN Glucose LESS THAN 70 milligrams/deciliter      REVIEW OF SYSTEMS:    unable to obtain   VITAL SIGNS:  T(C): 36.6 (06-04-19 @ 23:33), Max: 37.7 (06-04-19 @ 06:00)  T(F): 97.8 (06-04-19 @ 23:33), Max: 99.8 (06-04-19 @ 06:00)  HR: 82 (06-04-19 @ 23:00) (80 - 94)  BP: 152/62 (06-04-19 @ 23:00) (113/58 - 168/55)  RR: 13 (06-04-19 @ 23:00) (12 - 29)  SpO2: 100% (06-04-19 @ 23:00) (100% - 100%)  Wt(kg): --    PHYSICAL EXAM:    GENERAL: not in any distress  HEENT: Neck is supple, normocephalic, atraumatic   tracheostomy to vent   appears to be unresponsive   CHEST/LUNG: Clear to auscultation bilaterally; No rales, rhonchi, wheezing  HEART: Regular rate and rhythm; No murmurs, rubs, or gallops  ABDOMEN: Soft, Nontender, Nondistended; Bowel sounds present, no rebound   EXTREMITIES:  2+ Peripheral Pulses, No clubbing, cyanosis, or edema  SKIN: No rashes or lesions  BACK: no pressor sore   NERVOUS SYSTEM:  Alert & Oriented X3, Good concentration  PSYCH: normal affect     LABS:                         8.2    12.85 )-----------( 278      ( 04 Jun 2019 04:37 )             27.3     06-04    147<H>  |  111<H>  |  33<H>  ----------------------------<  116<H>  4.2   |  30  |  1.15    Ca    8.8      04 Jun 2019 04:37  Phos  2.6     06-04  Mg     2.7     06-04                                Culture Results:   Moderate Klebsiella pneumoniae ESBL  Moderate Enterococcus faecium (vancomycin resistant)  Moderate Proteus mirabilis (05-30 @ 18:37)  Culture Results:   Few Methicillin resistant Staphylococcus aureus (05-30 @ 18:36)  Culture Results:   Moderate Klebsiella pneumoniae ESBL  Moderate Enterococcus faecalis  Moderate Proteus mirabilis  Moderate Enterococcus faecium (vancomycin resistant) (05-30 @ 18:35)  Culture Results:   Numerous Pseudomonas aeruginosa (Carbapenem Resistant)  Normal Respiratory Christopher present (05-29 @ 17:42)  Culture Results:   No growth at 5 days. (05-29 @ 10:45)  Culture Results:   No growth at 5 days. (05-29 @ 10:45)  Culture Results:   10,000 - 49,000 CFU/mL Enterococcus faecalis  <10,000 CFU/ml Normal Urogenital christopher present (05-29 @ 10:21)                Radiology:

## 2019-06-04 NOTE — PROGRESS NOTE ADULT - SUBJECTIVE AND OBJECTIVE BOX
INTERVAL History:  Weak  On Ventilator    Allergies    penicillin (Unknown)    Intolerances        MEDICATIONS  (STANDING):  ALBUTerol    90 MICROgram(s) HFA Inhaler 1 Puff(s) Inhalation every 4 hours  ALBUTerol/ipratropium for Nebulization 3 milliLiter(s) Nebulizer every 6 hours  chlorhexidine 4% Liquid 1 Application(s) Topical <User Schedule>  dextrose 5%. 1000 milliLiter(s) (50 mL/Hr) IV Continuous <Continuous>  dextrose 50% Injectable 12.5 Gram(s) IV Push once  dextrose 50% Injectable 25 Gram(s) IV Push once  dextrose 50% Injectable 25 Gram(s) IV Push once  fentaNYL   Patch  12 MICROgram(s)/Hr 1 Patch Transdermal every 72 hours  heparin  Injectable 5000 Unit(s) SubCutaneous every 12 hours  insulin glargine Injectable (LANTUS) 40 Unit(s) SubCutaneous every morning  insulin lispro (HumaLOG) corrective regimen sliding scale   SubCutaneous every 6 hours  linezolid  IVPB 600 milliGRAM(s) IV Intermittent every 12 hours  linezolid  IVPB      meropenem  IVPB      meropenem  IVPB 1000 milliGRAM(s) IV Intermittent every 12 hours  methylPREDNISolone sodium succinate Injectable 40 milliGRAM(s) IV Push every 8 hours  midodrine 20 milliGRAM(s) Oral every 8 hours  norepinephrine Infusion 0.06 MICROgram(s)/kG/Min (6.9 mL/Hr) IV Continuous <Continuous>  pantoprazole   Suspension 40 milliGRAM(s) Oral daily  petrolatum Ophthalmic Ointment 1 Application(s) Both EYES daily  tiotropium 18 MICROgram(s) Capsule 1 Capsule(s) Inhalation daily    MEDICATIONS  (PRN):  acetaminophen    Suspension .. 650 milliGRAM(s) Oral every 6 hours PRN Temp greater or equal to 38C (100.4F)  dextrose 40% Gel 15 Gram(s) Oral once PRN Blood Glucose LESS THAN 70 milliGRAM(s)/deciliter  glucagon  Injectable 1 milliGRAM(s) IntraMuscular once PRN Glucose LESS THAN 70 milligrams/deciliter      Vital Signs Last 24 Hrs  T(C): 37.7 (04 Jun 2019 06:00), Max: 37.7 (04 Jun 2019 06:00)  T(F): 99.8 (04 Jun 2019 06:00), Max: 99.8 (04 Jun 2019 06:00)  HR: 88 (04 Jun 2019 07:00) (71 - 94)  BP: 127/47 (04 Jun 2019 07:00) (89/43 - 146/51)  BP(mean): 66 (04 Jun 2019 07:00) (5 - 76)  RR: 14 (04 Jun 2019 07:00) (10 - 25)  SpO2: 100% (04 Jun 2019 07:00) (98% - 100%)    PHYSICAL EXAM:      EYES: EOMI, PERRLA, conjunctiva and sclera clear  NECK: Trach  CHEST/LUNG: Clear to percussion bilaterally; No rales, rhonchi,   HEART: Regular rate and rhythm;   ABDOMEN:   Drain  PEG      LABS:                        8.2    12.85 )-----------( 278      ( 04 Jun 2019 04:37 )             27.3     06-04    147<H>  |  111<H>  |  33<H>  ----------------------------<  116<H>  4.2   |  30  |  1.15    Ca    8.8      04 Jun 2019 04:37  Phos  2.6     06-04  Mg     2.7     06-04              RADIOLOGY & ADDITIONAL STUDIES:    PATHOLOGY:

## 2019-06-04 NOTE — PROGRESS NOTE ADULT - ASSESSMENT
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  Weaned off pressors this AM on 6/4.      Neuro: s/p CVA with Locked In Syndrome, currently chronically trach to vent and PEG in place.  Fentanyl patch 12 mcg/hr q 72h for pain control.  Monitor for signs of pain.    CV: Septic shock 2/2 polymicrobial sepsis; pressors weaned off this AM.  Will continue to monitor closely may require reinitiation of pressors to maintain MAP >65.  Midodrine 20mg Q8H.   Pulm: Chronic Respiratory Failure on chronic ventilator, cont mechanical ventilation, cont duoneb treatment q4h for bronchodilation,  Cont Meropenem, CRE likely colonization and continue linezolid, minimal secretions and suctioning requirements  GI: Restarted tube feeds overnight.    Renal/Metabolic: ARF with ATN in setting of sepsis +/- initial dehydration; trend creatinine.  Hypernatremia improving.  Cont with free water 250 Q8H  ID: Polymicrobial and MDR sepsis with osteo of R medial ankle with exposed R med mallelolus and multiple decub ulcers and possible PNA with MDR organisms.  Currently leukocysosis is improving, noted to have CRE in sputum with minimal secretions, discussed with ID to hold treatment for now as likely colonizations.  Noted to have VRE and ESBL, will continue linezolid and meropenem.  Follow up ID reqs.  If family decides continued aggressive care patient will likely require at least BKA for source control of R ankle decubiti/osteo.    Heme: Hypercalcemia with renal and pulmonary lesions noted possible underlying malignancy; appreciate onc eval s/p pamidronate for hypercalcemia   Dispo: Patient remains critically ill.  Discussion as per Dr. Mata notes family meeting with son and daughter in law.  Family was informed of patient's medical issues, and made aware of possible need for amputation, polymicrobial infection, poor overall condition, shock state still on pressors, renal lesion with possibility of underlying malignancy, possible dislodged perc rory catheter. At that time, patient remains full code, family seems divided on pursing comfort and DNR, some family members want full code, others are willing to lean towards comfort.  Will readdress goals of care upon family arrival.    Critical Care Attending Time Spent 40 Minutes . 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  Weaned off pressors this AM on 6/4.      Neuro: s/p CVA with Locked In Syndrome, currently chronically trach to vent and PEG in place.  Fentanyl patch 12 mcg/hr q 72h for pain control.  Monitor for signs of pain.    CV: Septic shock 2/2 polymicrobial sepsis; pressors weaned off this AM.  Will continue to monitor closely may require reinitiation of pressors to maintain MAP >65.  Midodrine 20mg Q8H.   Pulm: Chronic Respiratory Failure on chronic ventilator, cont mechanical ventilation, cont duoneb treatment q4h for bronchodilation,  Cont Meropenem, CRE likely colonization and continue linezolid, minimal secretions and suctioning requirements  GI: Restarted tube feeds overnight.    Renal/Metabolic: ARF with ATN in setting of sepsis +/- initial dehydration; trend creatinine.  Hypernatremia improving.  Cont with free water 250 Q8H  ID: Polymicrobial and MDR sepsis with osteo of R medial ankle with exposed R med mallelolus and multiple decub ulcers and possible PNA with MDR organisms.  Currently leukocysosis is improving, noted to have CRE in sputum with minimal secretions, discussed with ID to hold treatment for now as likely colonizations.  Noted to have VRE and ESBL, will continue linezolid and meropenem.  Follow up ID reqs.  If family decides continued aggressive care patient will likely require at least BKA for source control of R ankle decubiti/osteo.    Heme: Hypercalcemia with renal and pulmonary lesions noted possible underlying malignancy; appreciate onc eval s/p pamidronate for hypercalcemia   Dispo: Patient remains critically ill.  Discussion as per Dr. Mata notes family meeting with son and daughter in law.  Family was informed of patient's medical issues, and made aware of possible need for amputation, polymicrobial infection, poor overall condition, shock state still on pressors, renal lesion with possibility of underlying malignancy, possible dislodged perc rory catheter. At that time, patient remains full code, family seems divided on pursing comfort and DNR, some family members want full code, others are willing to lean towards comfort.  Will readdress goals of care upon family arrival.    Patient has been off pressors since 7AM.  If remains off pressors is stable for transfer to floor.      Critical Care Attending Time Spent 40 Minutes .

## 2019-06-05 LAB
ANION GAP SERPL CALC-SCNC: 6 MMOL/L — SIGNIFICANT CHANGE UP (ref 5–17)
BUN SERPL-MCNC: 37 MG/DL — HIGH (ref 7–23)
CALCIUM SERPL-MCNC: 8.5 MG/DL — SIGNIFICANT CHANGE UP (ref 8.5–10.1)
CHLORIDE SERPL-SCNC: 107 MMOL/L — SIGNIFICANT CHANGE UP (ref 96–108)
CO2 SERPL-SCNC: 30 MMOL/L — SIGNIFICANT CHANGE UP (ref 22–31)
CREAT SERPL-MCNC: 1.15 MG/DL — SIGNIFICANT CHANGE UP (ref 0.5–1.3)
GLUCOSE BLDC GLUCOMTR-MCNC: 215 MG/DL — HIGH (ref 70–99)
GLUCOSE BLDC GLUCOMTR-MCNC: 294 MG/DL — HIGH (ref 70–99)
GLUCOSE BLDC GLUCOMTR-MCNC: 358 MG/DL — HIGH (ref 70–99)
GLUCOSE SERPL-MCNC: 275 MG/DL — HIGH (ref 70–99)
HCT VFR BLD CALC: 28.2 % — LOW (ref 39–50)
HGB BLD-MCNC: 8.5 G/DL — LOW (ref 13–17)
MAGNESIUM SERPL-MCNC: 2.9 MG/DL — HIGH (ref 1.6–2.6)
MCHC RBC-ENTMCNC: 29.3 PG — SIGNIFICANT CHANGE UP (ref 27–34)
MCHC RBC-ENTMCNC: 30.1 GM/DL — LOW (ref 32–36)
MCV RBC AUTO: 97.2 FL — SIGNIFICANT CHANGE UP (ref 80–100)
NRBC # BLD: 0 /100 WBCS — SIGNIFICANT CHANGE UP (ref 0–0)
PHOSPHATE SERPL-MCNC: 2.7 MG/DL — SIGNIFICANT CHANGE UP (ref 2.5–4.5)
PLATELET # BLD AUTO: 302 K/UL — SIGNIFICANT CHANGE UP (ref 150–400)
POTASSIUM SERPL-MCNC: 4.7 MMOL/L — SIGNIFICANT CHANGE UP (ref 3.5–5.3)
POTASSIUM SERPL-SCNC: 4.7 MMOL/L — SIGNIFICANT CHANGE UP (ref 3.5–5.3)
PTH RELATED PROT SERPL-MCNC: <2 PMOL/L — SIGNIFICANT CHANGE UP
RBC # BLD: 2.9 M/UL — LOW (ref 4.2–5.8)
RBC # FLD: 15.9 % — HIGH (ref 10.3–14.5)
SODIUM SERPL-SCNC: 143 MMOL/L — SIGNIFICANT CHANGE UP (ref 135–145)
WBC # BLD: 18.07 K/UL — HIGH (ref 3.8–10.5)
WBC # FLD AUTO: 18.07 K/UL — HIGH (ref 3.8–10.5)

## 2019-06-05 PROCEDURE — 99233 SBSQ HOSP IP/OBS HIGH 50: CPT

## 2019-06-05 RX ORDER — INSULIN GLARGINE 100 [IU]/ML
5 INJECTION, SOLUTION SUBCUTANEOUS ONCE
Refills: 0 | Status: COMPLETED | OUTPATIENT
Start: 2019-06-05 | End: 2019-06-05

## 2019-06-05 RX ADMIN — HEPARIN SODIUM 5000 UNIT(S): 5000 INJECTION INTRAVENOUS; SUBCUTANEOUS at 05:37

## 2019-06-05 RX ADMIN — Medication 10: at 11:31

## 2019-06-05 RX ADMIN — HEPARIN SODIUM 5000 UNIT(S): 5000 INJECTION INTRAVENOUS; SUBCUTANEOUS at 17:27

## 2019-06-05 RX ADMIN — LINEZOLID 300 MILLIGRAM(S): 600 INJECTION, SOLUTION INTRAVENOUS at 07:52

## 2019-06-05 RX ADMIN — Medication 1 APPLICATION(S): at 11:32

## 2019-06-05 RX ADMIN — MEROPENEM 100 MILLIGRAM(S): 1 INJECTION INTRAVENOUS at 05:36

## 2019-06-05 RX ADMIN — FENTANYL CITRATE 1 PATCH: 50 INJECTION INTRAVENOUS at 07:52

## 2019-06-05 RX ADMIN — Medication 3 MILLILITER(S): at 17:57

## 2019-06-05 RX ADMIN — Medication 3 MILLILITER(S): at 12:44

## 2019-06-05 RX ADMIN — INSULIN GLARGINE 40 UNIT(S): 100 INJECTION, SOLUTION SUBCUTANEOUS at 07:52

## 2019-06-05 RX ADMIN — FENTANYL CITRATE 1 PATCH: 50 INJECTION INTRAVENOUS at 19:44

## 2019-06-05 RX ADMIN — MIDODRINE HYDROCHLORIDE 20 MILLIGRAM(S): 2.5 TABLET ORAL at 13:57

## 2019-06-05 RX ADMIN — Medication 6: at 05:37

## 2019-06-05 RX ADMIN — Medication 3 MILLILITER(S): at 05:34

## 2019-06-05 RX ADMIN — PANTOPRAZOLE SODIUM 40 MILLIGRAM(S): 20 TABLET, DELAYED RELEASE ORAL at 11:31

## 2019-06-05 RX ADMIN — CHLORHEXIDINE GLUCONATE 1 APPLICATION(S): 213 SOLUTION TOPICAL at 05:37

## 2019-06-05 RX ADMIN — Medication 4: at 17:27

## 2019-06-05 RX ADMIN — LINEZOLID 300 MILLIGRAM(S): 600 INJECTION, SOLUTION INTRAVENOUS at 20:17

## 2019-06-05 RX ADMIN — MEROPENEM 100 MILLIGRAM(S): 1 INJECTION INTRAVENOUS at 20:16

## 2019-06-05 RX ADMIN — INSULIN GLARGINE 5 UNIT(S): 100 INJECTION, SOLUTION SUBCUTANEOUS at 10:01

## 2019-06-05 RX ADMIN — Medication 3 MILLILITER(S): at 23:16

## 2019-06-05 NOTE — PROGRESS NOTE ADULT - ASSESSMENT
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  Weaned off pressors this AM on 6/4.      Neuro: s/p CVA with Locked In Syndrome, currently chronically trach to vent and PEG in place.  Fentanyl patch 12 mcg/hr q 72h for pain control.  Monitor for signs of pain.    CV: Septic shock 2/2 polymicrobial sepsis; pressors weaned off this AM.  Will continue to monitor closely may require reinitiation of pressors to maintain MAP >65.  Midodrine 20mg Q8H.   Pulm: Chronic Respiratory Failure on chronic ventilator, cont mechanical ventilation, cont duoneb treatment q4h for bronchodilation,  Cont Meropenem, CRE likely colonization and continue linezolid, minimal secretions and suctioning requirements  GI: Restarted tube feeds overnight.    Renal/Metabolic: ARF with ATN in setting of sepsis +/- initial dehydration; trend creatinine.  Hypernatremia improving.  Cont with free water 250 Q8H  ID: Polymicrobial and MDR sepsis with osteo of R medial ankle with exposed R med mallelolus and multiple decub ulcers and possible PNA with MDR organisms.  Currently leukocysosis is improving, noted to have CRE in sputum with minimal secretions, discussed with ID to hold treatment for now as likely colonizations.  Noted to have VRE and ESBL, will continue linezolid and meropenem.  Follow up ID reqs.  If family decides continued aggressive care patient will likely require at least BKA for source control of R ankle decubiti/osteo.    Heme: Hypercalcemia with renal and pulmonary lesions noted possible underlying malignancy; appreciate onc eval s/p pamidronate for hypercalcemia   Dispo: Patient remains critically ill.  Discussion as per Dr. Mata notes family meeting with son and daughter in law.  Family was informed of patient's medical issues, and made aware of possible need for amputation, polymicrobial infection, poor overall condition, shock state still on pressors, renal lesion with possibility of underlying malignancy, possible dislodged perc rory catheter. At that time, patient remains full code, family seems divided on pursing comfort and DNR, some family members want full code, others are willing to lean towards comfort.  Will readdress goals of care upon family arrival.   prognosis Poor.

## 2019-06-05 NOTE — PROGRESS NOTE ADULT - SUBJECTIVE AND OBJECTIVE BOX
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    24 hour events: Off pressors.  I discussed with son Ernesto and daughter in law Deepa, patient's current clinical status.  I informed them that Mr Walsh is now off of pressor support, but continues to have open wound on L leg that likely requires amputation, if family wants to be aggressive.  Ernesto and Deepa state they do not want an amputation as this may not improve his quality of life, however understand that he will continue to have infections that could spread and cause worsening hemodynamic compromise and even death.  Ernesto and Deepa will discuss with patient's wife and patient's granddaughter.      CENTRAL LINE: [ ] YES [x ] NO  LOCATION:   DATE INSERTED:  REMOVE: [ ] YES [ ] NO  EXPLAIN:    COLE: [x] YES [ ] NO    DATE INSERTED:  REMOVE:  [ ] YES [x] NO  - chronic cole, cole was changed on admission  A-LINE:  [ ] YES [ ] NO  LOCATION:   DATE INSERTED:  REMOVE:  [ ] YES [ ] NO  EXPLAIN:    REVIEW OF SYSTEMS: [x] Unable to obtain because: 2/2 locked in syndrome CVA    ICU Vital Signs Last 24 Hrs  T(C): 37.3 (05 Jun 2019 11:25), Max: 37.3 (04 Jun 2019 15:57)  T(F): 99.1 (05 Jun 2019 11:25), Max: 99.1 (04 Jun 2019 15:57)  HR: 90 (05 Jun 2019 13:57) (80 - 100)  BP: 122/53 (05 Jun 2019 12:00) (122/53 - 154/69)  BP(mean): 70 (05 Jun 2019 12:00) (70 - 90)  ABP: --  ABP(mean): --  RR: 12 (05 Jun 2019 12:00) (11 - 25)  SpO2: 100% (05 Jun 2019 13:57) (99% - 100%)      I&O's Detail    04 Jun 2019 07:01  -  05 Jun 2019 07:00  --------------------------------------------------------  IN:    Enteral Tube Flush: 50 mL    Free Water: 1000 mL    Glucerna 1.5: 1325 mL    Solution: 600 mL    Solution: 100 mL  Total IN: 3075 mL    OUT:    Indwelling Catheter - Urethral: 1370 mL  Total OUT: 1370 mL    Total NET: 1705 mL      05 Jun 2019 07:01  -  05 Jun 2019 15:16  --------------------------------------------------------  IN:    Free Water: 250 mL    Glucerna 1.5: 385 mL  Total IN: 635 mL    OUT:  Total OUT: 0 mL    Total NET: 635 mL          Mode: AC/ CMV (Assist Control/ Continuous Mandatory Ventilation)  RR (machine): 12  TV (machine): 500  FiO2: 30  PEEP: 5  ITime: 1  MAP: 11  PIP: 18      MEDICATIONS  NEURO  Meds: acetaminophen    Suspension .. 650 milliGRAM(s) Oral every 6 hours PRN Temp greater or equal to 38C (100.4F)  fentaNYL   Patch  12 MICROgram(s)/Hr 1 Patch Transdermal every 72 hours    RESPIRATORY    Meds: ALBUTerol    90 MICROgram(s) HFA Inhaler 1 Puff(s) Inhalation every 4 hours  ALBUTerol/ipratropium for Nebulization 3 milliLiter(s) Nebulizer every 6 hours  tiotropium 18 MICROgram(s) Capsule 1 Capsule(s) Inhalation daily    CARDIOVASCULAR  Meds: midodrine 20 milliGRAM(s) Oral every 8 hours    GI/NUTRITION  Meds: pantoprazole   Suspension 40 milliGRAM(s) Oral daily    GENITOURINARY  Meds: dextrose 5%. 1000 milliLiter(s) IV Continuous <Continuous>    HEMATOLOGIC  Meds: heparin  Injectable 5000 Unit(s) SubCutaneous every 12 hours    [x] VTE Prophylaxis  INFECTIOUS DISEASES  Meds: linezolid  IVPB 600 milliGRAM(s) IV Intermittent every 12 hours  linezolid  IVPB      meropenem  IVPB 1000 milliGRAM(s) IV Intermittent every 12 hours  meropenem  IVPB        ENDOCRINE  CAPILLARY BLOOD GLUCOSE      POCT Blood Glucose.: 358 mg/dL (05 Jun 2019 11:28)  POCT Blood Glucose.: 294 mg/dL (05 Jun 2019 05:23)  POCT Blood Glucose.: 288 mg/dL (04 Jun 2019 23:15)  POCT Blood Glucose.: 291 mg/dL (04 Jun 2019 18:15)  POCT Blood Glucose.: 320 mg/dL (04 Jun 2019 15:35)    Meds: dextrose 40% Gel 15 Gram(s) Oral once PRN  dextrose 50% Injectable 12.5 Gram(s) IV Push once  dextrose 50% Injectable 25 Gram(s) IV Push once  dextrose 50% Injectable 25 Gram(s) IV Push once  glucagon  Injectable 1 milliGRAM(s) IntraMuscular once PRN  insulin glargine Injectable (LANTUS) 40 Unit(s) SubCutaneous every morning  insulin lispro (HumaLOG) corrective regimen sliding scale   SubCutaneous every 6 hours    OTHER MEDICATIONS:  chlorhexidine 4% Liquid 1 Application(s) Topical <User Schedule>  petrolatum Ophthalmic Ointment 1 Application(s) Both EYES daily  :    PHYSICAL EXAM:  GEN - Chronically ill, non-responsive, trach in place to vent  CARD -s1s2, RRR, no M,G,R;   PULM - Coarse breath sounds, minimal secretions from trach;   ABD:  +BS, soft, PEG in place, RUQ drain now removed  BACK: no CVA tenderness, sacral decub noted  EXT:  right ankle wound with exposed bone, right lateral leg wound, bilateral heel eschar, multiple sacral decubitus ulcers,      LABS:                        8.5    18.07 )-----------( 302      ( 05 Jun 2019 02:52 )             28.2      06-05    143  |  107  |  37<H>  ----------------------------<  275<H>  4.7   |  30  |  1.15    Ca    8.5      05 Jun 2019 02:52  Phos  2.7     06-05  Mg     2.9     06-05    Mode: AC/ CMV (Assist Control/ Continuous Mandatory Ventilation), RR (machine): 12, TV (machine): 500, FiO2: 30, PEEP: 5, ITime: 1, MAP: 11, PIP: 18    GLOBAL ISSUE/BEST PRACTICE:  Analgesia: fentanyl  Sedation: n/a  HOB elevation: yes  Stress ulcer prophylaxis: protonix  VTE prophylaxis: heparin sc  Oral Care: n/a  Glycemic control: lantus and sliding scale coverage  Nutrition: PEG feeds INTERVAL HPI/OVERNIGHT EVENTS:      73M PMH of CAD, 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    24 hour events: Off pressors.  I discussed with son Ernesto and daughter in law Deepa, patient's current clinical status.  I informed them that Mr Walsh is now off of pressor support, but continues to have open wound on R leg that likely requires amputation, if family wants to be aggressive.  Ernesto and Deepa state they do not want an amputation as this may not improve his quality of life, however understand that he will continue to have infections that could spread and cause worsening hemodynamic compromise and even death.  Ernesto and Deepa will discuss with patient's wife and patient's granddaughter.      CENTRAL LINE: [ ] YES [x] NO  LOCATION:   DATE INSERTED:  REMOVE: [ ] YES [ ] NO  EXPLAIN:    COLE: [x] YES [ ] NO    DATE INSERTED:  REMOVE:  [ ] YES [x] NO  - chronic cole, cole was changed on admission  A-LINE:  [ ] YES [ ] NO  LOCATION:   DATE INSERTED:  REMOVE:  [ ] YES [ ] NO  EXPLAIN:    REVIEW OF SYSTEMS: [x] Unable to obtain because: 2/2 locked in syndrome CVA    ICU Vital Signs Last 24 Hrs  T(C): 37.3 (05 Jun 2019 11:25), Max: 37.3 (04 Jun 2019 15:57)  T(F): 99.1 (05 Jun 2019 11:25), Max: 99.1 (04 Jun 2019 15:57)  HR: 90 (05 Jun 2019 13:57) (80 - 100)  BP: 122/53 (05 Jun 2019 12:00) (122/53 - 154/69)  BP(mean): 70 (05 Jun 2019 12:00) (70 - 90)  ABP: --  ABP(mean): --  RR: 12 (05 Jun 2019 12:00) (11 - 25)  SpO2: 100% (05 Jun 2019 13:57) (99% - 100%)      I&O's Detail    04 Jun 2019 07:01  -  05 Jun 2019 07:00  --------------------------------------------------------  IN:    Enteral Tube Flush: 50 mL    Free Water: 1000 mL    Glucerna 1.5: 1325 mL    Solution: 600 mL    Solution: 100 mL  Total IN: 3075 mL    OUT:    Indwelling Catheter - Urethral: 1370 mL  Total OUT: 1370 mL    Total NET: 1705 mL      05 Jun 2019 07:01  -  05 Jun 2019 15:16  --------------------------------------------------------  IN:    Free Water: 250 mL    Glucerna 1.5: 385 mL  Total IN: 635 mL    OUT:  Total OUT: 0 mL    Total NET: 635 mL          Mode: AC/ CMV (Assist Control/ Continuous Mandatory Ventilation)  RR (machine): 12  TV (machine): 500  FiO2: 30  PEEP: 5  ITime: 1  MAP: 11  PIP: 18      MEDICATIONS  NEURO  Meds: acetaminophen    Suspension .. 650 milliGRAM(s) Oral every 6 hours PRN Temp greater or equal to 38C (100.4F)  fentaNYL   Patch  12 MICROgram(s)/Hr 1 Patch Transdermal every 72 hours    RESPIRATORY    Meds: ALBUTerol    90 MICROgram(s) HFA Inhaler 1 Puff(s) Inhalation every 4 hours  ALBUTerol/ipratropium for Nebulization 3 milliLiter(s) Nebulizer every 6 hours  tiotropium 18 MICROgram(s) Capsule 1 Capsule(s) Inhalation daily    CARDIOVASCULAR  Meds: midodrine 20 milliGRAM(s) Oral every 8 hours    GI/NUTRITION  Meds: pantoprazole   Suspension 40 milliGRAM(s) Oral daily    GENITOURINARY  Meds: dextrose 5%. 1000 milliLiter(s) IV Continuous <Continuous>    HEMATOLOGIC  Meds: heparin  Injectable 5000 Unit(s) SubCutaneous every 12 hours    [x] VTE Prophylaxis  INFECTIOUS DISEASES  Meds: linezolid  IVPB 600 milliGRAM(s) IV Intermittent every 12 hours  linezolid  IVPB      meropenem  IVPB 1000 milliGRAM(s) IV Intermittent every 12 hours  meropenem  IVPB        ENDOCRINE  CAPILLARY BLOOD GLUCOSE      POCT Blood Glucose.: 358 mg/dL (05 Jun 2019 11:28)  POCT Blood Glucose.: 294 mg/dL (05 Jun 2019 05:23)  POCT Blood Glucose.: 288 mg/dL (04 Jun 2019 23:15)  POCT Blood Glucose.: 291 mg/dL (04 Jun 2019 18:15)  POCT Blood Glucose.: 320 mg/dL (04 Jun 2019 15:35)    Meds: dextrose 40% Gel 15 Gram(s) Oral once PRN  dextrose 50% Injectable 12.5 Gram(s) IV Push once  dextrose 50% Injectable 25 Gram(s) IV Push once  dextrose 50% Injectable 25 Gram(s) IV Push once  glucagon  Injectable 1 milliGRAM(s) IntraMuscular once PRN  insulin glargine Injectable (LANTUS) 40 Unit(s) SubCutaneous every morning  insulin lispro (HumaLOG) corrective regimen sliding scale   SubCutaneous every 6 hours    OTHER MEDICATIONS:  chlorhexidine 4% Liquid 1 Application(s) Topical <User Schedule>  petrolatum Ophthalmic Ointment 1 Application(s) Both EYES daily  :    PHYSICAL EXAM:  GEN - Chronically ill, non-responsive, trach in place to vent  CARD -s1s2, RRR, no M,G,R;   PULM - Coarse breath sounds, minimal secretions from trach;   ABD:  +BS, soft, PEG in place, RUQ drain now removed  BACK: no CVA tenderness, sacral decub noted  EXT:  right ankle wound with exposed bone, right lateral leg wound, bilateral heel eschar, multiple sacral decubitus ulcers,    LABS:                        8.5    18.07 )-----------( 302      ( 05 Jun 2019 02:52 )             28.2      06-05    143  |  107  |  37<H>  ----------------------------<  275<H>  4.7   |  30  |  1.15    Ca    8.5      05 Jun 2019 02:52  Phos  2.7     06-05  Mg     2.9     06-05    Mode: AC/ CMV (Assist Control/ Continuous Mandatory Ventilation), RR (machine): 12, TV (machine): 500, FiO2: 30, PEEP: 5, ITime: 1, MAP: 11, PIP: 18    GLOBAL ISSUE/BEST PRACTICE:  Analgesia: fentanyl  Sedation: n/a  HOB elevation: yes  Stress ulcer prophylaxis: protonix  VTE prophylaxis: heparin sc  Oral Care: n/a  Glycemic control: lantus and sliding scale coverage  Nutrition: PEG feeds

## 2019-06-05 NOTE — PROGRESS NOTE ADULT - ASSESSMENT
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  Weaned off pressors this AM on 6/4.      Neuro: s/p CVA with Locked In Syndrome, currently chronically trach to vent and PEG in place.  Fentanyl patch 12 mcg/hr q 72h for pain control.  Monitor for signs of pain.    CV: Septic shock 2/2 polymicrobial sepsis; now resolved.  Off pressors since 6/4/19; Continue with Midodrine 20mg Q8H.   Pulm: Chronic Respiratory Failure on chronic ventilator, cont mechanical ventilation, cont duoneb treatment q4h for bronchodilation,  CRE likely colonization and continue meropenem and linezolid for wound infections, minimal secretions and suctioning requirements  GI: Restarted tube feeds overnight.    Renal/Metabolic: ARF with ATN in setting of sepsis +/- initial dehydration; trend creatinine.  Hypernatremia improving.  Cont with free water 250 Q8H  ID: Polymicrobial and MDR sepsis with osteo of R medial ankle with exposed R med mallelolus and multiple decub ulcers and possible PNA with MDR organisms.  Currently leukocysosis is improving, noted to have CRE in sputum with minimal secretions, discussed with ID to hold treatment for now as likely colonizations.  Noted to have VRE and ESBL, will continue linezolid and meropenem.  Follow up ID reqs.  If family decides continued aggressive care patient will likely require at least BKA for source control of R ankle decubiti/osteo.    Heme: Hypercalcemia with renal and pulmonary lesions noted possible underlying malignancy; appreciate onc eval s/p pamidronate for hypercalcemia   Dispo: Patient remains critically ill.  Discussion as per Dr. Mata notes family meeting with son and daughter in law.  I discussed with son Ernesto and daughter in law Deepa, patient's current clinical status.  I informed them that Mr Walsh is now off of pressor support, but continues to have open wound on L leg that likely requires amputation, if family wants to be aggressive.  Ernesto and Deepa state they do not want an amputation as this may not improve his quality of life, however understand that he will continue to have infections that could spread and cause worsening hemodynamic compromise and even death.  I also discussed the patient's code status at this time.  Ernesto and eDepa will discuss with patient's wife and patient's granddaughter.      Patient has been off pressors for > 24 hours and is stable for transfer to floor.      Critical Care Attending Time Spent 40 Minutes . 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  Weaned off pressors this AM on 6/4.      Neuro: s/p CVA with Locked In Syndrome, currently chronically trach to vent and PEG in place.  Fentanyl patch 12 mcg/hr q 72h for pain control.  Monitor for signs of pain.    CV: Septic shock 2/2 polymicrobial sepsis; now resolved.  Off pressors since 6/4/19; Continue with Midodrine 20mg Q8H.   Pulm: Chronic Respiratory Failure on chronic ventilator, cont mechanical ventilation, cont duoneb treatment q4h for bronchodilation,  CRE likely colonization and continue meropenem and linezolid for wound infections, minimal secretions and suctioning requirements  GI: Restarted tube feeds overnight.    Renal/Metabolic: ARF with ATN in setting of sepsis +/- initial dehydration; trend creatinine.  Hypernatremia improving.  Cont with free water 250 Q8H  ID: Polymicrobial and MDR sepsis with osteo of R medial ankle with exposed R med mallelolus and multiple decub ulcers and possible PNA with MDR organisms.  Currently leukocysosis is improving, noted to have CRE in sputum with minimal secretions, discussed with ID to hold treatment for now as likely colonizations.  Noted to have VRE and ESBL, will continue linezolid and meropenem.  Follow up ID reqs.  If family decides continued aggressive care patient will likely require at least BKA for source control of R ankle decubiti/osteo.    Heme: Hypercalcemia with renal and pulmonary lesions noted possible underlying malignancy; appreciate onc eval s/p pamidronate for hypercalcemia   Dispo: Patient remains critically ill.  Discussion as per Dr. Mata notes family meeting with son and daughter in law.  I discussed with son Ernesto and daughter in law Deepa, patient's current clinical status.  I informed them that Mr Walsh is now off of pressor support, but continues to have open wound on R leg that likely requires amputation, if family wants to be aggressive.  Ernesto and Deepa state they do not want an amputation as this may not improve his quality of life, however understand that he will continue to have infections that could spread and cause worsening hemodynamic compromise and even death.  I also discussed the patient's code status at this time.  Ernesto and Deepa will discuss with patient's wife and patient's granddaughter.      Patient has been off pressors for > 24 hours and is stable for transfer to floor.      Critical Care Attending Time Spent 40 Minutes .

## 2019-06-05 NOTE — GOALS OF CARE CONVERSATION - PERSONAL ADVANCE DIRECTIVE - CONVERSATION DETAILS
I discussed with son Ernesto and daughter in law Deepa, patient's current clinical status.  I informed them that Mr Walsh is now off of pressor support, but continues to have open wound on right leg that likely requires amputation, if family wants to be aggressive.  I reinforced that Mr Walsh has multidrug resistant organisms in his wound and sputum including VRE, MRSA, ESBL klebsiella, carbapenem resistant pseudomonas, which may prevent complete treatment of wounds in addition to the presence of likely osteomyelitis of the R ankle which likely require amputation given bone exposure. Ernesto and Deepa state they do not want an amputation as this may not improve his quality of life and may cause more pain, however understand that he will continue to have infections that could spread and cause worsening hemodynamic compromise and even death.  I also discussed the patient's code status at this time.  Ernesto and Deepa will discuss with patient's wife and patient's granddaughter regarding amputation and code status.

## 2019-06-05 NOTE — PROGRESS NOTE ADULT - ASSESSMENT
sepsis   multiple decubiti   vent dependant   cultures noted ; decubiti noted  sputum culture noted   no xray abn   need to treat ?   continue current treatment sepsis   multiple decubiti   vent dependant   cultures noted ; decubiti noted  sputum culture noted   no xray abn   need to treat ? sputum ?mantain contact isolation   continue current treatment

## 2019-06-05 NOTE — PROGRESS NOTE ADULT - SUBJECTIVE AND OBJECTIVE BOX
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   patient is still on IV merem, and zyvox.   off pressors since 6-4.          INTERVAL HPI/OVERNIGHT EVENTS:  PAST MEDICAL & SURGICAL HISTORY:  DM (diabetes mellitus)  Heart failure  Diabetic neuropathy  Hyperlipidemia  Quadriplegia  Hypertension  Stroke  Hypertension  Obstructive cardiomyopathy  S/P percutaneous endoscopic gastrostomy (PEG) tube placement  History of tracheostomy      MEDICATIONS  (STANDING):  ALBUTerol    90 MICROgram(s) HFA Inhaler 1 Puff(s) Inhalation every 4 hours  ALBUTerol/ipratropium for Nebulization 3 milliLiter(s) Nebulizer every 6 hours  chlorhexidine 4% Liquid 1 Application(s) Topical <User Schedule>  dextrose 5%. 1000 milliLiter(s) (50 mL/Hr) IV Continuous <Continuous>  dextrose 50% Injectable 12.5 Gram(s) IV Push once  dextrose 50% Injectable 25 Gram(s) IV Push once  dextrose 50% Injectable 25 Gram(s) IV Push once  fentaNYL   Patch  12 MICROgram(s)/Hr 1 Patch Transdermal every 72 hours  heparin  Injectable 5000 Unit(s) SubCutaneous every 12 hours  insulin glargine Injectable (LANTUS) 40 Unit(s) SubCutaneous every morning  insulin lispro (HumaLOG) corrective regimen sliding scale   SubCutaneous every 6 hours  linezolid  IVPB 600 milliGRAM(s) IV Intermittent every 12 hours  linezolid  IVPB      meropenem  IVPB 1000 milliGRAM(s) IV Intermittent every 12 hours  meropenem  IVPB      midodrine 20 milliGRAM(s) Oral every 8 hours  pantoprazole   Suspension 40 milliGRAM(s) Oral daily  petrolatum Ophthalmic Ointment 1 Application(s) Both EYES daily  tiotropium 18 MICROgram(s) Capsule 1 Capsule(s) Inhalation daily    MEDICATIONS  (PRN):  acetaminophen    Suspension .. 650 milliGRAM(s) Oral every 6 hours PRN Temp greater or equal to 38C (100.4F)  dextrose 40% Gel 15 Gram(s) Oral once PRN Blood Glucose LESS THAN 70 milliGRAM(s)/deciliter  glucagon  Injectable 1 milliGRAM(s) IntraMuscular once PRN Glucose LESS THAN 70 milligrams/deciliter      Allergies    penicillin (Unknown)    Intolerances        REVIEW OF SYSTEMS:  CONSTITUTIONAL: No fever, weight loss, or fatigue  EYES: No eye pain, visual disturbances, or discharge  ENMT:  No difficulty hearing, tinnitus, vertigo; No sinus or throat pain  NECK: No pain or stiffness  BREASTS: No pain, masses, or nipple discharge  RESPIRATORY: No cough, wheezing, chills or hemoptysis; No shortness of breath  CARDIOVASCULAR: No chest pain, palpitations, dizziness, or leg swelling  GASTROINTESTINAL: No abdominal or epigastric pain. No nausea, vomiting, or hematemesis; No diarrhea or constipation. No melena or hematochezia.  GENITOURINARY: No dysuria, frequency, hematuria, or incontinence  NEUROLOGICAL: No headaches, memory loss, loss of strength, numbness, or tremors  SKIN: No itching, burning, rashes, or lesions   LYMPH NODES: No enlarged glands  ENDOCRINE: No heat or cold intolerance; No hair loss  MUSCULOSKELETAL: No joint pain or swelling; No muscle, back, or extremity pain  PSYCHIATRIC: No depression, anxiety, mood swings, or difficulty sleeping  HEME/LYMPH: No easy bruising, or bleeding gums  ALLERY AND IMMUNOLOGIC: No hives or eczema    Vital Signs Last 24 Hrs  T(C): 36.9 (05 Jun 2019 08:06), Max: 37.3 (04 Jun 2019 15:57)  T(F): 98.5 (05 Jun 2019 08:06), Max: 99.1 (04 Jun 2019 15:57)  HR: 90 (05 Jun 2019 09:48) (80 - 100)  BP: 154/69 (05 Jun 2019 08:00) (135/56 - 158/59)  BP(mean): 90 (05 Jun 2019 08:00) (75 - 90)  RR: 25 (05 Jun 2019 08:00) (11 - 25)  SpO2: 100% (05 Jun 2019 09:48) (99% - 100%)    PHYSICAL EXAM:  GENERAL: NAD, well-groomed, well-developed  HEAD:  Atraumatic, Normocephalic  EYES: EOMI, PERRLA, conjunctiva and sclera clear  ENMT: No tonsillar erythema, exudates, or enlargement; Moist mucous membranes, Good dentition, No lesions  NECK: Supple, No JVD. s/p trach with vent support.  NERVOUS SYSTEM:  quadriplegic.  CHEST/LUNG: Clear to percussion bilaterally; No rales, rhonchi, wheezing, or rubs  HEART: Regular rate and rhythm; No murmurs, rubs, or gallops  ABDOMEN: Soft, Nontender, Nondistended; Bowel sounds present  EXTREMITIES:  osteo rt ankle  LYMPH: No lymphadenopathy noted  SKIN: deep sacral ulcer and heel ulcers.    LABS:                        8.5    18.07 )-----------( 302      ( 05 Jun 2019 02:52 )             28.2     06-05    143  |  107  |  37<H>  ----------------------------<  275<H>  4.7   |  30  |  1.15    Ca    8.5      05 Jun 2019 02:52  Phos  2.7     06-05  Mg     2.9     06-05            CAPILLARY BLOOD GLUCOSE      POCT Blood Glucose.: 294 mg/dL (05 Jun 2019 05:23)  POCT Blood Glucose.: 288 mg/dL (04 Jun 2019 23:15)  POCT Blood Glucose.: 291 mg/dL (04 Jun 2019 18:15)  POCT Blood Glucose.: 320 mg/dL (04 Jun 2019 15:35)  POCT Blood Glucose.: 319 mg/dL (04 Jun 2019 12:39)          Hemoglobin A1C, Whole Blood: 7.4 % (05-30 @ 08:57)        RADIOLOGY & ADDITIONAL TESTS:    Imaging Personally Reviewed:  [ ] YES  [ ] NO    Consultant(s) Notes Reviewed:  [ ] YES  [ ] NO    Care Discussed with Consultants/Other Providers [ ] YES  [ ] NO    Care discussed with family,         [  ]   yes  [  ]  No    imp:    stable[ ]    unstable[  ]     improving [   ]       unchanged  [  ]                Plans:  Continue present plans  [ x  ] as per family and critical care.               New consult [  ]   specialty  .......               order test[  ]    test name.                  Discharge Planning  [  ]

## 2019-06-05 NOTE — PROGRESS NOTE ADULT - SUBJECTIVE AND OBJECTIVE BOX
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  patient is now off  pressor support, but continues to have open wound on R leg that likely requires amputation,  family discussion noted  Allergies    penicillin (Unknown)    Intolerances        MEDICATIONS  (STANDING):  ALBUTerol    90 MICROgram(s) HFA Inhaler 1 Puff(s) Inhalation every 4 hours  ALBUTerol/ipratropium for Nebulization 3 milliLiter(s) Nebulizer every 6 hours  chlorhexidine 4% Liquid 1 Application(s) Topical <User Schedule>  dextrose 5%. 1000 milliLiter(s) (50 mL/Hr) IV Continuous <Continuous>  dextrose 50% Injectable 12.5 Gram(s) IV Push once  dextrose 50% Injectable 25 Gram(s) IV Push once  dextrose 50% Injectable 25 Gram(s) IV Push once  fentaNYL   Patch  12 MICROgram(s)/Hr 1 Patch Transdermal every 72 hours  heparin  Injectable 5000 Unit(s) SubCutaneous every 12 hours  insulin glargine Injectable (LANTUS) 40 Unit(s) SubCutaneous every morning  insulin lispro (HumaLOG) corrective regimen sliding scale   SubCutaneous every 6 hours  linezolid  IVPB 600 milliGRAM(s) IV Intermittent every 12 hours  linezolid  IVPB      meropenem  IVPB 1000 milliGRAM(s) IV Intermittent every 12 hours  meropenem  IVPB      midodrine 20 milliGRAM(s) Oral every 8 hours  pantoprazole   Suspension 40 milliGRAM(s) Oral daily  petrolatum Ophthalmic Ointment 1 Application(s) Both EYES daily  tiotropium 18 MICROgram(s) Capsule 1 Capsule(s) Inhalation daily    MEDICATIONS  (PRN):  acetaminophen    Suspension .. 650 milliGRAM(s) Oral every 6 hours PRN Temp greater or equal to 38C (100.4F)  dextrose 40% Gel 15 Gram(s) Oral once PRN Blood Glucose LESS THAN 70 milliGRAM(s)/deciliter  glucagon  Injectable 1 milliGRAM(s) IntraMuscular once PRN Glucose LESS THAN 70 milligrams/deciliter      REVIEW OF SYSTEMS:    unable to obtain     VITAL SIGNS:  T(C): 37.7 (06-05-19 @ 15:43), Max: 37.7 (06-05-19 @ 15:43)  T(F): 99.8 (06-05-19 @ 15:43), Max: 99.8 (06-05-19 @ 15:43)  HR: 93 (06-05-19 @ 16:00) (80 - 100)  BP: 133/58 (06-05-19 @ 16:00) (113/55 - 154/69)  RR: 16 (06-05-19 @ 16:00) (11 - 25)  SpO2: 100% (06-05-19 @ 16:00) (99% - 100%)  Wt(kg): --    PHYSICAL EXAM:    GENERAL: not in any distress  HEENT: Neck is supple, normocephalic, atraumatic   CHEST/LUNG: Clear to auscultation bilaterally; No rales, rhonchi, wheezing  HEART: Regular rate and rhythm; No murmurs, rubs, or gallops  ABDOMEN: Soft, Nontender, Nondistended; Bowel sounds present, no rebound   EXTREMITIES:  2+ Peripheral Pulses, No clubbing, cyanosis, or edema  GENITOURINARY:   SKIN: No rashes or lesions  BACK: no pressor sore   NERVOUS SYSTEM:  Alert & Oriented X3, Good concentration  PSYCH: normal affect     LABS:                         8.5    18.07 )-----------( 302      ( 05 Jun 2019 02:52 )             28.2     06-05    143  |  107  |  37<H>  ----------------------------<  275<H>  4.7   |  30  |  1.15    Ca    8.5      05 Jun 2019 02:52  Phos  2.7     06-05  Mg     2.9     06-05                                Culture Results:   Moderate Klebsiella pneumoniae ESBL  Moderate Enterococcus faecium (vancomycin resistant)  Moderate Proteus mirabilis (05-30 @ 18:37)  Culture Results:   Few Methicillin resistant Staphylococcus aureus (05-30 @ 18:36)  Culture Results:   Moderate Klebsiella pneumoniae ESBL  Moderate Enterococcus faecalis  Moderate Proteus mirabilis  Moderate Enterococcus faecium (vancomycin resistant) (05-30 @ 18:35)                Radiology: 73M PMH of CAD, 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  patient is now off  pressor support, but continues to have open wound on R leg that likely requires amputation,  family discussion noted  Allergies    penicillin (Unknown)    Intolerances        MEDICATIONS  (STANDING):  ALBUTerol    90 MICROgram(s) HFA Inhaler 1 Puff(s) Inhalation every 4 hours  ALBUTerol/ipratropium for Nebulization 3 milliLiter(s) Nebulizer every 6 hours  chlorhexidine 4% Liquid 1 Application(s) Topical <User Schedule>  dextrose 5%. 1000 milliLiter(s) (50 mL/Hr) IV Continuous <Continuous>  dextrose 50% Injectable 12.5 Gram(s) IV Push once  dextrose 50% Injectable 25 Gram(s) IV Push once  dextrose 50% Injectable 25 Gram(s) IV Push once  fentaNYL   Patch  12 MICROgram(s)/Hr 1 Patch Transdermal every 72 hours  heparin  Injectable 5000 Unit(s) SubCutaneous every 12 hours  insulin glargine Injectable (LANTUS) 40 Unit(s) SubCutaneous every morning  insulin lispro (HumaLOG) corrective regimen sliding scale   SubCutaneous every 6 hours  linezolid  IVPB 600 milliGRAM(s) IV Intermittent every 12 hours  linezolid  IVPB      meropenem  IVPB 1000 milliGRAM(s) IV Intermittent every 12 hours  meropenem  IVPB      midodrine 20 milliGRAM(s) Oral every 8 hours  pantoprazole   Suspension 40 milliGRAM(s) Oral daily  petrolatum Ophthalmic Ointment 1 Application(s) Both EYES daily  tiotropium 18 MICROgram(s) Capsule 1 Capsule(s) Inhalation daily    MEDICATIONS  (PRN):  acetaminophen    Suspension .. 650 milliGRAM(s) Oral every 6 hours PRN Temp greater or equal to 38C (100.4F)  dextrose 40% Gel 15 Gram(s) Oral once PRN Blood Glucose LESS THAN 70 milliGRAM(s)/deciliter  glucagon  Injectable 1 milliGRAM(s) IntraMuscular once PRN Glucose LESS THAN 70 milligrams/deciliter      REVIEW OF SYSTEMS:    unable to obtain     VITAL SIGNS:  T(C): 37.7 (06-05-19 @ 15:43), Max: 37.7 (06-05-19 @ 15:43)  T(F): 99.8 (06-05-19 @ 15:43), Max: 99.8 (06-05-19 @ 15:43)  HR: 93 (06-05-19 @ 16:00) (80 - 100)  BP: 133/58 (06-05-19 @ 16:00) (113/55 - 154/69)  RR: 16 (06-05-19 @ 16:00) (11 - 25)  SpO2: 100% (06-05-19 @ 16:00) (99% - 100%)  Wt(kg): --    PHYSICAL EXAM:    GENERAL: not in any distress  HEENT: Neck is supple, normocephalic, atraumatic   CHEST/LUNG: Clear to auscultation bilaterally; No rales, rhonchi, wheezing  HEART: Regular rate and rhythm; No murmurs, rubs, or gallops  ABDOMEN: Soft, Nontender, Nondistended; Bowel sounds present, no rebound   EXTREMITIES:  2+ Peripheral Pulses, No clubbing, cyanosis, or edema  SKIN: extensive decubiti   NERVOUS SYSTEM: unresponsive on vent   LABS:                         8.5    18.07 )-----------( 302      ( 05 Jun 2019 02:52 )             28.2     06-05    143  |  107  |  37<H>  ----------------------------<  275<H>  4.7   |  30  |  1.15    Ca    8.5      05 Jun 2019 02:52  Phos  2.7     06-05  Mg     2.9     06-05                                Culture Results:   Moderate Klebsiella pneumoniae ESBL  Moderate Enterococcus faecium (vancomycin resistant)  Moderate Proteus mirabilis (05-30 @ 18:37)  Culture Results:   Few Methicillin resistant Staphylococcus aureus (05-30 @ 18:36)  Culture Results:   Moderate Klebsiella pneumoniae ESBL  Moderate Enterococcus faecalis  Moderate Proteus mirabilis  Moderate Enterococcus faecium (vancomycin resistant) (05-30 @ 18:35)                Radiology:

## 2019-06-05 NOTE — PROGRESS NOTE ADULT - SUBJECTIVE AND OBJECTIVE BOX
INTERVAL History:  On ventilator.    Allergies    penicillin (Unknown)    Intolerances        MEDICATIONS  (STANDING):  ALBUTerol    90 MICROgram(s) HFA Inhaler 1 Puff(s) Inhalation every 4 hours  ALBUTerol/ipratropium for Nebulization 3 milliLiter(s) Nebulizer every 6 hours  chlorhexidine 4% Liquid 1 Application(s) Topical <User Schedule>  dextrose 5%. 1000 milliLiter(s) (50 mL/Hr) IV Continuous <Continuous>  dextrose 50% Injectable 12.5 Gram(s) IV Push once  dextrose 50% Injectable 25 Gram(s) IV Push once  dextrose 50% Injectable 25 Gram(s) IV Push once  fentaNYL   Patch  12 MICROgram(s)/Hr 1 Patch Transdermal every 72 hours  heparin  Injectable 5000 Unit(s) SubCutaneous every 12 hours  insulin glargine Injectable (LANTUS) 40 Unit(s) SubCutaneous every morning  insulin lispro (HumaLOG) corrective regimen sliding scale   SubCutaneous every 6 hours  linezolid  IVPB 600 milliGRAM(s) IV Intermittent every 12 hours  linezolid  IVPB      meropenem  IVPB 1000 milliGRAM(s) IV Intermittent every 12 hours  meropenem  IVPB      midodrine 20 milliGRAM(s) Oral every 8 hours  pantoprazole   Suspension 40 milliGRAM(s) Oral daily  petrolatum Ophthalmic Ointment 1 Application(s) Both EYES daily  tiotropium 18 MICROgram(s) Capsule 1 Capsule(s) Inhalation daily    MEDICATIONS  (PRN):  acetaminophen    Suspension .. 650 milliGRAM(s) Oral every 6 hours PRN Temp greater or equal to 38C (100.4F)  dextrose 40% Gel 15 Gram(s) Oral once PRN Blood Glucose LESS THAN 70 milliGRAM(s)/deciliter  glucagon  Injectable 1 milliGRAM(s) IntraMuscular once PRN Glucose LESS THAN 70 milligrams/deciliter      Vital Signs Last 24 Hrs  T(C): 36.9 (05 Jun 2019 08:06), Max: 37.3 (04 Jun 2019 15:57)  T(F): 98.5 (05 Jun 2019 08:06), Max: 99.1 (04 Jun 2019 15:57)  HR: 90 (05 Jun 2019 09:48) (80 - 100)  BP: 154/69 (05 Jun 2019 08:00) (135/56 - 158/59)  BP(mean): 90 (05 Jun 2019 08:00) (75 - 90)  RR: 25 (05 Jun 2019 08:00) (11 - 25)  SpO2: 100% (05 Jun 2019 09:48) (99% - 100%)    PHYSICAL EXAM:  Trach  PEG  Abdominal Drain.  Decubiti      CHEST/LUNG:  rales, rhonchi,     ABDOMEN: Soft,      LABS:                        8.5    18.07 )-----------( 302      ( 05 Jun 2019 02:52 )             28.2     06-05    143  |  107  |  37<H>  ----------------------------<  275<H>  4.7   |  30  |  1.15    Ca    8.5      05 Jun 2019 02:52  Phos  2.7     06-05  Mg     2.9     06-05              RADIOLOGY & ADDITIONAL STUDIES:    PATHOLOGY:

## 2019-06-06 LAB
ANION GAP SERPL CALC-SCNC: 5 MMOL/L — SIGNIFICANT CHANGE UP (ref 5–17)
BUN SERPL-MCNC: 44 MG/DL — HIGH (ref 7–23)
CALCIUM SERPL-MCNC: 7.7 MG/DL — LOW (ref 8.5–10.1)
CHLORIDE SERPL-SCNC: 108 MMOL/L — SIGNIFICANT CHANGE UP (ref 96–108)
CO2 SERPL-SCNC: 33 MMOL/L — HIGH (ref 22–31)
CREAT SERPL-MCNC: 0.97 MG/DL — SIGNIFICANT CHANGE UP (ref 0.5–1.3)
GLUCOSE BLDC GLUCOMTR-MCNC: 143 MG/DL — HIGH (ref 70–99)
GLUCOSE BLDC GLUCOMTR-MCNC: 145 MG/DL — HIGH (ref 70–99)
GLUCOSE BLDC GLUCOMTR-MCNC: 148 MG/DL — HIGH (ref 70–99)
GLUCOSE BLDC GLUCOMTR-MCNC: 176 MG/DL — HIGH (ref 70–99)
GLUCOSE BLDC GLUCOMTR-MCNC: 93 MG/DL — SIGNIFICANT CHANGE UP (ref 70–99)
GLUCOSE SERPL-MCNC: 134 MG/DL — HIGH (ref 70–99)
HCT VFR BLD CALC: 27.4 % — LOW (ref 39–50)
HGB BLD-MCNC: 8.4 G/DL — LOW (ref 13–17)
MAGNESIUM SERPL-MCNC: 3.1 MG/DL — HIGH (ref 1.6–2.6)
MCHC RBC-ENTMCNC: 30.3 PG — SIGNIFICANT CHANGE UP (ref 27–34)
MCHC RBC-ENTMCNC: 30.7 GM/DL — LOW (ref 32–36)
MCV RBC AUTO: 98.9 FL — SIGNIFICANT CHANGE UP (ref 80–100)
NRBC # BLD: 0 /100 WBCS — SIGNIFICANT CHANGE UP (ref 0–0)
PHOSPHATE SERPL-MCNC: 2.1 MG/DL — LOW (ref 2.5–4.5)
PLATELET # BLD AUTO: 355 K/UL — SIGNIFICANT CHANGE UP (ref 150–400)
POTASSIUM SERPL-MCNC: 4 MMOL/L — SIGNIFICANT CHANGE UP (ref 3.5–5.3)
POTASSIUM SERPL-SCNC: 4 MMOL/L — SIGNIFICANT CHANGE UP (ref 3.5–5.3)
RBC # BLD: 2.77 M/UL — LOW (ref 4.2–5.8)
RBC # FLD: 16.2 % — HIGH (ref 10.3–14.5)
SODIUM SERPL-SCNC: 146 MMOL/L — HIGH (ref 135–145)
WBC # BLD: 15.88 K/UL — HIGH (ref 3.8–10.5)
WBC # FLD AUTO: 15.88 K/UL — HIGH (ref 3.8–10.5)

## 2019-06-06 RX ORDER — POTASSIUM PHOSPHATE, MONOBASIC POTASSIUM PHOSPHATE, DIBASIC 236; 224 MG/ML; MG/ML
15 INJECTION, SOLUTION INTRAVENOUS ONCE
Refills: 0 | Status: COMPLETED | OUTPATIENT
Start: 2019-06-06 | End: 2019-06-06

## 2019-06-06 RX ADMIN — MEROPENEM 100 MILLIGRAM(S): 1 INJECTION INTRAVENOUS at 17:44

## 2019-06-06 RX ADMIN — CHLORHEXIDINE GLUCONATE 1 APPLICATION(S): 213 SOLUTION TOPICAL at 06:21

## 2019-06-06 RX ADMIN — MIDODRINE HYDROCHLORIDE 20 MILLIGRAM(S): 2.5 TABLET ORAL at 12:17

## 2019-06-06 RX ADMIN — LINEZOLID 300 MILLIGRAM(S): 600 INJECTION, SOLUTION INTRAVENOUS at 20:49

## 2019-06-06 RX ADMIN — POTASSIUM PHOSPHATE, MONOBASIC POTASSIUM PHOSPHATE, DIBASIC 62.5 MILLIMOLE(S): 236; 224 INJECTION, SOLUTION INTRAVENOUS at 12:07

## 2019-06-06 RX ADMIN — MIDODRINE HYDROCHLORIDE 20 MILLIGRAM(S): 2.5 TABLET ORAL at 23:00

## 2019-06-06 RX ADMIN — INSULIN GLARGINE 40 UNIT(S): 100 INJECTION, SOLUTION SUBCUTANEOUS at 08:25

## 2019-06-06 RX ADMIN — Medication 3 MILLILITER(S): at 17:26

## 2019-06-06 RX ADMIN — Medication 3 MILLILITER(S): at 11:07

## 2019-06-06 RX ADMIN — LINEZOLID 300 MILLIGRAM(S): 600 INJECTION, SOLUTION INTRAVENOUS at 08:25

## 2019-06-06 RX ADMIN — FENTANYL CITRATE 1 PATCH: 50 INJECTION INTRAVENOUS at 07:47

## 2019-06-06 RX ADMIN — PANTOPRAZOLE SODIUM 40 MILLIGRAM(S): 20 TABLET, DELAYED RELEASE ORAL at 12:08

## 2019-06-06 RX ADMIN — HEPARIN SODIUM 5000 UNIT(S): 5000 INJECTION INTRAVENOUS; SUBCUTANEOUS at 17:43

## 2019-06-06 RX ADMIN — FENTANYL CITRATE 1 PATCH: 50 INJECTION INTRAVENOUS at 19:41

## 2019-06-06 RX ADMIN — HEPARIN SODIUM 5000 UNIT(S): 5000 INJECTION INTRAVENOUS; SUBCUTANEOUS at 06:21

## 2019-06-06 RX ADMIN — Medication 2: at 12:08

## 2019-06-06 RX ADMIN — MEROPENEM 100 MILLIGRAM(S): 1 INJECTION INTRAVENOUS at 08:25

## 2019-06-06 RX ADMIN — Medication 1 APPLICATION(S): at 12:11

## 2019-06-06 RX ADMIN — Medication 3 MILLILITER(S): at 05:08

## 2019-06-06 RX ADMIN — Medication 3 MILLILITER(S): at 23:32

## 2019-06-06 NOTE — PROGRESS NOTE ADULT - ASSESSMENT
74 yo M w/ multiple foot and ankle wounds.   - Pt seen and evaluated  - Foot is not salvageable pt has already has attempts at long term abx for OM,   - Will continue to keep wounds dry with betadine and DSD daily. If pt becomes septic pt will need BKA emergently  - Wound care: Betadine to bilateral foot wounds daily.   - Pod will follow periodically to monitor  - seen w/ attending

## 2019-06-06 NOTE — PROGRESS NOTE ADULT - SUBJECTIVE AND OBJECTIVE BOX
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  out of icu       Allergies    penicillin (Unknown)    Intolerances        MEDICATIONS  (STANDING):  ALBUTerol    90 MICROgram(s) HFA Inhaler 1 Puff(s) Inhalation every 4 hours  ALBUTerol/ipratropium for Nebulization 3 milliLiter(s) Nebulizer every 6 hours  chlorhexidine 4% Liquid 1 Application(s) Topical <User Schedule>  dextrose 5%. 1000 milliLiter(s) (50 mL/Hr) IV Continuous <Continuous>  dextrose 50% Injectable 12.5 Gram(s) IV Push once  dextrose 50% Injectable 25 Gram(s) IV Push once  dextrose 50% Injectable 25 Gram(s) IV Push once  fentaNYL   Patch  12 MICROgram(s)/Hr 1 Patch Transdermal every 72 hours  heparin  Injectable 5000 Unit(s) SubCutaneous every 12 hours  insulin glargine Injectable (LANTUS) 40 Unit(s) SubCutaneous every morning  insulin lispro (HumaLOG) corrective regimen sliding scale   SubCutaneous every 6 hours  linezolid  IVPB 600 milliGRAM(s) IV Intermittent every 12 hours  linezolid  IVPB      meropenem  IVPB 1000 milliGRAM(s) IV Intermittent every 12 hours  meropenem  IVPB      midodrine 20 milliGRAM(s) Oral every 8 hours  pantoprazole   Suspension 40 milliGRAM(s) Oral daily  petrolatum Ophthalmic Ointment 1 Application(s) Both EYES daily  tiotropium 18 MICROgram(s) Capsule 1 Capsule(s) Inhalation daily    MEDICATIONS  (PRN):  acetaminophen    Suspension .. 650 milliGRAM(s) Oral every 6 hours PRN Temp greater or equal to 38C (100.4F)  dextrose 40% Gel 15 Gram(s) Oral once PRN Blood Glucose LESS THAN 70 milliGRAM(s)/deciliter  glucagon  Injectable 1 milliGRAM(s) IntraMuscular once PRN Glucose LESS THAN 70 milligrams/deciliter      REVIEW OF SYSTEMS:    unable to obtain     VITAL SIGNS:  T(C): 37.1 (06-06-19 @ 11:30), Max: 37.3 (06-05-19 @ 19:07)  T(F): 98.8 (06-06-19 @ 11:30), Max: 99.2 (06-05-19 @ 23:25)  HR: 86 (06-06-19 @ 17:49) (74 - 101)  BP: 126/61 (06-06-19 @ 16:58) (114/63 - 158/69)  RR: 17 (06-06-19 @ 16:58) (17 - 18)  SpO2: 98% (06-06-19 @ 17:49) (95% - 100%)  Wt(kg): --    PHYSICAL EXAM:    GENERAL: not in any distress  HEENT:trach to vent   CHEST/LUNG: Clear to auscultation bilaterally; No rales, rhonchi, wheezing  HEART: Regular rate and rhythm; No murmurs, rubs, or gallops  ABDOMEN: Soft, Nontender, Nondistended; Bowel sounds present, no rebound   peg   EXTREMITIES:  2+ Peripheral Pulses, No clubbing, cyanosis, or edema   SKIN: multiple decubiti   gangrenous legs   BACK: no pressor sore   NERVOUS SYSTEM:  ch veg state    LABS:                         8.4    15.88 )-----------( 355      ( 06 Jun 2019 08:39 )             27.4     06-06    146<H>  |  108  |  44<H>  ----------------------------<  134<H>  4.0   |  33<H>  |  0.97    Ca    7.7<L>      06 Jun 2019 08:39  Phos  2.1     06-06  Mg     3.1     06-06                                Culture Results:   Moderate Klebsiella pneumoniae ESBL  Moderate Enterococcus faecium (vancomycin resistant)  Moderate Proteus mirabilis (05-30 @ 18:37)  Culture Results:   Few Methicillin resistant Staphylococcus aureus (05-30 @ 18:36)  Culture Results:   Moderate Klebsiella pneumoniae ESBL  Moderate Enterococcus faecalis  Moderate Proteus mirabilis  Moderate Enterococcus faecium (vancomycin resistant) (05-30 @ 18:35)                Radiology:

## 2019-06-06 NOTE — PROGRESS NOTE ADULT - SUBJECTIVE AND OBJECTIVE BOX
Patient is a 73y old  Male who presents with a chief complaint of Sepsis (06 Jun 2019 10:37)       INTERVAL HPI/OVERNIGHT EVENTS:  Patient seen and evaluated at bedside.  Pt is resting comfortable in NAD. Denies N/V/F/C.      Allergies    penicillin (Unknown)    Intolerances        Vital Signs Last 24 Hrs  T(C): 37.1 (06 Jun 2019 11:30), Max: 37.7 (05 Jun 2019 15:43)  T(F): 98.8 (06 Jun 2019 11:30), Max: 99.8 (05 Jun 2019 15:43)  HR: 85 (06 Jun 2019 11:33) (74 - 100)  BP: 118/60 (06 Jun 2019 11:30) (114/63 - 158/69)  BP(mean): 77 (05 Jun 2019 16:00) (77 - 77)  RR: 17 (06 Jun 2019 11:30) (15 - 18)  SpO2: 98% (06 Jun 2019 11:33) (95% - 100%)    LABS:                        8.4    15.88 )-----------( 355      ( 06 Jun 2019 08:39 )             27.4     06-06    146<H>  |  108  |  44<H>  ----------------------------<  134<H>  4.0   |  33<H>  |  0.97    Ca    7.7<L>      06 Jun 2019 08:39  Phos  2.1     06-06  Mg     3.1     06-06          CAPILLARY BLOOD GLUCOSE      POCT Blood Glucose.: 176 mg/dL (06 Jun 2019 11:47)  POCT Blood Glucose.: 148 mg/dL (06 Jun 2019 07:59)  POCT Blood Glucose.: 145 mg/dL (06 Jun 2019 06:24)  POCT Blood Glucose.: 93 mg/dL (06 Jun 2019 00:06)  POCT Blood Glucose.: 215 mg/dL (05 Jun 2019 17:24)      Lower Extremity Physical Exam:  Vascular: DP/PT 2/4, B/L, CFT <3 seconds B/L, Temperature gradient warm to cool, B/L.   Neuro: Epicritic sensation inact to the level of toes, B/L.  Musculoskeletal/Ortho: Quadriplegic   Skin: RF medial ankle wound with ankle joint exposed and surrounding fibrotic and necrotic tissue measuring 5 cm X 4 cm X 2 cm, no malodor or purulence expressed. B/l  necrotic heel eschars well adhered, LF superficial anterior ankle wound with no signs of infection

## 2019-06-06 NOTE — PROGRESS NOTE ADULT - ASSESSMENT
sepsis   multiple decubiti   vent dependant   cultures noted ; decubiti noted  sputum culture noted   no xray abn   need to treat ? sputum ?mantain contact isolation   continue current treatment merrem and zyvox

## 2019-06-06 NOTE — PROGRESS NOTE ADULT - SUBJECTIVE AND OBJECTIVE BOX
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   patient is still on IV merem, and zyvox.   off pressors since 6-4.    discussions with sonErnesto has not resulted in any concrete decisions by family whether to continue  agressive management or not.   No fever. no distress.         INTERVAL HPI/OVERNIGHT EVENTS:  PAST MEDICAL & SURGICAL HISTORY:  DM (diabetes mellitus)  Heart failure  Diabetic neuropathy  Hyperlipidemia  Quadriplegia  Hypertension  Stroke  Hypertension  Obstructive cardiomyopathy  S/P percutaneous endoscopic gastrostomy (PEG) tube placement  History of tracheostomy      MEDICATIONS  (STANDING):  ALBUTerol    90 MICROgram(s) HFA Inhaler 1 Puff(s) Inhalation every 4 hours  ALBUTerol/ipratropium for Nebulization 3 milliLiter(s) Nebulizer every 6 hours  chlorhexidine 4% Liquid 1 Application(s) Topical <User Schedule>  dextrose 5%. 1000 milliLiter(s) (50 mL/Hr) IV Continuous <Continuous>  dextrose 50% Injectable 12.5 Gram(s) IV Push once  dextrose 50% Injectable 25 Gram(s) IV Push once  dextrose 50% Injectable 25 Gram(s) IV Push once  fentaNYL   Patch  12 MICROgram(s)/Hr 1 Patch Transdermal every 72 hours  heparin  Injectable 5000 Unit(s) SubCutaneous every 12 hours  insulin glargine Injectable (LANTUS) 40 Unit(s) SubCutaneous every morning  insulin lispro (HumaLOG) corrective regimen sliding scale   SubCutaneous every 6 hours  linezolid  IVPB 600 milliGRAM(s) IV Intermittent every 12 hours  linezolid  IVPB      meropenem  IVPB 1000 milliGRAM(s) IV Intermittent every 12 hours  meropenem  IVPB      midodrine 20 milliGRAM(s) Oral every 8 hours  pantoprazole   Suspension 40 milliGRAM(s) Oral daily  petrolatum Ophthalmic Ointment 1 Application(s) Both EYES daily  potassium phosphate IVPB 15 milliMole(s) IV Intermittent once  tiotropium 18 MICROgram(s) Capsule 1 Capsule(s) Inhalation daily    MEDICATIONS  (PRN):  acetaminophen    Suspension .. 650 milliGRAM(s) Oral every 6 hours PRN Temp greater or equal to 38C (100.4F)  dextrose 40% Gel 15 Gram(s) Oral once PRN Blood Glucose LESS THAN 70 milliGRAM(s)/deciliter  glucagon  Injectable 1 milliGRAM(s) IntraMuscular once PRN Glucose LESS THAN 70 milligrams/deciliter      Allergies    penicillin (Unknown)    Intolerances        REVIEW OF SYSTEMS:  CONSTITUTIONAL: No fever, weight loss, or fatigue  EYES: No eye pain, visual disturbances, or discharge  ENMT:  No difficulty hearing, tinnitus, vertigo; No sinus or throat pain  NECK: No pain or stiffness  BREASTS: No pain, masses, or nipple discharge  RESPIRATORY: No cough, wheezing, chills or hemoptysis; No shortness of breath  CARDIOVASCULAR: No chest pain, palpitations, dizziness, or leg swelling  GASTROINTESTINAL: No abdominal or epigastric pain. No nausea, vomiting, or hematemesis; No diarrhea or constipation. No melena or hematochezia.  GENITOURINARY: No dysuria, frequency, hematuria, or incontinence  NEUROLOGICAL: No headaches, memory loss, loss of strength, numbness, or tremors  SKIN: No itching, burning, rashes, or lesions   LYMPH NODES: No enlarged glands  ENDOCRINE: No heat or cold intolerance; No hair loss  MUSCULOSKELETAL: No joint pain or swelling; No muscle, back, or extremity pain  PSYCHIATRIC: No depression, anxiety, mood swings, or difficulty sleeping  HEME/LYMPH: No easy bruising, or bleeding gums  ALLERY AND IMMUNOLOGIC: No hives or eczema    Vital Signs Last 24 Hrs  T(C): 37 (06 Jun 2019 05:06), Max: 37.7 (05 Jun 2019 15:43)  T(F): 98.6 (06 Jun 2019 05:06), Max: 99.8 (05 Jun 2019 15:43)  HR: 80 (06 Jun 2019 08:50) (74 - 95)  BP: 134/62 (06 Jun 2019 05:06) (113/55 - 158/69)  BP(mean): 77 (05 Jun 2019 16:00) (70 - 77)  RR: 18 (06 Jun 2019 05:06) (12 - 18)  SpO2: 98% (06 Jun 2019 08:50) (95% - 100%)    PHYSICAL EXAM:  GENERAL: NAD, well-groomed, well-developed  HEAD:  Atraumatic, Normocephalic  EYES: EOMI, PERRLA, conjunctiva and sclera clear  ENMT: No tonsillar erythema, exudates, or enlargement; Moist mucous membranes, Good dentition, No lesions  NECK: Supple, No JVD, Tracheotomy wit vent support  NERVOUS SYSTEM:  Alert & Oriented X3, Good concentration; Motor Strength 5/5 B/L upper and lower extremities; DTRs 2+ intact and symmetric  CHEST/LUNG: Clear to percussion bilaterally; No rales, rhonchi, wheezing, or rubs  HEART: Regular rate and rhythm; No murmurs, rubs, or gallops  ABDOMEN: Soft, Nontender, Nondistended; Bowel sounds present. Peg tube present  EXTREMITIES:  decubiti both heels,  with osteo of rt ankle.  LYMPH: No lymphadenopathy noted  SKIN: No rashes or lesions    LABS:                        8.4    15.88 )-----------( 355      ( 06 Jun 2019 08:39 )             27.4     06-06    146<H>  |  108  |  44<H>  ----------------------------<  134<H>  4.0   |  33<H>  |  0.97    Ca    7.7<L>      06 Jun 2019 08:39  Phos  2.1     06-06  Mg     3.1     06-06            CAPILLARY BLOOD GLUCOSE      POCT Blood Glucose.: 148 mg/dL (06 Jun 2019 07:59)  POCT Blood Glucose.: 145 mg/dL (06 Jun 2019 06:24)  POCT Blood Glucose.: 93 mg/dL (06 Jun 2019 00:06)  POCT Blood Glucose.: 215 mg/dL (05 Jun 2019 17:24)  POCT Blood Glucose.: 358 mg/dL (05 Jun 2019 11:28)                RADIOLOGY & ADDITIONAL TESTS:    Imaging Personally Reviewed:  [ ] YES  [ ] NO    Consultant(s) Notes Reviewed:  [ ] YES  [ ] NO    Care Discussed with Consultants/Other Providers [ ] YES  [ ] NO    Care discussed with family,         [  ]   yes  [  ]  No    imp:    stable[ ]    unstable[  ]     improving [   ]       unchanged  [x  ]                Plans:  Continue present plans  [x  ]                New consult [  ]   specialty  ...palliative care....               order test[  ]    test name.                  Discharge Planning  [  ]

## 2019-06-06 NOTE — CHART NOTE - NSCHARTNOTEFT_GEN_A_CORE
Assessment: Patient admitted with sepsis. PMH of CAD, CVA with quadriplegic locked in syndrome, chronic respiratory failure vent dependent s/p trach, s/p PEG, HTN, hyperlipidemia, GERD, T2DM, previous PE, hx of carbapenem resistant pseudomonas PNA, proteus bacteremia, mult sacral decub ulcers, s/p biliary, admitted to ICU for severe poly microbial drug resistant sepsis, septic shock, dehydration, hypernatremia, acute renal failure with ATN, multiple infected decubitus ulcers (sacrum, hip, bilateral lower extremities), osteomyelitis of right ankle, hypercalcemia, left renal lesion and pulmonary nodules. Weaned off pressors on 6/4. Pt remains full code for now, family to decide comfort measures vs full code. Palliative care team following pt, prognosis appears poor.    Factors impacting intake: [ ] none [ ] nausea  [ ] vomiting [ ] diarrhea [ ] constipation  [ ]chewing problems [x] swallowing issues  [x] other: S/p PEG    Diet Prescription: Diet, NPO with Tube Feed:   Tube Feeding Modality: Gastrostomy  Glucerna 1.5 Tereso  Total Volume for 24 Hours (mL): 1320  Continuous  Starting Tube Feed Rate {mL per Hour}: 30  Increase Tube Feed Rate by (mL): 10     Every 8 hours  Until Goal Tube Feed Rate (mL per Hour): 55  Tube Feed Duration (in Hours): 24  Tube Feed Start Time: 14:47 (05-30-19 @ 14:47)    Intake: Observed TF running at goal rate of 55 ml/hr, providing 1320 ml, 1980 kcals & 109 gm protein. Pt tolerating TF well, minimal residuals.    Current Weight: Pt's weight on 6/4 was 72.4 kg and today (6/6) it is 62.6 kg. ? accuracy of today's weight  % Weight Change: Unable to determine change in weight as wt recorded for today 6/6 appears likely inaccurate; Pt with 3+ Edema bilateral hand & bilateral wrist    Nutrition focused physical exam conducted; Subcutaneous fat loss: [mild] Orbital fat pads region, [mild]Buccal fat region, [mild]Triceps region,  [unable]Ribs region.  Muscle wasting: [mild]Temples region, [WNL]Clavicle region, [WNL]Shoulder region, [unable]Scapula region, [unable-edematous]Interosseous region,  [mild]thigh region, [unable - compression device]Calf region    Pertinent Medications: MEDICATIONS  (STANDING):  ALBUTerol    90 MICROgram(s) HFA Inhaler 1 Puff(s) Inhalation every 4 hours  ALBUTerol/ipratropium for Nebulization 3 milliLiter(s) Nebulizer every 6 hours  chlorhexidine 4% Liquid 1 Application(s) Topical <User Schedule>  dextrose 5%. 1000 milliLiter(s) (50 mL/Hr) IV Continuous <Continuous>  dextrose 50% Injectable 12.5 Gram(s) IV Push once  dextrose 50% Injectable 25 Gram(s) IV Push once  dextrose 50% Injectable 25 Gram(s) IV Push once  fentaNYL   Patch  12 MICROgram(s)/Hr 1 Patch Transdermal every 72 hours  heparin  Injectable 5000 Unit(s) SubCutaneous every 12 hours  insulin glargine Injectable (LANTUS) 40 Unit(s) SubCutaneous every morning  insulin lispro (HumaLOG) corrective regimen sliding scale   SubCutaneous every 6 hours  linezolid  IVPB 600 milliGRAM(s) IV Intermittent every 12 hours  linezolid  IVPB      meropenem  IVPB 1000 milliGRAM(s) IV Intermittent every 12 hours  meropenem  IVPB      midodrine 20 milliGRAM(s) Oral every 8 hours  pantoprazole   Suspension 40 milliGRAM(s) Oral daily  petrolatum Ophthalmic Ointment 1 Application(s) Both EYES daily  tiotropium 18 MICROgram(s) Capsule 1 Capsule(s) Inhalation daily    MEDICATIONS  (PRN):  acetaminophen    Suspension .. 650 milliGRAM(s) Oral every 6 hours PRN Temp greater or equal to 38C (100.4F)  dextrose 40% Gel 15 Gram(s) Oral once PRN Blood Glucose LESS THAN 70 milliGRAM(s)/deciliter  glucagon  Injectable 1 milliGRAM(s) IntraMuscular once PRN Glucose LESS THAN 70 milligrams/deciliter    Pertinent Labs: 06-06 Na146 mmol/L<H> Glu 134 mg/dL<H> K+ 4.0 mmol/L Cr  0.97 mg/dL BUN 44 mg/dL<H> 06-06 Phos 2.1 mg/dL<L> 05-30 [id number] 7.4 %<H>    CAPILLARY BLOOD GLUCOSE    POCT Blood Glucose.: 176 mg/dL (06 Jun 2019 11:47)  POCT Blood Glucose.: 148 mg/dL (06 Jun 2019 07:59)  POCT Blood Glucose.: 145 mg/dL (06 Jun 2019 06:24)  POCT Blood Glucose.: 93 mg/dL (06 Jun 2019 00:06)  POCT Blood Glucose.: 215 mg/dL (05 Jun 2019 17:24)    Skin: Pt with multiple pressure ulcers and wounds    Estimated Needs:   [x] no change since previous assessment on 5/30/19  [ ] recalculated:     Previous Nutrition Diagnosis:   Malnutrition; Mild malnutrition in the context of chronic illness  Etiology: Increased energy & protein needs related to multiple pressure ulcers & chronic respiratory failure on vent support  Signs/Symptoms: mild fat loss & wt loss 6% x 5 months (Mild muscle wasting & fat loss as noted above)    Nutrition Diagnosis is [x] ongoing  [ ] resolved [ ] not applicable    Previous Goal/Expected Outcome: Pt to meet >75% energy & protein via TF to support wound healing (goal met)    New Nutrition Diagnosis: [x] not applicable      Interventions:   Recommend  [x] Continue with current TF regimen  [ ] Change Diet To:  [ ] Nutrition Supplement  [ ] Nutrition Support  [ ] Other:     Monitoring and Evaluation:   [ ] PO intake [ x ] Tolerance to diet prescription [ x ] weights [ x ] labs[ x ] follow up per protocol  [ x ] other: TF residuals

## 2019-06-06 NOTE — PROGRESS NOTE ADULT - ATTENDING COMMENTS
Prognosis poor  Recommend palliative care  Ankle joint exposed with necrotic Ant tib tendon  Needs Amp

## 2019-06-06 NOTE — PROGRESS NOTE ADULT - SUBJECTIVE AND OBJECTIVE BOX
INTERVAL History:  ventilator    Allergies    penicillin (Unknown)    Intolerances        MEDICATIONS  (STANDING):  ALBUTerol    90 MICROgram(s) HFA Inhaler 1 Puff(s) Inhalation every 4 hours  ALBUTerol/ipratropium for Nebulization 3 milliLiter(s) Nebulizer every 6 hours  chlorhexidine 4% Liquid 1 Application(s) Topical <User Schedule>  dextrose 5%. 1000 milliLiter(s) (50 mL/Hr) IV Continuous <Continuous>  dextrose 50% Injectable 12.5 Gram(s) IV Push once  dextrose 50% Injectable 25 Gram(s) IV Push once  dextrose 50% Injectable 25 Gram(s) IV Push once  fentaNYL   Patch  12 MICROgram(s)/Hr 1 Patch Transdermal every 72 hours  heparin  Injectable 5000 Unit(s) SubCutaneous every 12 hours  insulin glargine Injectable (LANTUS) 40 Unit(s) SubCutaneous every morning  insulin lispro (HumaLOG) corrective regimen sliding scale   SubCutaneous every 6 hours  linezolid  IVPB 600 milliGRAM(s) IV Intermittent every 12 hours  linezolid  IVPB      meropenem  IVPB 1000 milliGRAM(s) IV Intermittent every 12 hours  meropenem  IVPB      midodrine 20 milliGRAM(s) Oral every 8 hours  pantoprazole   Suspension 40 milliGRAM(s) Oral daily  petrolatum Ophthalmic Ointment 1 Application(s) Both EYES daily  potassium phosphate IVPB 15 milliMole(s) IV Intermittent once  tiotropium 18 MICROgram(s) Capsule 1 Capsule(s) Inhalation daily    MEDICATIONS  (PRN):  acetaminophen    Suspension .. 650 milliGRAM(s) Oral every 6 hours PRN Temp greater or equal to 38C (100.4F)  dextrose 40% Gel 15 Gram(s) Oral once PRN Blood Glucose LESS THAN 70 milliGRAM(s)/deciliter  glucagon  Injectable 1 milliGRAM(s) IntraMuscular once PRN Glucose LESS THAN 70 milligrams/deciliter      Vital Signs Last 24 Hrs  T(C): 37 (06 Jun 2019 05:06), Max: 37.7 (05 Jun 2019 15:43)  T(F): 98.6 (06 Jun 2019 05:06), Max: 99.8 (05 Jun 2019 15:43)  HR: 80 (06 Jun 2019 08:50) (74 - 95)  BP: 134/62 (06 Jun 2019 05:06) (113/55 - 158/69)  BP(mean): 77 (05 Jun 2019 16:00) (70 - 77)  RR: 18 (06 Jun 2019 05:06) (12 - 18)  SpO2: 98% (06 Jun 2019 08:50) (95% - 100%)    PHYSICAL EXAM:      EYES:   blind  ventilator  CHEST/LUNG:  rales, rhonchi,   HEART: Regular rate and rhythm;   ABDOMEN: PEG  LYMPH: No lymphadenopathy noted        LABS:                        8.4    15.88 )-----------( 355      ( 06 Jun 2019 08:39 )             27.4     06-06    146<H>  |  108  |  44<H>  ----------------------------<  134<H>  4.0   |  33<H>  |  0.97    Ca    7.7<L>      06 Jun 2019 08:39  Phos  2.1     06-06  Mg     3.1     06-06              RADIOLOGY & ADDITIONAL STUDIES:    PATHOLOGY:

## 2019-06-07 LAB
GLUCOSE BLDC GLUCOMTR-MCNC: 124 MG/DL — HIGH (ref 70–99)
GLUCOSE BLDC GLUCOMTR-MCNC: 127 MG/DL — HIGH (ref 70–99)
GLUCOSE BLDC GLUCOMTR-MCNC: 139 MG/DL — HIGH (ref 70–99)
GLUCOSE BLDC GLUCOMTR-MCNC: 197 MG/DL — HIGH (ref 70–99)

## 2019-06-07 RX ORDER — MEROPENEM 1 G/30ML
1000 INJECTION INTRAVENOUS EVERY 8 HOURS
Refills: 0 | Status: DISCONTINUED | OUTPATIENT
Start: 2019-06-07 | End: 2019-06-12

## 2019-06-07 RX ADMIN — Medication 3 MILLILITER(S): at 23:43

## 2019-06-07 RX ADMIN — Medication 650 MILLIGRAM(S): at 05:32

## 2019-06-07 RX ADMIN — MEROPENEM 100 MILLIGRAM(S): 1 INJECTION INTRAVENOUS at 08:30

## 2019-06-07 RX ADMIN — HEPARIN SODIUM 5000 UNIT(S): 5000 INJECTION INTRAVENOUS; SUBCUTANEOUS at 17:07

## 2019-06-07 RX ADMIN — INSULIN GLARGINE 40 UNIT(S): 100 INJECTION, SOLUTION SUBCUTANEOUS at 11:52

## 2019-06-07 RX ADMIN — HEPARIN SODIUM 5000 UNIT(S): 5000 INJECTION INTRAVENOUS; SUBCUTANEOUS at 05:31

## 2019-06-07 RX ADMIN — PANTOPRAZOLE SODIUM 40 MILLIGRAM(S): 20 TABLET, DELAYED RELEASE ORAL at 11:41

## 2019-06-07 RX ADMIN — MIDODRINE HYDROCHLORIDE 20 MILLIGRAM(S): 2.5 TABLET ORAL at 05:32

## 2019-06-07 RX ADMIN — MEROPENEM 100 MILLIGRAM(S): 1 INJECTION INTRAVENOUS at 17:05

## 2019-06-07 RX ADMIN — MIDODRINE HYDROCHLORIDE 20 MILLIGRAM(S): 2.5 TABLET ORAL at 21:20

## 2019-06-07 RX ADMIN — FENTANYL CITRATE 1 PATCH: 50 INJECTION INTRAVENOUS at 11:49

## 2019-06-07 RX ADMIN — Medication 3 MILLILITER(S): at 05:13

## 2019-06-07 RX ADMIN — FENTANYL CITRATE 1 PATCH: 50 INJECTION INTRAVENOUS at 17:08

## 2019-06-07 RX ADMIN — LINEZOLID 300 MILLIGRAM(S): 600 INJECTION, SOLUTION INTRAVENOUS at 08:31

## 2019-06-07 RX ADMIN — Medication 3 MILLILITER(S): at 17:13

## 2019-06-07 RX ADMIN — FENTANYL CITRATE 1 PATCH: 50 INJECTION INTRAVENOUS at 11:45

## 2019-06-07 RX ADMIN — FENTANYL CITRATE 1 PATCH: 50 INJECTION INTRAVENOUS at 08:28

## 2019-06-07 RX ADMIN — Medication 3 MILLILITER(S): at 11:55

## 2019-06-07 RX ADMIN — Medication 1 APPLICATION(S): at 11:41

## 2019-06-07 RX ADMIN — LINEZOLID 300 MILLIGRAM(S): 600 INJECTION, SOLUTION INTRAVENOUS at 19:24

## 2019-06-07 RX ADMIN — CHLORHEXIDINE GLUCONATE 1 APPLICATION(S): 213 SOLUTION TOPICAL at 05:24

## 2019-06-07 RX ADMIN — MEROPENEM 100 MILLIGRAM(S): 1 INJECTION INTRAVENOUS at 21:22

## 2019-06-07 RX ADMIN — Medication 2: at 11:53

## 2019-06-07 NOTE — PROGRESS NOTE ADULT - ASSESSMENT
sepsis   multiple decubiti   vent dependant   cultures noted ; decubiti noted  sputum culture noted   no xray abn   need to treat ? sputum ?mantain contact isolation   continue current treatment merrem and zyvox  pharmacy intervention noted

## 2019-06-07 NOTE — PROGRESS NOTE ADULT - SUBJECTIVE AND OBJECTIVE BOX
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   patient is still on IV merem, and zyvox.   T. max 100.6.   palliative care consult noted.    case discussed with case management.      INTERVAL HPI/OVERNIGHT EVENTS: as above.  PAST MEDICAL & SURGICAL HISTORY:  DM (diabetes mellitus)  Heart failure  Diabetic neuropathy  Hyperlipidemia  Quadriplegia  Hypertension  Stroke  Hypertension  Obstructive cardiomyopathy  S/P percutaneous endoscopic gastrostomy (PEG) tube placement  History of tracheostomy      MEDICATIONS  (STANDING):  ALBUTerol    90 MICROgram(s) HFA Inhaler 1 Puff(s) Inhalation every 4 hours  ALBUTerol/ipratropium for Nebulization 3 milliLiter(s) Nebulizer every 6 hours  chlorhexidine 4% Liquid 1 Application(s) Topical <User Schedule>  dextrose 5%. 1000 milliLiter(s) (50 mL/Hr) IV Continuous <Continuous>  dextrose 50% Injectable 12.5 Gram(s) IV Push once  dextrose 50% Injectable 25 Gram(s) IV Push once  dextrose 50% Injectable 25 Gram(s) IV Push once  fentaNYL   Patch  12 MICROgram(s)/Hr 1 Patch Transdermal every 72 hours  heparin  Injectable 5000 Unit(s) SubCutaneous every 12 hours  insulin glargine Injectable (LANTUS) 40 Unit(s) SubCutaneous every morning  insulin lispro (HumaLOG) corrective regimen sliding scale   SubCutaneous every 6 hours  linezolid  IVPB 600 milliGRAM(s) IV Intermittent every 12 hours  linezolid  IVPB      meropenem  IVPB 1000 milliGRAM(s) IV Intermittent every 12 hours  meropenem  IVPB      midodrine 20 milliGRAM(s) Oral every 8 hours  pantoprazole   Suspension 40 milliGRAM(s) Oral daily  petrolatum Ophthalmic Ointment 1 Application(s) Both EYES daily  tiotropium 18 MICROgram(s) Capsule 1 Capsule(s) Inhalation daily    MEDICATIONS  (PRN):  acetaminophen    Suspension .. 650 milliGRAM(s) Oral every 6 hours PRN Temp greater or equal to 38C (100.4F)  dextrose 40% Gel 15 Gram(s) Oral once PRN Blood Glucose LESS THAN 70 milliGRAM(s)/deciliter  glucagon  Injectable 1 milliGRAM(s) IntraMuscular once PRN Glucose LESS THAN 70 milligrams/deciliter      Allergies    penicillin (Unknown)    Intolerances        REVIEW OF SYSTEMS:  CONSTITUTIONAL: No fever, weight loss, or fatigue  EYES: No eye pain, visual disturbances, or discharge  ENMT:  No difficulty hearing, tinnitus, vertigo; No sinus or throat pain  NECK: No pain or stiffness  BREASTS: No pain, masses, or nipple discharge  RESPIRATORY: No cough, wheezing, chills or hemoptysis; No shortness of breath  CARDIOVASCULAR: No chest pain, palpitations, dizziness, or leg swelling  GASTROINTESTINAL: No abdominal or epigastric pain. No nausea, vomiting, or hematemesis; No diarrhea or constipation. No melena or hematochezia.  GENITOURINARY: No dysuria, frequency, hematuria, or incontinence  NEUROLOGICAL: No headaches, memory loss, loss of strength, numbness, or tremors  SKIN: No itching, burning, rashes, or lesions   LYMPH NODES: No enlarged glands  ENDOCRINE: No heat or cold intolerance; No hair loss  MUSCULOSKELETAL: No joint pain or swelling; No muscle, back, or extremity pain  PSYCHIATRIC: No depression, anxiety, mood swings, or difficulty sleeping  HEME/LYMPH: No easy bruising, or bleeding gums  ALLERY AND IMMUNOLOGIC: No hives or eczema    Vital Signs Last 24 Hrs  T(C): 38.1 (07 Jun 2019 04:54), Max: 38.1 (07 Jun 2019 04:54)  T(F): 100.6 (07 Jun 2019 04:54), Max: 100.6 (07 Jun 2019 04:54)  HR: 89 (07 Jun 2019 08:48) (84 - 107)  BP: 118/59 (07 Jun 2019 04:54) (118/59 - 144/66)  BP(mean): --  RR: 18 (07 Jun 2019 04:54) (17 - 18)  SpO2: 99% (07 Jun 2019 08:48) (98% - 100%)    PHYSICAL EXAM:  GENERAL: NAD, well-groomed, well-developed  HEAD:  Atraumatic, Normocephalic  EYES: EOMI, PERRLA, conjunctiva and sclera clear  ENMT: No tonsillar erythema, exudates, or enlargement; Moist mucous membranes, Good dentition, No lesions  NECK: Supple, No JVD, Normal thyroid. s/p tracheotomy n, on the vent.  NERVOUS SYSTEM: Quadriplegic  CHEST/LUNG: Clear to percussion bilaterally; No rales, rhonchi, wheezing, or rubs  HEART: Regular rate and rhythm; No murmurs, rubs, or gallops  ABDOMEN: Soft, Nontender, Nondistended; Bowel sounds present  EXTREMITIES:  2+ Peripheral Pulses, No clubbing, cyanosis, or edema  LYMPH: No lymphadenopathy noted  SKIN: No rashes or lesions    LABS:                        8.4    15.88 )-----------( 355      ( 06 Jun 2019 08:39 )             27.4     06-06    146<H>  |  108  |  44<H>  ----------------------------<  134<H>  4.0   |  33<H>  |  0.97    Ca    7.7<L>      06 Jun 2019 08:39  Phos  2.1     06-06  Mg     3.1     06-06            CAPILLARY BLOOD GLUCOSE      POCT Blood Glucose.: 124 mg/dL (07 Jun 2019 06:47)  POCT Blood Glucose.: 127 mg/dL (07 Jun 2019 00:05)  POCT Blood Glucose.: 143 mg/dL (06 Jun 2019 16:43)  POCT Blood Glucose.: 176 mg/dL (06 Jun 2019 11:47)                RADIOLOGY & ADDITIONAL TESTS:    Imaging Personally Reviewed:  [ ] YES  [ ] NO    Consultant(s) Notes Reviewed:  [x ] YES  [ ] NO    Care Discussed with Consultants/Other Providers [x ] YES  [ ] NO    Care discussed with family,         [  ]   yes  [  ]  No    imp:    stable[ ]    unstable[  ]     improving [   ]       unchanged  [ x ]                Plans:  Continue present plans  [  x] will continue to discuss with betty Orozco about further management.                New consult [  ]   specialty  .......               order test[  ]    test name.                  Discharge Planning  [  ]

## 2019-06-07 NOTE — PROGRESS NOTE ADULT - SUBJECTIVE AND OBJECTIVE BOX
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  out of icu   and stable         Allergies    penicillin (Unknown)    Intolerances        MEDICATIONS  (STANDING):  ALBUTerol    90 MICROgram(s) HFA Inhaler 1 Puff(s) Inhalation every 4 hours  ALBUTerol/ipratropium for Nebulization 3 milliLiter(s) Nebulizer every 6 hours  chlorhexidine 4% Liquid 1 Application(s) Topical <User Schedule>  dextrose 5%. 1000 milliLiter(s) (50 mL/Hr) IV Continuous <Continuous>  dextrose 50% Injectable 12.5 Gram(s) IV Push once  dextrose 50% Injectable 25 Gram(s) IV Push once  dextrose 50% Injectable 25 Gram(s) IV Push once  fentaNYL   Patch  12 MICROgram(s)/Hr 1 Patch Transdermal every 72 hours  heparin  Injectable 5000 Unit(s) SubCutaneous every 12 hours  insulin glargine Injectable (LANTUS) 40 Unit(s) SubCutaneous every morning  insulin lispro (HumaLOG) corrective regimen sliding scale   SubCutaneous every 6 hours  linezolid  IVPB 600 milliGRAM(s) IV Intermittent every 12 hours  linezolid  IVPB      meropenem  IVPB 1000 milliGRAM(s) IV Intermittent every 8 hours  midodrine 20 milliGRAM(s) Oral every 8 hours  pantoprazole   Suspension 40 milliGRAM(s) Oral daily  petrolatum Ophthalmic Ointment 1 Application(s) Both EYES daily  tiotropium 18 MICROgram(s) Capsule 1 Capsule(s) Inhalation daily    MEDICATIONS  (PRN):  acetaminophen    Suspension .. 650 milliGRAM(s) Oral every 6 hours PRN Temp greater or equal to 38C (100.4F)  dextrose 40% Gel 15 Gram(s) Oral once PRN Blood Glucose LESS THAN 70 milliGRAM(s)/deciliter  glucagon  Injectable 1 milliGRAM(s) IntraMuscular once PRN Glucose LESS THAN 70 milligrams/deciliter      REVIEW OF SYSTEMS:    unable to obtain     VITAL SIGNS:  T(C): 37.9 (06-07-19 @ 17:10), Max: 38.1 (06-07-19 @ 04:54)  T(F): 100.3 (06-07-19 @ 17:10), Max: 100.6 (06-07-19 @ 04:54)  HR: 88 (06-07-19 @ 18:14) (84 - 107)  BP: 157/69 (06-07-19 @ 17:10) (118/59 - 157/69)  RR: 18 (06-07-19 @ 17:10) (17 - 18)  SpO2: 100% (06-07-19 @ 18:14) (98% - 100%)  Wt(kg): --    PHYSICAL EXAM:    GENERAL: ch veg state   HEENT: tracheostomy to vent   CHEST/LUNG: Clear to auscultation bilaterally; No rales, rhonchi, wheezing  HEART: Regular rate and rhythm; No murmurs, rubs, or gallops  ABDOMEN: Soft, Nontender, Nondistended; Bowel sounds present, no rebound   EXTREMITIES:  2+ Peripheral Pulses, No clubbing, cyanosis, or edema  no change in status of gangrene and ulcers   SKIN: No rashes or lesions  BACK: huge  pressor sore   NERVOUS SYSTEM:   veg     LABS:                         8.4    15.88 )-----------( 355      ( 06 Jun 2019 08:39 )             27.4     06-06    146<H>  |  108  |  44<H>  ----------------------------<  134<H>  4.0   |  33<H>  |  0.97    Ca    7.7<L>      06 Jun 2019 08:39  Phos  2.1     06-06  Mg     3.1     06-06                                              Radiology:

## 2019-06-07 NOTE — PHARMACOTHERAPY INTERVENTION NOTE - COMMENTS
Renal function has been steadily improving. CrCl currently 58 mL/min. eGFR currentl 89 mL/min/1.73 m2. Dose adjusted meropenem from 1 gm IV q12h to 1 gm IV q8h

## 2019-06-07 NOTE — PROGRESS NOTE ADULT - ASSESSMENT
Poor prognosis.   patient should be in palliative care and Hospice dare with DNR- however family unable to make any decisions. . case should be discussed in ethics committee.

## 2019-06-07 NOTE — CONSULT NOTE ADULT - SUBJECTIVE AND OBJECTIVE BOX
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  NH report pt BUN & Cr incr, EMS report pt hypotensive on their arrival.  On arrival to ED, removed two 12microgram fentanyl patches.  admitted to ICU for severe poly microbial drug resistant sepsis, septic shock, dehydration, hypernatremia, acute renal failure with ATN, multiple infected decubitus ulcers (sacrum, hip, bilateral lower extremities), osteomyelitis of right ankle, hypercalcemia, left renal lesion and pulmonary nodules.  Now out of ICU.    PAST MEDICAL & SURGICAL HISTORY:  DM (diabetes mellitus)  Heart failure  Diabetic neuropathy  Hyperlipidemia  Quadriplegia  Hypertension  Stroke  Hypertension  Obstructive cardiomyopathy  S/P percutaneous endoscopic gastrostomy (PEG) tube placement  History of tracheostomy    FAMILY HISTORY:  No pertinent family history in first degree relatives    SOCIAL HISTORY: not able to provide.    Allergies  penicillin (Unknown)    MEDICATIONS  (STANDING):  ALBUTerol    90 MICROgram(s) HFA Inhaler 1 Puff(s) Inhalation every 4 hours  ALBUTerol/ipratropium for Nebulization 3 milliLiter(s) Nebulizer every 6 hours  chlorhexidine 4% Liquid 1 Application(s) Topical <User Schedule>  dextrose 5%. 1000 milliLiter(s) (50 mL/Hr) IV Continuous <Continuous>  dextrose 50% Injectable 12.5 Gram(s) IV Push once  dextrose 50% Injectable 25 Gram(s) IV Push once  dextrose 50% Injectable 25 Gram(s) IV Push once  fentaNYL   Patch  12 MICROgram(s)/Hr 1 Patch Transdermal every 72 hours  heparin  Injectable 5000 Unit(s) SubCutaneous every 12 hours  insulin glargine Injectable (LANTUS) 40 Unit(s) SubCutaneous every morning  insulin lispro (HumaLOG) corrective regimen sliding scale   SubCutaneous every 6 hours  linezolid  IVPB 600 milliGRAM(s) IV Intermittent every 12 hours  linezolid  IVPB      meropenem  IVPB 1000 milliGRAM(s) IV Intermittent every 8 hours  midodrine 20 milliGRAM(s) Oral every 8 hours  pantoprazole   Suspension 40 milliGRAM(s) Oral daily  petrolatum Ophthalmic Ointment 1 Application(s) Both EYES daily  tiotropium 18 MICROgram(s) Capsule 1 Capsule(s) Inhalation daily    MEDICATIONS  (PRN):  acetaminophen    Suspension .. 650 milliGRAM(s) Oral every 6 hours PRN Temp greater or equal to 38C (100.4F)  dextrose 40% Gel 15 Gram(s) Oral once PRN Blood Glucose LESS THAN 70 milliGRAM(s)/deciliter  glucagon  Injectable 1 milliGRAM(s) IntraMuscular once PRN Glucose LESS THAN 70 milligrams/deciliter    REVIEW OF SYSTEMS:  not able to provide.    Vital Signs Last 24 Hrs  T(C): 37.9 (07 Jun 2019 17:10), Max: 38.1 (07 Jun 2019 04:54)  T(F): 100.3 (07 Jun 2019 17:10), Max: 100.6 (07 Jun 2019 04:54)  HR: 88 (07 Jun 2019 18:14) (84 - 107)  BP: 157/69 (07 Jun 2019 17:10) (118/59 - 157/69)  BP(mean): --  RR: 18 (07 Jun 2019 17:10) (17 - 18)  SpO2: 100% (07 Jun 2019 18:14) (98% - 100%)    PHYSICAL EXAM:  GEN:        comfortable on Vent  HEENT:    Trach  RESP:       no wheezing.  CVS:         Regular rate and rhythm.   ABD:        peg  SKIN:           Warm and dry.  EXTR:         edema  CNS:           not able to cooperate  PSYCH:        not able to cooperate.    LABS:                        8.4    15.88 )-----------( 355      ( 06 Jun 2019 08:39 )             27.4     06-06    146<H>  |  108  |  44<H>  ----------------------------<  134<H>  4.0   |  33<H>  |  0.97    Ca    7.7<L>      06 Jun 2019 08:39  Phos  2.1     06-06  Mg     3.1     06-06    Culture - Other (collected 05-30-19 @ 18:37)  Source: Skin  Final Report (06-02-19 @ 20:15):    Moderate Klebsiella pneumoniae ESBL    Moderate Enterococcus faecium (vancomycin resistant)    Moderate Proteus mirabilis  Organism: Klebsiella pneumoniae ESBL  Enterococcus faecium (vancomycin resistant)  Proteus mirabilis (06-02-19 @ 20:15)  Organism: Proteus mirabilis (06-02-19 @ 20:15)      -  Amikacin: S <=16      -  Ampicillin: S <=8 These ampicillin results predict results for amoxicillin      -  Ampicillin/Sulbactam: S <=8/4      -  Aztreonam: S <=4      -  Cefepime: S <=4      -  Cefoxitin: S <=8      -  Ceftriaxone: S <=1 Enterobacter, Citrobacter, and Serratia may develop resistance during prolonged therapy      -  Ciprofloxacin: R >2      -  Ertapenem: S <=1      -  Gentamicin: S <=4      -  Levofloxacin: R >4      -  Meropenem: S <=1      -  Piperacillin/Tazobactam: S <=16      -  Tobramycin: S <=4      -  Trimethoprim/Sulfamethoxazole: S <=2/38      Method Type: GABBY  Organism: Enterococcus faecium (vancomycin resistant) (06-02-19 @ 20:15)      -  Ampicillin: R >8 Predicts results to ampicillin/sulbactam, amoxacillin-clavulanate and  piperacillin-tazobactam.      -  Daptomycin: S 4      -  Levofloxacin: R >4      -  Linezolid: S 2      -  Tetra/Doxy: R >8      -  Vancomycin: R >16      Method Type: GABBY  Organism: Klebsiella pneumoniae ESBL (06-02-19 @ 20:15)      -  Amikacin: S <=16      -  Ampicillin: R >16 These ampicillin results predict results for amoxicillin      -  Ampicillin/Sulbactam: R >16/8      -  Aztreonam: R >16      -  Cefazolin: R >16      -  Cefepime: R >16      -  Cefoxitin: I 16      -  Ceftriaxone: R >32 Enterobacter, Citrobacter, and Serratia may develop resistance during prolonged therapy      -  Ciprofloxacin: R >2      -  Ertapenem: S <=1      -  Gentamicin: S <=4      -  Imipenem: S <=1      -  Levofloxacin: R >4      -  Meropenem: S <=1      -  Piperacillin/Tazobactam: R <=16      -  Tobramycin: S <=4      -  Trimethoprim/Sulfamethoxazole: R >2/38      Method Type: GABBY    Culture - Other (collected 05-30-19 @ 18:36)  Source: Skin  Final Report (06-01-19 @ 17:32):    Few Methicillin resistant Staphylococcus aureus  Organism: Methicillin resistant Staphylococcus aureus (06-01-19 @ 17:32)  Organism: Methicillin resistant Staphylococcus aureus (06-01-19 @ 17:32)      -  Ampicillin/Sulbactam: R 16/8      -  Cefazolin: R >16      -  Clindamycin: R >4      -  Daptomycin: S 0.5      -  Erythromycin: R >4      -  Gentamicin: S <=1 Should not be used as monotherapy      -  Linezolid: S 2      -  Oxacillin: R >2      -  Penicillin: R >8      -  RIF- Rifampin: S <=1 Should not be used as monotherapy      -  Tetra/Doxy: S 2      -  Trimethoprim/Sulfamethoxazole: S <=0.5/9.5      -  Vancomycin: S 2      Method Type: GABBY    Culture - Other (collected 05-30-19 @ 18:35)  Source: Skin  Final Report (06-01-19 @ 19:56):    Moderate Klebsiella pneumoniae ESBL    Moderate Enterococcus faecalis    Moderate Proteus mirabilis    Moderate Enterococcus faecium (vancomycin resistant)  Organism: Klebsiella pneumoniae ESBL  Proteus mirabilis  Enterococcus faecalis  Enterococcus faecium (vancomycin resistant) (06-01-19 @ 19:56)  Organism: Enterococcus faecium (vancomycin resistant) (06-01-19 @ 19:56)      -  Ampicillin: R >8 Predicts results to ampicillin/sulbactam, amoxacillin-clavulanate and  piperacillin-tazobactam.      -  Daptomycin: S 4      -  Levofloxacin: R >4      -  Linezolid: S <=1      -  Tetra/Doxy: I 8      -  Vancomycin: R >16      Method Type: GABBY  Organism: Enterococcus faecalis (06-01-19 @ 19:56)      -  Ampicillin: S <=2 Predicts results to ampicillin/sulbactam, amoxacillin-clavulanate and  piperacillin-tazobactam.      -  Tetra/Doxy: R >8      -  Vancomycin: S 2      Method Type: GABBY  Organism: Proteus mirabilis (06-01-19 @ 19:56)      -  Amikacin: S <=16      -  Ampicillin: S [id number] These ampicillin results predict results for amoxicillin      -  Ampicillin/Sulbactam: S <=8/4      -  Aztreonam: S <=4      -  Cefepime: S <=4      -  Cefotaxime: S <=2      -  Cefoxitin: S <=8      -  Ceftazidime: S <=1      -  Ceftriaxone: S <=1 Enterobacter, Citrobacter, and Serratia may develop resistance during prolonged therapy      -  Cefuroxime: S <=4      -  Ciprofloxacin: R >2      -  Ertapenem: S <=1      -  Gentamicin: S <=4      -  Levofloxacin: R >4      -  Meropenem: S <=1      -  Piperacillin/Tazobactam: S <=16      -  Tobramycin: S <=4      -  Trimethoprim/Sulfamethoxazole: S <=2/38      Method Type: GABBY  Organism: Klebsiella pneumoniae ESBL (06-01-19 @ 19:56)      -  Amikacin: S <=16      -  Ampicillin: R >16 These ampicillin results predict results for amoxicillin      -  Ampicillin/Sulbactam: R >16/8      -  Aztreonam: R >16      -  Cefazolin: R >16      -  Cefepime: R >16      -  Cefoxitin: R >16      -  Ceftriaxone: R >32 Enterobacter, Citrobacter, and Serratia may develop resistance during prolonged therapy      -  Ciprofloxacin: R >2      -  Ertapenem: S <=1      -  Gentamicin: S <=4      -  Imipenem: S <=1      -  Levofloxacin: R >4      -  Meropenem: S <=1      -  Piperacillin/Tazobactam: R <=16      -  Tobramycin: S <=4      -  Trimethoprim/Sulfamethoxazole: R >2/38      Method Type: GABBY    Culture - Sputum (collected 05-29-19 @ 17:42)  Source: .Sputum  Gram Stain (05-31-19 @ 17:54):    Few polymorphonuclear leukocytes per low power field    Few Squamous epithelial cells per low power field    Few Gram Variable Rods per oil power field    Rare Gram positive cocci in pairs per oil power field  Final Report (05-31-19 @ 17:54):    Numerous Pseudomonas aeruginosa (Carbapenem Resistant)    Normal Respiratory Christopher present  Organism: Pseudomonas aeruginosa (Carbapenem Resistant) (05-31-19 @ 17:54)  Organism: Pseudomonas aeruginosa (Carbapenem Resistant) (05-31-19 @ 17:54)      -  Amikacin: S <=16      -  Aztreonam: R >16      -  Cefepime: R >16      -  Ceftazidime: R >16      -  Ciprofloxacin: R >2      -  Gentamicin: S <=4      -  Imipenem: R >8      -  Levofloxacin: R >4      -  Meropenem: R >8      -  Piperacillin/Tazobactam: S <=16      -  Tobramycin: S <=4      Method Type: GABBY    Culture - Blood (collected 05-29-19 @ 10:45)  Source: .Blood  Final Report (06-03-19 @ 11:00):    No growth at 5 days.    Culture - Blood (collected 05-29-19 @ 10:45)  Source: .Blood  Final Report (06-03-19 @ 11:00):    No growth at 5 days.    Culture - Urine (collected 05-29-19 @ 10:21)  Source: .Urine  Final Report (05-31-19 @ 14:29):    10,000 - 49,000 CFU/mL Enterococcus faecalis    <10,000 CFU/ml Normal Urogenital christopher present  Organism: Enterococcus faecalis (05-31-19 @ 14:29)  Organism: Enterococcus faecalis (05-31-19 @ 14:29)      -  Ampicillin: S <=2 Predicts results to ampicillin/sulbactam, amoxacillin-clavulanate and  piperacillin-tazobactam.      -  Ciprofloxacin: R >2      -  Levofloxacin: R >4      -  Nitrofurantoin: S <=32 Should not be used to treat pyelonephritis.      -  Tetra/Doxy: I 8      -  Vancomycin: S 1      Method Type: GABBY    EKG: sinus tachycardia    RADIOLOGY & ADDITIONAL STUDIES:  < from: CT Chest No Cont (05.30.19 @ 14:24) >    EXAM:  CT ABDOMEN AND PELVIS                          EXAM:  CT CHEST                          PROCEDURE DATE:  05/30/2019      INTERPRETATION:  CLINICAL INFORMATION: Septic shock, renal mass    COMPARISON: Ultrasound dated 8/11/2018 and 5/29/2019    PROCEDURE:   CT of the Chest, Abdomen and Pelvis was performed without intravenous   contrast.   Intravenous contrast: None.  Oral contrast: Positive contrast was administered.  Sagittal and coronal reformats were performed.    FINDINGS:    Please note that evaluation of the chest/abdominal organs and vascular   structures is limited by lack of intravenous contrast.    CHEST:     LUNGS AND LARGE AIRWAYS: Tracheostomy tube. Centrilobular emphysema.   Bilateral dependent and basilar atelectasis. Bilateral lower lobe nodular   opacities. Nonspecific right middle and right lower lobe nodules   measuring 4 mm.  PLEURA: Trace bilateral pleural effusions.  VESSELS: Atherosclerotic calcifications of thoracic aorta and coronary   arteries.  HEART: Heart size is normal. No pericardial effusion.  MEDIASTINUM AND GUERA: No lymphadenopathy.  CHEST WALL AND LOWER NECK: Subcutaneous soft tissue edema.    ABDOMEN AND PELVIS:    LIVER: Within normal limits.  BILE DUCTS: Minimal dilatation.  GALLBLADDER: Drainage catheter near the gallbladder fundus, question   within or just outside of the gallbladder.  SPLEEN: Within normal limits.  PANCREAS: Within normal limits.  ADRENALS: Within normal limits.  KIDNEYS/URETERS: Nonspecific perinephric stranding. 2.7 cm heterogeneous   left lower pole renal lesion. No hydronephrosis.    BLADDER: Partially decompressed around Guerra catheter. Mild wall   thickening.  REPRODUCTIVE ORGANS: Prostate within normal limits.    BOWEL: Gastrostomy tube. No bowel obstruction. Appendix within normal   limits. Colonic diverticula.  PERITONEUM: No ascites.  VESSELS:  Atherosclerotic calcifications.  RETROPERITONEUM: No lymphadenopathy.    ABDOMINAL WALL: Subcutaneous soft tissue edema. Sacral and left ischial   decubitus ulcers.  BONES: Hip and spine degenerative changes.    IMPRESSION:     1. Emphysema.  2. Nonspecific right middle and lower lobe nodules measuring 4 mm.  3. Bilateral lower lobe nodular opacities, possibly infectious or   inflammatory.  4. Drainage catheter near the gallbladder fundus, question within or just   outside of the gallbladder.  5. Left lower pole renal lesion, which may represent known renal mass.  6. No hydronephrosis.  7. Sacral and left ischial decubitus ulcers.    FAN PIZARRO M.D., ATTENDING RADIOLOGIST  This document has been electronically signed. May 30 2019  2:50PM      ASSESSMENT AND PLAN:  ·	S/P Septic shock.  ·	Respiratory failure  ·	S/P Tracheostomy.  ·	Pseudomonas in sputum.  ·	UTI with enterococcus.  ·	CVA.  ·	Quadriplegia.  ·	HTN.  ·	DM    Continue Vent, antibiotics and nebulizer.

## 2019-06-07 NOTE — PROGRESS NOTE ADULT - SUBJECTIVE AND OBJECTIVE BOX
INTERVAL History:  Sepsis.  renal Lesion.    Allergies    penicillin (Unknown)    Intolerances        MEDICATIONS  (STANDING):  ALBUTerol    90 MICROgram(s) HFA Inhaler 1 Puff(s) Inhalation every 4 hours  ALBUTerol/ipratropium for Nebulization 3 milliLiter(s) Nebulizer every 6 hours  chlorhexidine 4% Liquid 1 Application(s) Topical <User Schedule>  dextrose 5%. 1000 milliLiter(s) (50 mL/Hr) IV Continuous <Continuous>  dextrose 50% Injectable 12.5 Gram(s) IV Push once  dextrose 50% Injectable 25 Gram(s) IV Push once  dextrose 50% Injectable 25 Gram(s) IV Push once  fentaNYL   Patch  12 MICROgram(s)/Hr 1 Patch Transdermal every 72 hours  heparin  Injectable 5000 Unit(s) SubCutaneous every 12 hours  insulin glargine Injectable (LANTUS) 40 Unit(s) SubCutaneous every morning  insulin lispro (HumaLOG) corrective regimen sliding scale   SubCutaneous every 6 hours  linezolid  IVPB 600 milliGRAM(s) IV Intermittent every 12 hours  linezolid  IVPB      meropenem  IVPB 1000 milliGRAM(s) IV Intermittent every 12 hours  meropenem  IVPB      midodrine 20 milliGRAM(s) Oral every 8 hours  pantoprazole   Suspension 40 milliGRAM(s) Oral daily  petrolatum Ophthalmic Ointment 1 Application(s) Both EYES daily  tiotropium 18 MICROgram(s) Capsule 1 Capsule(s) Inhalation daily    MEDICATIONS  (PRN):  acetaminophen    Suspension .. 650 milliGRAM(s) Oral every 6 hours PRN Temp greater or equal to 38C (100.4F)  dextrose 40% Gel 15 Gram(s) Oral once PRN Blood Glucose LESS THAN 70 milliGRAM(s)/deciliter  glucagon  Injectable 1 milliGRAM(s) IntraMuscular once PRN Glucose LESS THAN 70 milligrams/deciliter      Vital Signs Last 24 Hrs  T(C): 38.1 (07 Jun 2019 04:54), Max: 38.1 (07 Jun 2019 04:54)  T(F): 100.6 (07 Jun 2019 04:54), Max: 100.6 (07 Jun 2019 04:54)  HR: 89 (07 Jun 2019 08:48) (84 - 107)  BP: 118/59 (07 Jun 2019 04:54) (118/59 - 144/66)  BP(mean): --  RR: 18 (07 Jun 2019 04:54) (17 - 18)  SpO2: 99% (07 Jun 2019 08:48) (98% - 100%)    PHYSICAL EXAM:  Trach:- Ventilator    EYES: EOMI, PERRLA, conjunctiva and sclera clear  NECK: Supple, No JVD, Normal thyroid  CHEST/LUNG:  rales, rhonchi,     ABDOMEN: Soft,   PEG  Drainage Catheter.      LABS:                        8.4    15.88 )-----------( 355      ( 06 Jun 2019 08:39 )             27.4     06-06    146<H>  |  108  |  44<H>  ----------------------------<  134<H>  4.0   |  33<H>  |  0.97    Ca    7.7<L>      06 Jun 2019 08:39  Phos  2.1     06-06  Mg     3.1     06-06              RADIOLOGY & ADDITIONAL STUDIES:    PATHOLOGY:

## 2019-06-08 LAB
ANION GAP SERPL CALC-SCNC: 5 MMOL/L — SIGNIFICANT CHANGE UP (ref 5–17)
BUN SERPL-MCNC: 30 MG/DL — HIGH (ref 7–23)
CALCIUM SERPL-MCNC: 7.4 MG/DL — LOW (ref 8.5–10.1)
CHLORIDE SERPL-SCNC: 110 MMOL/L — HIGH (ref 96–108)
CO2 SERPL-SCNC: 29 MMOL/L — SIGNIFICANT CHANGE UP (ref 22–31)
CREAT SERPL-MCNC: 0.91 MG/DL — SIGNIFICANT CHANGE UP (ref 0.5–1.3)
GLUCOSE BLDC GLUCOMTR-MCNC: 108 MG/DL — HIGH (ref 70–99)
GLUCOSE BLDC GLUCOMTR-MCNC: 117 MG/DL — HIGH (ref 70–99)
GLUCOSE BLDC GLUCOMTR-MCNC: 126 MG/DL — HIGH (ref 70–99)
GLUCOSE BLDC GLUCOMTR-MCNC: 154 MG/DL — HIGH (ref 70–99)
GLUCOSE BLDC GLUCOMTR-MCNC: 79 MG/DL — SIGNIFICANT CHANGE UP (ref 70–99)
GLUCOSE SERPL-MCNC: 151 MG/DL — HIGH (ref 70–99)
HCT VFR BLD CALC: 30.9 % — LOW (ref 39–50)
HGB BLD-MCNC: 9.5 G/DL — LOW (ref 13–17)
MCHC RBC-ENTMCNC: 30 PG — SIGNIFICANT CHANGE UP (ref 27–34)
MCHC RBC-ENTMCNC: 30.7 GM/DL — LOW (ref 32–36)
MCV RBC AUTO: 97.5 FL — SIGNIFICANT CHANGE UP (ref 80–100)
NRBC # BLD: 0 /100 WBCS — SIGNIFICANT CHANGE UP (ref 0–0)
PLATELET # BLD AUTO: 413 K/UL — HIGH (ref 150–400)
POTASSIUM SERPL-MCNC: 5.3 MMOL/L — SIGNIFICANT CHANGE UP (ref 3.5–5.3)
POTASSIUM SERPL-SCNC: 5.3 MMOL/L — SIGNIFICANT CHANGE UP (ref 3.5–5.3)
RBC # BLD: 3.17 M/UL — LOW (ref 4.2–5.8)
RBC # FLD: 16.3 % — HIGH (ref 10.3–14.5)
SODIUM SERPL-SCNC: 144 MMOL/L — SIGNIFICANT CHANGE UP (ref 135–145)
WBC # BLD: 15.44 K/UL — HIGH (ref 3.8–10.5)
WBC # FLD AUTO: 15.44 K/UL — HIGH (ref 3.8–10.5)

## 2019-06-08 RX ORDER — FENTANYL CITRATE 50 UG/ML
1 INJECTION INTRAVENOUS
Refills: 0 | Status: DISCONTINUED | OUTPATIENT
Start: 2019-06-08 | End: 2019-06-11

## 2019-06-08 RX ADMIN — Medication 3 MILLILITER(S): at 05:48

## 2019-06-08 RX ADMIN — Medication 2: at 12:39

## 2019-06-08 RX ADMIN — Medication 3 MILLILITER(S): at 11:44

## 2019-06-08 RX ADMIN — MEROPENEM 100 MILLIGRAM(S): 1 INJECTION INTRAVENOUS at 22:35

## 2019-06-08 RX ADMIN — INSULIN GLARGINE 40 UNIT(S): 100 INJECTION, SOLUTION SUBCUTANEOUS at 08:06

## 2019-06-08 RX ADMIN — MIDODRINE HYDROCHLORIDE 20 MILLIGRAM(S): 2.5 TABLET ORAL at 22:36

## 2019-06-08 RX ADMIN — LINEZOLID 300 MILLIGRAM(S): 600 INJECTION, SOLUTION INTRAVENOUS at 22:15

## 2019-06-08 RX ADMIN — FENTANYL CITRATE 1 PATCH: 50 INJECTION INTRAVENOUS at 16:36

## 2019-06-08 RX ADMIN — HEPARIN SODIUM 5000 UNIT(S): 5000 INJECTION INTRAVENOUS; SUBCUTANEOUS at 18:05

## 2019-06-08 RX ADMIN — MEROPENEM 100 MILLIGRAM(S): 1 INJECTION INTRAVENOUS at 05:06

## 2019-06-08 RX ADMIN — FENTANYL CITRATE 1 PATCH: 50 INJECTION INTRAVENOUS at 07:45

## 2019-06-08 RX ADMIN — PANTOPRAZOLE SODIUM 40 MILLIGRAM(S): 20 TABLET, DELAYED RELEASE ORAL at 12:38

## 2019-06-08 RX ADMIN — MIDODRINE HYDROCHLORIDE 20 MILLIGRAM(S): 2.5 TABLET ORAL at 05:05

## 2019-06-08 RX ADMIN — Medication 650 MILLIGRAM(S): at 06:40

## 2019-06-08 RX ADMIN — Medication 1 APPLICATION(S): at 12:38

## 2019-06-08 RX ADMIN — FENTANYL CITRATE 1 PATCH: 50 INJECTION INTRAVENOUS at 18:03

## 2019-06-08 RX ADMIN — Medication 650 MILLIGRAM(S): at 05:38

## 2019-06-08 RX ADMIN — MEROPENEM 100 MILLIGRAM(S): 1 INJECTION INTRAVENOUS at 16:37

## 2019-06-08 RX ADMIN — LINEZOLID 300 MILLIGRAM(S): 600 INJECTION, SOLUTION INTRAVENOUS at 08:06

## 2019-06-08 RX ADMIN — HEPARIN SODIUM 5000 UNIT(S): 5000 INJECTION INTRAVENOUS; SUBCUTANEOUS at 05:06

## 2019-06-08 RX ADMIN — Medication 3 MILLILITER(S): at 17:09

## 2019-06-08 NOTE — GOALS OF CARE CONVERSATION - PERSONAL ADVANCE DIRECTIVE - CONVERSATION DETAILS
Discussion with myself, Dr. Adler and patient's family with primary decision maker Ernesto Walsh.  Wife, patient's sister, Ernesto, Ernesto's Wife Deepa, were present during conversation.  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  We discussed treatment options including debridement and amputation, which family declined at this time because it would cause more pain and suffering at this time.  In addition, family would like to make patient DNR with no pressors, or hemodialysis at this time.      They are still discussing further treatment with antibiotics, tube feeds and ongoing ventilator support.  They are considering hospice and overall withdrawal of ventilatory and tube feedings.     DNR and MOLST completed with no pressors or hemodialysis to be started.

## 2019-06-08 NOTE — PROGRESS NOTE ADULT - SUBJECTIVE AND OBJECTIVE BOX
admitted for sepsis with shock.   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   patient is still on IV merem, and zyvox.  T max 100.3.         INTERVAL HPI/OVERNIGHT EVENTS:  PAST MEDICAL & SURGICAL HISTORY:  DM (diabetes mellitus)  Heart failure  Diabetic neuropathy  Hyperlipidemia  Quadriplegia  Hypertension  Stroke  Hypertension  Obstructive cardiomyopathy  S/P percutaneous endoscopic gastrostomy (PEG) tube placement  History of tracheostomy      MEDICATIONS  (STANDING):  ALBUTerol    90 MICROgram(s) HFA Inhaler 1 Puff(s) Inhalation every 4 hours  ALBUTerol/ipratropium for Nebulization 3 milliLiter(s) Nebulizer every 6 hours  chlorhexidine 4% Liquid 1 Application(s) Topical <User Schedule>  dextrose 5%. 1000 milliLiter(s) (50 mL/Hr) IV Continuous <Continuous>  dextrose 50% Injectable 12.5 Gram(s) IV Push once  dextrose 50% Injectable 25 Gram(s) IV Push once  dextrose 50% Injectable 25 Gram(s) IV Push once  fentaNYL   Patch  12 MICROgram(s)/Hr 1 Patch Transdermal every 72 hours  heparin  Injectable 5000 Unit(s) SubCutaneous every 12 hours  insulin glargine Injectable (LANTUS) 40 Unit(s) SubCutaneous every morning  insulin lispro (HumaLOG) corrective regimen sliding scale   SubCutaneous every 6 hours  linezolid  IVPB 600 milliGRAM(s) IV Intermittent every 12 hours  linezolid  IVPB      meropenem  IVPB 1000 milliGRAM(s) IV Intermittent every 8 hours  midodrine 20 milliGRAM(s) Oral every 8 hours  pantoprazole   Suspension 40 milliGRAM(s) Oral daily  petrolatum Ophthalmic Ointment 1 Application(s) Both EYES daily  tiotropium 18 MICROgram(s) Capsule 1 Capsule(s) Inhalation daily    MEDICATIONS  (PRN):  acetaminophen    Suspension .. 650 milliGRAM(s) Oral every 6 hours PRN Temp greater or equal to 38C (100.4F)  dextrose 40% Gel 15 Gram(s) Oral once PRN Blood Glucose LESS THAN 70 milliGRAM(s)/deciliter  glucagon  Injectable 1 milliGRAM(s) IntraMuscular once PRN Glucose LESS THAN 70 milligrams/deciliter      Allergies    penicillin (Unknown)    Intolerances        REVIEW OF SYSTEMS:  CONSTITUTIONAL: No fever, weight loss, or fatigue  EYES: No eye pain, visual disturbances, or discharge  ENMT:  No difficulty hearing, tinnitus, vertigo; No sinus or throat pain  NECK: No pain or stiffness  BREASTS: No pain, masses, or nipple discharge  RESPIRATORY: No cough, wheezing, chills or hemoptysis; No shortness of breath  CARDIOVASCULAR: No chest pain, palpitations, dizziness, or leg swelling  GASTROINTESTINAL: No abdominal or epigastric pain. No nausea, vomiting, or hematemesis; No diarrhea or constipation. No melena or hematochezia.  GENITOURINARY: No dysuria, frequency, hematuria, or incontinence  NEUROLOGICAL: No headaches, memory loss, loss of strength, numbness, or tremors  SKIN: No itching, burning, rashes, or lesions   LYMPH NODES: No enlarged glands  ENDOCRINE: No heat or cold intolerance; No hair loss  MUSCULOSKELETAL: No joint pain or swelling; No muscle, back, or extremity pain  PSYCHIATRIC: No depression, anxiety, mood swings, or difficulty sleeping  HEME/LYMPH: No easy bruising, or bleeding gums  ALLERY AND IMMUNOLOGIC: No hives or eczema    Vital Signs Last 24 Hrs  T(C): 37.2 (08 Jun 2019 06:39), Max: 37.9 (07 Jun 2019 17:10)  T(F): 98.9 (08 Jun 2019 06:39), Max: 100.3 (07 Jun 2019 17:10)  HR: 90 (08 Jun 2019 09:25) (84 - 98)  BP: 138/50 (08 Jun 2019 05:03) (138/50 - 157/69)  BP(mean): --  RR: 18 (08 Jun 2019 05:03) (16 - 18)  SpO2: 99% (08 Jun 2019 09:29) (98% - 100%)    PHYSICAL EXAM:  GENERAL: NAD, well-groomed, well-developed  HEAD:  Atraumatic, Normocephalic  EYES: EOMI, PERRLA, conjunctiva and sclera clear  ENMT: No tonsillar erythema, exudates, or enlargement; Moist mucous membranes, Good dentition, No lesions  NECK: Supple, No JVD, Normal thyroid. s/p tracheotomy , connected to vent  for chronic respiratory failure  NERVOUS SYSTEM:  Quadriplegic.   CHEST/LUNG: Clear to percussion bilaterally; No rales, rhonchi, wheezing, or rubs  HEART: Regular rate and rhythm; No murmurs, rubs, or gallops  ABDOMEN: Soft, Nontender, Nondistended; Bowel sounds present. peg tube present.  EXTREMITIES:  2+ Peripheral Pulses, No clubbing, cyanosis, or edema  LYMPH: No lymphadenopathy noted  SKIN:  deep decubiti in sacral area, stage 4,  hip and  bilateral heel decubiti.  infectet rt heel with osteomyelitis. rt.    LABS:                        9.5    15.44 )-----------( 413      ( 08 Jun 2019 07:41 )             30.9                 CAPILLARY BLOOD GLUCOSE      POCT Blood Glucose.: 117 mg/dL (08 Jun 2019 06:35)  POCT Blood Glucose.: 126 mg/dL (08 Jun 2019 00:24)  POCT Blood Glucose.: 139 mg/dL (07 Jun 2019 17:07)  POCT Blood Glucose.: 197 mg/dL (07 Jun 2019 11:51)                RADIOLOGY & ADDITIONAL TESTS:    Imaging Personally Reviewed:  [ ] YES  [ ] NO    Consultant(s) Notes Reviewed:  [x ] YES  [ ] NO    Care Discussed with Consultants/Other Providers [x ] YES  [ ] NO    Care discussed with family,         [  ]   yes  [  ]  No    imp:    stable[ ]    unstable[  ]     improving [   ]       unchanged  [ x ]                Plans:  Continue present plans  [ x ]               New consult [  ]   specialty  .......               order test[  ]    test name.                  Discharge Planning  [  ] admitted for sepsis with shock.   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   patient is still on IV merem, and zyvox.  T max 100.3.         INTERVAL HPI/OVERNIGHT EVENTS:  PAST MEDICAL & SURGICAL HISTORY:  DM (diabetes mellitus)  Heart failure  Diabetic neuropathy  Hyperlipidemia  Quadriplegia  Hypertension  Stroke  Hypertension  Obstructive cardiomyopathy  S/P percutaneous endoscopic gastrostomy (PEG) tube placement  History of tracheostomy      MEDICATIONS  (STANDING):  ALBUTerol    90 MICROgram(s) HFA Inhaler 1 Puff(s) Inhalation every 4 hours  ALBUTerol/ipratropium for Nebulization 3 milliLiter(s) Nebulizer every 6 hours  chlorhexidine 4% Liquid 1 Application(s) Topical <User Schedule>  dextrose 5%. 1000 milliLiter(s) (50 mL/Hr) IV Continuous <Continuous>  dextrose 50% Injectable 12.5 Gram(s) IV Push once  dextrose 50% Injectable 25 Gram(s) IV Push once  dextrose 50% Injectable 25 Gram(s) IV Push once  fentaNYL   Patch  12 MICROgram(s)/Hr 1 Patch Transdermal every 72 hours  heparin  Injectable 5000 Unit(s) SubCutaneous every 12 hours  insulin glargine Injectable (LANTUS) 40 Unit(s) SubCutaneous every morning  insulin lispro (HumaLOG) corrective regimen sliding scale   SubCutaneous every 6 hours  linezolid  IVPB 600 milliGRAM(s) IV Intermittent every 12 hours  linezolid  IVPB      meropenem  IVPB 1000 milliGRAM(s) IV Intermittent every 8 hours  midodrine 20 milliGRAM(s) Oral every 8 hours  pantoprazole   Suspension 40 milliGRAM(s) Oral daily  petrolatum Ophthalmic Ointment 1 Application(s) Both EYES daily  tiotropium 18 MICROgram(s) Capsule 1 Capsule(s) Inhalation daily    MEDICATIONS  (PRN):  acetaminophen    Suspension .. 650 milliGRAM(s) Oral every 6 hours PRN Temp greater or equal to 38C (100.4F)  dextrose 40% Gel 15 Gram(s) Oral once PRN Blood Glucose LESS THAN 70 milliGRAM(s)/deciliter  glucagon  Injectable 1 milliGRAM(s) IntraMuscular once PRN Glucose LESS THAN 70 milligrams/deciliter      Allergies    penicillin (Unknown)    Intolerances        REVIEW OF SYSTEMS:  CONSTITUTIONAL: No fever, weight loss, or fatigue  EYES: No eye pain, visual disturbances, or discharge  ENMT:  No difficulty hearing, tinnitus, vertigo; No sinus or throat pain  NECK: No pain or stiffness  BREASTS: No pain, masses, or nipple discharge  RESPIRATORY: No cough, wheezing, chills or hemoptysis; No shortness of breath  CARDIOVASCULAR: No chest pain, palpitations, dizziness, or leg swelling  GASTROINTESTINAL: No abdominal or epigastric pain. No nausea, vomiting, or hematemesis; No diarrhea or constipation. No melena or hematochezia.  GENITOURINARY: No dysuria, frequency, hematuria, or incontinence  NEUROLOGICAL: No headaches, memory loss, loss of strength, numbness, or tremors  SKIN: No itching, burning, rashes, or lesions   LYMPH NODES: No enlarged glands  ENDOCRINE: No heat or cold intolerance; No hair loss  MUSCULOSKELETAL: No joint pain or swelling; No muscle, back, or extremity pain  PSYCHIATRIC: No depression, anxiety, mood swings, or difficulty sleeping  HEME/LYMPH: No easy bruising, or bleeding gums  ALLERY AND IMMUNOLOGIC: No hives or eczema    Vital Signs Last 24 Hrs  T(C): 37.2 (08 Jun 2019 06:39), Max: 37.9 (07 Jun 2019 17:10)  T(F): 98.9 (08 Jun 2019 06:39), Max: 100.3 (07 Jun 2019 17:10)  HR: 90 (08 Jun 2019 09:25) (84 - 98)  BP: 138/50 (08 Jun 2019 05:03) (138/50 - 157/69)  BP(mean): --  RR: 18 (08 Jun 2019 05:03) (16 - 18)  SpO2: 99% (08 Jun 2019 09:29) (98% - 100%)    PHYSICAL EXAM:  GENERAL: NAD, well-groomed, well-developed  HEAD:  Atraumatic, Normocephalic  EYES: EOMI, PERRLA, conjunctiva and sclera clear  ENMT: No tonsillar erythema, exudates, or enlargement; Moist mucous membranes, Good dentition, No lesions  NECK: Supple, No JVD, Normal thyroid. s/p tracheotomy , connected to vent  for chronic respiratory failure  NERVOUS SYSTEM:  Quadriplegic.   CHEST/LUNG: Clear to percussion bilaterally; No rales, rhonchi, wheezing, or rubs  HEART: Regular rate and rhythm; No murmurs, rubs, or gallops  ABDOMEN: Soft, Nontender, Nondistended; Bowel sounds present. peg tube present.  EXTREMITIES:  2+ Peripheral Pulses, No clubbing, cyanosis, or edema  LYMPH: No lymphadenopathy noted  SKIN:  deep decubiti in sacral area, stage 4,  hip and  bilateral heel decubiti.  infectet rt heel with osteomyelitis. rt.    LABS:                        9.5    15.44 )-----------( 413      ( 08 Jun 2019 07:41 )             30.9                 CAPILLARY BLOOD GLUCOSE      POCT Blood Glucose.: 117 mg/dL (08 Jun 2019 06:35)  POCT Blood Glucose.: 126 mg/dL (08 Jun 2019 00:24)  POCT Blood Glucose.: 139 mg/dL (07 Jun 2019 17:07)  POCT Blood Glucose.: 197 mg/dL (07 Jun 2019 11:51)                RADIOLOGY & ADDITIONAL TESTS:    Imaging Personally Reviewed:  [ ] YES  [ ] NO    Consultant(s) Notes Reviewed:  [x ] YES  [ ] NO    Care Discussed with Consultants/Other Providers [x ] YES  [ ] NO    Care discussed with family,         [  ]   yes  [  ]  No    imp:    stable[ ]    unstable[  ]     improving [   ]       unchanged  [ x ]                Plans:  Continue present plans  [ x ] will increase dose of fentanyl.               New consult [  ]   specialty  .......               order test[  ]    test name.                  Discharge Planning  [  ]

## 2019-06-08 NOTE — PROGRESS NOTE ADULT - SUBJECTIVE AND OBJECTIVE BOX
INTERVAL History:  Trach  PEG    Allergies    penicillin (Unknown)    Intolerances        MEDICATIONS  (STANDING):  ALBUTerol    90 MICROgram(s) HFA Inhaler 1 Puff(s) Inhalation every 4 hours  ALBUTerol/ipratropium for Nebulization 3 milliLiter(s) Nebulizer every 6 hours  chlorhexidine 4% Liquid 1 Application(s) Topical <User Schedule>  dextrose 5%. 1000 milliLiter(s) (50 mL/Hr) IV Continuous <Continuous>  dextrose 50% Injectable 12.5 Gram(s) IV Push once  dextrose 50% Injectable 25 Gram(s) IV Push once  dextrose 50% Injectable 25 Gram(s) IV Push once  fentaNYL   Patch  50 MICROgram(s)/Hr 1 Patch Transdermal every 72 hours  heparin  Injectable 5000 Unit(s) SubCutaneous every 12 hours  insulin glargine Injectable (LANTUS) 40 Unit(s) SubCutaneous every morning  insulin lispro (HumaLOG) corrective regimen sliding scale   SubCutaneous every 6 hours  linezolid  IVPB 600 milliGRAM(s) IV Intermittent every 12 hours  linezolid  IVPB      meropenem  IVPB 1000 milliGRAM(s) IV Intermittent every 8 hours  midodrine 20 milliGRAM(s) Oral every 8 hours  pantoprazole   Suspension 40 milliGRAM(s) Oral daily  petrolatum Ophthalmic Ointment 1 Application(s) Both EYES daily  tiotropium 18 MICROgram(s) Capsule 1 Capsule(s) Inhalation daily    MEDICATIONS  (PRN):  acetaminophen    Suspension .. 650 milliGRAM(s) Oral every 6 hours PRN Temp greater or equal to 38C (100.4F)  dextrose 40% Gel 15 Gram(s) Oral once PRN Blood Glucose LESS THAN 70 milliGRAM(s)/deciliter  glucagon  Injectable 1 milliGRAM(s) IntraMuscular once PRN Glucose LESS THAN 70 milligrams/deciliter      Vital Signs Last 24 Hrs  T(C): 37.2 (08 Jun 2019 06:39), Max: 37.9 (07 Jun 2019 17:10)  T(F): 98.9 (08 Jun 2019 06:39), Max: 100.3 (07 Jun 2019 17:10)  HR: 92 (08 Jun 2019 13:38) (84 - 98)  BP: 138/50 (08 Jun 2019 05:03) (138/50 - 157/69)  BP(mean): --  RR: 18 (08 Jun 2019 05:03) (16 - 18)  SpO2: 99% (08 Jun 2019 13:38) (98% - 100%)    PHYSICAL EXAM:    On Ventilator:- Trach  EYES: EOMI, PERRLA, conjunctiva and sclera clear  NECK: Supple, No JVD, Normal thyroid  CHEST/LUNG: Clear to percussion bilaterally; No rales, rhonchi,   HEART: Regular rate and rhythm;   ABDOMEN:   PEG      LABS:                        9.5    15.44 )-----------( 413      ( 08 Jun 2019 07:41 )             30.9     06-08    144  |  110<H>  |  30<H>  ----------------------------<  151<H>  5.3   |  29  |  0.91    Ca    7.4<L>      08 Jun 2019 10:22              RADIOLOGY & ADDITIONAL STUDIES:    PATHOLOGY:

## 2019-06-08 NOTE — PROGRESS NOTE ADULT - SUBJECTIVE AND OBJECTIVE BOX
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  NH report pt BUN & Cr incr, EMS report pt hypotensive on their arrival.  On arrival to ED, removed two 12microgram fentanyl patches.  admitted to ICU for severe poly microbial drug resistant sepsis, septic shock, dehydration, hypernatremia, acute renal failure with ATN, multiple infected decubitus ulcers (sacrum, hip, bilateral lower extremities), osteomyelitis of right ankle, hypercalcemia, left renal lesion and pulmonary nodules.  Now out of ICU.    OVERNIGHT EVENTS:  Comfortable  on Vent.    Vital Signs Last 24 Hrs  T(C): 37.7 (08 Jun 2019 16:55), Max: 37.9 (08 Jun 2019 05:03)  T(F): 99.9 (08 Jun 2019 16:55), Max: 100.2 (08 Jun 2019 05:03)  HR: 90 (08 Jun 2019 17:25) (90 - 101)  BP: 128/53 (08 Jun 2019 16:55) (128/53 - 152/66)  BP(mean): --  RR: 100 (08 Jun 2019 16:55) (16 - 100)  SpO2: 99% (08 Jun 2019 17:25) (98% - 100%)    Mode: AC/ CMV (Assist Control/ Continuous Mandatory Ventilation)  RR (machine): 12  TV (machine): 500  FiO2: 30  PEEP: 5  ITime: 1.67  MAP: 10  PIP: 19    PHYSICAL EXAM:  GEN:        comfortable.  HEENT:     Trach  RESP:        no wheezing.    CVS:           Regular rate and rhythm.   ABD:         Soft, non-tender, non-distended;     MEDICATIONS  (STANDING):  ALBUTerol    90 MICROgram(s) HFA Inhaler 1 Puff(s) Inhalation every 4 hours  ALBUTerol/ipratropium for Nebulization 3 milliLiter(s) Nebulizer every 6 hours  chlorhexidine 4% Liquid 1 Application(s) Topical <User Schedule>  dextrose 5%. 1000 milliLiter(s) (50 mL/Hr) IV Continuous <Continuous>  dextrose 50% Injectable 12.5 Gram(s) IV Push once  dextrose 50% Injectable 25 Gram(s) IV Push once  dextrose 50% Injectable 25 Gram(s) IV Push once  fentaNYL   Patch  50 MICROgram(s)/Hr 1 Patch Transdermal every 72 hours  heparin  Injectable 5000 Unit(s) SubCutaneous every 12 hours  insulin glargine Injectable (LANTUS) 40 Unit(s) SubCutaneous every morning  insulin lispro (HumaLOG) corrective regimen sliding scale   SubCutaneous every 6 hours  linezolid  IVPB 600 milliGRAM(s) IV Intermittent every 12 hours  linezolid  IVPB      meropenem  IVPB 1000 milliGRAM(s) IV Intermittent every 8 hours  midodrine 20 milliGRAM(s) Oral every 8 hours  pantoprazole   Suspension 40 milliGRAM(s) Oral daily  petrolatum Ophthalmic Ointment 1 Application(s) Both EYES daily  tiotropium 18 MICROgram(s) Capsule 1 Capsule(s) Inhalation daily    MEDICATIONS  (PRN):  acetaminophen    Suspension .. 650 milliGRAM(s) Oral every 6 hours PRN Temp greater or equal to 38C (100.4F)  dextrose 40% Gel 15 Gram(s) Oral once PRN Blood Glucose LESS THAN 70 milliGRAM(s)/deciliter  glucagon  Injectable 1 milliGRAM(s) IntraMuscular once PRN Glucose LESS THAN 70 milligrams/deciliter    LABS:                        9.5    15.44 )-----------( 413      ( 08 Jun 2019 07:41 )             30.9     06-08    144  |  110<H>  |  30<H>  ----------------------------<  151<H>  5.3   |  29  |  0.91    Ca    7.4<L>      08 Jun 2019 10:22    ASSESSMENT AND PLAN:  ·	S/P Septic shock.  ·	Respiratory failure  ·	S/P Tracheostomy.  ·	Pseudomonas in sputum.  ·	UTI with enterococcus.  ·	CVA.  ·	Quadriplegia.  ·	HTN.  ·	DM    Complete antibiotics per ID.  Continue Vent support.

## 2019-06-08 NOTE — PROGRESS NOTE ADULT - ASSESSMENT
quadriplegic patient, with tracheotomy and peg.    vent support for chronic resp failure.   multiple dcubiti  teomyelitis rt ankle, with recurrent bacteremia, Multiple Drug resistant organisms   Diabetes. . quadriplegic patient, with tracheotomy and peg.    vent support for chronic resp failure.   multiple dcubiti  teomyelitis rt ankle, with recurrent bacteremia, Multiple Drug resistant organisms   Diabetes. .    Had a family meeting when end of life discussions were presented and  treminal weaning,  cessations of feedings,  , surgery for the  rt ankle etc were discussed , in addition to pain management and Hospice care. .  Family is inclined to sign the DNR form and Paul Orozco will be the signatory . They needed  time to look over the form as well as have more meaningful dialoge within the family defer terminal weaning was considered. . At this juncture ,  they wanted only comfort measures. . Dr Alan Thomas from critical care who has been taking cre of the patient in ICU wqas present in the meeting and  headed the discussion.

## 2019-06-08 NOTE — PROGRESS NOTE ADULT - SUBJECTIVE AND OBJECTIVE BOX
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  out of icu   and stable     goals of care conversation read and agree    Allergies    penicillin (Unknown)    Intolerances        MEDICATIONS  (STANDING):  ALBUTerol    90 MICROgram(s) HFA Inhaler 1 Puff(s) Inhalation every 4 hours  ALBUTerol/ipratropium for Nebulization 3 milliLiter(s) Nebulizer every 6 hours  chlorhexidine 4% Liquid 1 Application(s) Topical <User Schedule>  dextrose 5%. 1000 milliLiter(s) (50 mL/Hr) IV Continuous <Continuous>  dextrose 50% Injectable 12.5 Gram(s) IV Push once  dextrose 50% Injectable 25 Gram(s) IV Push once  dextrose 50% Injectable 25 Gram(s) IV Push once  fentaNYL   Patch  50 MICROgram(s)/Hr 1 Patch Transdermal every 72 hours  heparin  Injectable 5000 Unit(s) SubCutaneous every 12 hours  insulin glargine Injectable (LANTUS) 40 Unit(s) SubCutaneous every morning  insulin lispro (HumaLOG) corrective regimen sliding scale   SubCutaneous every 6 hours  linezolid  IVPB 600 milliGRAM(s) IV Intermittent every 12 hours  linezolid  IVPB      meropenem  IVPB 1000 milliGRAM(s) IV Intermittent every 8 hours  midodrine 20 milliGRAM(s) Oral every 8 hours  pantoprazole   Suspension 40 milliGRAM(s) Oral daily  petrolatum Ophthalmic Ointment 1 Application(s) Both EYES daily  tiotropium 18 MICROgram(s) Capsule 1 Capsule(s) Inhalation daily    MEDICATIONS  (PRN):  acetaminophen    Suspension .. 650 milliGRAM(s) Oral every 6 hours PRN Temp greater or equal to 38C (100.4F)  dextrose 40% Gel 15 Gram(s) Oral once PRN Blood Glucose LESS THAN 70 milliGRAM(s)/deciliter  glucagon  Injectable 1 milliGRAM(s) IntraMuscular once PRN Glucose LESS THAN 70 milligrams/deciliter      REVIEW OF SYSTEMS:    unable to obtain     VITAL SIGNS:  T(C): 37.2 (06-08-19 @ 06:39), Max: 37.9 (06-07-19 @ 17:10)  T(F): 98.9 (06-08-19 @ 06:39), Max: 100.3 (06-07-19 @ 17:10)  HR: 92 (06-08-19 @ 13:38) (84 - 98)  BP: 138/50 (06-08-19 @ 05:03) (138/50 - 157/69)  RR: 18 (06-08-19 @ 05:03) (16 - 18)  SpO2: 99% (06-08-19 @ 13:38) (98% - 100%)  Wt(kg): --    PHYSICAL EXAM:    GENERAL: not in any distress  HEENT: tracheostomy to vent   CHEST/LUNG: Clear to auscultation bilaterally; No rales, rhonchi, wheezing  HEART: Regular rate and rhythm; No murmurs, rubs, or gallops  ABDOMEN: Soft, Nontender, Nondistended; Bowel sounds present, no rebound   EXTREMITIES:  2+ Peripheral Pulses, No clubbing, cyanosis, or edema  SKIN: muliple decubiti and gangrene    NERVOUS SYSTEM:  ch vegetative state    LABS:                         9.5    15.44 )-----------( 413      ( 08 Jun 2019 07:41 )             30.9     06-08    144  |  110<H>  |  30<H>  ----------------------------<  151<H>  5.3   |  29  |  0.91    Ca    7.4<L>      08 Jun 2019 10:22                                              Radiology:

## 2019-06-09 LAB
GLUCOSE BLDC GLUCOMTR-MCNC: 114 MG/DL — HIGH (ref 70–99)
GLUCOSE BLDC GLUCOMTR-MCNC: 160 MG/DL — HIGH (ref 70–99)
GLUCOSE BLDC GLUCOMTR-MCNC: 186 MG/DL — HIGH (ref 70–99)
GLUCOSE BLDC GLUCOMTR-MCNC: 188 MG/DL — HIGH (ref 70–99)

## 2019-06-09 RX ADMIN — MEROPENEM 100 MILLIGRAM(S): 1 INJECTION INTRAVENOUS at 05:35

## 2019-06-09 RX ADMIN — HEPARIN SODIUM 5000 UNIT(S): 5000 INJECTION INTRAVENOUS; SUBCUTANEOUS at 18:10

## 2019-06-09 RX ADMIN — Medication 3 MILLILITER(S): at 00:07

## 2019-06-09 RX ADMIN — FENTANYL CITRATE 1 PATCH: 50 INJECTION INTRAVENOUS at 07:29

## 2019-06-09 RX ADMIN — Medication 2: at 12:40

## 2019-06-09 RX ADMIN — MEROPENEM 100 MILLIGRAM(S): 1 INJECTION INTRAVENOUS at 13:06

## 2019-06-09 RX ADMIN — LINEZOLID 300 MILLIGRAM(S): 600 INJECTION, SOLUTION INTRAVENOUS at 09:03

## 2019-06-09 RX ADMIN — Medication 2: at 23:17

## 2019-06-09 RX ADMIN — Medication 3 MILLILITER(S): at 17:14

## 2019-06-09 RX ADMIN — HEPARIN SODIUM 5000 UNIT(S): 5000 INJECTION INTRAVENOUS; SUBCUTANEOUS at 05:35

## 2019-06-09 RX ADMIN — MEROPENEM 100 MILLIGRAM(S): 1 INJECTION INTRAVENOUS at 23:11

## 2019-06-09 RX ADMIN — LINEZOLID 300 MILLIGRAM(S): 600 INJECTION, SOLUTION INTRAVENOUS at 19:37

## 2019-06-09 RX ADMIN — FENTANYL CITRATE 1 PATCH: 50 INJECTION INTRAVENOUS at 19:35

## 2019-06-09 RX ADMIN — Medication 3 MILLILITER(S): at 05:19

## 2019-06-09 RX ADMIN — Medication 2: at 18:10

## 2019-06-09 RX ADMIN — PANTOPRAZOLE SODIUM 40 MILLIGRAM(S): 20 TABLET, DELAYED RELEASE ORAL at 12:41

## 2019-06-09 RX ADMIN — Medication 3 MILLILITER(S): at 11:09

## 2019-06-09 RX ADMIN — Medication 1 APPLICATION(S): at 12:41

## 2019-06-09 RX ADMIN — CHLORHEXIDINE GLUCONATE 1 APPLICATION(S): 213 SOLUTION TOPICAL at 05:34

## 2019-06-09 RX ADMIN — INSULIN GLARGINE 40 UNIT(S): 100 INJECTION, SOLUTION SUBCUTANEOUS at 12:40

## 2019-06-09 NOTE — PROGRESS NOTE ADULT - SUBJECTIVE AND OBJECTIVE BOX
Patient is a 73y old  Male who presents with a chief complaint of Sepsis (09 Jun 2019 09:23)  T max 100.2.   wbc still elevated.    DNR status, No pressors.   clinically unchanged.   still on Iv merem and zyvox.        INTERVAL HPI/OVERNIGHT EVENTS:  PAST MEDICAL & SURGICAL HISTORY:  DM (diabetes mellitus)  Heart failure  Diabetic neuropathy  Hyperlipidemia  Quadriplegia  Hypertension  Stroke  Hypertension  Obstructive cardiomyopathy  S/P percutaneous endoscopic gastrostomy (PEG) tube placement  History of tracheostomy      MEDICATIONS  (STANDING):  ALBUTerol    90 MICROgram(s) HFA Inhaler 1 Puff(s) Inhalation every 4 hours  ALBUTerol/ipratropium for Nebulization 3 milliLiter(s) Nebulizer every 6 hours  chlorhexidine 4% Liquid 1 Application(s) Topical <User Schedule>  dextrose 5%. 1000 milliLiter(s) (50 mL/Hr) IV Continuous <Continuous>  dextrose 50% Injectable 12.5 Gram(s) IV Push once  dextrose 50% Injectable 25 Gram(s) IV Push once  dextrose 50% Injectable 25 Gram(s) IV Push once  fentaNYL   Patch  50 MICROgram(s)/Hr 1 Patch Transdermal every 72 hours  heparin  Injectable 5000 Unit(s) SubCutaneous every 12 hours  insulin glargine Injectable (LANTUS) 40 Unit(s) SubCutaneous every morning  insulin lispro (HumaLOG) corrective regimen sliding scale   SubCutaneous every 6 hours  linezolid  IVPB 600 milliGRAM(s) IV Intermittent every 12 hours  linezolid  IVPB      meropenem  IVPB 1000 milliGRAM(s) IV Intermittent every 8 hours  midodrine 20 milliGRAM(s) Oral every 8 hours  pantoprazole   Suspension 40 milliGRAM(s) Oral daily  petrolatum Ophthalmic Ointment 1 Application(s) Both EYES daily  tiotropium 18 MICROgram(s) Capsule 1 Capsule(s) Inhalation daily    MEDICATIONS  (PRN):  acetaminophen    Suspension .. 650 milliGRAM(s) Oral every 6 hours PRN Temp greater or equal to 38C (100.4F)  dextrose 40% Gel 15 Gram(s) Oral once PRN Blood Glucose LESS THAN 70 milliGRAM(s)/deciliter  glucagon  Injectable 1 milliGRAM(s) IntraMuscular once PRN Glucose LESS THAN 70 milligrams/deciliter      Allergies    penicillin (Unknown)    Intolerances        REVIEW OF SYSTEMS:  CONSTITUTIONAL: No fever, weight loss, or fatigue  EYES: No eye pain, visual disturbances, or discharge  ENMT:  No difficulty hearing, tinnitus, vertigo; No sinus or throat pain  NECK: No pain or stiffness  BREASTS: No pain, masses, or nipple discharge  RESPIRATORY: No cough, wheezing, chills or hemoptysis; No shortness of breath  CARDIOVASCULAR: No chest pain, palpitations, dizziness, or leg swelling  GASTROINTESTINAL: No abdominal or epigastric pain. No nausea, vomiting, or hematemesis; No diarrhea or constipation. No melena or hematochezia.  GENITOURINARY: No dysuria, frequency, hematuria, or incontinence  NEUROLOGICAL: No headaches, memory loss, loss of strength, numbness, or tremors  SKIN: No itching, burning, rashes, or lesions   LYMPH NODES: No enlarged glands  ENDOCRINE: No heat or cold intolerance; No hair loss  MUSCULOSKELETAL: No joint pain or swelling; No muscle, back, or extremity pain  PSYCHIATRIC: No depression, anxiety, mood swings, or difficulty sleeping  HEME/LYMPH: No easy bruising, or bleeding gums  ALLERY AND IMMUNOLOGIC: No hives or eczema    Vital Signs Last 24 Hrs  T(C): 37.3 (09 Jun 2019 10:48), Max: 37.7 (08 Jun 2019 16:55)  T(F): 99.1 (09 Jun 2019 10:48), Max: 99.9 (08 Jun 2019 16:55)  HR: 94 (09 Jun 2019 10:48) (88 - 101)  BP: 140/55 (09 Jun 2019 10:48) (128/53 - 143/61)  BP(mean): --  RR: 16 (09 Jun 2019 10:48) (16 - 100)  SpO2: 99% (09 Jun 2019 10:48) (96% - 100%)    PHYSICAL EXAM:  GENERAL: NAD, well-groomed, well-developed. un responsive  HEAD:  Atraumatic, Normocephalic  EYES: EOMI, PERRLA, conjunctiva and sclera clear  ENMT: No tonsillar erythema, exudates, or enlargement; Moist mucous membranes, Good dentition, No lesions  NECK: Supple, No JVD, Normal thyroid. S/p tracheostomy , with vent support.  NERVOUS SYSTEM:  Quadriplegic  CHEST/LUNG: Clear to percussion bilaterally; No rales, rhonchi, wheezing, or rubs  HEART: Regular rate and rhythm; No murmurs, rubs, or gallops  ABDOMEN: Soft, Nontender, Nondistended; Bowel sounds present. peg tube in place  EXTREMITIES:  Osteromyelitis of the  rt ankle.  LYMPH: No lymphadenopathy noted  SKIN:multiple decubiti, stage 4 in sacral area as well as on heels and hips.     LABS:                        9.5    15.44 )-----------( 413      ( 08 Jun 2019 07:41 )             30.9     06-08    144  |  110<H>  |  30<H>  ----------------------------<  151<H>  5.3   |  29  |  0.91    Ca    7.4<L>      08 Jun 2019 10:22            CAPILLARY BLOOD GLUCOSE      POCT Blood Glucose.: 114 mg/dL (09 Jun 2019 05:43)  POCT Blood Glucose.: 79 mg/dL (08 Jun 2019 23:46)  POCT Blood Glucose.: 108 mg/dL (08 Jun 2019 16:54)  POCT Blood Glucose.: 154 mg/dL (08 Jun 2019 12:36)                RADIOLOGY & ADDITIONAL TESTS:    Imaging Personally Reviewed:  [ ] YES  [ ] NO    Consultant(s) Notes Reviewed:  [ ] YES  [ ] NO    Care Discussed with Consultants/Other Providers [ ] YES  [ ] NO    Care discussed with family,         [  ]   yes  [  ]  No    imp:    stable[ ]    unstable[  ]     improving [   ]       unchanged  [x  ]                Plans:  Continue present plans  [  ]               New consult [  ]   specialty  .......               order test[  ]    test name.                  Discharge Planning  [  ]

## 2019-06-09 NOTE — PROGRESS NOTE ADULT - SUBJECTIVE AND OBJECTIVE BOX
INTERVAL History:  Sepsis.  On ventilator.  Allergies    penicillin (Unknown)    Intolerances        MEDICATIONS  (STANDING):  ALBUTerol    90 MICROgram(s) HFA Inhaler 1 Puff(s) Inhalation every 4 hours  ALBUTerol/ipratropium for Nebulization 3 milliLiter(s) Nebulizer every 6 hours  chlorhexidine 4% Liquid 1 Application(s) Topical <User Schedule>  dextrose 5%. 1000 milliLiter(s) (50 mL/Hr) IV Continuous <Continuous>  dextrose 50% Injectable 12.5 Gram(s) IV Push once  dextrose 50% Injectable 25 Gram(s) IV Push once  dextrose 50% Injectable 25 Gram(s) IV Push once  fentaNYL   Patch  50 MICROgram(s)/Hr 1 Patch Transdermal every 72 hours  heparin  Injectable 5000 Unit(s) SubCutaneous every 12 hours  insulin glargine Injectable (LANTUS) 40 Unit(s) SubCutaneous every morning  insulin lispro (HumaLOG) corrective regimen sliding scale   SubCutaneous every 6 hours  linezolid  IVPB 600 milliGRAM(s) IV Intermittent every 12 hours  linezolid  IVPB      meropenem  IVPB 1000 milliGRAM(s) IV Intermittent every 8 hours  midodrine 20 milliGRAM(s) Oral every 8 hours  pantoprazole   Suspension 40 milliGRAM(s) Oral daily  petrolatum Ophthalmic Ointment 1 Application(s) Both EYES daily  tiotropium 18 MICROgram(s) Capsule 1 Capsule(s) Inhalation daily    MEDICATIONS  (PRN):  acetaminophen    Suspension .. 650 milliGRAM(s) Oral every 6 hours PRN Temp greater or equal to 38C (100.4F)  dextrose 40% Gel 15 Gram(s) Oral once PRN Blood Glucose LESS THAN 70 milliGRAM(s)/deciliter  glucagon  Injectable 1 milliGRAM(s) IntraMuscular once PRN Glucose LESS THAN 70 milligrams/deciliter      Vital Signs Last 24 Hrs  T(C): 37.4 (09 Jun 2019 05:09), Max: 37.7 (08 Jun 2019 16:55)  T(F): 99.4 (09 Jun 2019 05:09), Max: 99.9 (08 Jun 2019 16:55)  HR: 88 (09 Jun 2019 08:37) (88 - 101)  BP: 141/72 (09 Jun 2019 05:09) (128/53 - 143/61)  BP(mean): --  RR: 16 (09 Jun 2019 05:09) (16 - 100)  SpO2: 99% (09 Jun 2019 08:37) (96% - 100%)    PHYSICAL EXAM:      EYES: Blind.  NECK:   trach  Ventilator.  CHEST/LUNG:  rales, rhonchi,   HEART: Regular rate and rhythm;   ABDOMEN:   PEG  Drain        LABS:                        9.5    15.44 )-----------( 413      ( 08 Jun 2019 07:41 )             30.9     06-08    144  |  110<H>  |  30<H>  ----------------------------<  151<H>  5.3   |  29  |  0.91    Ca    7.4<L>      08 Jun 2019 10:22              RADIOLOGY & ADDITIONAL STUDIES:    PATHOLOGY:

## 2019-06-09 NOTE — PROGRESS NOTE ADULT - ASSESSMENT
quadriplegic patient, with tracheotomy and peg.    vent support for chronic resp failure.   multiple dcubiti  teomyelitis rt ankle, with recurrent bacteremia, Multiple Drug resistant organisms. Family wants only comfort care.    Diabetes.   DNR status. .

## 2019-06-09 NOTE — PROGRESS NOTE ADULT - SUBJECTIVE AND OBJECTIVE BOX
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  NH report pt BUN & Cr incr, EMS report pt hypotensive on their arrival.  On arrival to ED, removed two 12microgram fentanyl patches.  admitted to ICU for severe poly microbial drug resistant sepsis, septic shock, dehydration, hypernatremia, acute renal failure with ATN, multiple infected decubitus ulcers (sacrum, hip, bilateral lower extremities), osteomyelitis of right ankle, hypercalcemia, left renal lesion and pulmonary nodules.  Now out of ICU.    OVERNIGHT EVENTS:  Comfortable on Vent.    Vital Signs Last 24 Hrs  T(C): 37.3 (09 Jun 2019 10:48), Max: 37.7 (08 Jun 2019 16:55)  T(F): 99.1 (09 Jun 2019 10:48), Max: 99.9 (08 Jun 2019 16:55)  HR: 86 (09 Jun 2019 12:01) (86 - 101)  BP: 140/55 (09 Jun 2019 10:48) (128/53 - 143/61)  BP(mean): --  RR: 16 (09 Jun 2019 10:48) (16 - 100)  SpO2: 99% (09 Jun 2019 12:01) (96% - 100%)    Mode: AC/ CMV (Assist Control/ Continuous Mandatory Ventilation)  RR (machine): 12  TV (machine): 500  FiO2: 30  PEEP: 5  ITime: 1.43  MAP: 11  PIP: 18  PHYSICAL EXAM:  GEN:       comfortable.  HEENT:    Trach  RESP:        no distress.  CVS:          Regular rate and rhythm.   ABD:         Soft, non-tender, non-distended;     MEDICATIONS  (STANDING):  ALBUTerol    90 MICROgram(s) HFA Inhaler 1 Puff(s) Inhalation every 4 hours  ALBUTerol/ipratropium for Nebulization 3 milliLiter(s) Nebulizer every 6 hours  chlorhexidine 4% Liquid 1 Application(s) Topical <User Schedule>  dextrose 5%. 1000 milliLiter(s) (50 mL/Hr) IV Continuous <Continuous>  dextrose 50% Injectable 12.5 Gram(s) IV Push once  dextrose 50% Injectable 25 Gram(s) IV Push once  dextrose 50% Injectable 25 Gram(s) IV Push once  fentaNYL   Patch  50 MICROgram(s)/Hr 1 Patch Transdermal every 72 hours  heparin  Injectable 5000 Unit(s) SubCutaneous every 12 hours  insulin glargine Injectable (LANTUS) 40 Unit(s) SubCutaneous every morning  insulin lispro (HumaLOG) corrective regimen sliding scale   SubCutaneous every 6 hours  linezolid  IVPB 600 milliGRAM(s) IV Intermittent every 12 hours  linezolid  IVPB      meropenem  IVPB 1000 milliGRAM(s) IV Intermittent every 8 hours  midodrine 20 milliGRAM(s) Oral every 8 hours  pantoprazole   Suspension 40 milliGRAM(s) Oral daily  petrolatum Ophthalmic Ointment 1 Application(s) Both EYES daily  tiotropium 18 MICROgram(s) Capsule 1 Capsule(s) Inhalation daily    MEDICATIONS  (PRN):  acetaminophen    Suspension .. 650 milliGRAM(s) Oral every 6 hours PRN Temp greater or equal to 38C (100.4F)  dextrose 40% Gel 15 Gram(s) Oral once PRN Blood Glucose LESS THAN 70 milliGRAM(s)/deciliter  glucagon  Injectable 1 milliGRAM(s) IntraMuscular once PRN Glucose LESS THAN 70 milligrams/deciliter    LABS:                        9.5    15.44 )-----------( 413      ( 08 Jun 2019 07:41 )             30.9     06-08    144  |  110<H>  |  30<H>  ----------------------------<  151<H>  5.3   |  29  |  0.91    Ca    7.4<L>      08 Jun 2019 10:22    ASSESSMENT AND PLAN:  ·	S/P Septic shock.  ·	Respiratory failure  ·	S/P Tracheostomy.  ·	Pseudomonas in sputum.  ·	UTI with enterococcus.  ·	CVA.  ·	Quadriplegia.  ·	HTN.  ·	DM    Antibiotics, Vent and nebulizer.

## 2019-06-10 LAB
ANION GAP SERPL CALC-SCNC: 4 MMOL/L — LOW (ref 5–17)
BUN SERPL-MCNC: 24 MG/DL — HIGH (ref 7–23)
CALCIUM SERPL-MCNC: 7.9 MG/DL — LOW (ref 8.5–10.1)
CHLORIDE SERPL-SCNC: 108 MMOL/L — SIGNIFICANT CHANGE UP (ref 96–108)
CO2 SERPL-SCNC: 27 MMOL/L — SIGNIFICANT CHANGE UP (ref 22–31)
CREAT SERPL-MCNC: 0.83 MG/DL — SIGNIFICANT CHANGE UP (ref 0.5–1.3)
GLUCOSE BLDC GLUCOMTR-MCNC: 100 MG/DL — HIGH (ref 70–99)
GLUCOSE BLDC GLUCOMTR-MCNC: 124 MG/DL — HIGH (ref 70–99)
GLUCOSE BLDC GLUCOMTR-MCNC: 155 MG/DL — HIGH (ref 70–99)
GLUCOSE BLDC GLUCOMTR-MCNC: 160 MG/DL — HIGH (ref 70–99)
GLUCOSE BLDC GLUCOMTR-MCNC: 168 MG/DL — HIGH (ref 70–99)
GLUCOSE BLDC GLUCOMTR-MCNC: 43 MG/DL — CRITICAL LOW (ref 70–99)
GLUCOSE BLDC GLUCOMTR-MCNC: 44 MG/DL — CRITICAL LOW (ref 70–99)
GLUCOSE BLDC GLUCOMTR-MCNC: 52 MG/DL — LOW (ref 70–99)
GLUCOSE BLDC GLUCOMTR-MCNC: 76 MG/DL — SIGNIFICANT CHANGE UP (ref 70–99)
GLUCOSE BLDC GLUCOMTR-MCNC: 77 MG/DL — SIGNIFICANT CHANGE UP (ref 70–99)
GLUCOSE SERPL-MCNC: 116 MG/DL — HIGH (ref 70–99)
HCT VFR BLD CALC: 30.6 % — LOW (ref 39–50)
HGB BLD-MCNC: 9 G/DL — LOW (ref 13–17)
MCHC RBC-ENTMCNC: 29 PG — SIGNIFICANT CHANGE UP (ref 27–34)
MCHC RBC-ENTMCNC: 29.4 GM/DL — LOW (ref 32–36)
MCV RBC AUTO: 98.7 FL — SIGNIFICANT CHANGE UP (ref 80–100)
NRBC # BLD: 0 /100 WBCS — SIGNIFICANT CHANGE UP (ref 0–0)
PLATELET # BLD AUTO: 438 K/UL — HIGH (ref 150–400)
POTASSIUM SERPL-MCNC: 5.8 MMOL/L — HIGH (ref 3.5–5.3)
POTASSIUM SERPL-MCNC: 5.9 MMOL/L — HIGH (ref 3.5–5.3)
POTASSIUM SERPL-MCNC: 6 MMOL/L — HIGH (ref 3.5–5.3)
POTASSIUM SERPL-SCNC: 5.8 MMOL/L — HIGH (ref 3.5–5.3)
POTASSIUM SERPL-SCNC: 5.9 MMOL/L — HIGH (ref 3.5–5.3)
POTASSIUM SERPL-SCNC: 6 MMOL/L — HIGH (ref 3.5–5.3)
RBC # BLD: 3.1 M/UL — LOW (ref 4.2–5.8)
RBC # FLD: 16 % — HIGH (ref 10.3–14.5)
SODIUM SERPL-SCNC: 139 MMOL/L — SIGNIFICANT CHANGE UP (ref 135–145)
WBC # BLD: 12.44 K/UL — HIGH (ref 3.8–10.5)
WBC # FLD AUTO: 12.44 K/UL — HIGH (ref 3.8–10.5)

## 2019-06-10 RX ORDER — DEXTROSE 50 % IN WATER 50 %
25 SYRINGE (ML) INTRAVENOUS ONCE
Refills: 0 | Status: COMPLETED | OUTPATIENT
Start: 2019-06-10 | End: 2019-06-10

## 2019-06-10 RX ORDER — INSULIN HUMAN 100 [IU]/ML
10 INJECTION, SOLUTION SUBCUTANEOUS ONCE
Refills: 0 | Status: COMPLETED | OUTPATIENT
Start: 2019-06-10 | End: 2019-06-10

## 2019-06-10 RX ORDER — ALBUTEROL 90 UG/1
2.5 AEROSOL, METERED ORAL ONCE
Refills: 0 | Status: DISCONTINUED | OUTPATIENT
Start: 2019-06-10 | End: 2019-06-10

## 2019-06-10 RX ORDER — SODIUM CHLORIDE 9 MG/ML
1000 INJECTION, SOLUTION INTRAVENOUS
Refills: 0 | Status: DISCONTINUED | OUTPATIENT
Start: 2019-06-10 | End: 2019-06-11

## 2019-06-10 RX ORDER — SODIUM POLYSTYRENE SULFONATE 4.1 MEQ/G
30 POWDER, FOR SUSPENSION ORAL ONCE
Refills: 0 | Status: COMPLETED | OUTPATIENT
Start: 2019-06-10 | End: 2019-06-10

## 2019-06-10 RX ORDER — DEXTROSE 50 % IN WATER 50 %
12.5 SYRINGE (ML) INTRAVENOUS ONCE
Refills: 0 | Status: COMPLETED | OUTPATIENT
Start: 2019-06-10 | End: 2019-06-10

## 2019-06-10 RX ORDER — SODIUM CHLORIDE 9 MG/ML
1000 INJECTION, SOLUTION INTRAVENOUS
Refills: 0 | Status: DISCONTINUED | OUTPATIENT
Start: 2019-06-10 | End: 2019-06-10

## 2019-06-10 RX ORDER — DEXTROSE 50 % IN WATER 50 %
50 SYRINGE (ML) INTRAVENOUS ONCE
Refills: 0 | Status: COMPLETED | OUTPATIENT
Start: 2019-06-10 | End: 2019-06-10

## 2019-06-10 RX ORDER — FUROSEMIDE 40 MG
20 TABLET ORAL ONCE
Refills: 0 | Status: COMPLETED | OUTPATIENT
Start: 2019-06-10 | End: 2019-06-10

## 2019-06-10 RX ORDER — SODIUM POLYSTYRENE SULFONATE 4.1 MEQ/G
30 POWDER, FOR SUSPENSION ORAL ONCE
Refills: 0 | Status: DISCONTINUED | OUTPATIENT
Start: 2019-06-10 | End: 2019-06-10

## 2019-06-10 RX ADMIN — Medication 650 MILLIGRAM(S): at 17:49

## 2019-06-10 RX ADMIN — Medication 3 MILLILITER(S): at 05:42

## 2019-06-10 RX ADMIN — Medication 20 MILLIGRAM(S): at 18:21

## 2019-06-10 RX ADMIN — LINEZOLID 300 MILLIGRAM(S): 600 INJECTION, SOLUTION INTRAVENOUS at 08:26

## 2019-06-10 RX ADMIN — HEPARIN SODIUM 5000 UNIT(S): 5000 INJECTION INTRAVENOUS; SUBCUTANEOUS at 05:27

## 2019-06-10 RX ADMIN — Medication 650 MILLIGRAM(S): at 18:22

## 2019-06-10 RX ADMIN — Medication 2: at 13:58

## 2019-06-10 RX ADMIN — HEPARIN SODIUM 5000 UNIT(S): 5000 INJECTION INTRAVENOUS; SUBCUTANEOUS at 17:51

## 2019-06-10 RX ADMIN — INSULIN HUMAN 10 UNIT(S): 100 INJECTION, SOLUTION SUBCUTANEOUS at 12:12

## 2019-06-10 RX ADMIN — CHLORHEXIDINE GLUCONATE 1 APPLICATION(S): 213 SOLUTION TOPICAL at 05:29

## 2019-06-10 RX ADMIN — Medication 3 MILLILITER(S): at 17:24

## 2019-06-10 RX ADMIN — SODIUM CHLORIDE 80 MILLILITER(S): 9 INJECTION, SOLUTION INTRAVENOUS at 19:09

## 2019-06-10 RX ADMIN — Medication 25 GRAM(S): at 23:46

## 2019-06-10 RX ADMIN — FENTANYL CITRATE 1 PATCH: 50 INJECTION INTRAVENOUS at 08:27

## 2019-06-10 RX ADMIN — MIDODRINE HYDROCHLORIDE 20 MILLIGRAM(S): 2.5 TABLET ORAL at 22:23

## 2019-06-10 RX ADMIN — MEROPENEM 100 MILLIGRAM(S): 1 INJECTION INTRAVENOUS at 05:26

## 2019-06-10 RX ADMIN — MEROPENEM 100 MILLIGRAM(S): 1 INJECTION INTRAVENOUS at 22:23

## 2019-06-10 RX ADMIN — Medication 12.5 GRAM(S): at 17:28

## 2019-06-10 RX ADMIN — SODIUM POLYSTYRENE SULFONATE 30 GRAM(S): 4.1 POWDER, FOR SUSPENSION ORAL at 18:21

## 2019-06-10 RX ADMIN — LINEZOLID 300 MILLIGRAM(S): 600 INJECTION, SOLUTION INTRAVENOUS at 20:55

## 2019-06-10 RX ADMIN — Medication 1 APPLICATION(S): at 15:14

## 2019-06-10 RX ADMIN — SODIUM POLYSTYRENE SULFONATE 30 GRAM(S): 4.1 POWDER, FOR SUSPENSION ORAL at 08:48

## 2019-06-10 RX ADMIN — Medication 50 MILLILITER(S): at 12:11

## 2019-06-10 RX ADMIN — PANTOPRAZOLE SODIUM 40 MILLIGRAM(S): 20 TABLET, DELAYED RELEASE ORAL at 15:13

## 2019-06-10 RX ADMIN — Medication 3 MILLILITER(S): at 00:26

## 2019-06-10 RX ADMIN — INSULIN GLARGINE 40 UNIT(S): 100 INJECTION, SOLUTION SUBCUTANEOUS at 08:40

## 2019-06-10 RX ADMIN — MEROPENEM 100 MILLIGRAM(S): 1 INJECTION INTRAVENOUS at 14:14

## 2019-06-10 RX ADMIN — FENTANYL CITRATE 1 PATCH: 50 INJECTION INTRAVENOUS at 19:30

## 2019-06-10 RX ADMIN — Medication 3 MILLILITER(S): at 11:59

## 2019-06-10 RX ADMIN — CHLORHEXIDINE GLUCONATE 1 APPLICATION(S): 213 SOLUTION TOPICAL at 14:15

## 2019-06-10 NOTE — PROGRESS NOTE ADULT - SUBJECTIVE AND OBJECTIVE BOX
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  out of icu   and stable   on antibiotics for decubiti now   Allergies    penicillin (Unknown)    Intolerances        MEDICATIONS  (STANDING):  ALBUTerol    90 MICROgram(s) HFA Inhaler 1 Puff(s) Inhalation every 4 hours  ALBUTerol/ipratropium for Nebulization 3 milliLiter(s) Nebulizer every 6 hours  chlorhexidine 4% Liquid 1 Application(s) Topical <User Schedule>  dextrose 5% + sodium chloride 0.9%. 1000 milliLiter(s) (80 mL/Hr) IV Continuous <Continuous>  dextrose 5%. 1000 milliLiter(s) (50 mL/Hr) IV Continuous <Continuous>  dextrose 50% Injectable 12.5 Gram(s) IV Push once  dextrose 50% Injectable 25 Gram(s) IV Push once  dextrose 50% Injectable 25 Gram(s) IV Push once  fentaNYL   Patch  50 MICROgram(s)/Hr 1 Patch Transdermal every 72 hours  heparin  Injectable 5000 Unit(s) SubCutaneous every 12 hours  insulin glargine Injectable (LANTUS) 40 Unit(s) SubCutaneous every morning  insulin lispro (HumaLOG) corrective regimen sliding scale   SubCutaneous every 6 hours  linezolid  IVPB 600 milliGRAM(s) IV Intermittent every 12 hours  linezolid  IVPB      meropenem  IVPB 1000 milliGRAM(s) IV Intermittent every 8 hours  midodrine 20 milliGRAM(s) Oral every 8 hours  pantoprazole   Suspension 40 milliGRAM(s) Oral daily  petrolatum Ophthalmic Ointment 1 Application(s) Both EYES daily  tiotropium 18 MICROgram(s) Capsule 1 Capsule(s) Inhalation daily    MEDICATIONS  (PRN):  acetaminophen    Suspension .. 650 milliGRAM(s) Oral every 6 hours PRN Temp greater or equal to 38C (100.4F)  dextrose 40% Gel 15 Gram(s) Oral once PRN Blood Glucose LESS THAN 70 milliGRAM(s)/deciliter  glucagon  Injectable 1 milliGRAM(s) IntraMuscular once PRN Glucose LESS THAN 70 milligrams/deciliter      REVIEW OF SYSTEMS:  unable to obtain   VITAL SIGNS:  T(C): 38.1 (06-10-19 @ 18:00), Max: 38.2 (06-10-19 @ 17:45)  T(F): 100.6 (06-10-19 @ 18:00), Max: 100.8 (06-10-19 @ 17:45)  HR: 110 (06-10-19 @ 18:00) (99 - 118)  BP: 119/65 (06-10-19 @ 18:00) (119/65 - 157/59)  RR: 20 (06-10-19 @ 18:00) (17 - 20)  SpO2: 95% (06-10-19 @ 18:00) (95% - 100%)  Wt(kg): --    PHYSICAL EXAM:    GENERAL: not in any distress  HEENT: Neck is supple, normocephalic, atraumatic   CHEST/LUNG: Clear to auscultation bilaterally; No rales, rhonchi, wheezing  HEART: Regular rate and rhythm; No murmurs, rubs, or gallops  ABDOMEN: Soft, Nontender, Nondistended; Bowel sounds present, no rebound   EXTREMITIES:  2+ Peripheral Pulses, No clubbing, cyanosis, or edema  bad ulcers both feet   sacral ulcer  BACK:  pressor sore   NERVOUS SYSTEM: ch veg state    LABS:                         9.0    12.44 )-----------( 438      ( 10 Fan 2019 07:21 )             30.6     06-10    x   |  x   |  x   ----------------------------<  x   5.9<H>   |  x   |  x     Ca    7.9<L>      10 Fan 2019 07:21                                              Radiology:      < from: Xray Chest 1 View- PORTABLE-Urgent (05.28.19 @ 22:53) >  OCEDURE DATE:  05/28/2019          INTERPRETATION:  PROCEDURE: AP view of the chest.    CLINICAL INFORMATION: Hypotension.    COMPARISON: 12/11/2018.    FINDINGS:     A tracheostomy tube and right PICC line are noted.    Lungs: There are no lung consolidations. Left base atelectasis is noted.  Heart: The heart is normal in size.  Mediastinum: The mediastinum is within normal limits.    IMPRESSION:    No lung consolidations.                FAN PIZARRO M.D., ATTENDING RADIOLOGIST  This document has been electronically signed. May 29 2019  8:42AM                < end of copied text >

## 2019-06-10 NOTE — PROGRESS NOTE ADULT - SUBJECTIVE AND OBJECTIVE BOX
Podiatry pager #: [phone] ([place])/ [phone] ([facility])    Patient is a 73y old  Male who presents with a chief complaint of Sepsis (10 Fan 2019 11:13)       INTERVAL HPI/OVERNIGHT EVENTS:  Patient seen and evaluated at bedside.  Pt is resting comfortable in NAD. Denies N/V/F/C.     Allergies    penicillin (Unknown)    Intolerances        Vital Signs Last 24 Hrs  T(C): 38.2 (10 Fan 2019 17:45), Max: 38.2 (10 Fan 2019 17:45)  T(F): 100.8 (10 Fan 2019 17:45), Max: 100.8 (10 Fan 2019 17:45)  HR: 116 (10 Fan 2019 17:45) (99 - 118)  BP: 146/70 (10 Fan 2019 11:53) (146/70 - 157/59)  BP(mean): --  RR: 19 (10 Fan 2019 11:53) (17 - 20)  SpO2: 97% (10 Fan 2019 17:45) (97% - 100%)    LABS:                        9.0    12.44 )-----------( 438      ( 10 Fan 2019 07:21 )             30.6     06-10    x   |  x   |  x   ----------------------------<  x   5.9<H>   |  x   |  x     Ca    7.9<L>      10 Fan 2019 07:21          CAPILLARY BLOOD GLUCOSE      POCT Blood Glucose.: 100 mg/dL (10 Fan 2019 17:48)  POCT Blood Glucose.: 52 mg/dL (10 Fan 2019 16:59)  POCT Blood Glucose.: 155 mg/dL (10 Fan 2019 13:50)  POCT Blood Glucose.: 168 mg/dL (10 Fan 2019 12:00)  POCT Blood Glucose.: 160 mg/dL (10 Fan 2019 08:35)  POCT Blood Glucose.: 124 mg/dL (10 Fan 2019 05:20)  POCT Blood Glucose.: 160 mg/dL (09 Jun 2019 23:15)      Lower Extremity Physical Exam:  Vascular: DP/PT 2/4, B/L, CFT <3 seconds B/L, Temperature gradient warm to cool, B/L.   Neuro: Epicritic sensation inact to the level of toes, B/L.  Musculoskeletal/Ortho: Quadriplegic   Skin: RF medial ankle wound with ankle joint exposed and surrounding fibrotic and necrotic tissue measuring 5 cm X 4 cm X 2 cm, no malodor or purulence expressed. B/l  necrotic heel eschars well adhered, LF superficial anterior ankle wound with no signs of infection

## 2019-06-10 NOTE — PROGRESS NOTE ADULT - SUBJECTIVE AND OBJECTIVE BOX
Nutrition Assessment (Parenteral Nutrition)    Type and Reason for Visit: Reassess    Nutrition Recommendations: Continue NPO status, TPN at 65 mL/hr and 100 ml lipids. Following changes made to additives: KCl: 55 to 70 mEq, Mg sulfate: 11 to 13 mEq. Start diet when appropriate. Nutrition Assessment: Patient is stable from a nutritional viewpoint with TPN and lipids meeting nutrition needs. Patient extubated 4/20, NPO continues with TPN. Patient may require a procedure. Recommend starting diet if appropriate. Will monitor for future nutrition progression. Malnutrition Assessment:  · Malnutrition Status: At risk for malnutrition  · Context: Acute illness or injury  · Findings of the 6 clinical characteristics of malnutrition (Minimum of 2 out of 6 clinical characteristics is required to make the diagnosis of moderate or severe Protein Calorie Malnutrition based on AND/ASPEN Guidelines):  1. Energy Intake-Less than or equal to 75% of estimated energy requirement(Previously; improving with parenteral nutrition), Greater than or equal to 5 days    2. Weight Loss-Unable to assess(edema present),    3. Fat Loss-Unable to assess,    4. Muscle Loss-Unable to assess,    5. Fluid Accumulation-(moderate fluid accumulation), Extremities  6.  Strength-Not measured    Nutrition Risk Level: High    Nutrient Needs:  · Estimated Daily Total Kcal: 7446-0843 based on wt of 25-27 per kg using wt of 57.2 kg  · Estimated Daily Protein (g): 63-68 based on 1.4-1.5 gm/kg IBW(revised)    Nutrition Diagnosis:   · Problem: Inadequate oral intake  · Etiology: related to Alteration in GI function, Insufficient energy/nutrient consumption     Signs and symptoms:  as evidenced by NPO status due to medical condition, Nutrition support - PN    Objective Information:  · Nutrition-Focused Physical Findings: +3 pitting BUE, +2 RLE, +2 pitting LLE edema.  Irritable, agitated, mitts   · Wound Type: Stage I, Pressure Ulcer(Stage pressure Patient is a 73y old  Male who presents with a chief complaint of Sepsis (09 Jun 2019 13:40)  hyperkalemia 5.8 today.   bun/ cr unchanged   T max 100.3   wbc downtrending    INTERVAL HPI/OVERNIGHT EVENTS:  PAST MEDICAL & SURGICAL HISTORY:  DM (diabetes mellitus)  Heart failure  Diabetic neuropathy  Hyperlipidemia  Quadriplegia  Hypertension  Stroke  Hypertension  Obstructive cardiomyopathy  S/P percutaneous endoscopic gastrostomy (PEG) tube placement  History of tracheostomy      MEDICATIONS  (STANDING):  ALBUTerol    90 MICROgram(s) HFA Inhaler 1 Puff(s) Inhalation every 4 hours  ALBUTerol/ipratropium for Nebulization 3 milliLiter(s) Nebulizer every 6 hours  chlorhexidine 4% Liquid 1 Application(s) Topical <User Schedule>  dextrose 5%. 1000 milliLiter(s) (50 mL/Hr) IV Continuous <Continuous>  dextrose 50% Injectable 50 milliLiter(s) IV Push once  dextrose 50% Injectable 12.5 Gram(s) IV Push once  dextrose 50% Injectable 25 Gram(s) IV Push once  dextrose 50% Injectable 25 Gram(s) IV Push once  fentaNYL   Patch  50 MICROgram(s)/Hr 1 Patch Transdermal every 72 hours  heparin  Injectable 5000 Unit(s) SubCutaneous every 12 hours  insulin glargine Injectable (LANTUS) 40 Unit(s) SubCutaneous every morning  insulin lispro (HumaLOG) corrective regimen sliding scale   SubCutaneous every 6 hours  insulin regular  human recombinant. 10 Unit(s) IV Push once  linezolid  IVPB 600 milliGRAM(s) IV Intermittent every 12 hours  linezolid  IVPB      meropenem  IVPB 1000 milliGRAM(s) IV Intermittent every 8 hours  midodrine 20 milliGRAM(s) Oral every 8 hours  pantoprazole   Suspension 40 milliGRAM(s) Oral daily  petrolatum Ophthalmic Ointment 1 Application(s) Both EYES daily  tiotropium 18 MICROgram(s) Capsule 1 Capsule(s) Inhalation daily    MEDICATIONS  (PRN):  acetaminophen    Suspension .. 650 milliGRAM(s) Oral every 6 hours PRN Temp greater or equal to 38C (100.4F)  dextrose 40% Gel 15 Gram(s) Oral once PRN Blood Glucose LESS THAN 70 milliGRAM(s)/deciliter  glucagon  Injectable 1 milliGRAM(s) IntraMuscular once PRN Glucose LESS THAN 70 milligrams/deciliter      Allergies    penicillin (Unknown)    Intolerances        REVIEW OF SYSTEMS:  CONSTITUTIONAL: No fever, weight loss, or fatigue  EYES: No eye pain, visual disturbances, or discharge  ENMT:  No difficulty hearing, tinnitus, vertigo; No sinus or throat pain  NECK: No pain or stiffness  BREASTS: No pain, masses, or nipple discharge  RESPIRATORY: No cough, wheezing, chills or hemoptysis; No shortness of breath  CARDIOVASCULAR: No chest pain, palpitations, dizziness, or leg swelling  GASTROINTESTINAL: No abdominal or epigastric pain. No nausea, vomiting, or hematemesis; No diarrhea or constipation. No melena or hematochezia.  GENITOURINARY: No dysuria, frequency, hematuria, or incontinence  NEUROLOGICAL: No headaches, memory loss, loss of strength, numbness, or tremors  SKIN: No itching, burning, rashes, or lesions   LYMPH NODES: No enlarged glands  ENDOCRINE: No heat or cold intolerance; No hair loss  MUSCULOSKELETAL: No joint pain or swelling; No muscle, back, or extremity pain  PSYCHIATRIC: No depression, anxiety, mood swings, or difficulty sleeping  HEME/LYMPH: No easy bruising, or bleeding gums  ALLERY AND IMMUNOLOGIC: No hives or eczema    Vital Signs Last 24 Hrs  T(C): 37.9 (10 Fan 2019 04:45), Max: 37.9 (10 Fan 2019 04:45)  T(F): 100.3 (10 Fan 2019 04:45), Max: 100.3 (10 Fan 2019 04:45)  HR: 105 (10 Fan 2019 08:24) (86 - 106)  BP: 155/70 (10 Fan 2019 04:45) (135/63 - 157/59)  BP(mean): --  RR: 17 (10 Fan 2019 04:45) (17 - 20)  SpO2: 98% (10 Fan 2019 08:24) (98% - 100%)    PHYSICAL EXAM:  GENERAL: NAD, well-groomed, well-developed  HEAD:  Atraumatic, Normocephalic  EYES: EOMI, PERRLA, conjunctiva and sclera clear  ENMT: No tonsillar erythema, exudates, or enlargement; Moist mucous membranes, Good dentition, No lesions  NECK: Supple, No JVD, Normal thyroid/ s/p tracheotomy connected to vent  NERVOUS SYSTEM: Quadriplegic  CHEST/LUNG: Clear to percussion bilaterally; No rales, rhonchi, wheezing, or rubs  HEART: Regular rate and rhythm; No murmurs, rubs, or gallops  ABDOMEN: Soft, Nontender, Nondistended; Bowel sounds present. peg tube in placxe  EXTREMITIES:  osteomyelitis rt ankle  LYMPH: No lymphadenopathy noted  SKIN: multiple deep decubiti    LABS:                        9.0    12.44 )-----------( 438      ( 10 Fan 2019 07:21 )             30.6     06-10    139  |  108  |  24<H>  ----------------------------<  116<H>  5.8<H>   |  27  |  0.83    Ca    7.9<L>      10 Fan 2019 07:21            CAPILLARY BLOOD GLUCOSE      POCT Blood Glucose.: 160 mg/dL (10 Fan 2019 08:35)  POCT Blood Glucose.: 124 mg/dL (10 Fan 2019 05:20)  POCT Blood Glucose.: 160 mg/dL (09 Jun 2019 23:15)  POCT Blood Glucose.: 186 mg/dL (09 Jun 2019 17:23)  POCT Blood Glucose.: 188 mg/dL (09 Jun 2019 12:32)                RADIOLOGY & ADDITIONAL TESTS:    Imaging Personally Reviewed:  [ ] YES  [ ] NO    Consultant(s) Notes Reviewed:  [ ] YES  [ ] NO    Care Discussed with Consultants/Other Providers [ ] YES  [ ] NO    Care discussed with family,         [  ]   yes  [  ]  No    imp:    stable[ ]    unstable[  ]     improving [   ]       unchanged  [x  ]                Plans:  Continue present plans  [x  ] will treat hyperkalemia. abx as per Id               New consult [  ]   specialty  .......               order test[  ]    test name.                  Discharge Planning  [ x ] ulcer on buttocks; thigh wound)  · Current Nutrition Therapies:  · Oral Diet Orders: NPO   · Oral Diet intake: NPO  · Oral Nutrition Supplement (ONS) Orders: None  · ONS intake: NPO  · Parenteral Nutrition Orders:  · Type and Formula: Premix Central, 2-in-1 Custom   · Lipids: 100ml, Daily  · Rate/Volume: 65 ml/ 1560 ml daily  · Duration: Continuous  · Current PN Order Provides: 1573 kcals, 78 gm of protein (lipids included)  · Goal PN Orders Provides: 5851-5537 kcal; 63-68 gm pro  · Additional Calories: Propofol at 5.2 ml/hr providing 137 kcal  · Anthropometric Measures:  · Ht: 5' (152.4 cm)   · Current Body Wt: 130 lb (59 kg)  · Admission Body Wt: 102 lb (46.3 kg)  · Ideal Body Wt: 100 lb (45.4 kg), % Ideal Body 102% based on admission wt  · BMI Classification: BMI 25.0 - 29.9 Overweight    Nutrition Interventions:   Continue current diet, Modify current Parenteral Nutrition  Continued Inpatient Monitoring, Education Not Indicated    Nutrition Evaluation:   · Evaluation: Progressing toward goals   · Goals: PN to meet greater 90% of estimated nutrition needs   · Monitoring: Weight, Pertinent Labs, Monitor Bowel Function, PN Intake, PN Tolerance, Wound Healing, I&O, Skin Integrity, Nutrition Progression      Ansley DAVIDSON RVanD, L.D,  Clinical Dietitian  Cell # [phone]  Office # [phone]

## 2019-06-10 NOTE — PROGRESS NOTE ADULT - SUBJECTIVE AND OBJECTIVE BOX
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  NH report pt BUN & Cr incr, EMS report pt hypotensive on their arrival.  On arrival to ED, removed two 12microgram fentanyl patches.  admitted to ICU for severe poly microbial drug resistant sepsis, septic shock, dehydration, hypernatremia, acute renal failure with ATN, multiple infected decubitus ulcers (sacrum, hip, bilateral lower extremities), osteomyelitis of right ankle, hypercalcemia, left renal lesion and pulmonary nodules.  Now out of ICU.    OVERNIGHT EVENTS:  Temperature spike to 100.8 noted.    Vital Signs Last 24 Hrs  T(C): 38.2 (10 Fan 2019 17:45), Max: 38.2 (10 Fan 2019 17:45)  T(F): 100.8 (10 Fan 2019 17:45), Max: 100.8 (10 Fan 2019 17:45)  HR: 116 (10 Fan 2019 17:45) (99 - 118)  BP: 146/70 (10 Fan 2019 11:53) (146/70 - 157/59)  BP(mean): --  RR: 19 (10 Fan 2019 11:53) (17 - 20)  SpO2: 97% (10 Fan 2019 17:45) (97% - 100%)    Mode: AC/ CMV (Assist Control/ Continuous Mandatory Ventilation)  RR (machine): 12  TV (machine): 500  FiO2: 30  PEEP: 5  ITime: 1  MAP: 10  PIP: 20    PHYSICAL EXAM:  GEN:        comfortable.  HEENT:     trach   RESP:         no distress.  CVS:           Regular rate and rhythm.   ABD:         Soft, non-tender, non-distended;     MEDICATIONS  (STANDING):  ALBUTerol    90 MICROgram(s) HFA Inhaler 1 Puff(s) Inhalation every 4 hours  ALBUTerol/ipratropium for Nebulization 3 milliLiter(s) Nebulizer every 6 hours  chlorhexidine 4% Liquid 1 Application(s) Topical <User Schedule>  dextrose 5%. 1000 milliLiter(s) (50 mL/Hr) IV Continuous <Continuous>  dextrose 50% Injectable 25 Gram(s) IV Push once  dextrose 50% Injectable 12.5 Gram(s) IV Push once  dextrose 50% Injectable 25 Gram(s) IV Push once  dextrose 50% Injectable 25 Gram(s) IV Push once  fentaNYL   Patch  50 MICROgram(s)/Hr 1 Patch Transdermal every 72 hours  heparin  Injectable 5000 Unit(s) SubCutaneous every 12 hours  insulin glargine Injectable (LANTUS) 40 Unit(s) SubCutaneous every morning  insulin lispro (HumaLOG) corrective regimen sliding scale   SubCutaneous every 6 hours  linezolid  IVPB 600 milliGRAM(s) IV Intermittent every 12 hours  linezolid  IVPB      meropenem  IVPB 1000 milliGRAM(s) IV Intermittent every 8 hours  midodrine 20 milliGRAM(s) Oral every 8 hours  pantoprazole   Suspension 40 milliGRAM(s) Oral daily  petrolatum Ophthalmic Ointment 1 Application(s) Both EYES daily  tiotropium 18 MICROgram(s) Capsule 1 Capsule(s) Inhalation daily    MEDICATIONS  (PRN):  acetaminophen    Suspension .. 650 milliGRAM(s) Oral every 6 hours PRN Temp greater or equal to 38C (100.4F)  dextrose 40% Gel 15 Gram(s) Oral once PRN Blood Glucose LESS THAN 70 milliGRAM(s)/deciliter  glucagon  Injectable 1 milliGRAM(s) IntraMuscular once PRN Glucose LESS THAN 70 milligrams/deciliter    LABS:                        9.0    12.44 )-----------( 438      ( 10 Fan 2019 07:21 )             30.6     06-10    x   |  x   |  x   ----------------------------<  x   5.9<H>   |  x   |  x     Ca    7.9<L>      10 Fan 2019 07:21  ASSESSMENT AND PLAN:  ·	S/P Septic shock.  ·	Respiratory failure  ·	S/P Tracheostomy.  ·	Pseudomonas in sputum.  ·	UTI with enterococcus.  ·	CVA.  ·	Quadriplegia.  ·	HTN.  ·	DM    Continue antibiotics and Vent.  Follow fever graph.

## 2019-06-10 NOTE — PROGRESS NOTE ADULT - ASSESSMENT
sepsis   multiple decubiti   vent dependant   cultures noted ; decubiti noted  sputum culture noted   no xray abn   need to treat ? sputum ?mantain contact isolation   continue current treatment merrem and zyvox '  discussed with dr rodriguez  possible withdrawl of care  agree   this is futile   await familys decisions

## 2019-06-10 NOTE — PROGRESS NOTE ADULT - ASSESSMENT
74 yo M w/ multiple foot and ankle wounds.   - Pt seen and evaluated  - Right ankle joint exposed w/ necrotic anterior tibial tendon  - Foot is not salvageable, pt has already has attempts at long term abx for OM, poor prognosis  - Will continue to keep wounds dry with betadine and DSD daily. If pt becomes septic pt will need BKA emergently.  - Wound care: Betadine to bilateral foot wounds daily.   - Podiatry will continue to follow periodically to monitor  - Discussed w/ attending

## 2019-06-11 DIAGNOSIS — R13.10 DYSPHAGIA, UNSPECIFIED: ICD-10-CM

## 2019-06-11 DIAGNOSIS — K21.0 GASTRO-ESOPHAGEAL REFLUX DISEASE WITH ESOPHAGITIS: ICD-10-CM

## 2019-06-11 LAB
ANION GAP SERPL CALC-SCNC: 5 MMOL/L — SIGNIFICANT CHANGE UP (ref 5–17)
BUN SERPL-MCNC: 19 MG/DL — SIGNIFICANT CHANGE UP (ref 7–23)
CALCIUM SERPL-MCNC: 8 MG/DL — LOW (ref 8.5–10.1)
CHLORIDE SERPL-SCNC: 108 MMOL/L — SIGNIFICANT CHANGE UP (ref 96–108)
CO2 SERPL-SCNC: 28 MMOL/L — SIGNIFICANT CHANGE UP (ref 22–31)
CREAT SERPL-MCNC: 0.85 MG/DL — SIGNIFICANT CHANGE UP (ref 0.5–1.3)
GLUCOSE BLDC GLUCOMTR-MCNC: 146 MG/DL — HIGH (ref 70–99)
GLUCOSE BLDC GLUCOMTR-MCNC: 162 MG/DL — HIGH (ref 70–99)
GLUCOSE BLDC GLUCOMTR-MCNC: 190 MG/DL — HIGH (ref 70–99)
GLUCOSE BLDC GLUCOMTR-MCNC: 58 MG/DL — LOW (ref 70–99)
GLUCOSE BLDC GLUCOMTR-MCNC: 59 MG/DL — LOW (ref 70–99)
GLUCOSE BLDC GLUCOMTR-MCNC: 59 MG/DL — LOW (ref 70–99)
GLUCOSE SERPL-MCNC: 101 MG/DL — HIGH (ref 70–99)
MAGNESIUM SERPL-MCNC: 2.5 MG/DL — SIGNIFICANT CHANGE UP (ref 1.6–2.6)
PHOSPHATE SERPL-MCNC: 3.9 MG/DL — SIGNIFICANT CHANGE UP (ref 2.5–4.5)
POTASSIUM SERPL-MCNC: 4.4 MMOL/L — SIGNIFICANT CHANGE UP (ref 3.5–5.3)
POTASSIUM SERPL-SCNC: 4.4 MMOL/L — SIGNIFICANT CHANGE UP (ref 3.5–5.3)
SODIUM SERPL-SCNC: 141 MMOL/L — SIGNIFICANT CHANGE UP (ref 135–145)

## 2019-06-11 PROCEDURE — 74018 RADEX ABDOMEN 1 VIEW: CPT | Mod: 26

## 2019-06-11 RX ORDER — ROBINUL 0.2 MG/ML
0.4 INJECTION INTRAMUSCULAR; INTRAVENOUS THREE TIMES A DAY
Refills: 0 | Status: DISCONTINUED | OUTPATIENT
Start: 2019-06-11 | End: 2019-06-17

## 2019-06-11 RX ORDER — SODIUM CHLORIDE 9 MG/ML
1000 INJECTION, SOLUTION INTRAVENOUS
Refills: 0 | Status: DISCONTINUED | OUTPATIENT
Start: 2019-06-11 | End: 2019-06-13

## 2019-06-11 RX ORDER — DEXTROSE 50 % IN WATER 50 %
12.5 SYRINGE (ML) INTRAVENOUS ONCE
Refills: 0 | Status: COMPLETED | OUTPATIENT
Start: 2019-06-11 | End: 2019-06-11

## 2019-06-11 RX ORDER — MORPHINE SULFATE 50 MG/1
2 CAPSULE, EXTENDED RELEASE ORAL EVERY 6 HOURS
Refills: 0 | Status: DISCONTINUED | OUTPATIENT
Start: 2019-06-11 | End: 2019-06-17

## 2019-06-11 RX ORDER — ROBINUL 0.2 MG/ML
0.4 INJECTION INTRAMUSCULAR; INTRAVENOUS THREE TIMES A DAY
Refills: 0 | Status: DISCONTINUED | OUTPATIENT
Start: 2019-06-11 | End: 2019-06-11

## 2019-06-11 RX ADMIN — Medication 3 MILLILITER(S): at 11:24

## 2019-06-11 RX ADMIN — HEPARIN SODIUM 5000 UNIT(S): 5000 INJECTION INTRAVENOUS; SUBCUTANEOUS at 06:09

## 2019-06-11 RX ADMIN — MORPHINE SULFATE 2 MILLIGRAM(S): 50 CAPSULE, EXTENDED RELEASE ORAL at 11:52

## 2019-06-11 RX ADMIN — MORPHINE SULFATE 2 MILLIGRAM(S): 50 CAPSULE, EXTENDED RELEASE ORAL at 12:05

## 2019-06-11 RX ADMIN — ROBINUL 0.4 MILLIGRAM(S): 0.2 INJECTION INTRAMUSCULAR; INTRAVENOUS at 22:03

## 2019-06-11 RX ADMIN — Medication 1 APPLICATION(S): at 11:54

## 2019-06-11 RX ADMIN — Medication 3 MILLILITER(S): at 00:06

## 2019-06-11 RX ADMIN — ROBINUL 0.4 MILLIGRAM(S): 0.2 INJECTION INTRAMUSCULAR; INTRAVENOUS at 16:00

## 2019-06-11 RX ADMIN — SODIUM CHLORIDE 80 MILLILITER(S): 9 INJECTION, SOLUTION INTRAVENOUS at 08:59

## 2019-06-11 RX ADMIN — FENTANYL CITRATE 1 PATCH: 50 INJECTION INTRAVENOUS at 06:45

## 2019-06-11 RX ADMIN — Medication 12.5 GRAM(S): at 06:02

## 2019-06-11 RX ADMIN — MEROPENEM 100 MILLIGRAM(S): 1 INJECTION INTRAVENOUS at 11:56

## 2019-06-11 RX ADMIN — MORPHINE SULFATE 2 MILLIGRAM(S): 50 CAPSULE, EXTENDED RELEASE ORAL at 18:01

## 2019-06-11 RX ADMIN — CHLORHEXIDINE GLUCONATE 1 APPLICATION(S): 213 SOLUTION TOPICAL at 06:09

## 2019-06-11 RX ADMIN — ROBINUL 0.4 MILLIGRAM(S): 0.2 INJECTION INTRAMUSCULAR; INTRAVENOUS at 11:55

## 2019-06-11 RX ADMIN — Medication 3 MILLILITER(S): at 05:29

## 2019-06-11 RX ADMIN — Medication 3 MILLILITER(S): at 17:15

## 2019-06-11 RX ADMIN — HEPARIN SODIUM 5000 UNIT(S): 5000 INJECTION INTRAVENOUS; SUBCUTANEOUS at 18:02

## 2019-06-11 RX ADMIN — Medication 3 MILLILITER(S): at 23:24

## 2019-06-11 RX ADMIN — LINEZOLID 300 MILLIGRAM(S): 600 INJECTION, SOLUTION INTRAVENOUS at 08:33

## 2019-06-11 RX ADMIN — MORPHINE SULFATE 2 MILLIGRAM(S): 50 CAPSULE, EXTENDED RELEASE ORAL at 19:01

## 2019-06-11 RX ADMIN — LINEZOLID 300 MILLIGRAM(S): 600 INJECTION, SOLUTION INTRAVENOUS at 21:30

## 2019-06-11 RX ADMIN — PANTOPRAZOLE SODIUM 40 MILLIGRAM(S): 20 TABLET, DELAYED RELEASE ORAL at 11:54

## 2019-06-11 RX ADMIN — MEROPENEM 100 MILLIGRAM(S): 1 INJECTION INTRAVENOUS at 22:03

## 2019-06-11 RX ADMIN — MEROPENEM 100 MILLIGRAM(S): 1 INJECTION INTRAVENOUS at 06:09

## 2019-06-11 RX ADMIN — SODIUM CHLORIDE 80 MILLILITER(S): 9 INJECTION, SOLUTION INTRAVENOUS at 22:02

## 2019-06-11 RX ADMIN — Medication 12.5 GRAM(S): at 11:52

## 2019-06-11 NOTE — CONSULT NOTE ADULT - SUBJECTIVE AND OBJECTIVE BOX
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  NH report pt BUN & Cr incr, EMS report pt hypotensive on their arrival.  On arrival to ED, removed two 12microgram fentanyl patches (5/10/19 & 19)    In ED received 2200ml saline bolus. (29 May 2019 05:43)   "    Original presentation for STACEY hypernatremia; improved hospital course;   Noted hyperkalemia trend with initiation of Glucerna  1.5 ;   Renal indices stable; BS acceptable range;       PAST MEDICAL & SURGICAL HISTORY:  DM (diabetes mellitus)  Heart failure  Diabetic neuropathy  Hyperlipidemia  Quadriplegia  Hypertension  Stroke  Hypertension  Obstructive cardiomyopathy  S/P percutaneous endoscopic gastrostomy (PEG) tube placement  History of tracheostomy    FAMILY HISTORY:  No pertinent family history in first degree relatives    Allergies    penicillin (Unknown)    Intolerances      Home Medications:  acetaminophen 650 mg oral tablet: 2 tab(s) by PEG tube prn, As Needed (29 May 2019 12:10)  Artificial Tears ophthalmic ointment: 1 application to each affected eye once a day (at bedtime) (29 May 2019 12:10)  doxycycline hyclate 100 mg oral delayed release tablet: 1 tab(s) orally 2 times a day (29 May 2019 12:10)  DuoNeb 0.5 mg-2.5 mg/3 mL inhalation solution: 3 milliliter(s) inhaled 4 times a day (29 May 2019 12:10)  fentaNYL 12 mcg/hr transdermal film, extended release: 1 film(s) transdermal every 72 hours (29 May 2019 12:10)  HumuLIN R 100 units/mL injectable solution:  (29 May 2019 12:10)  Lantus 100 units/mL subcutaneous solution: 40  subcutaneous once a day (29 May 2019 12:10)  metoprolol tartrate 25 mg oral tablet: 0.5 tab(s) orally 2 times a day (29 May 2019 12:10)  NovoLIN N 100 units/mL subcutaneous suspension: 30  subcutaneous once a day at 3pm (29 May 2019 12:10)  ocular lubricant ophthalmic solution: 1 drop(s) to each affected eye every 6 hours (29 May 2019 12:10)  raNITIdine 300 mg oral tablet: 1 tab(s) by PEG tube once a day (at bedtime) (29 May 2019 12:10)    MEDICATIONS  (STANDING):  ALBUTerol    90 MICROgram(s) HFA Inhaler 1 Puff(s) Inhalation every 4 hours  ALBUTerol/ipratropium for Nebulization 3 milliLiter(s) Nebulizer every 6 hours  chlorhexidine 4% Liquid 1 Application(s) Topical <User Schedule>  dextrose 5% + sodium chloride 0.9%. 1000 milliLiter(s) (80 mL/Hr) IV Continuous <Continuous>  glycopyrrolate Injectable 0.4 milliGRAM(s) IV Push three times a day  heparin  Injectable 5000 Unit(s) SubCutaneous every 12 hours  linezolid  IVPB 600 milliGRAM(s) IV Intermittent every 12 hours  linezolid  IVPB      meropenem  IVPB 1000 milliGRAM(s) IV Intermittent every 8 hours  midodrine 20 milliGRAM(s) Oral every 8 hours  morphine  - Injectable 2 milliGRAM(s) IV Push every 6 hours  pantoprazole   Suspension 40 milliGRAM(s) Oral daily  petrolatum Ophthalmic Ointment 1 Application(s) Both EYES daily  tiotropium 18 MICROgram(s) Capsule 1 Capsule(s) Inhalation daily    MEDICATIONS  (PRN):  acetaminophen    Suspension .. 650 milliGRAM(s) Oral every 6 hours PRN Temp greater or equal to 38C (100.4F)    Vital Signs Last 24 Hrs  T(C): 37.3 (2019 12:09), Max: 38.2 (10 Fan 2019 17:45)  T(F): 99.2 (2019 12:09), Max: 100.8 (10 Fan 2019 17:45)  HR: 113 (2019 12:09) (106 - 119)  BP: 114/60 (2019 12:09) (105/53 - 164/79)  BP(mean): --  RR: 19 (2019 12:09) (19 - 20)  SpO2: 99% (2019 12:09) (95% - 99%)  Mode: AC/ CMV (Assist Control/ Continuous Mandatory Ventilation)  RR (machine): 12  TV (machine): 500  FiO2: 30  PEEP: 5  ITime: 1  MAP: 8  PIP: 18    Daily     Daily Weight in k.4 (2019 05:00)    06-10-19 @ 07:01  -  19 @ 07:00  --------------------------------------------------------  IN: 1795 mL / OUT: 1000 mL / NET: 795 mL      CAPILLARY BLOOD GLUCOSE      POCT Blood Glucose.: 190 mg/dL (2019 12:06)  POCT Blood Glucose.: 59 mg/dL (2019 11:42)  POCT Blood Glucose.: 58 mg/dL (2019 11:41)  POCT Blood Glucose.: 162 mg/dL (2019 06:17)  POCT Blood Glucose.: 59 mg/dL (2019 05:58)  POCT Blood Glucose.: 146 mg/dL (2019 00:06)  POCT Blood Glucose.: 43 mg/dL (10 Fan 2019 23:41)  POCT Blood Glucose.: 44 mg/dL (10 Fan 2019 23:37)  POCT Blood Glucose.: 76 mg/dL (10 Fan 2019 18:43)  POCT Blood Glucose.: 77 mg/dL (10 Fan 2019 18:22)  POCT Blood Glucose.: 100 mg/dL (10 Fan 2019 17:48)  POCT Blood Glucose.: 52 mg/dL (10 Fan 2019 16:59)    PHYSICAL EXAM:      T(C): 37.3 (19 @ 12:09), Max: 38.2 (06-10-19 @ 17:45)  HR: 113 (19 @ 12:09) (106 - 119)  BP: 114/60 (19 @ 12:09) (105/53 - 164/79)  RR: 19 (19 @ 12:09) (19 - 20)  SpO2: 99% (19 @ 12:09) (95% - 99%)  Wt(kg): --  Respiratory: clear anteriorly, decreased BS at bases  Cardiovascular: S1 S2  Gastrointestinal: soft NT ND +BS  Extremities:   edema                  141  |  108  |  19  ----------------------------<  101<H>  4.4   |  28  |  0.85    Ca    8.0<L>      2019 07:22  Phos  3.9     06-11  Mg     2.5                                 9.0    12.44 )-----------( 438      ( 10 Fan 2019 07:21 )             30.6     Creatinine Trend: 0.85<--, 0.83<--, 0.91<--, 0.97<--, 1.15<--, 1.15<--          Assessment and Plan  STACEY, hypernatremia; pre renal azotemia, improved;   Hyperkalemia; suspected impaired renal tubule handling with underlying incomplete Type IV RTA ( diabetes);   Medical rx for now;   Will add distal tubule diuretic to aid in K excretion;   Will follow, Information from chart:  "Patient is a 73y old  Male who presents with a chief complaint of Sepsis (2019 08:37)    HPI:  72yo M w/ PMH of CAD, 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  NH report pt BUN & Cr incr, EMS report pt hypotensive on their arrival.  On arrival to ED, removed two 12microgram fentanyl patches (5/10/19 & 19)    In ED received 2200ml saline bolus. (29 May 2019 05:43)   "    Original presentation for STACEY hypernatremia; improved hospital course;   Noted hyperkalemia trend with initiation of Glucerna  1.5 ;   Renal indices stable; BS acceptable range;       PAST MEDICAL & SURGICAL HISTORY:  DM (diabetes mellitus)  Heart failure  Diabetic neuropathy  Hyperlipidemia  Quadriplegia  Hypertension  Stroke  Hypertension  Obstructive cardiomyopathy  S/P percutaneous endoscopic gastrostomy (PEG) tube placement  History of tracheostomy    FAMILY HISTORY:  No pertinent family history in first degree relatives    Allergies    penicillin (Unknown)    Intolerances      Home Medications:  acetaminophen 650 mg oral tablet: 2 tab(s) by PEG tube prn, As Needed (29 May 2019 12:10)  Artificial Tears ophthalmic ointment: 1 application to each affected eye once a day (at bedtime) (29 May 2019 12:10)  doxycycline hyclate 100 mg oral delayed release tablet: 1 tab(s) orally 2 times a day (29 May 2019 12:10)  DuoNeb 0.5 mg-2.5 mg/3 mL inhalation solution: 3 milliliter(s) inhaled 4 times a day (29 May 2019 12:10)  fentaNYL 12 mcg/hr transdermal film, extended release: 1 film(s) transdermal every 72 hours (29 May 2019 12:10)  HumuLIN R 100 units/mL injectable solution:  (29 May 2019 12:10)  Lantus 100 units/mL subcutaneous solution: 40  subcutaneous once a day (29 May 2019 12:10)  metoprolol tartrate 25 mg oral tablet: 0.5 tab(s) orally 2 times a day (29 May 2019 12:10)  NovoLIN N 100 units/mL subcutaneous suspension: 30  subcutaneous once a day at 3pm (29 May 2019 12:10)  ocular lubricant ophthalmic solution: 1 drop(s) to each affected eye every 6 hours (29 May 2019 12:10)  raNITIdine 300 mg oral tablet: 1 tab(s) by PEG tube once a day (at bedtime) (29 May 2019 12:10)    MEDICATIONS  (STANDING):  ALBUTerol    90 MICROgram(s) HFA Inhaler 1 Puff(s) Inhalation every 4 hours  ALBUTerol/ipratropium for Nebulization 3 milliLiter(s) Nebulizer every 6 hours  chlorhexidine 4% Liquid 1 Application(s) Topical <User Schedule>  dextrose 5% + sodium chloride 0.9%. 1000 milliLiter(s) (80 mL/Hr) IV Continuous <Continuous>  glycopyrrolate Injectable 0.4 milliGRAM(s) IV Push three times a day  heparin  Injectable 5000 Unit(s) SubCutaneous every 12 hours  linezolid  IVPB 600 milliGRAM(s) IV Intermittent every 12 hours  linezolid  IVPB      meropenem  IVPB 1000 milliGRAM(s) IV Intermittent every 8 hours  midodrine 20 milliGRAM(s) Oral every 8 hours  morphine  - Injectable 2 milliGRAM(s) IV Push every 6 hours  pantoprazole   Suspension 40 milliGRAM(s) Oral daily  petrolatum Ophthalmic Ointment 1 Application(s) Both EYES daily  tiotropium 18 MICROgram(s) Capsule 1 Capsule(s) Inhalation daily    MEDICATIONS  (PRN):  acetaminophen    Suspension .. 650 milliGRAM(s) Oral every 6 hours PRN Temp greater or equal to 38C (100.4F)    Vital Signs Last 24 Hrs  T(C): 37.3 (2019 12:09), Max: 38.2 (10 Fan 2019 17:45)  T(F): 99.2 (2019 12:09), Max: 100.8 (10 Fan 2019 17:45)  HR: 113 (2019 12:09) (106 - 119)  BP: 114/60 (2019 12:09) (105/53 - 164/79)  BP(mean): --  RR: 19 (2019 12:09) (19 - 20)  SpO2: 99% (2019 12:09) (95% - 99%)  Mode: AC/ CMV (Assist Control/ Continuous Mandatory Ventilation)  RR (machine): 12  TV (machine): 500  FiO2: 30  PEEP: 5  ITime: 1  MAP: 8  PIP: 18    Daily     Daily Weight in k.4 (2019 05:00)    06-10-19 @ 07:01  -  19 @ 07:00  --------------------------------------------------------  IN: 1795 mL / OUT: 1000 mL / NET: 795 mL      CAPILLARY BLOOD GLUCOSE      POCT Blood Glucose.: 190 mg/dL (2019 12:06)  POCT Blood Glucose.: 59 mg/dL (2019 11:42)  POCT Blood Glucose.: 58 mg/dL (2019 11:41)  POCT Blood Glucose.: 162 mg/dL (2019 06:17)  POCT Blood Glucose.: 59 mg/dL (2019 05:58)  POCT Blood Glucose.: 146 mg/dL (2019 00:06)  POCT Blood Glucose.: 43 mg/dL (10 Fan 2019 23:41)  POCT Blood Glucose.: 44 mg/dL (10 Fan 2019 23:37)  POCT Blood Glucose.: 76 mg/dL (10 Fan 2019 18:43)  POCT Blood Glucose.: 77 mg/dL (10 Fan 2019 18:22)  POCT Blood Glucose.: 100 mg/dL (10 Fan 2019 17:48)  POCT Blood Glucose.: 52 mg/dL (10 Fan 2019 16:59)    PHYSICAL EXAM:      T(C): 37.3 (19 @ 12:09), Max: 38.2 (06-10-19 @ 17:45)  HR: 113 (19 @ 12:09) (106 - 119)  BP: 114/60 (19 @ 12:09) (105/53 - 164/79)  RR: 19 (19 @ 12:09) (19 - 20)  SpO2: 99% (19 @ 12:09) (95% - 99%)  Wt(kg): --  Respiratory: clear anteriorly, decreased BS at bases  Cardiovascular: S1 S2  Gastrointestinal: soft NT slightly distended +BS  + peg  Extremities:  1 edema              06-11    141  |  108  |  19  ----------------------------<  101<H>  4.4   |  28  |  0.85    Ca    8.0<L>      2019 07:22  Phos  3.9     06-11  Mg     2.5     -11                            9.0    12.44 )-----------( 438      ( 10 Fan 2019 07:21 )             30.6     Creatinine Trend: 0.85<--, 0.83<--, 0.91<--, 0.97<--, 1.15<--, 1.15<--          Assessment and Plan  STACEY, hypernatremia; pre renal azotemia, improved;   Hyperkalemia; suspected impaired renal tubule handling with underlying incomplete Type IV RTA ( diabetes);   Medical rx for now; follow CPK for occult endogenous release.  Will add distal tubule diuretic to aid in K excretion;

## 2019-06-11 NOTE — PROGRESS NOTE ADULT - SUBJECTIVE AND OBJECTIVE BOX
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  NH report pt BUN & Cr incr, EMS report pt hypotensive on their arrival.  On arrival to ED, removed two 12microgram fentanyl patches.  admitted to ICU for severe poly microbial drug resistant sepsis, septic shock, dehydration, hypernatremia, acute renal failure with ATN, multiple infected decubitus ulcers (sacrum, hip, bilateral lower extremities), osteomyelitis of right ankle, hypercalcemia, left renal lesion and pulmonary nodules.  Now out of ICU.    OVERNIGHT EVENTS:  vomiting episode, low grade fever.    Vital Signs Last 24 Hrs  T(C): 37.3 (11 Jun 2019 16:59), Max: 38.2 (10 Fan 2019 17:45)  T(F): 99.2 (11 Jun 2019 16:59), Max: 100.8 (10 Fan 2019 17:45)  HR: 107 (11 Jun 2019 17:08) (106 - 119)  BP: 141/68 (11 Jun 2019 16:59) (105/53 - 164/79)  BP(mean): --  RR: 18 (11 Jun 2019 16:59) (18 - 20)  SpO2: 98% (11 Jun 2019 17:08) (95% - 99%)    Mode: AC/ CMV (Assist Control/ Continuous Mandatory Ventilation)  RR (machine): 12  TV (machine): 500  FiO2: 30  PEEP: 5  ITime: 1.1  MAP: 8  PIP: 17    PHYSICAL EXAM:  GEN:        comfortable.  HEENT:    trach  RESP:       no distress  CVS:           Regular rate and rhythm.   ABD:         Soft, non-tender, non-distended;     MEDICATIONS  (STANDING):  ALBUTerol    90 MICROgram(s) HFA Inhaler 1 Puff(s) Inhalation every 4 hours  ALBUTerol/ipratropium for Nebulization 3 milliLiter(s) Nebulizer every 6 hours  chlorhexidine 4% Liquid 1 Application(s) Topical <User Schedule>  dextrose 5% + sodium chloride 0.9%. 1000 milliLiter(s) (80 mL/Hr) IV Continuous <Continuous>  glycopyrrolate Injectable 0.4 milliGRAM(s) IV Push three times a day  heparin  Injectable 5000 Unit(s) SubCutaneous every 12 hours  linezolid  IVPB 600 milliGRAM(s) IV Intermittent every 12 hours  linezolid  IVPB      meropenem  IVPB 1000 milliGRAM(s) IV Intermittent every 8 hours  metolazone 2.5 milliGRAM(s) Oral daily  midodrine 20 milliGRAM(s) Oral every 8 hours  morphine  - Injectable 2 milliGRAM(s) IV Push every 6 hours  pantoprazole   Suspension 40 milliGRAM(s) Oral daily  petrolatum Ophthalmic Ointment 1 Application(s) Both EYES daily  tiotropium 18 MICROgram(s) Capsule 1 Capsule(s) Inhalation daily    MEDICATIONS  (PRN):  acetaminophen    Suspension .. 650 milliGRAM(s) Oral every 6 hours PRN Temp greater or equal to 38C (100.4F)    LABS:                        9.0    12.44 )-----------( 438      ( 10 Fan 2019 07:21 )             30.6     06-11    141  |  108  |  19  ----------------------------<  101<H>  4.4   |  28  |  0.85    Ca    8.0<L>      11 Jun 2019 07:22  Phos  3.9     06-11  Mg     2.5     06-11    ASSESSMENT AND PLAN:  ·	S/P Septic shock.  ·	Respiratory failure  ·	S/P Tracheostomy.  ·	Pseudomonas in sputum.  ·	UTI with enterococcus.  ·	CVA.  ·	Quadriplegia.  ·	HTN.  ·	DM    Feeding on hold.  Continue Vent.

## 2019-06-11 NOTE — CONSULT NOTE ADULT - CONSULT REQUESTED DATE/TIME
07-Jun-2019 18:26
11-Jun-2019 16:28
29-May-2019 11:00
30-May-2019 15:00
31-May-2019 08:10
30-May-2019
30-May-2019 13:39
03-Jun-2019 11:24
29-May-2019

## 2019-06-11 NOTE — PROGRESS NOTE ADULT - SUBJECTIVE AND OBJECTIVE BOX
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  NH report pt BUN & Cr incr, EMS report pt hypotensive on their arrival.  On arrival to ED, removed two 12microgram fentanyl patches (5/10/19 & 5/27/19)    In ED received 2200ml saline bolus. (29 May 2019 05:43)      INTERVAL HPI/OVERNIGHT EVENTS:  Called because of reflux of feeds into mouth seen when suctioning mouth. Seen intermittently over past few days. No N/V. Diarrhea yesterday after being given K Exelate. PEG tube feeds on hold at present time.     MEDICATIONS  (STANDING):  ALBUTerol    90 MICROgram(s) HFA Inhaler 1 Puff(s) Inhalation every 4 hours  ALBUTerol/ipratropium for Nebulization 3 milliLiter(s) Nebulizer every 6 hours  chlorhexidine 4% Liquid 1 Application(s) Topical <User Schedule>  dextrose 5% + sodium chloride 0.9%. 1000 milliLiter(s) (80 mL/Hr) IV Continuous <Continuous>  dextrose 5%. 1000 milliLiter(s) (50 mL/Hr) IV Continuous <Continuous>  dextrose 50% Injectable 12.5 Gram(s) IV Push once  dextrose 50% Injectable 25 Gram(s) IV Push once  dextrose 50% Injectable 25 Gram(s) IV Push once  glycopyrrolate Injectable 0.4 milliGRAM(s) IV Push three times a day  heparin  Injectable 5000 Unit(s) SubCutaneous every 12 hours  insulin glargine Injectable (LANTUS) 40 Unit(s) SubCutaneous every morning  insulin lispro (HumaLOG) corrective regimen sliding scale   SubCutaneous every 6 hours  linezolid  IVPB 600 milliGRAM(s) IV Intermittent every 12 hours  linezolid  IVPB      meropenem  IVPB 1000 milliGRAM(s) IV Intermittent every 8 hours  midodrine 20 milliGRAM(s) Oral every 8 hours  morphine  - Injectable 2 milliGRAM(s) IV Push every 6 hours  pantoprazole   Suspension 40 milliGRAM(s) Oral daily  petrolatum Ophthalmic Ointment 1 Application(s) Both EYES daily  tiotropium 18 MICROgram(s) Capsule 1 Capsule(s) Inhalation daily    MEDICATIONS  (PRN):  acetaminophen    Suspension .. 650 milliGRAM(s) Oral every 6 hours PRN Temp greater or equal to 38C (100.4F)  dextrose 40% Gel 15 Gram(s) Oral once PRN Blood Glucose LESS THAN 70 milliGRAM(s)/deciliter  glucagon  Injectable 1 milliGRAM(s) IntraMuscular once PRN Glucose LESS THAN 70 milligrams/deciliter      FAMILY HISTORY:  No pertinent family history in first degree relatives      Allergies    penicillin (Unknown)    Intolerances        PMH/PSH:  DM (diabetes mellitus)  Heart failure  Diabetic neuropathy  Hyperlipidemia  Quadriplegia  Hypertension  Stroke  Hypertension  Obstructive cardiomyopathy  S/P percutaneous endoscopic gastrostomy (PEG) tube placement  History of tracheostomy  No significant past surgical history        REVIEW OF SYSTEMS: Unresponsive  CONSTITUTIONAL: No fever, weight loss, or fatigue  EYES: No eye pain, visual disturbances, or discharge  ENMT:  No difficulty hearing, tinnitus, vertigo; No sinus or throat pain  NECK: No pain or stiffness  BREASTS: No pain, masses, or nipple discharge  RESPIRATORY: No cough, wheezing, chills or hemoptysis; No shortness of breath  CARDIOVASCULAR: No chest pain, palpitations, dizziness, or leg swelling  GASTROINTESTINAL: See above  GENITOURINARY: No dysuria, frequency, hematuria, or incontinence  NEUROLOGICAL: No headaches, memory loss, loss of strength, numbness, or tremors  SKIN: No itching, burning, rashes, or lesions   LYMPH NODES: No enlarged glands  ENDOCRINE: No heat or cold intolerance; No hair loss  MUSCULOSKELETAL: No joint pain or swelling; No muscle, back, or extremity pain  PSYCHIATRIC: No depression, anxiety, mood swings, or difficulty sleeping  HEME/LYMPH: No easy bruising, or bleeding gums  ALLERGY AND IMMUNOLOGIC: No hives or eczema    Vital Signs Last 24 Hrs  T(C): 37.9 (11 Jun 2019 05:00), Max: 38.2 (10 Fan 2019 17:45)  T(F): 100.2 (11 Jun 2019 05:00), Max: 100.8 (10 Fan 2019 17:45)  HR: 106 (11 Jun 2019 06:31) (102 - 119)  BP: 149/65 (11 Jun 2019 05:00) (105/53 - 164/79)  BP(mean): --  RR: 19 (11 Jun 2019 05:00) (19 - 20)  SpO2: 98% (11 Jun 2019 06:31) (95% - 99%)    PHYSICAL EXAM:  GENERAL: NAD, well-groomed, well-developed  HEAD:  Atraumatic, Normocephalic  EYES: EOMI, PERRLA, conjunctiva and sclera clear  NECK: Supple, No JVD, Normal thyroid  NERVOUS SYSTEM:  @ baseline  CHEST/LUNG: Clear to percussion bilaterally; No rales, rhonchi, wheezing, or rubs  HEART: Regular rate and rhythm; No murmurs, rubs, or gallops  ABDOMEN: Soft, Nontender, Nondistended; Bowel sounds present. PEG tube clean. Gauze (+)  EXTREMITIES:  2+ Peripheral Pulses, No clubbing, cyanosis, or edema  LYMPH: No lymphadenopathy noted  SKIN: No rashes or lesions    LAB                          9.0    12.44 )-----------( 438      ( 10 Fan 2019 07:21 )             30.6       CBC:  06-10 @ 07:21  WBC 12.44   Hgb 9.0   Hct 30.6   Plts 438  MCV 98.7  06-08 @ 07:41  WBC 15.44   Hgb 9.5   Hct 30.9   Plts 413  MCV 97.5  06-06 @ 08:39  WBC 15.88   Hgb 8.4   Hct 27.4   Plts 355  MCV 98.9  06-05 @ 02:52  WBC 18.07   Hgb 8.5   Hct 28.2   Plts 302  MCV 97.2      Chemistry:  06-11 @ 07:22  Na+ 141  K+ 4.4  Cl- 108  CO2 28  BUN 19  Cr 0.85     06-10 @ 17:40  Na+ --  K+ 5.9  Cl- --  CO2 --  BUN --  Cr --     06-10 @ 16:00  Na+ --  K+ 6.0  Cl- --  CO2 --  BUN --  Cr --     06-10 @ 07:21  Na+ 139  K+ 5.8  Cl- 108  CO2 27  BUN 24  Cr 0.83     06-08 @ 10:22  Na+ 144  K+ 5.3  Cl- 110  CO2 29  BUN 30  Cr 0.91     06-06 @ 08:39  Na+ 146  K+ 4.0  Cl- 108  CO2 33  BUN 44  Cr 0.97     06-05 @ 02:52  Na+ 143  K+ 4.7  Cl- 107  CO2 30  BUN 37  Cr 1.15         Glucose, Serum: 101 mg/dL (06-11 @ 07:22)  Glucose, Serum: 116 mg/dL (06-10 @ 07:21)  Glucose, Serum: 151 mg/dL (06-08 @ 10:22)  Glucose, Serum: 134 mg/dL (06-06 @ 08:39)  Glucose, Serum: 275 mg/dL (06-05 @ 02:52)      11 Jun 2019 07:22    141    |  108    |  19     ----------------------------<  101    4.4     |  28     |  0.85   10 Fan 2019 17:40    x      |  x      |  x      ----------------------------<  x      5.9     |  x      |  x      10 Fan 2019 16:00    x      |  x      |  x      ----------------------------<  x      6.0     |  x      |  x      10 Fan 2019 07:21    139    |  108    |  24     ----------------------------<  116    5.8     |  27     |  0.83   08 Jun 2019 10:22    144    |  110    |  30     ----------------------------<  151    5.3     |  29     |  0.91   06 Jun 2019 08:39    146    |  108    |  44     ----------------------------<  134    4.0     |  33     |  0.97   05 Jun 2019 02:52    143    |  107    |  37     ----------------------------<  275    4.7     |  30     |  1.15     Ca    8.0        11 Jun 2019 07:22  Ca    7.9        10 Fan 2019 07:21  Ca    7.4        08 Jun 2019 10:22  Ca    7.7        06 Jun 2019 08:39  Ca    8.5        05 Jun 2019 02:52  Phos  2.1       06 Jun 2019 08:39  Phos  2.7       05 Jun 2019 02:52  Mg     3.1       06 Jun 2019 08:39  Mg     2.9       05 Jun 2019 02:52                CAPILLARY BLOOD GLUCOSE      POCT Blood Glucose.: 162 mg/dL (11 Jun 2019 06:17)  POCT Blood Glucose.: 59 mg/dL (11 Jun 2019 05:58)  POCT Blood Glucose.: 146 mg/dL (11 Jun 2019 00:06)  POCT Blood Glucose.: 43 mg/dL (10 Fan 2019 23:41)  POCT Blood Glucose.: 44 mg/dL (10 Fan 2019 23:37)  POCT Blood Glucose.: 76 mg/dL (10 Fan 2019 18:43)  POCT Blood Glucose.: 77 mg/dL (10 Fan 2019 18:22)  POCT Blood Glucose.: 100 mg/dL (10 Fan 2019 17:48)  POCT Blood Glucose.: 52 mg/dL (10 Fan 2019 16:59)  POCT Blood Glucose.: 155 mg/dL (10 Fan 2019 13:50)  POCT Blood Glucose.: 168 mg/dL (10 Fan 2019 12:00)          RADIOLOGY & ADDITIONAL TESTS:    Imaging Personally Reviewed:  [ ] YES  [ ] NO    Consultant(s) Notes Reviewed:  [ ] YES  [ ] NO    Care Discussed with Consultants/Other Providers [ ] YES  [ ] NO

## 2019-06-11 NOTE — PROGRESS NOTE ADULT - SUBJECTIVE AND OBJECTIVE BOX
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  NH report pt BUN & Cr incr, EMS report pt hypotensive on their arrival.  On arrival to ED, removed two 12microgram fentanyl patches.  admitted to ICU for severe poly microbial drug resistant sepsis, septic shock, dehydration, hypernatremia, acute renal failure with ATN, multiple infected decubitus ulcers (sacrum, hip, bilateral lower extremities), osteomyelitis of right ankle, hypercalcemia, left renal lesion and pulmonary nodules.  Now out of ICU.  vomited yesterday     Allergies    penicillin (Unknown)    Intolerances        MEDICATIONS  (STANDING):  ALBUTerol    90 MICROgram(s) HFA Inhaler 1 Puff(s) Inhalation every 4 hours  ALBUTerol/ipratropium for Nebulization 3 milliLiter(s) Nebulizer every 6 hours  chlorhexidine 4% Liquid 1 Application(s) Topical <User Schedule>  dextrose 5% + sodium chloride 0.9%. 1000 milliLiter(s) (80 mL/Hr) IV Continuous <Continuous>  glycopyrrolate Injectable 0.4 milliGRAM(s) IV Push three times a day  heparin  Injectable 5000 Unit(s) SubCutaneous every 12 hours  linezolid  IVPB 600 milliGRAM(s) IV Intermittent every 12 hours  linezolid  IVPB      meropenem  IVPB 1000 milliGRAM(s) IV Intermittent every 8 hours  metolazone 2.5 milliGRAM(s) Oral daily  midodrine 20 milliGRAM(s) Oral every 8 hours  morphine  - Injectable 2 milliGRAM(s) IV Push every 6 hours  pantoprazole   Suspension 40 milliGRAM(s) Oral daily  petrolatum Ophthalmic Ointment 1 Application(s) Both EYES daily  tiotropium 18 MICROgram(s) Capsule 1 Capsule(s) Inhalation daily    MEDICATIONS  (PRN):  acetaminophen    Suspension .. 650 milliGRAM(s) Oral every 6 hours PRN Temp greater or equal to 38C (100.4F)      REVIEW OF SYSTEMS:    unable to obtain   VITAL SIGNS:  T(C): 37.3 (06-11-19 @ 16:59), Max: 37.9 (06-10-19 @ 23:24)  T(F): 99.2 (06-11-19 @ 16:59), Max: 100.2 (06-10-19 @ 23:24)  HR: 107 (06-11-19 @ 17:08) (106 - 119)  BP: 141/68 (06-11-19 @ 16:59) (105/53 - 164/79)  RR: 18 (06-11-19 @ 16:59) (18 - 19)  SpO2: 98% (06-11-19 @ 17:08) (96% - 99%)  Wt(kg): --    PHYSICAL EXAM:    GENERAL: not in any distress  HEENT: tracheostomy to vent   CHEST/LUNG: Clear to auscultation bilaterally; No rales, rhonchi, wheezing  HEART: Regular rate and rhythm; No murmurs, rubs, or gallops  ABDOMEN: Soft, Nontender, Nondistended; Bowel sounds present, no rebound   EXTREMITIES:  2+ Peripheral Pulses, No clubbing, cyanosis, or edema  multiple decubiti  BACK:  pressor sore   NERVOUS SYSTEM:  ch veg state  LABS:                         9.0    12.44 )-----------( 438      ( 10 Fan 2019 07:21 )             30.6     06-11    141  |  108  |  19  ----------------------------<  101<H>  4.4   |  28  |  0.85    Ca    8.0<L>      11 Jun 2019 07:22  Phos  3.9     06-11  Mg     2.5     06-11                                              Radiology:

## 2019-06-11 NOTE — PROGRESS NOTE ADULT - SUBJECTIVE AND OBJECTIVE BOX
Patient is a 73y old  Male who presents with a chief complaint of Sepsis (10 Fan 2019 19:09)  ID and PUl notes appreciated   agree with discontinuing abx, will discuss with son.    patient is not tolerating peg feedings, vomiting, - will hold peg feedings, IV fluids and discontinue Fentanyl.  add glycopyrrolate to  secretions.  T. max 100.8    INTERVAL HPI/OVERNIGHT EVENTS:  PAST MEDICAL & SURGICAL HISTORY:  DM (diabetes mellitus)  Heart failure  Diabetic neuropathy  Hyperlipidemia  Quadriplegia  Hypertension  Stroke  Hypertension  Obstructive cardiomyopathy  S/P percutaneous endoscopic gastrostomy (PEG) tube placement  History of tracheostomy      MEDICATIONS  (STANDING):  ALBUTerol    90 MICROgram(s) HFA Inhaler 1 Puff(s) Inhalation every 4 hours  ALBUTerol/ipratropium for Nebulization 3 milliLiter(s) Nebulizer every 6 hours  chlorhexidine 4% Liquid 1 Application(s) Topical <User Schedule>  dextrose 5% + sodium chloride 0.9%. 1000 milliLiter(s) (80 mL/Hr) IV Continuous <Continuous>  dextrose 5%. 1000 milliLiter(s) (50 mL/Hr) IV Continuous <Continuous>  dextrose 50% Injectable 12.5 Gram(s) IV Push once  dextrose 50% Injectable 25 Gram(s) IV Push once  dextrose 50% Injectable 25 Gram(s) IV Push once  fentaNYL   Patch  50 MICROgram(s)/Hr 1 Patch Transdermal every 72 hours  glycopyrrolate Injectable 0.4 milliGRAM(s) IV Push three times a day  heparin  Injectable 5000 Unit(s) SubCutaneous every 12 hours  insulin glargine Injectable (LANTUS) 40 Unit(s) SubCutaneous every morning  insulin lispro (HumaLOG) corrective regimen sliding scale   SubCutaneous every 6 hours  linezolid  IVPB 600 milliGRAM(s) IV Intermittent every 12 hours  linezolid  IVPB      meropenem  IVPB 1000 milliGRAM(s) IV Intermittent every 8 hours  midodrine 20 milliGRAM(s) Oral every 8 hours  morphine  - Injectable 2 milliGRAM(s) IV Push every 6 hours  pantoprazole   Suspension 40 milliGRAM(s) Oral daily  petrolatum Ophthalmic Ointment 1 Application(s) Both EYES daily  tiotropium 18 MICROgram(s) Capsule 1 Capsule(s) Inhalation daily    MEDICATIONS  (PRN):  acetaminophen    Suspension .. 650 milliGRAM(s) Oral every 6 hours PRN Temp greater or equal to 38C (100.4F)  dextrose 40% Gel 15 Gram(s) Oral once PRN Blood Glucose LESS THAN 70 milliGRAM(s)/deciliter  glucagon  Injectable 1 milliGRAM(s) IntraMuscular once PRN Glucose LESS THAN 70 milligrams/deciliter      Allergies    penicillin (Unknown)    Intolerances        REVIEW OF SYSTEMS:  CONSTITUTIONAL: No fever, weight loss, or fatigue  EYES: No eye pain, visual disturbances, or discharge  ENMT:  No difficulty hearing, tinnitus, vertigo; No sinus or throat pain  NECK: No pain or stiffness  BREASTS: No pain, masses, or nipple discharge  RESPIRATORY: No cough, wheezing, chills or hemoptysis; No shortness of breath  CARDIOVASCULAR: No chest pain, palpitations, dizziness, or leg swelling  GASTROINTESTINAL: No abdominal or epigastric pain. No nausea, vomiting, or hematemesis; No diarrhea or constipation. No melena or hematochezia.  GENITOURINARY: No dysuria, frequency, hematuria, or incontinence  NEUROLOGICAL: No headaches, memory loss, loss of strength, numbness, or tremors  SKIN: No itching, burning, rashes, or lesions   LYMPH NODES: No enlarged glands  ENDOCRINE: No heat or cold intolerance; No hair loss  MUSCULOSKELETAL: No joint pain or swelling; No muscle, back, or extremity pain  PSYCHIATRIC: No depression, anxiety, mood swings, or difficulty sleeping  HEME/LYMPH: No easy bruising, or bleeding gums  ALLERY AND IMMUNOLOGIC: No hives or eczema    Vital Signs Last 24 Hrs  T(C): 37.9 (2019 05:00), Max: 38.2 (10 Fan 2019 17:45)  T(F): 100.2 (2019 05:00), Max: 100.8 (10 Fan 2019 17:45)  HR: 106 (2019 06:31) (102 - 119)  BP: 149/65 (2019 05:00) (105/53 - 164/79)  BP(mean): --  RR: 19 (2019 05:00) (19 - 20)  SpO2: 98% (2019 06:31) (95% - 99%)    PHYSICAL EXAM:  GENERAL: NAD, well-groomed, well-developed  HEAD:  Atraumatic, Normocephalic  EYES: EOMI, PERRLA, conjunctiva and sclera clear  ENMT: No tonsillar erythema, exudates, or enlargement; Moist mucous membranes, Good dentition, No lesions  NECK: Supple, No JVD, Normal thyroid. tracheotomy , connected to vent  NERVOUS SYSTEM:  Quadriplegic.  CHEST/LUNG: Clear to percussion bilaterally; No rales, rhonchi, wheezing, or rubs  HEART: Regular rate and rhythm; No murmurs, rubs, or gallops  ABDOMEN: Soft, Nontender, Nondistended; Bowel sounds present. Peg tube in place.  EXTREMITIES:  2+ Peripheral Pulses, No clubbing, cyanosis, or edema  LYMPH: No lymphadenopathy noted  SKIN; Multiple decubiti, super infected. MDr ESBL, etc.    LABS:                        9.0    12.44 )-----------( 438      ( 10 Fan 2019 07:21 )             30.6     06-10    x   |  x   |  x   ----------------------------<  x   5.9<H>   |  x   |  x     Ca    7.9<L>      10 Fan 2019 07:21            CAPILLARY BLOOD GLUCOSE      POCT Blood Glucose.: 162 mg/dL (2019 06:17)  POCT Blood Glucose.: 59 mg/dL (2019 05:58)  POCT Blood Glucose.: 146 mg/dL (2019 00:06)  POCT Blood Glucose.: 43 mg/dL (10 Fan 2019 23:41)  POCT Blood Glucose.: 44 mg/dL (10 Fan 2019 23:37)  POCT Blood Glucose.: 76 mg/dL (10 Fan 2019 18:43)  POCT Blood Glucose.: 77 mg/dL (10 Fan 2019 18:22)  POCT Blood Glucose.: 100 mg/dL (10 Fan 2019 17:48)  POCT Blood Glucose.: 52 mg/dL (10 Fan 2019 16:59)  POCT Blood Glucose.: 155 mg/dL (10 Fan 2019 13:50)  POCT Blood Glucose.: 168 mg/dL (10 Fan 2019 12:00)  POCT Blood Glucose.: 160 mg/dL (10 Fan 2019 08:35)                RADIOLOGY & ADDITIONAL TESTS:    Imaging Personally Reviewed:  [ ] YES  [ ] NO    Consultant(s) Notes Reviewed:  [ ] YES  [ ] NO    Care Discussed with Consultants/Other Providers [ ] YES  [ ] NO    Care discussed with family,         [  ]   yes  [  ]  No    imp:    stable[ ]    unstable[  ]     improving [   ]       unchanged  [  ]                Plans:  Continue present plans  [x  ] add glycopyrrolate for secretions, zofran for vomitin, Hold feeding. ( discussed with son, Ernesto)               New consult [  ]   specialty  .......               order test[  ]    test name.                  Discharge Planning  [  ]

## 2019-06-11 NOTE — CONSULT NOTE ADULT - PROVIDER SPECIALTY LIST ADULT
Gastroenterology
Heme/Onc
Nephrology
Podiatry
Pulmonology
Infectious Disease
Wound Care
Intervent Radiology
Internal Medicine

## 2019-06-11 NOTE — PROGRESS NOTE ADULT - ASSESSMENT
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  NH report pt BUN & Cr incr, EMS report pt hypotensive on their arrival.  On arrival to ED, removed two 12microgram fentanyl patches (5/10/19 & 5/27/19)  ------------------------ As Above --------------------------------------------  Patient can not give any history. Called to evaluate PEG tube. It is an original tube placed (?). Functioning well. No leakage  History of GI bleed. Has a cholecystostomy tube.     In ED received 2200ml saline bolus. (29 May 2019 05:43)    Decreased H/H - No signs of active bleeding. 1) PPi 2) suppoertive care  PEG tube - Although tube appears old, would not change it. There is no beading and tube is functioning well. There is a significantly higher risk for tube displacement once the original tube is replaced. D/C PEG tube dressing. Clean with H2O2 : H2O 1:1 BID

## 2019-06-11 NOTE — PROGRESS NOTE ADULT - ASSESSMENT
s/p sepsis   hyperkalemia  quadriplegic patient, with tracheotomy and peg.    vent support for chronic resp failure.   multiple decubiti  teomyelitis rt ankle, with recurrent bacteremia, Multiple Drug resistant organisms. Family wants only comfort care.

## 2019-06-12 LAB
ANION GAP SERPL CALC-SCNC: 6 MMOL/L — SIGNIFICANT CHANGE UP (ref 5–17)
BUN SERPL-MCNC: 13 MG/DL — SIGNIFICANT CHANGE UP (ref 7–23)
CALCIUM SERPL-MCNC: 8.3 MG/DL — LOW (ref 8.5–10.1)
CHLORIDE SERPL-SCNC: 109 MMOL/L — HIGH (ref 96–108)
CK SERPL-CCNC: 37 U/L — SIGNIFICANT CHANGE UP (ref 26–308)
CO2 SERPL-SCNC: 27 MMOL/L — SIGNIFICANT CHANGE UP (ref 22–31)
CREAT SERPL-MCNC: 0.68 MG/DL — SIGNIFICANT CHANGE UP (ref 0.5–1.3)
GLUCOSE BLDC GLUCOMTR-MCNC: 81 MG/DL — SIGNIFICANT CHANGE UP (ref 70–99)
GLUCOSE SERPL-MCNC: 65 MG/DL — LOW (ref 70–99)
POTASSIUM SERPL-MCNC: 4.4 MMOL/L — SIGNIFICANT CHANGE UP (ref 3.5–5.3)
POTASSIUM SERPL-SCNC: 4.4 MMOL/L — SIGNIFICANT CHANGE UP (ref 3.5–5.3)
SODIUM SERPL-SCNC: 142 MMOL/L — SIGNIFICANT CHANGE UP (ref 135–145)
TSH SERPL-MCNC: 14.9 UIU/ML — HIGH (ref 0.36–3.74)

## 2019-06-12 RX ORDER — LEVOTHYROXINE SODIUM 125 MCG
50 TABLET ORAL DAILY
Refills: 0 | Status: DISCONTINUED | OUTPATIENT
Start: 2019-06-12 | End: 2019-06-17

## 2019-06-12 RX ORDER — MULTIVIT WITH MIN/MFOLATE/K2 340-15/3 G
1 POWDER (GRAM) ORAL ONCE
Refills: 0 | Status: COMPLETED | OUTPATIENT
Start: 2019-06-12 | End: 2019-06-12

## 2019-06-12 RX ADMIN — MEROPENEM 100 MILLIGRAM(S): 1 INJECTION INTRAVENOUS at 06:29

## 2019-06-12 RX ADMIN — MORPHINE SULFATE 2 MILLIGRAM(S): 50 CAPSULE, EXTENDED RELEASE ORAL at 17:20

## 2019-06-12 RX ADMIN — MORPHINE SULFATE 2 MILLIGRAM(S): 50 CAPSULE, EXTENDED RELEASE ORAL at 11:45

## 2019-06-12 RX ADMIN — MORPHINE SULFATE 2 MILLIGRAM(S): 50 CAPSULE, EXTENDED RELEASE ORAL at 23:57

## 2019-06-12 RX ADMIN — MORPHINE SULFATE 2 MILLIGRAM(S): 50 CAPSULE, EXTENDED RELEASE ORAL at 00:50

## 2019-06-12 RX ADMIN — Medication 1 APPLICATION(S): at 11:44

## 2019-06-12 RX ADMIN — SODIUM CHLORIDE 80 MILLILITER(S): 9 INJECTION, SOLUTION INTRAVENOUS at 13:27

## 2019-06-12 RX ADMIN — MORPHINE SULFATE 2 MILLIGRAM(S): 50 CAPSULE, EXTENDED RELEASE ORAL at 06:29

## 2019-06-12 RX ADMIN — Medication 3 MILLILITER(S): at 17:52

## 2019-06-12 RX ADMIN — HEPARIN SODIUM 5000 UNIT(S): 5000 INJECTION INTRAVENOUS; SUBCUTANEOUS at 06:30

## 2019-06-12 RX ADMIN — MORPHINE SULFATE 2 MILLIGRAM(S): 50 CAPSULE, EXTENDED RELEASE ORAL at 17:35

## 2019-06-12 RX ADMIN — HEPARIN SODIUM 5000 UNIT(S): 5000 INJECTION INTRAVENOUS; SUBCUTANEOUS at 17:20

## 2019-06-12 RX ADMIN — PANTOPRAZOLE SODIUM 40 MILLIGRAM(S): 20 TABLET, DELAYED RELEASE ORAL at 11:44

## 2019-06-12 RX ADMIN — Medication 3 MILLILITER(S): at 23:26

## 2019-06-12 RX ADMIN — LINEZOLID 300 MILLIGRAM(S): 600 INJECTION, SOLUTION INTRAVENOUS at 21:15

## 2019-06-12 RX ADMIN — MORPHINE SULFATE 2 MILLIGRAM(S): 50 CAPSULE, EXTENDED RELEASE ORAL at 12:00

## 2019-06-12 RX ADMIN — Medication 3 MILLILITER(S): at 05:30

## 2019-06-12 RX ADMIN — Medication 1 BOTTLE: at 11:45

## 2019-06-12 RX ADMIN — ROBINUL 0.4 MILLIGRAM(S): 0.2 INJECTION INTRAMUSCULAR; INTRAVENOUS at 13:27

## 2019-06-12 RX ADMIN — MEROPENEM 100 MILLIGRAM(S): 1 INJECTION INTRAVENOUS at 13:27

## 2019-06-12 RX ADMIN — Medication 50 MICROGRAM(S): at 09:59

## 2019-06-12 RX ADMIN — ROBINUL 0.4 MILLIGRAM(S): 0.2 INJECTION INTRAMUSCULAR; INTRAVENOUS at 21:16

## 2019-06-12 RX ADMIN — MORPHINE SULFATE 2 MILLIGRAM(S): 50 CAPSULE, EXTENDED RELEASE ORAL at 00:35

## 2019-06-12 RX ADMIN — ROBINUL 0.4 MILLIGRAM(S): 0.2 INJECTION INTRAMUSCULAR; INTRAVENOUS at 06:30

## 2019-06-12 RX ADMIN — Medication 3 MILLILITER(S): at 11:00

## 2019-06-12 RX ADMIN — LINEZOLID 300 MILLIGRAM(S): 600 INJECTION, SOLUTION INTRAVENOUS at 08:32

## 2019-06-12 RX ADMIN — MEROPENEM 100 MILLIGRAM(S): 1 INJECTION INTRAVENOUS at 21:15

## 2019-06-12 NOTE — PROGRESS NOTE ADULT - ASSESSMENT
Poor prognosis.   family wants comfort care only.   discussions ongoing regarding stopping abx, , lab testing and hospice care.

## 2019-06-12 NOTE — PROGRESS NOTE ADULT - SUBJECTIVE AND OBJECTIVE BOX
Patient is a 73y old  Male who presents with a chief complaint of Sepsis (11 Jun 2019 20:45)  essentially un changed. T max 99.2.    hemodynamically stable.   still on Iv merm and zyvox .   will restart  peg feedings today at 20 ml / hour.    insulin coverage discontinued  because he has low blood sugarr.    discussed with betty sullivan about stopping all blood tests and  IV abx and  getting hospice consult.   family still wants comfort care only.    INTERVAL HPI/OVERNIGHT EVENTS:  PAST MEDICAL & SURGICAL HISTORY:  DM (diabetes mellitus)  Heart failure  Diabetic neuropathy  Hyperlipidemia  Quadriplegia  Hypertension  Stroke  Hypertension  Obstructive cardiomyopathy  S/P percutaneous endoscopic gastrostomy (PEG) tube placement  History of tracheostomy      MEDICATIONS  (STANDING):  ALBUTerol    90 MICROgram(s) HFA Inhaler 1 Puff(s) Inhalation every 4 hours  ALBUTerol/ipratropium for Nebulization 3 milliLiter(s) Nebulizer every 6 hours  chlorhexidine 4% Liquid 1 Application(s) Topical <User Schedule>  dextrose 5% + sodium chloride 0.9%. 1000 milliLiter(s) (80 mL/Hr) IV Continuous <Continuous>  glycopyrrolate Injectable 0.4 milliGRAM(s) IV Push three times a day  heparin  Injectable 5000 Unit(s) SubCutaneous every 12 hours  levothyroxine 50 MICROGram(s) Oral daily  linezolid  IVPB 600 milliGRAM(s) IV Intermittent every 12 hours  linezolid  IVPB      meropenem  IVPB 1000 milliGRAM(s) IV Intermittent every 8 hours  metolazone 2.5 milliGRAM(s) Oral daily  midodrine 20 milliGRAM(s) Oral every 8 hours  morphine  - Injectable 2 milliGRAM(s) IV Push every 6 hours  pantoprazole   Suspension 40 milliGRAM(s) Oral daily  petrolatum Ophthalmic Ointment 1 Application(s) Both EYES daily  tiotropium 18 MICROgram(s) Capsule 1 Capsule(s) Inhalation daily    MEDICATIONS  (PRN):  acetaminophen    Suspension .. 650 milliGRAM(s) Oral every 6 hours PRN Temp greater or equal to 38C (100.4F)      Allergies    penicillin (Unknown)    Intolerances        REVIEW OF SYSTEMS:  CONSTITUTIONAL: No fever, weight loss, or fatigue  EYES: No eye pain, visual disturbances, or discharge  ENMT:  No difficulty hearing, tinnitus, vertigo; No sinus or throat pain  NECK: No pain or stiffness  BREASTS: No pain, masses, or nipple discharge  RESPIRATORY: No cough, wheezing, chills or hemoptysis; No shortness of breath  CARDIOVASCULAR: No chest pain, palpitations, dizziness, or leg swelling  GASTROINTESTINAL: No abdominal or epigastric pain. No nausea, vomiting, or hematemesis; No diarrhea or constipation. No melena or hematochezia.  GENITOURINARY: No dysuria, frequency, hematuria, or incontinence  NEUROLOGICAL: No headaches, memory loss, loss of strength, numbness, or tremors  SKIN: No itching, burning, rashes, or lesions   LYMPH NODES: No enlarged glands  ENDOCRINE: No heat or cold intolerance; No hair loss  MUSCULOSKELETAL: No joint pain or swelling; No muscle, back, or extremity pain  PSYCHIATRIC: No depression, anxiety, mood swings, or difficulty sleeping  HEME/LYMPH: No easy bruising, or bleeding gums  ALLERY AND IMMUNOLOGIC: No hives or eczema    Vital Signs Last 24 Hrs  T(C): 36.9 (12 Jun 2019 06:03), Max: 37.3 (11 Jun 2019 12:09)  T(F): 98.5 (12 Jun 2019 06:03), Max: 99.2 (11 Jun 2019 12:09)  HR: 105 (12 Jun 2019 08:44) (102 - 113)  BP: 134/53 (12 Jun 2019 06:03) (114/60 - 143/58)  BP(mean): --  RR: 18 (12 Jun 2019 06:03) (18 - 19)  SpO2: 100% (12 Jun 2019 08:44) (97% - 100%)    PHYSICAL EXAM:  GENERAL: NAD, well-groomed, well-developed  HEAD:  Atraumatic, Normocephalic  EYES: EOMI, PERRLA, conjunctiva and sclera clear  ENMT: No tonsillar erythema, exudates, or enlargement; Moist mucous membranes, Good dentition, No lesions  NECK: Supple, No JVD, Normal thyroid. Tracheotomy connected to vent.   NERVOUS SYSTEM:  quadriplegic.  CHEST/LUNG: Clear to percussion bilaterally; No rales, rhonchi, wheezing, or rubs  HEART: Regular rate and rhythm; No murmurs, rubs, or gallops  ABDOMEN: Soft, Nontender, Nondistended; Bowel sounds present  EXTREMITIES:  2+ Peripheral Pulses, No clubbing, cyanosis, or edema  LYMPH: No lymphadenopathy noted  SKIN:  deep decubiti in sacral area and hp and hels with osteo of rt ankle.     LABS:    06-12    142  |  109<H>  |  13  ----------------------------<  65<L>  4.4   |  27  |  0.68    Ca    8.3<L>      12 Jun 2019 06:35  Phos  3.9     06-11  Mg     2.5     06-11            CAPILLARY BLOOD GLUCOSE      POCT Blood Glucose.: 81 mg/dL (12 Jun 2019 06:26)  POCT Blood Glucose.: 190 mg/dL (11 Jun 2019 12:06)  POCT Blood Glucose.: 59 mg/dL (11 Jun 2019 11:42)  POCT Blood Glucose.: 58 mg/dL (11 Jun 2019 11:41)      CARDIAC MARKERS ( 12 Jun 2019 06:35 )  x     / x     / 37 U/L / x     / x                RADIOLOGY & ADDITIONAL TESTS:    Imaging Personally Reviewed:  [ ] YES  [ ] NO    Consultant(s) Notes Reviewed:  [ ] YES  [ ] NO    Care Discussed with Consultants/Other Providers [ ] YES  [ ] NO    Care discussed with family,         [  ]   yes  [  ]  No    imp:    stable[ ]    unstable[  ]     improving [   ]       unchanged  [ x ]                Plans:  Continue present plans  [ x ]  discussions ongoing with son regarding stopping abx and stoppng blood tests and  hospice consult.                 New consult [  ]   specialty  .......               order test[  ]    test name.                  Discharge Planning  [  ]

## 2019-06-12 NOTE — PROGRESS NOTE ADULT - SUBJECTIVE AND OBJECTIVE BOX
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  NH report pt BUN & Cr incr, EMS report pt hypotensive on their arrival.  On arrival to ED, removed two 12microgram fentanyl patches.  admitted to ICU for severe poly microbial drug resistant sepsis, septic shock, dehydration, hypernatremia, acute renal failure with ATN, multiple infected decubitus ulcers (sacrum, hip, bilateral lower extremities), osteomyelitis of right ankle, hypercalcemia, left renal lesion and pulmonary nodules.  Now out of ICU.    OVERNIGHT EVENTS:  Comfortable on Vent.    Vital Signs Last 24 Hrs  T(C): 36.7 (12 Jun 2019 12:31), Max: 37.3 (11 Jun 2019 16:59)  T(F): 98 (12 Jun 2019 12:31), Max: 99.2 (11 Jun 2019 16:59)  HR: 100 (12 Jun 2019 13:52) (100 - 111)  BP: 139/63 (12 Jun 2019 12:31) (134/53 - 143/58)  BP(mean): --  RR: 17 (12 Jun 2019 12:31) (17 - 18)  SpO2: 98% (12 Jun 2019 13:52) (97% - 100%)    Mode: AC/ CMV (Assist Control/ Continuous Mandatory Ventilation)  RR (machine): 12  TV (machine): 500  FiO2: 30  PEEP: 5  ITime: 1.16  MAP: 10  PIP: 18    PHYSICAL EXAM:  GEN:         comfortable.  HEENT:     TRach   RESP:       no didtress.  CVS:          Regular rate and rhythm.   ABD:         Soft, non-tender, non-distended;     MEDICATIONS  (STANDING):  ALBUTerol    90 MICROgram(s) HFA Inhaler 1 Puff(s) Inhalation every 4 hours  ALBUTerol/ipratropium for Nebulization 3 milliLiter(s) Nebulizer every 6 hours  chlorhexidine 4% Liquid 1 Application(s) Topical <User Schedule>  dextrose 5% + sodium chloride 0.9%. 1000 milliLiter(s) (80 mL/Hr) IV Continuous <Continuous>  glycopyrrolate Injectable 0.4 milliGRAM(s) IV Push three times a day  heparin  Injectable 5000 Unit(s) SubCutaneous every 12 hours  levothyroxine 50 MICROGram(s) Oral daily  linezolid  IVPB 600 milliGRAM(s) IV Intermittent every 12 hours  linezolid  IVPB      meropenem  IVPB 1000 milliGRAM(s) IV Intermittent every 8 hours  metolazone 2.5 milliGRAM(s) Oral daily  midodrine 20 milliGRAM(s) Oral every 8 hours  morphine  - Injectable 2 milliGRAM(s) IV Push every 6 hours  pantoprazole   Suspension 40 milliGRAM(s) Oral daily  petrolatum Ophthalmic Ointment 1 Application(s) Both EYES daily  tiotropium 18 MICROgram(s) Capsule 1 Capsule(s) Inhalation daily    MEDICATIONS  (PRN):  acetaminophen    Suspension .. 650 milliGRAM(s) Oral every 6 hours PRN Temp greater or equal to 38C (100.4F)    LABS:    06-12    142  |  109<H>  |  13  ----------------------------<  65<L>  4.4   |  27  |  0.68    Ca    8.3<L>      12 Jun 2019 06:35  Phos  3.9     06-11  Mg     2.5     06-11    ASSESSMENT AND PLAN:  ·	S/P Septic shock.  ·	Respiratory failure  ·	S/P Tracheostomy.  ·	Pseudomonas in sputum.  ·	UTI with enterococcus.  ·	CVA.  ·	Quadriplegia.  ·	HTN.  ·	DM    Supportive care.

## 2019-06-12 NOTE — PROGRESS NOTE ADULT - SUBJECTIVE AND OBJECTIVE BOX
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  NH report pt BUN & Cr incr, EMS report pt hypotensive on their arrival.  On arrival to ED, removed two 12microgram fentanyl patches.  admitted to ICU for severe poly microbial drug resistant sepsis, septic shock, dehydration, hypernatremia, acute renal failure with ATN, multiple infected decubitus ulcers (sacrum, hip, bilateral lower extremities), osteomyelitis of right ankle, hypercalcemia, left renal lesion and pulmonary nodules.  Now out of ICU.  no change in status     Allergies    penicillin (Unknown)    Intolerances          Allergies    penicillin (Unknown)    Intolerances        MEDICATIONS  (STANDING):  ALBUTerol    90 MICROgram(s) HFA Inhaler 1 Puff(s) Inhalation every 4 hours  ALBUTerol/ipratropium for Nebulization 3 milliLiter(s) Nebulizer every 6 hours  chlorhexidine 4% Liquid 1 Application(s) Topical <User Schedule>  dextrose 5% + sodium chloride 0.9%. 1000 milliLiter(s) (80 mL/Hr) IV Continuous <Continuous>  glycopyrrolate Injectable 0.4 milliGRAM(s) IV Push three times a day  heparin  Injectable 5000 Unit(s) SubCutaneous every 12 hours  levothyroxine 50 MICROGram(s) Oral daily  linezolid  IVPB 600 milliGRAM(s) IV Intermittent every 12 hours  linezolid  IVPB      meropenem  IVPB 1000 milliGRAM(s) IV Intermittent every 8 hours  metolazone 2.5 milliGRAM(s) Oral daily  midodrine 20 milliGRAM(s) Oral every 8 hours  morphine  - Injectable 2 milliGRAM(s) IV Push every 6 hours  pantoprazole   Suspension 40 milliGRAM(s) Oral daily  petrolatum Ophthalmic Ointment 1 Application(s) Both EYES daily  tiotropium 18 MICROgram(s) Capsule 1 Capsule(s) Inhalation daily    MEDICATIONS  (PRN):  acetaminophen    Suspension .. 650 milliGRAM(s) Oral every 6 hours PRN Temp greater or equal to 38C (100.4F)      REVIEW OF SYSTEMS:    unable to obtain     VITAL SIGNS:  T(C): 37.3 (06-12-19 @ 17:25), Max: 37.3 (06-12-19 @ 00:20)  T(F): 99.1 (06-12-19 @ 17:25), Max: 99.2 (06-12-19 @ 00:20)  HR: 111 (06-12-19 @ 17:53) (100 - 117)  BP: 129/62 (06-12-19 @ 17:25) (129/62 - 143/58)  RR: 18 (06-12-19 @ 17:25) (17 - 18)  SpO2: 97% (06-12-19 @ 17:53) (96% - 100%)  Wt(kg): --    PHYSICAL EXAM:    GENERAL: not in any distress  HEENT: tracheostomy to vent   CHEST/LUNG: Clear to auscultation bilaterally; No rales, rhonchi, wheezing  HEART: Regular rate and rhythm; No murmurs, rubs, or gallops  ABDOMEN: Soft, Nontender, Nondistended; Bowel sounds present, no rebound   EXTREMITIES:  2+ Peripheral Pulses, No clubbing, cyanosis, or edema  multiple decubiti   SKIN:   BACK:  pressor sore   NERVOUS SYSTEM: unresponsive  LABS:     06-12    142  |  109<H>  |  13  ----------------------------<  65<L>  4.4   |  27  |  0.68    Ca    8.3<L>      12 Jun 2019 06:35  Phos  3.9     06-11  Mg     2.5     06-11              CARDIAC MARKERS ( 12 Jun 2019 06:35 )  x     / x     / 37 U/L / x     / x          Thyroid Stimulating Hormone, Serum: 14.900 uIU/mL (06-12 @ 06:35)                              Radiology:    < from: Xray Chest 1 View- PORTABLE-Urgent (05.28.19 @ 22:53) >  IMPRESSION:    No lung consolidations.                  < end of copied text >

## 2019-06-12 NOTE — PROGRESS NOTE ADULT - SUBJECTIVE AND OBJECTIVE BOX
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  NH report pt BUN & Cr incr, EMS report pt hypotensive on their arrival.  On arrival to ED, removed two 12microgram fentanyl patches (5/10/19 & 5/27/19)    In ED received 2200ml saline bolus. (29 May 2019 05:43)      INTERVAL HPI/OVERNIGHT EVENTS:    Un communicative. Vomiting bilious material last night. The nurse denies melena, hematochezia, hematemesis, nausea,  abdominal pain, constipation, diarrhea, or change in bowel movements     MEDICATIONS  (STANDING):  ALBUTerol    90 MICROgram(s) HFA Inhaler 1 Puff(s) Inhalation every 4 hours  ALBUTerol/ipratropium for Nebulization 3 milliLiter(s) Nebulizer every 6 hours  chlorhexidine 4% Liquid 1 Application(s) Topical <User Schedule>  dextrose 5% + sodium chloride 0.9%. 1000 milliLiter(s) (80 mL/Hr) IV Continuous <Continuous>  glycopyrrolate Injectable 0.4 milliGRAM(s) IV Push three times a day  heparin  Injectable 5000 Unit(s) SubCutaneous every 12 hours  levothyroxine 50 MICROGram(s) Oral daily  linezolid  IVPB 600 milliGRAM(s) IV Intermittent every 12 hours  linezolid  IVPB      meropenem  IVPB 1000 milliGRAM(s) IV Intermittent every 8 hours  metolazone 2.5 milliGRAM(s) Oral daily  midodrine 20 milliGRAM(s) Oral every 8 hours  morphine  - Injectable 2 milliGRAM(s) IV Push every 6 hours  pantoprazole   Suspension 40 milliGRAM(s) Oral daily  petrolatum Ophthalmic Ointment 1 Application(s) Both EYES daily  tiotropium 18 MICROgram(s) Capsule 1 Capsule(s) Inhalation daily    MEDICATIONS  (PRN):  acetaminophen    Suspension .. 650 milliGRAM(s) Oral every 6 hours PRN Temp greater or equal to 38C (100.4F)      FAMILY HISTORY:  No pertinent family history in first degree relatives      Allergies    penicillin (Unknown)    Intolerances        PMH/PSH:  DM (diabetes mellitus)  Heart failure  Diabetic neuropathy  Hyperlipidemia  Quadriplegia  Hypertension  Stroke  Hypertension  Obstructive cardiomyopathy  S/P percutaneous endoscopic gastrostomy (PEG) tube placement  History of tracheostomy  No significant past surgical history        REVIEW OF SYSTEMS:  Uncommunicative  CONSTITUTIONAL: No fever, weight loss, or fatigue  EYES: No eye pain, visual disturbances, or discharge  ENMT:  No difficulty hearing, tinnitus, vertigo; No sinus or throat pain  NECK: No pain or stiffness  BREASTS: No pain, masses, or nipple discharge  RESPIRATORY: No cough, wheezing, chills or hemoptysis; No shortness of breath  CARDIOVASCULAR: No chest pain, palpitations, dizziness, or leg swelling  GASTROINTESTINAL:  See above  GENITOURINARY: No dysuria, frequency, hematuria, or incontinence  NEUROLOGICAL: No headaches, memory loss, loss of strength, numbness, or tremors  SKIN: No itching, burning, rashes, or lesions   LYMPH NODES: No enlarged glands  ENDOCRINE: No heat or cold intolerance; No hair loss  MUSCULOSKELETAL: No joint pain or swelling; No muscle, back, or extremity pain  PSYCHIATRIC: No depression, anxiety, mood swings, or difficulty sleeping  HEME/LYMPH: No easy bruising, or bleeding gums  ALLERGY AND IMMUNOLOGIC: No hives or eczema    Vital Signs Last 24 Hrs  T(C): 36.9 (12 Jun 2019 06:03), Max: 37.3 (11 Jun 2019 12:09)  T(F): 98.5 (12 Jun 2019 06:03), Max: 99.2 (11 Jun 2019 12:09)  HR: 105 (12 Jun 2019 08:44) (102 - 113)  BP: 134/53 (12 Jun 2019 06:03) (114/60 - 143/58)  BP(mean): --  RR: 18 (12 Jun 2019 06:03) (18 - 19)  SpO2: 100% (12 Jun 2019 08:44) (97% - 100%)    PHYSICAL EXAM:  GENERAL: NAD, well-groomed, well-developed  HEAD:  Atraumatic, Normocephalic  EYES: EOMI, PERRLA, conjunctiva and sclera clear  ENMT: No tonsillar erythema, exudates, or enlargement; Moist mucous membranes, Good dentition, No lesions  NECK: Supple, No JVD, Normal thyroid  NERVOUS SYSTEM:  Alert & Oriented X3, Good concentration; Motor Strength 5/5 B/L upper and lower extremities; DTRs 2+ intact and symmetric  CHEST/LUNG: Clear to percussion bilaterally; No rales, rhonchi, wheezing, or rubs  HEART: Regular rate and rhythm; No murmurs, rubs, or gallops  ABDOMEN: Soft, Nontender, Nondistended; Bowel sounds present  EXTREMITIES:  2+ Peripheral Pulses, No clubbing, cyanosis, or edema  LYMPH: No lymphadenopathy noted  SKIN: No rashes or lesions    LAB        CBC:  06-10 @ 07:21  WBC 12.44   Hgb 9.0   Hct 30.6   Plts 438  MCV 98.7  06-08 @ 07:41  WBC 15.44   Hgb 9.5   Hct 30.9   Plts 413  MCV 97.5  06-06 @ 08:39  WBC 15.88   Hgb 8.4   Hct 27.4   Plts 355  MCV 98.9      Chemistry:  06-12 @ 06:35  Na+ 142  K+ 4.4  Cl- 109  CO2 27  BUN 13  Cr 0.68     06-11 @ 07:22  Na+ 141  K+ 4.4  Cl- 108  CO2 28  BUN 19  Cr 0.85     06-10 @ 17:40  Na+ --  K+ 5.9  Cl- --  CO2 --  BUN --  Cr --     06-10 @ 16:00  Na+ --  K+ 6.0  Cl- --  CO2 --  BUN --  Cr --     06-10 @ 07:21  Na+ 139  K+ 5.8  Cl- 108  CO2 27  BUN 24  Cr 0.83     06-08 @ 10:22  Na+ 144  K+ 5.3  Cl- 110  CO2 29  BUN 30  Cr 0.91     06-06 @ 08:39  Na+ 146  K+ 4.0  Cl- 108  CO2 33  BUN 44  Cr 0.97         Glucose, Serum: 65 mg/dL (06-12 @ 06:35)  Glucose, Serum: 101 mg/dL (06-11 @ 07:22)  Glucose, Serum: 116 mg/dL (06-10 @ 07:21)  Glucose, Serum: 151 mg/dL (06-08 @ 10:22)  Glucose, Serum: 134 mg/dL (06-06 @ 08:39)      12 Jun 2019 06:35    142    |  109    |  13     ----------------------------<  65     4.4     |  27     |  0.68   11 Jun 2019 07:22    141    |  108    |  19     ----------------------------<  101    4.4     |  28     |  0.85   10 Fan 2019 17:40    x      |  x      |  x      ----------------------------<  x      5.9     |  x      |  x      10 Fan 2019 16:00    x      |  x      |  x      ----------------------------<  x      6.0     |  x      |  x      10 Jun 2019 07:21    139    |  108    |  24     ----------------------------<  116    5.8     |  27     |  0.83   08 Jun 2019 10:22    144    |  110    |  30     ----------------------------<  151    5.3     |  29     |  0.91   06 Jun 2019 08:39    146    |  108    |  44     ----------------------------<  134    4.0     |  33     |  0.97     Ca    8.3        12 Jun 2019 06:35  Ca    8.0        11 Jun 2019 07:22  Ca    7.9        10 Fan 2019 07:21  Ca    7.4        08 Jun 2019 10:22  Ca    7.7        06 Jun 2019 08:39  Phos  3.9       11 Jun 2019 07:22  Phos  2.1       06 Jun 2019 08:39  Mg     2.5       11 Jun 2019 07:22  Mg     3.1       06 Jun 2019 08:39                CAPILLARY BLOOD GLUCOSE      POCT Blood Glucose.: 81 mg/dL (12 Jun 2019 06:26)  POCT Blood Glucose.: 190 mg/dL (11 Jun 2019 12:06)  POCT Blood Glucose.: 59 mg/dL (11 Jun 2019 11:42)  POCT Blood Glucose.: 58 mg/dL (11 Jun 2019 11:41)          RADIOLOGY & ADDITIONAL TESTS:    Imaging Personally Reviewed:  [ ] YES  [ ] NO    Consultant(s) Notes Reviewed:  [ ] YES  [ ] NO    Care Discussed with Consultants/Other Providers [ ] YES  [ ] NO

## 2019-06-13 LAB
GLUCOSE BLDC GLUCOMTR-MCNC: 193 MG/DL — HIGH (ref 70–99)
GLUCOSE BLDC GLUCOMTR-MCNC: 199 MG/DL — HIGH (ref 70–99)

## 2019-06-13 PROCEDURE — 74018 RADEX ABDOMEN 1 VIEW: CPT | Mod: 26

## 2019-06-13 RX ORDER — SODIUM CHLORIDE 9 MG/ML
1000 INJECTION, SOLUTION INTRAVENOUS
Refills: 0 | Status: DISCONTINUED | OUTPATIENT
Start: 2019-06-13 | End: 2019-06-17

## 2019-06-13 RX ADMIN — MORPHINE SULFATE 2 MILLIGRAM(S): 50 CAPSULE, EXTENDED RELEASE ORAL at 23:59

## 2019-06-13 RX ADMIN — HEPARIN SODIUM 5000 UNIT(S): 5000 INJECTION INTRAVENOUS; SUBCUTANEOUS at 18:17

## 2019-06-13 RX ADMIN — Medication 1 APPLICATION(S): at 12:29

## 2019-06-13 RX ADMIN — Medication 3 MILLILITER(S): at 17:04

## 2019-06-13 RX ADMIN — Medication 50 MICROGRAM(S): at 06:20

## 2019-06-13 RX ADMIN — Medication 3 MILLILITER(S): at 05:00

## 2019-06-13 RX ADMIN — Medication 3 MILLILITER(S): at 23:15

## 2019-06-13 RX ADMIN — MORPHINE SULFATE 2 MILLIGRAM(S): 50 CAPSULE, EXTENDED RELEASE ORAL at 06:20

## 2019-06-13 RX ADMIN — MORPHINE SULFATE 2 MILLIGRAM(S): 50 CAPSULE, EXTENDED RELEASE ORAL at 12:29

## 2019-06-13 RX ADMIN — ROBINUL 0.4 MILLIGRAM(S): 0.2 INJECTION INTRAMUSCULAR; INTRAVENOUS at 13:07

## 2019-06-13 RX ADMIN — HEPARIN SODIUM 5000 UNIT(S): 5000 INJECTION INTRAVENOUS; SUBCUTANEOUS at 06:20

## 2019-06-13 RX ADMIN — SODIUM CHLORIDE 80 MILLILITER(S): 9 INJECTION, SOLUTION INTRAVENOUS at 03:09

## 2019-06-13 RX ADMIN — ROBINUL 0.4 MILLIGRAM(S): 0.2 INJECTION INTRAMUSCULAR; INTRAVENOUS at 06:20

## 2019-06-13 RX ADMIN — MORPHINE SULFATE 2 MILLIGRAM(S): 50 CAPSULE, EXTENDED RELEASE ORAL at 23:44

## 2019-06-13 RX ADMIN — MORPHINE SULFATE 2 MILLIGRAM(S): 50 CAPSULE, EXTENDED RELEASE ORAL at 06:40

## 2019-06-13 RX ADMIN — MORPHINE SULFATE 2 MILLIGRAM(S): 50 CAPSULE, EXTENDED RELEASE ORAL at 00:17

## 2019-06-13 RX ADMIN — Medication 3 MILLILITER(S): at 11:03

## 2019-06-13 RX ADMIN — SODIUM CHLORIDE 40 MILLILITER(S): 9 INJECTION, SOLUTION INTRAVENOUS at 10:45

## 2019-06-13 RX ADMIN — MORPHINE SULFATE 2 MILLIGRAM(S): 50 CAPSULE, EXTENDED RELEASE ORAL at 18:30

## 2019-06-13 RX ADMIN — ROBINUL 0.4 MILLIGRAM(S): 0.2 INJECTION INTRAMUSCULAR; INTRAVENOUS at 21:53

## 2019-06-13 RX ADMIN — MIDODRINE HYDROCHLORIDE 20 MILLIGRAM(S): 2.5 TABLET ORAL at 06:19

## 2019-06-13 RX ADMIN — MORPHINE SULFATE 2 MILLIGRAM(S): 50 CAPSULE, EXTENDED RELEASE ORAL at 12:45

## 2019-06-13 RX ADMIN — PANTOPRAZOLE SODIUM 40 MILLIGRAM(S): 20 TABLET, DELAYED RELEASE ORAL at 12:29

## 2019-06-13 RX ADMIN — MORPHINE SULFATE 2 MILLIGRAM(S): 50 CAPSULE, EXTENDED RELEASE ORAL at 18:16

## 2019-06-13 NOTE — PROGRESS NOTE ADULT - ASSESSMENT
72 yo M w/ multiple foot and ankle wounds.   - Pt seen and evaluated  - Right ankle joint exposed w/ necrotic anterior tibial tendon  - Foot is not salvageable, pt has already has attempts at long term abx for OM, poor prognosis  - Will continue to keep wounds dry with betadine and DSD daily. If pt becomes septic pt will need BKA emergently.  - Wound care: Betadine to bilateral foot wounds daily.   - Podiatry will continue to follow periodically to monitor  - Discussed w/ attending

## 2019-06-13 NOTE — PROGRESS NOTE ADULT - SUBJECTIVE AND OBJECTIVE BOX
Patient is a 73y old  Male who presents with a chief complaint of Sepsis (12 Jun 2019 20:05)  abx discontinued yesterday.  No fever since.   No distress.    no vomiting   had fleet  and tap water enema yesterday.  Blood sugar is holding up.   continuing comfort care as per family wishes.  Discussed with son, Ernesto( HCP) regarding placement. Family does not want him back in [place].   ID Notes , PUl notes, GI, Renal notes appreciated        INTERVAL HPI/OVERNIGHT EVENTS:  PAST MEDICAL & SURGICAL HISTORY:  DM (diabetes mellitus)  Heart failure  Diabetic neuropathy  Hyperlipidemia  Quadriplegia  Hypertension  Stroke  Hypertension  Obstructive cardiomyopathy  S/P percutaneous endoscopic gastrostomy (PEG) tube placement  History of tracheostomy      MEDICATIONS  (STANDING):  ALBUTerol    90 MICROgram(s) HFA Inhaler 1 Puff(s) Inhalation every 4 hours  ALBUTerol/ipratropium for Nebulization 3 milliLiter(s) Nebulizer every 6 hours  chlorhexidine 4% Liquid 1 Application(s) Topical <User Schedule>  dextrose 5% + sodium chloride 0.9%. 1000 milliLiter(s) (80 mL/Hr) IV Continuous <Continuous>  glycopyrrolate Injectable 0.4 milliGRAM(s) IV Push three times a day  heparin  Injectable 5000 Unit(s) SubCutaneous every 12 hours  levothyroxine 50 MICROGram(s) Oral daily  metolazone 2.5 milliGRAM(s) Oral daily  midodrine 20 milliGRAM(s) Oral every 8 hours  morphine  - Injectable 2 milliGRAM(s) IV Push every 6 hours  pantoprazole   Suspension 40 milliGRAM(s) Oral daily  petrolatum Ophthalmic Ointment 1 Application(s) Both EYES daily  tiotropium 18 MICROgram(s) Capsule 1 Capsule(s) Inhalation daily    MEDICATIONS  (PRN):  acetaminophen    Suspension .. 650 milliGRAM(s) Oral every 6 hours PRN Temp greater or equal to 38C (100.4F)      Allergies    penicillin (Unknown)    Intolerances        REVIEW OF SYSTEMS:  CONSTITUTIONAL: No fever, weight loss, or fatigue  EYES: No eye pain, visual disturbances, or discharge  ENMT:  No difficulty hearing, tinnitus, vertigo; No sinus or throat pain  NECK: No pain or stiffness  BREASTS: No pain, masses, or nipple discharge  RESPIRATORY: No cough, wheezing, chills or hemoptysis; No shortness of breath  CARDIOVASCULAR: No chest pain, palpitations, dizziness, or leg swelling  GASTROINTESTINAL: No abdominal or epigastric pain. No nausea, vomiting, or hematemesis; No diarrhea or constipation. No melena or hematochezia.  GENITOURINARY: No dysuria, frequency, hematuria, or incontinence  NEUROLOGICAL: No headaches, memory loss, loss of strength, numbness, or tremors  SKIN: No itching, burning, rashes, or lesions   LYMPH NODES: No enlarged glands  ENDOCRINE: No heat or cold intolerance; No hair loss  MUSCULOSKELETAL: No joint pain or swelling; No muscle, back, or extremity pain  PSYCHIATRIC: No depression, anxiety, mood swings, or difficulty sleeping  HEME/LYMPH: No easy bruising, or bleeding gums  ALLERY AND IMMUNOLOGIC: No hives or eczema    Vital Signs Last 24 Hrs  T(C): 36.6 (13 Jun 2019 06:00), Max: 37.3 (12 Jun 2019 17:25)  T(F): 97.8 (13 Jun 2019 06:00), Max: 99.1 (12 Jun 2019 17:25)  HR: 100 (13 Jun 2019 07:33) (100 - 117)  BP: 128/58 (13 Jun 2019 06:00) (128/58 - 139/63)  BP(mean): 78 (12 Jun 2019 17:25) (78 - 78)  RR: 18 (13 Jun 2019 06:00) (17 - 18)  SpO2: 99% (13 Jun 2019 07:33) (96% - 103%)    PHYSICAL EXAM:  GENERAL: NAD, well-groomed, well-developed  HEAD:  Atraumatic, Normocephalic  EYES: EOMI, PERRLA, conjunctiva and sclera clear  ENMT: No tonsillar erythema, exudates, or enlargement; Moist mucous membranes, Good dentition, No lesions  NECK: Supple, No JVD, Normal thyroid. s/p trach. on the vent.   NERVOUS SYSTEM:  Alert & Oriented X3, Good concentration; Motor Strength 5/5 B/L upper and lower extremities; DTRs 2+ intact and symmetric  CHEST/LUNG: Clear to percussion bilaterally; No rales, rhonchi, wheezing, or rubs  HEART: Regular rate and rhythm; No murmurs, rubs, or gallops  ABDOMEN: Soft, Nontender, Nondistended; Bowel sounds present. Peg tube in place.  EXTREMITIES:  2+ Peripheral Pulses, No clubbing, cyanosis, or edema  LYMPH: No lymphadenopathy noted  SKIN: stage 4 decubiti in sacral area. , decubiti in heels and hip areas. osteo in rt ankle.    LABS:    06-12    142  |  109<H>  |  13  ----------------------------<  65<L>  4.4   |  27  |  0.68    Ca    8.3<L>      12 Jun 2019 06:35            CAPILLARY BLOOD GLUCOSE      POCT Blood Glucose.: 199 mg/dL (13 Jun 2019 08:03)      CARDIAC MARKERS ( 12 Jun 2019 06:35 )  x     / x     / 37 U/L / x     / x                RADIOLOGY & ADDITIONAL TESTS:    Imaging Personally Reviewed:  [ ] YES  [ ] NO    Consultant(s) Notes Reviewed:  [x ] YES  [ ] NO    Care Discussed with Consultants/Other Providers [x ] YES  [ ] NO    Care discussed with family,         [ x ]   yes  [  ]  No    imp:    stable[ x]    unstable[  ]     improving [   ]       unchanged  [ x ]                Plans:  Continue present plans  [x  ] no more labs. . will increase  Jevity ot 40 ml per hour. , continue decubitus  care. . decrease Iv fluids to 40 ml per hour.               New consult [  ]   specialty  .......               order test[  ]    test name.                  Discharge Planning  [ x ]

## 2019-06-13 NOTE — PROGRESS NOTE ADULT - SUBJECTIVE AND OBJECTIVE BOX
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  NH report pt BUN & Cr incr, EMS report pt hypotensive on their arrival.  On arrival to ED, removed two 12microgram fentanyl patches.  admitted to ICU for severe poly microbial drug resistant sepsis, septic shock, dehydration, hypernatremia, acute renal failure with ATN, multiple infected decubitus ulcers (sacrum, hip, bilateral lower extremities), osteomyelitis of right ankle, hypercalcemia, left renal lesion and pulmonary nodules.  Now out of ICU.    OVERNIGHT EVENTS:  on vent, comfortable.    Vital Signs Last 24 Hrs  T(C): 37.2 (13 Jun 2019 17:15), Max: 37.2 (13 Jun 2019 17:15)  T(F): 98.9 (13 Jun 2019 17:15), Max: 98.9 (13 Jun 2019 17:15)  HR: 96 (13 Jun 2019 17:15) (96 - 114)  BP: 139/70 (13 Jun 2019 17:15) (128/58 - 142/63)  BP(mean): 77 (13 Jun 2019 17:15) (77 - 77)  RR: 17 (13 Jun 2019 17:15) (17 - 18)  SpO2: 98% (13 Jun 2019 17:15) (97% - 103%)    Mode: AC/ CMV (Assist Control/ Continuous Mandatory Ventilation)  RR (machine): 12  TV (machine): 500  FiO2: 30  PEEP: 5  ITime: 1  MAP: 8  PIP: 19    PHYSICAL EXAM:  GEN:        comfortable.  HEENT:     Trach  RESP:       no distress.  CVS:          Regular rate and rhythm.   ABD:         Soft, non-tender, non-distended;     MEDICATIONS  (STANDING):  ALBUTerol    90 MICROgram(s) HFA Inhaler 1 Puff(s) Inhalation every 4 hours  ALBUTerol/ipratropium for Nebulization 3 milliLiter(s) Nebulizer every 6 hours  chlorhexidine 4% Liquid 1 Application(s) Topical <User Schedule>  dextrose 5% + sodium chloride 0.9%. 1000 milliLiter(s) (40 mL/Hr) IV Continuous <Continuous>  glycopyrrolate Injectable 0.4 milliGRAM(s) IV Push three times a day  heparin  Injectable 5000 Unit(s) SubCutaneous every 12 hours  levothyroxine 50 MICROGram(s) Oral daily  metolazone 2.5 milliGRAM(s) Oral daily  midodrine 20 milliGRAM(s) Oral every 8 hours  morphine  - Injectable 2 milliGRAM(s) IV Push every 6 hours  pantoprazole   Suspension 40 milliGRAM(s) Oral daily  petrolatum Ophthalmic Ointment 1 Application(s) Both EYES daily  tiotropium 18 MICROgram(s) Capsule 1 Capsule(s) Inhalation daily    MEDICATIONS  (PRN):  acetaminophen    Suspension .. 650 milliGRAM(s) Oral every 6 hours PRN Temp greater or equal to 38C (100.4F)    LABS:    06-12    142  |  109<H>  |  13  ----------------------------<  65<L>  4.4   |  27  |  0.68    Ca    8.3<L>      12 Jun 2019 06:35    ASSESSMENT AND PLAN:  ·	S/P Septic shock.  ·	Respiratory failure  ·	S/P Tracheostomy.  ·	Pseudomonas in sputum.  ·	UTI with enterococcus.  ·	CVA.  ·	Quadriplegia.  ·	HTN.  ·	DM    Comfort care per family.  Continue Vent and nebulizer.

## 2019-06-13 NOTE — CHART NOTE - NSCHARTNOTEFT_GEN_A_CORE
Upon Nutritional Assessment by the Registered Dietitian your patient was determined to meet criteria / has evidence of the following diagnosis/diagnoses:          [ ]  Mild Protein Calorie Malnutrition        [ ]  Moderate Protein Calorie Malnutrition        [x ] Severe Protein Calorie Malnutrition; acute severe        [ ] Unspecified Protein Calorie Malnutrition        [ ] Underweight / BMI <19        [ ] Morbid Obesity / BMI > 40      Findings as based on:  •  Comprehensive nutrition assessment and consultation  •  Calorie counts (nutrient intake analysis)  •  Food acceptance and intake status from observations by staff  •  Follow up  •  Patient education  •  Intervention secondary to interdisciplinary rounds  •   concerns      Treatment:    The following diet has been recommended:  add No Carb Prosource 2 pkg daily=120 calories, 30 grams protein @ present   if feeding is tolerated recommend increase Jevity 1.2 @ goal rate of 60 mL/hr x 24 hrs (1728 shima, 83 gm pro, 1166 mL free water) or Glucerna 1.2 @ goal rate of 60 mL/hr x 24 hrs (1728 shima, 86 gm pro, 1166 mL free water, 120% RDIs).      PROVIDER Section:     By signing this assessment you are acknowledging and agree with the diagnosis/diagnoses assigned by the Registered Dietitian        Comments:

## 2019-06-13 NOTE — PROGRESS NOTE ADULT - SUBJECTIVE AND OBJECTIVE BOX
Patient is a 73y old  Male who presents with a chief complaint of Sepsis (13 Jun 2019 09:52)       INTERVAL HPI/OVERNIGHT EVENTS:  Patient seen and evaluated at bedside.      Allergies    penicillin (Unknown)    Intolerances        Vital Signs Last 24 Hrs  T(C): 36.4 (13 Jun 2019 11:10), Max: 37.3 (12 Jun 2019 17:25)  T(F): 97.6 (13 Jun 2019 11:10), Max: 99.1 (12 Jun 2019 17:25)  HR: 97 (13 Jun 2019 11:10) (97 - 117)  BP: 142/63 (13 Jun 2019 11:10) (128/58 - 142/63)  BP(mean): 78 (12 Jun 2019 17:25) (78 - 78)  RR: 18 (13 Jun 2019 11:10) (18 - 18)  SpO2: 100% (13 Jun 2019 11:10) (96% - 103%)    LABS:    06-12    142  |  109<H>  |  13  ----------------------------<  65<L>  4.4   |  27  |  0.68    Ca    8.3<L>      12 Jun 2019 06:35          CAPILLARY BLOOD GLUCOSE      POCT Blood Glucose.: 193 mg/dL (13 Jun 2019 11:33)  POCT Blood Glucose.: 199 mg/dL (13 Jun 2019 08:03)      Lower Extremity Physical Exam:  Vascular: DP/PT 2/4, B/L, CFT <3 seconds B/L, Temperature gradient warm to cool, B/L.   Neuro: Epicritic sensation inact to the level of toes, B/L.  Musculoskeletal/Ortho: Quadriplegic   Skin: RF medial ankle wound with ankle joint exposed and surrounding fibrotic and necrotic tissue measuring 5 cm X 4 cm X 2 cm, no malodor or purulence expressed. B/l  necrotic heel eschars well adhered, LF superficial anterior ankle wound with no signs of infection     RADIOLOGY & ADDITIONAL TESTS:

## 2019-06-13 NOTE — PROGRESS NOTE ADULT - SUBJECTIVE AND OBJECTIVE BOX
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  NH report pt BUN & Cr incr, EMS report pt hypotensive on their arrival.  On arrival to ED, removed two 12microgram fentanyl patches (5/10/19 & 5/27/19)    In ED received 2200ml saline bolus. (29 May 2019 05:43)      INTERVAL HPI/OVERNIGHT EVENTS:  MS unchanged. No BM after enema / mag citrate. The nurse denies melena, hematochezia, hematemesis, nausea, vomiting, abdominal pain,  diarrhea, or change in bowel movements Tolerating feeds @ 20 ml / hr    MEDICATIONS  (STANDING):  ALBUTerol    90 MICROgram(s) HFA Inhaler 1 Puff(s) Inhalation every 4 hours  ALBUTerol/ipratropium for Nebulization 3 milliLiter(s) Nebulizer every 6 hours  chlorhexidine 4% Liquid 1 Application(s) Topical <User Schedule>  dextrose 5% + sodium chloride 0.9%. 1000 milliLiter(s) (40 mL/Hr) IV Continuous <Continuous>  glycopyrrolate Injectable 0.4 milliGRAM(s) IV Push three times a day  heparin  Injectable 5000 Unit(s) SubCutaneous every 12 hours  levothyroxine 50 MICROGram(s) Oral daily  metolazone 2.5 milliGRAM(s) Oral daily  midodrine 20 milliGRAM(s) Oral every 8 hours  morphine  - Injectable 2 milliGRAM(s) IV Push every 6 hours  pantoprazole   Suspension 40 milliGRAM(s) Oral daily  petrolatum Ophthalmic Ointment 1 Application(s) Both EYES daily  tiotropium 18 MICROgram(s) Capsule 1 Capsule(s) Inhalation daily    MEDICATIONS  (PRN):  acetaminophen    Suspension .. 650 milliGRAM(s) Oral every 6 hours PRN Temp greater or equal to 38C (100.4F)      FAMILY HISTORY:  No pertinent family history in first degree relatives      Allergies    penicillin (Unknown)    Intolerances        PMH/PSH:  DM (diabetes mellitus)  Heart failure  Diabetic neuropathy  Hyperlipidemia  Quadriplegia  Hypertension  Stroke  Hypertension  Obstructive cardiomyopathy  S/P percutaneous endoscopic gastrostomy (PEG) tube placement  History of tracheostomy  No significant past surgical history        REVIEW OF SYSTEMS: Un communicative  CONSTITUTIONAL: No fever, weight loss, or fatigue  EYES: No eye pain, visual disturbances, or discharge  ENMT:  No difficulty hearing, tinnitus, vertigo; No sinus or throat pain  NECK: No pain or stiffness  BREASTS: No pain, masses, or nipple discharge  RESPIRATORY: No cough, wheezing, chills or hemoptysis; No shortness of breath  CARDIOVASCULAR: No chest pain, palpitations, dizziness, or leg swelling  GASTROINTESTINAL:  See above  GENITOURINARY: No dysuria, frequency, hematuria, or incontinence  NEUROLOGICAL: No headaches, memory loss, loss of strength, numbness, or tremors  SKIN: No itching, burning, rashes, or lesions   LYMPH NODES: No enlarged glands  ENDOCRINE: No heat or cold intolerance; No hair loss  MUSCULOSKELETAL: No joint pain or swelling; No muscle, back, or extremity pain  PSYCHIATRIC: No depression, anxiety, mood swings, or difficulty sleeping  HEME/LYMPH: No easy bruising, or bleeding gums  ALLERGY AND IMMUNOLOGIC: No hives or eczema    Vital Signs Last 24 Hrs  T(C): 36.4 (13 Jun 2019 11:10), Max: 37.3 (12 Jun 2019 17:25)  T(F): 97.6 (13 Jun 2019 11:10), Max: 99.1 (12 Jun 2019 17:25)  HR: 97 (13 Jun 2019 11:10) (97 - 117)  BP: 142/63 (13 Jun 2019 11:10) (128/58 - 142/63)  BP(mean): 78 (12 Jun 2019 17:25) (78 - 78)  RR: 18 (13 Jun 2019 11:10) (18 - 18)  SpO2: 100% (13 Jun 2019 11:10) (96% - 103%)    PHYSICAL EXAM:  GENERAL: NAD, well-groomed, well-developed  HEAD:  Atraumatic, Normocephalic  EYES: EOMI, PERRLA, conjunctiva and sclera clear  NECK: Supple, No JVD, Normal thyroid  NERVOUS SYSTEM:  MS at baseline  CHEST/LUNG: Clear to percussion bilaterally; No rales, rhonchi, wheezing, or rubs  HEART: Regular rate and rhythm; No murmurs, rubs, or gallops  ABDOMEN: Soft, Nontender, Nondistended; Bowel sounds present  EXTREMITIES:  2+ Peripheral Pulses, No clubbing, cyanosis, or edema  LYMPH: No lymphadenopathy noted  SKIN: No rashes or lesions    LAB        CBC:  06-10 @ 07:21  WBC 12.44   Hgb 9.0   Hct 30.6   Plts 438  MCV 98.7  06-08 @ 07:41  WBC 15.44   Hgb 9.5   Hct 30.9   Plts 413  MCV 97.5      Chemistry:  06-12 @ 06:35  Na+ 142  K+ 4.4  Cl- 109  CO2 27  BUN 13  Cr 0.68     06-11 @ 07:22  Na+ 141  K+ 4.4  Cl- 108  CO2 28  BUN 19  Cr 0.85     06-10 @ 17:40  Na+ --  K+ 5.9  Cl- --  CO2 --  BUN --  Cr --     06-10 @ 16:00  Na+ --  K+ 6.0  Cl- --  CO2 --  BUN --  Cr --     06-10 @ 07:21  Na+ 139  K+ 5.8  Cl- 108  CO2 27  BUN 24  Cr 0.83     06-08 @ 10:22  Na+ 144  K+ 5.3  Cl- 110  CO2 29  BUN 30  Cr 0.91         Glucose, Serum: 65 mg/dL (06-12 @ 06:35)  Glucose, Serum: 101 mg/dL (06-11 @ 07:22)  Glucose, Serum: 116 mg/dL (06-10 @ 07:21)  Glucose, Serum: 151 mg/dL (06-08 @ 10:22)      12 Jun 2019 06:35    142    |  109    |  13     ----------------------------<  65     4.4     |  27     |  0.68   11 Jun 2019 07:22    141    |  108    |  19     ----------------------------<  101    4.4     |  28     |  0.85   10 Fan 2019 17:40    x      |  x      |  x      ----------------------------<  x      5.9     |  x      |  x      10 Fan 2019 16:00    x      |  x      |  x      ----------------------------<  x      6.0     |  x      |  x      10 Fan 2019 07:21    139    |  108    |  24     ----------------------------<  116    5.8     |  27     |  0.83   08 Jun 2019 10:22    144    |  110    |  30     ----------------------------<  151    5.3     |  29     |  0.91     Ca    8.3        12 Jun 2019 06:35  Ca    8.0        11 Jun 2019 07:22  Ca    7.9        10 Fan 2019 07:21  Ca    7.4        08 Jun 2019 10:22  Phos  3.9       11 Jun 2019 07:22  Mg     2.5       11 Jun 2019 07:22                CAPILLARY BLOOD GLUCOSE      POCT Blood Glucose.: 193 mg/dL (13 Jun 2019 11:33)  POCT Blood Glucose.: 199 mg/dL (13 Jun 2019 08:03)          RADIOLOGY & ADDITIONAL TESTS:    Imaging Personally Reviewed:  [ ] YES  [ ] NO    Consultant(s) Notes Reviewed:  [ ] YES  [ ] NO    Care Discussed with Consultants/Other Providers [ ] YES  [ ] NO

## 2019-06-13 NOTE — PROGRESS NOTE ADULT - ASSESSMENT
Hemodynamically stable now.   s/p sepsis   hyperkalemia  quadriplegic patient, with tracheotomy and peg.    vent support for chronic resp failure.   multiple decubiti  Osteomyelitis rt ankle, with recurrent bacteremia, Multiple Drug resistant organisms. Family wants only comfort care.    discussed with son care of patient.   Son Ernesto is the HCP.. He agreed  to discuss with   about placement.. He will be coming in later today.

## 2019-06-14 ENCOUNTER — TRANSCRIPTION ENCOUNTER (OUTPATIENT)
Age: 74
End: 2019-06-14

## 2019-06-14 LAB — GLUCOSE BLDC GLUCOMTR-MCNC: 189 MG/DL — HIGH (ref 70–99)

## 2019-06-14 RX ORDER — MORPHINE SULFATE 50 MG/1
10 CAPSULE, EXTENDED RELEASE ORAL
Qty: 0 | Refills: 0 | DISCHARGE

## 2019-06-14 RX ORDER — LEVOTHYROXINE SODIUM 125 MCG
1 TABLET ORAL
Qty: 0 | Refills: 0 | DISCHARGE
Start: 2019-06-14

## 2019-06-14 RX ORDER — HUMAN INSULIN 100 [IU]/ML
30 INJECTION, SUSPENSION SUBCUTANEOUS
Qty: 0 | Refills: 0 | DISCHARGE

## 2019-06-14 RX ORDER — FENTANYL CITRATE 50 UG/ML
1 INJECTION INTRAVENOUS
Qty: 0 | Refills: 0 | DISCHARGE

## 2019-06-14 RX ORDER — ACETAMINOPHEN 500 MG
20.31 TABLET ORAL
Qty: 0 | Refills: 0 | DISCHARGE
Start: 2019-06-14

## 2019-06-14 RX ORDER — ACETAMINOPHEN 500 MG
2 TABLET ORAL
Qty: 0 | Refills: 0 | DISCHARGE

## 2019-06-14 RX ORDER — ROBINUL 0.2 MG/ML
1 INJECTION INTRAMUSCULAR; INTRAVENOUS
Qty: 0 | Refills: 0 | DISCHARGE

## 2019-06-14 RX ORDER — INSULIN HUMAN 100 [IU]/ML
0 INJECTION, SOLUTION SUBCUTANEOUS
Qty: 0 | Refills: 0 | DISCHARGE

## 2019-06-14 RX ORDER — METOPROLOL TARTRATE 50 MG
0.5 TABLET ORAL
Qty: 0 | Refills: 0 | DISCHARGE

## 2019-06-14 RX ORDER — INSULIN GLARGINE 100 [IU]/ML
40 INJECTION, SOLUTION SUBCUTANEOUS
Qty: 0 | Refills: 0 | DISCHARGE

## 2019-06-14 RX ORDER — MIDODRINE HYDROCHLORIDE 2.5 MG/1
2 TABLET ORAL
Qty: 0 | Refills: 0 | DISCHARGE
Start: 2019-06-14

## 2019-06-14 RX ADMIN — Medication 3 MILLILITER(S): at 12:28

## 2019-06-14 RX ADMIN — Medication 3 MILLILITER(S): at 23:11

## 2019-06-14 RX ADMIN — HEPARIN SODIUM 5000 UNIT(S): 5000 INJECTION INTRAVENOUS; SUBCUTANEOUS at 06:24

## 2019-06-14 RX ADMIN — ROBINUL 0.4 MILLIGRAM(S): 0.2 INJECTION INTRAMUSCULAR; INTRAVENOUS at 06:25

## 2019-06-14 RX ADMIN — CHLORHEXIDINE GLUCONATE 1 APPLICATION(S): 213 SOLUTION TOPICAL at 06:26

## 2019-06-14 RX ADMIN — Medication 3 MILLILITER(S): at 17:26

## 2019-06-14 RX ADMIN — MORPHINE SULFATE 2 MILLIGRAM(S): 50 CAPSULE, EXTENDED RELEASE ORAL at 06:39

## 2019-06-14 RX ADMIN — HEPARIN SODIUM 5000 UNIT(S): 5000 INJECTION INTRAVENOUS; SUBCUTANEOUS at 17:21

## 2019-06-14 RX ADMIN — Medication 3 MILLILITER(S): at 05:20

## 2019-06-14 RX ADMIN — Medication 50 MICROGRAM(S): at 06:25

## 2019-06-14 RX ADMIN — MORPHINE SULFATE 2 MILLIGRAM(S): 50 CAPSULE, EXTENDED RELEASE ORAL at 11:40

## 2019-06-14 RX ADMIN — Medication 1 APPLICATION(S): at 11:25

## 2019-06-14 RX ADMIN — PANTOPRAZOLE SODIUM 40 MILLIGRAM(S): 20 TABLET, DELAYED RELEASE ORAL at 11:23

## 2019-06-14 RX ADMIN — ROBINUL 0.4 MILLIGRAM(S): 0.2 INJECTION INTRAMUSCULAR; INTRAVENOUS at 13:00

## 2019-06-14 RX ADMIN — MORPHINE SULFATE 2 MILLIGRAM(S): 50 CAPSULE, EXTENDED RELEASE ORAL at 11:25

## 2019-06-14 RX ADMIN — Medication 650 MILLIGRAM(S): at 21:57

## 2019-06-14 RX ADMIN — MORPHINE SULFATE 2 MILLIGRAM(S): 50 CAPSULE, EXTENDED RELEASE ORAL at 06:24

## 2019-06-14 RX ADMIN — MORPHINE SULFATE 2 MILLIGRAM(S): 50 CAPSULE, EXTENDED RELEASE ORAL at 17:19

## 2019-06-14 RX ADMIN — SODIUM CHLORIDE 40 MILLILITER(S): 9 INJECTION, SOLUTION INTRAVENOUS at 06:27

## 2019-06-14 RX ADMIN — Medication 650 MILLIGRAM(S): at 22:57

## 2019-06-14 RX ADMIN — ROBINUL 0.4 MILLIGRAM(S): 0.2 INJECTION INTRAMUSCULAR; INTRAVENOUS at 21:59

## 2019-06-14 NOTE — PROGRESS NOTE ADULT - ASSESSMENT
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  NH report pt BUN & Cr incr, EMS report pt hypotensive on their arrival.  On arrival to ED, removed two 12microgram fentanyl patches (5/10/19 & 5/27/19)  ------------------------ As Above --------------------------------------------  Patient can not give any history. Called to evaluate PEG tube. It is an original tube placed (?). Functioning well. No leakage  History of GI bleed. Has a cholecystostomy tube.     In ED received 2200ml saline bolus. (29 May 2019 05:43)    Decreased H/H - No signs of active bleeding. 1) PPi 2) suppoertive care  PEG tube - Although tube appears old, would not change it. There is no beading and tube is functioning well. There is a significantly higher risk for tube displacement once the original tube is replaced. D/C PEG tube dressing. Clean with H2O2 : H2O 1:1 BID

## 2019-06-14 NOTE — DISCHARGE NOTE PROVIDER - HOSPITAL COURSE
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  NH report pt BUN & Cr increased,. Patient was in ARF and with hypercalcemia and hypernatremic dehydration and with hypotension and sepsis . he was admitted to ICu and treated with IV fluids, pressors and  broad spectrum abx. he was followed by renal, ID and critical care in addition to GI because of presence of Cholecystostomy tube and peg tube. . His condition improved with care of his decubiti, but he also has osteomyelitis of the rt heel. . Because  he has locked in syndrome from his cva of 9 months ago and he has mutiple comorbidities ( DM, HTN, sever sacral decubitus ulcer and heel ulcers, all growing MDR organisms) a family meeting was held  and family wanted only comfort measures. a Molst form was signed by family. patient is DNR/ DNI..     His condition stabilized, his blood sugar have been  less than 200 and his insulin coverage was stopped. The cholecystostomy tube fell out and he was evaluated by IR and decision was made by IR and criticl care not to reinsert it. . patient is on morphine  for pain management ( Noted by wincing).. patient is dischrge in stable condition. Family did not want hospice care. . patient also has  lesions in his kydneys and liver and patient will not tolerate any invasive procedures at this point.        Culture Results:     Moderate Klebsiella pneumoniae ESBL    Moderate Enterococcus faecium (vancomycin resistant)    Moderate Proteus mirabilis (05-30 @ 18:37)    Culture Results:     Few Methicillin resistant Staphylococcus aureus (05-30 @ 18:36)    Culture Results:     Moderate Klebsiella pneumoniae ESBL    Moderate Enterococcus faecalis    Moderate Proteus mirabilis    Moderate Enterococcus faecium (vancomycin resistant) (05-30 @ 18:35)    Culture Results:     Numerous Pseudomonas aeruginosa (Carbapenem Resistant)    Normal Respiratory Christopher present (05-29 @ 17:42)    Culture Results:     No growth to date. (05-29 @ 10:45)    Culture Results:     No growth to date. (05-29 @ 10:45)    Culture Results:     10,000 - 49,000 CFU/mL Enterococcus faecalis    <10,000 CFU/ml Normal Urogenital christopher present (05-29 @ 10:21)

## 2019-06-14 NOTE — PROGRESS NOTE ADULT - SUBJECTIVE AND OBJECTIVE BOX
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  NH report pt BUN & Cr incr, EMS report pt hypotensive on their arrival.  On arrival to ED, removed two 12microgram fentanyl patches (5/10/19 & 5/27/19)    In ED received 2200ml saline bolus. 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 1000 milliLiter(s) (40 mL/Hr) IV Continuous <Continuous>  glycopyrrolate Injectable 0.4 milliGRAM(s) IV Push three times a day  heparin  Injectable 5000 Unit(s) SubCutaneous every 12 hours  levothyroxine 50 MICROGram(s) Oral daily  metolazone 2.5 milliGRAM(s) Oral daily  midodrine 20 milliGRAM(s) Oral every 8 hours  morphine  - Injectable 2 milliGRAM(s) IV Push every 6 hours  pantoprazole   Suspension 40 milliGRAM(s) Oral daily  petrolatum Ophthalmic Ointment 1 Application(s) Both EYES daily  tiotropium 18 MICROgram(s) Capsule 1 Capsule(s) Inhalation daily    MEDICATIONS  (PRN):  acetaminophen    Suspension .. 650 milliGRAM(s) Oral every 6 hours PRN Temp greater or equal to 38C (100.4F)      FAMILY HISTORY:  No pertinent family history in first degree relatives      Allergies    penicillin (Unknown)    Intolerances        PMH/PSH:  DM (diabetes mellitus)  Heart failure  Diabetic neuropathy  Hyperlipidemia  Quadriplegia  Hypertension  Stroke  Hypertension  Obstructive cardiomyopathy  S/P percutaneous endoscopic gastrostomy (PEG) tube placement  History of tracheostomy  No significant past surgical history        REVIEW OF SYSTEMS: Uncommunicative    CONSTITUTIONAL: No fever, weight loss, or fatigue  EYES: No eye pain, visual disturbances, or discharge  ENMT:  No difficulty hearing, tinnitus, vertigo; No sinus or throat pain  NECK: No pain or stiffness  BREASTS: No pain, masses, or nipple discharge  RESPIRATORY: No cough, wheezing, chills or hemoptysis; No shortness of breath  CARDIOVASCULAR: No chest pain, palpitations, dizziness, or leg swelling  GASTROINTESTINAL: See above  GENITOURINARY: No dysuria, frequency, hematuria, or incontinence  NEUROLOGICAL: No headaches, memory loss, loss of strength, numbness, or tremors  SKIN: No itching, burning, rashes, or lesions   LYMPH NODES: No enlarged glands  ENDOCRINE: No heat or cold intolerance; No hair loss  MUSCULOSKELETAL: No joint pain or swelling; No muscle, back, or extremity pain  PSYCHIATRIC: No depression, anxiety, mood swings, or difficulty sleeping  HEME/LYMPH: No easy bruising, or bleeding gums  ALLERGY AND IMMUNOLOGIC: No hives or eczema    Vital Signs Last 24 Hrs  T(C): 36.9 (14 Jun 2019 11:07), Max: 37.2 (13 Jun 2019 17:15)  T(F): 98.4 (14 Jun 2019 11:07), Max: 98.9 (13 Jun 2019 17:15)  HR: 118 (14 Jun 2019 16:31) (89 - 118)  BP: 122/48 (14 Jun 2019 16:31) (122/48 - 161/71)  BP(mean): 77 (13 Jun 2019 17:15) (77 - 77)  RR: 18 (14 Jun 2019 16:31) (15 - 18)  SpO2: 98% (14 Jun 2019 16:31) (94% - 100%)    PHYSICAL EXAM:  GENERAL: NAD, well-groomed, well-developed  HEAD:  Atraumatic, Normocephalic  EYES: EOMI, PERRLA, conjunctiva and sclera clear  ENMT: No tonsillar erythema, exudates, or enlargement; Moist mucous membranes, Good dentition, No lesions  NECK: Supple, No JVD, Normal thyroid  NERVOUS SYSTEM:  Alert & at baseline  CHEST/LUNG: Clear to percussion bilaterally; No rales, rhonchi, wheezing, or rubs  HEART: Regular rate and rhythm; No murmurs, rubs, or gallops  ABDOMEN: Soft, Nontender, Nondistended; Bowel sounds present. PEG site clean  EXTREMITIES:  2+ Peripheral Pulses, No clubbing, cyanosis, or edema  LYMPH: No lymphadenopathy noted  SKIN: No rashes or lesions    LAB        CBC:  06-10 @ 07:21  WBC 12.44   Hgb 9.0   Hct 30.6   Plts 438  MCV 98.7  06-08 @ 07:41  WBC 15.44   Hgb 9.5   Hct 30.9   Plts 413  MCV 97.5      Chemistry:  06-12 @ 06:35  Na+ 142  K+ 4.4  Cl- 109  CO2 27  BUN 13  Cr 0.68     06-11 @ 07:22  Na+ 141  K+ 4.4  Cl- 108  CO2 28  BUN 19  Cr 0.85     06-10 @ 17:40  Na+ --  K+ 5.9  Cl- --  CO2 --  BUN --  Cr --     06-10 @ 16:00  Na+ --  K+ 6.0  Cl- --  CO2 --  BUN --  Cr --     06-10 @ 07:21  Na+ 139  K+ 5.8  Cl- 108  CO2 27  BUN 24  Cr 0.83     06-08 @ 10:22  Na+ 144  K+ 5.3  Cl- 110  CO2 29  BUN 30  Cr 0.91         Glucose, Serum: 65 mg/dL (06-12 @ 06:35)  Glucose, Serum: 101 mg/dL (06-11 @ 07:22)  Glucose, Serum: 116 mg/dL (06-10 @ 07:21)  Glucose, Serum: 151 mg/dL (06-08 @ 10:22)      12 Jun 2019 06:35    142    |  109    |  13     ----------------------------<  65     4.4     |  27     |  0.68   11 Jun 2019 07:22    141    |  108    |  19     ----------------------------<  101    4.4     |  28     |  0.85   10 Fan 2019 17:40    x      |  x      |  x      ----------------------------<  x      5.9     |  x      |  x      10 Fan 2019 16:00    x      |  x      |  x      ----------------------------<  x      6.0     |  x      |  x      10 Fan 2019 07:21    139    |  108    |  24     ----------------------------<  116    5.8     |  27     |  0.83   08 Jun 2019 10:22    144    |  110    |  30     ----------------------------<  151    5.3     |  29     |  0.91     Ca    8.3        12 Jun 2019 06:35  Ca    8.0        11 Jun 2019 07:22  Ca    7.9        10 Fan 2019 07:21  Ca    7.4        08 Jun 2019 10:22  Phos  3.9       11 Jun 2019 07:22  Mg     2.5       11 Jun 2019 07:22                CAPILLARY BLOOD GLUCOSE      POCT Blood Glucose.: 189 mg/dL (14 Jun 2019 06:38)          RADIOLOGY & ADDITIONAL TESTS:    Imaging Personally Reviewed:  [ ] YES  [ ] NO    Consultant(s) Notes Reviewed:  [ ] YES  [ ] NO    Care Discussed with Consultants/Other Providers [ ] YES  [ ] NO

## 2019-06-14 NOTE — PROGRESS NOTE ADULT - SUBJECTIVE AND OBJECTIVE BOX
Patient is a 73y old  Male who presents with a chief complaint of Sepsis (13 Jun 2019 17:41)  hemodynamically stable. no fever.   no vomiting   DNR status, Family wants only comfort care.    INTERVAL HPI/OVERNIGHT EVENTS: as above.   PAST MEDICAL & SURGICAL HISTORY:  DM (diabetes mellitus)  Heart failure  Diabetic neuropathy  Hyperlipidemia  Quadriplegia  Hypertension  Stroke  Hypertension  Obstructive cardiomyopathy  S/P percutaneous endoscopic gastrostomy (PEG) tube placement  History of tracheostomy      MEDICATIONS  (STANDING):  ALBUTerol    90 MICROgram(s) HFA Inhaler 1 Puff(s) Inhalation every 4 hours  ALBUTerol/ipratropium for Nebulization 3 milliLiter(s) Nebulizer every 6 hours  chlorhexidine 4% Liquid 1 Application(s) Topical <User Schedule>  dextrose 5% + sodium chloride 0.9%. 1000 milliLiter(s) (40 mL/Hr) IV Continuous <Continuous>  glycopyrrolate Injectable 0.4 milliGRAM(s) IV Push three times a day  heparin  Injectable 5000 Unit(s) SubCutaneous every 12 hours  levothyroxine 50 MICROGram(s) Oral daily  metolazone 2.5 milliGRAM(s) Oral daily  midodrine 20 milliGRAM(s) Oral every 8 hours  morphine  - Injectable 2 milliGRAM(s) IV Push every 6 hours  pantoprazole   Suspension 40 milliGRAM(s) Oral daily  petrolatum Ophthalmic Ointment 1 Application(s) Both EYES daily  tiotropium 18 MICROgram(s) Capsule 1 Capsule(s) Inhalation daily    MEDICATIONS  (PRN):  acetaminophen    Suspension .. 650 milliGRAM(s) Oral every 6 hours PRN Temp greater or equal to 38C (100.4F)      Allergies    penicillin (Unknown)    Intolerances        REVIEW OF SYSTEMS:  CONSTITUTIONAL: No fever, weight loss, or fatigue  EYES: No eye pain, visual disturbances, or discharge  ENMT:  No difficulty hearing, tinnitus, vertigo; No sinus or throat pain  NECK: No pain or stiffness  BREASTS: No pain, masses, or nipple discharge  RESPIRATORY: No cough, wheezing, chills or hemoptysis; No shortness of breath  CARDIOVASCULAR: No chest pain, palpitations, dizziness, or leg swelling  GASTROINTESTINAL: No abdominal or epigastric pain. No nausea, vomiting, or hematemesis; No diarrhea or constipation. No melena or hematochezia.  GENITOURINARY: No dysuria, frequency, hematuria, or incontinence  NEUROLOGICAL: No headaches, memory loss, loss of strength, numbness, or tremors  SKIN: No itching, burning, rashes, or lesions   LYMPH NODES: No enlarged glands  ENDOCRINE: No heat or cold intolerance; No hair loss  MUSCULOSKELETAL: No joint pain or swelling; No muscle, back, or extremity pain  PSYCHIATRIC: No depression, anxiety, mood swings, or difficulty sleeping  HEME/LYMPH: No easy bruising, or bleeding gums  ALLERY AND IMMUNOLOGIC: No hives or eczema    Vital Signs Last 24 Hrs  T(C): 37 (14 Jun 2019 05:00), Max: 37.2 (13 Jun 2019 17:15)  T(F): 98.6 (14 Jun 2019 05:00), Max: 98.9 (13 Jun 2019 17:15)  HR: 104 (14 Jun 2019 09:37) (89 - 110)  BP: 161/71 (14 Jun 2019 05:00) (139/70 - 161/71)  BP(mean): 77 (13 Jun 2019 17:15) (77 - 77)  RR: 17 (14 Jun 2019 05:00) (15 - 18)  SpO2: 98% (14 Jun 2019 09:37) (94% - 100%)    PHYSICAL EXAM:  GENERAL: NAD, well-groomed, well-developed  HEAD:  Atraumatic, Normocephalic  EYES: EOMI, PERRLA, conjunctiva and sclera clear  ENMT: No tonsillar erythema, exudates, or enlargement; Moist mucous membranes, Good dentition, No lesions  NECK: Supple, No JVD, Normal thyroid. s/p trach with vent connect  NERVOUS SYSTEM:  quadriplegic, locked in. .   CHEST/LUNG: Clear to percussion bilaterally; No rales, rhonchi, wheezing, or rubs  HEART: Regular rate and rhythm; No murmurs, rubs, or gallops  ABDOMEN: Soft, Nontender, Nondistended; Bowel sounds present. Peg tube functioning well  EXTREMITIES:  edema present, bilateral heel decubiti hip decubiti,  rt heel osteomyeleitis  LYMPH: No lymphadenopathy noted  SKIN: dep decubiti in sacral area. bilateral heel and hip decubiti.  rt heel osteo.    LABS:                CAPILLARY BLOOD GLUCOSE      POCT Blood Glucose.: 189 mg/dL (14 Jun 2019 06:38)  POCT Blood Glucose.: 193 mg/dL (13 Jun 2019 11:33)                RADIOLOGY & ADDITIONAL TESTS:    Imaging Personally Reviewed:  [ ] YES  [ ] NO    Consultant(s) Notes Reviewed:  [ ] YES  [ ] NO    Care Discussed with Consultants/Other Providers [ ] YES  [ ] NO    Care discussed with family,         [  ]   yes  [  ]  No    imp:    stable[ ]    unstable[  ]     improving [   ]       unchanged  [x  ]                Plans:  Continue present plans  [ x ] dischrge to [facility]. for comfort care               New consult [  ]   specialty  .......               order test[  ]    test name.                  Discharge Planning  [ x ]

## 2019-06-14 NOTE — PROGRESS NOTE ADULT - SUBJECTIVE AND OBJECTIVE BOX
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  NH report pt BUN & Cr incr, EMS report pt hypotensive on their arrival.  On arrival to ED, removed two 12microgram fentanyl patches.  admitted to ICU for severe poly microbial drug resistant sepsis, septic shock, dehydration, hypernatremia, acute renal failure with ATN, multiple infected decubitus ulcers (sacrum, hip, bilateral lower extremities), osteomyelitis of right ankle, hypercalcemia, left renal lesion and pulmonary nodules.  Now out of ICU.  no change in status   off antibiotics      MEDICATIONS  (STANDING):  ALBUTerol    90 MICROgram(s) HFA Inhaler 1 Puff(s) Inhalation every 4 hours  ALBUTerol/ipratropium for Nebulization 3 milliLiter(s) Nebulizer every 6 hours  chlorhexidine 4% Liquid 1 Application(s) Topical <User Schedule>  dextrose 5% + sodium chloride 0.9%. 1000 milliLiter(s) (40 mL/Hr) IV Continuous <Continuous>  glycopyrrolate Injectable 0.4 milliGRAM(s) IV Push three times a day  heparin  Injectable 5000 Unit(s) SubCutaneous every 12 hours  levothyroxine 50 MICROGram(s) Oral daily  metolazone 2.5 milliGRAM(s) Oral daily  midodrine 20 milliGRAM(s) Oral every 8 hours  morphine  - Injectable 2 milliGRAM(s) IV Push every 6 hours  pantoprazole   Suspension 40 milliGRAM(s) Oral daily  petrolatum Ophthalmic Ointment 1 Application(s) Both EYES daily  tiotropium 18 MICROgram(s) Capsule 1 Capsule(s) Inhalation daily    MEDICATIONS  (PRN):  acetaminophen    Suspension .. 650 milliGRAM(s) Oral every 6 hours PRN Temp greater or equal to 38C (100.4F)      REVIEW OF SYSTEMS:    unable to obtain   VITAL SIGNS:  T(C): 36.9 (06-14-19 @ 11:07), Max: 37 (06-14-19 @ 05:00)  T(F): 98.4 (06-14-19 @ 11:07), Max: 98.6 (06-14-19 @ 05:00)  HR: 91 (06-14-19 @ 19:04) (89 - 118)  BP: 122/48 (06-14-19 @ 16:31) (122/48 - 161/71)  RR: 18 (06-14-19 @ 16:31) (15 - 18)  SpO2: 98% (06-14-19 @ 19:04) (94% - 100%)  Wt(kg): --    PHYSICAL EXAM:    GENERAL: not in any distress  HEENT: tracheostomy to vent   CHEST/LUNG: Clear to auscultation bilaterally; No rales, rhonchi, wheezing  HEART: Regular rate and rhythm; No murmurs, rubs, or gallops  ABDOMEN: Soft, Nontender, Nondistended; Bowel sounds present, no rebound   EXTREMITIES:  2+ Peripheral Pulses, No clubbing, cyanosis, or edema  SKIN: no change in status   NERVOUS SYSTEM:  ch veg state  LABS:                       Thyroid Stimulating Hormone, Serum: 14.900 uIU/mL (06-12 @ 06:35)                              Radiology:    < from: Xray Kidney Ureter Bladder (06.13.19 @ 10:46) >  FINDINGS AND   IMPRESSION: Gastrostomy tube overlies the stomach. There is anonspecific   bowel gas pattern with air in the colon. There are multilevel   degenerative changes of the spine.                JOHANNA MOMIN M.D., ATTENDING RADIOLOGIST  This document has been electronically signed. Jun 13 2019  3:20PM                < end of copied text >

## 2019-06-14 NOTE — DISCHARGE NOTE PROVIDER - PROVIDER TOKENS
FREE:[LAST:[PMD],PHONE:[(   )    -],FAX:[(   )    -],ADDRESS:[PMD in  LTC],FOLLOWUP:[1-3 days]] FREE:[LAST:[PMD],PHONE:[(   )    -],FAX:[(   )    -],ADDRESS:[PMD in  LTC],FOLLOWUP:[1-3 days]],PROVIDER:[TOKEN:[[id number]]]

## 2019-06-14 NOTE — PROGRESS NOTE ADULT - ASSESSMENT
continuing comfort care as per family wishes.  Discussed with son, Ernesto( HCP) regarding placement. Family does not want [place] back in [place].   ID Notes , PUl notes, GI, Renal notes appreciated  .    Hemodynamically stable now.   s/p sepsis   hyperkalemia  quadriplegic patient, with tracheotomy and peg.    vent support for chronic resp failure.   multiple decubiti  Osteomyelitis rt ankle, with recurrent bacteremia, Multiple Drug resistant organisms

## 2019-06-14 NOTE — PROGRESS NOTE ADULT - ASSESSMENT
sepsis resolved   multiple decubiti   vent dependant   cultures noted ; decubiti noted  sputum culture noted   no xray abn ;; last 5/28   need to treat ? sputum ?mantain contact isolation   continue current treatment merrem and zyvox ';; day 14 of antibiotics today   legs need betadine   sacrum needs debridement   discussed with dr rodriguez 2 dsays ago   possible withdrawl of care  agree   this is futile   await familys decisions   for now dc antibiotics and watch

## 2019-06-14 NOTE — DISCHARGE NOTE PROVIDER - NSDCCPCAREPLAN_GEN_ALL_CORE_FT
PRINCIPAL DISCHARGE DIAGNOSIS  Diagnosis: Severe sepsis  Assessment and Plan of Treatment: treated      SECONDARY DISCHARGE DIAGNOSES  Diagnosis: Hypernatremia  Assessment and Plan of Treatment: resolved    Diagnosis: Hypercalcemia  Assessment and Plan of Treatment: resolved PRINCIPAL DISCHARGE DIAGNOSIS  Diagnosis: Severe sepsis  Assessment and Plan of Treatment: treated      SECONDARY DISCHARGE DIAGNOSES  Diagnosis: DM (diabetes mellitus)  Assessment and Plan of Treatment: lantus 10 units at bedtime and lispro insulin low dose sliding scale.    Diagnosis: Hypernatremia  Assessment and Plan of Treatment: resolved    Diagnosis: Dysphagia, unspecified type  Assessment and Plan of Treatment: Peg feed: Glucerna at 50 ml /hr    Diagnosis: Hypercalcemia  Assessment and Plan of Treatment: resolved

## 2019-06-14 NOTE — PROGRESS NOTE ADULT - SUBJECTIVE AND OBJECTIVE BOX
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  NH report pt BUN & Cr incr, EMS report pt hypotensive on their arrival.  On arrival to ED, removed two 12microgram fentanyl patches.  admitted to ICU for severe poly microbial drug resistant sepsis, septic shock, dehydration, hypernatremia, acute renal failure with ATN, multiple infected decubitus ulcers (sacrum, hip, bilateral lower extremities), osteomyelitis of right ankle, hypercalcemia, left renal lesion and pulmonary nodules.  Now out of ICU.    OVERNIGHT EVENTS:   No significant change.    Vital Signs Last 24 Hrs  T(C): 36.9 (14 Jun 2019 11:07), Max: 37.2 (13 Jun 2019 17:15)  T(F): 98.4 (14 Jun 2019 11:07), Max: 98.9 (13 Jun 2019 17:15)  HR: 97 (14 Jun 2019 13:54) (89 - 118)  BP: 149/61 (14 Jun 2019 11:07) (139/70 - 161/71)  BP(mean): 77 (13 Jun 2019 17:15) (77 - 77)  RR: 17 (14 Jun 2019 11:07) (15 - 17)  SpO2: 98% (14 Jun 2019 13:54) (94% - 100%)    Mode: AC/ CMV (Assist Control/ Continuous Mandatory Ventilation)  RR (machine): 12  TV (machine): 500  FiO2: 30  PEEP: 5  ITime: 1.16  MAP: 9  PIP: 22    PHYSICAL EXAM:  GEN:        comfortable.  HEENT:     Tracheostomy   RESP:        no distress.  CVS:          Regular rate and rhythm.   ABD:         Soft, non-tender, non-distended;     MEDICATIONS  (STANDING):  ALBUTerol    90 MICROgram(s) HFA Inhaler 1 Puff(s) Inhalation every 4 hours  ALBUTerol/ipratropium for Nebulization 3 milliLiter(s) Nebulizer every 6 hours  chlorhexidine 4% Liquid 1 Application(s) Topical <User Schedule>  dextrose 5% + sodium chloride 0.9%. 1000 milliLiter(s) (40 mL/Hr) IV Continuous <Continuous>  glycopyrrolate Injectable 0.4 milliGRAM(s) IV Push three times a day  heparin  Injectable 5000 Unit(s) SubCutaneous every 12 hours  levothyroxine 50 MICROGram(s) Oral daily  metolazone 2.5 milliGRAM(s) Oral daily  midodrine 20 milliGRAM(s) Oral every 8 hours  morphine  - Injectable 2 milliGRAM(s) IV Push every 6 hours  pantoprazole   Suspension 40 milliGRAM(s) Oral daily  petrolatum Ophthalmic Ointment 1 Application(s) Both EYES daily  tiotropium 18 MICROgram(s) Capsule 1 Capsule(s) Inhalation daily    MEDICATIONS  (PRN):  acetaminophen    Suspension .. 650 milliGRAM(s) Oral every 6 hours PRN Temp greater or equal to 38C (100.4F)    ASSESSMENT AND PLAN:  ·	S/P Septic shock.  ·	Respiratory failure  ·	S/P Tracheostomy.  ·	Pseudomonas in sputum.  ·	UTI with enterococcus.  ·	CVA.  ·	Quadriplegia.  ·	HTN.  ·	DM    DNR, comfort care per Family.  For discharge to Nursing home.

## 2019-06-14 NOTE — DISCHARGE NOTE PROVIDER - CARE PROVIDER_API CALL
PMD,   PMD in  LTC  Phone: (   )    -  Fax: (   )    -  Follow Up Time: 1-3 days PMD,   PMD in  LTC  Phone: (   )    -  Fax: (   )    -  Follow Up Time: 1-3 days    Sylvia Aguilar (MD)  Geriatric Medicine; Internal Medicine  [address] [place] [address]  Phone: [phone]  Fax: [phone]  Follow Up Time:

## 2019-06-15 LAB — GLUCOSE BLDC GLUCOMTR-MCNC: 256 MG/DL — HIGH (ref 70–99)

## 2019-06-15 RX ORDER — IBUPROFEN 200 MG
600 TABLET ORAL ONCE
Refills: 0 | Status: COMPLETED | OUTPATIENT
Start: 2019-06-15 | End: 2019-06-15

## 2019-06-15 RX ORDER — MEROPENEM 1 G/30ML
INJECTION INTRAVENOUS
Refills: 0 | Status: DISCONTINUED | OUTPATIENT
Start: 2019-06-15 | End: 2019-06-17

## 2019-06-15 RX ORDER — KETOROLAC TROMETHAMINE 30 MG/ML
15 SYRINGE (ML) INJECTION ONCE
Refills: 0 | Status: DISCONTINUED | OUTPATIENT
Start: 2019-06-15 | End: 2019-06-15

## 2019-06-15 RX ORDER — MEROPENEM 1 G/30ML
1000 INJECTION INTRAVENOUS EVERY 8 HOURS
Refills: 0 | Status: DISCONTINUED | OUTPATIENT
Start: 2019-06-15 | End: 2019-06-17

## 2019-06-15 RX ORDER — MEROPENEM 1 G/30ML
1000 INJECTION INTRAVENOUS ONCE
Refills: 0 | Status: COMPLETED | OUTPATIENT
Start: 2019-06-15 | End: 2019-06-15

## 2019-06-15 RX ADMIN — ROBINUL 0.4 MILLIGRAM(S): 0.2 INJECTION INTRAMUSCULAR; INTRAVENOUS at 21:05

## 2019-06-15 RX ADMIN — MEROPENEM 100 MILLIGRAM(S): 1 INJECTION INTRAVENOUS at 15:35

## 2019-06-15 RX ADMIN — ROBINUL 0.4 MILLIGRAM(S): 0.2 INJECTION INTRAMUSCULAR; INTRAVENOUS at 06:49

## 2019-06-15 RX ADMIN — ROBINUL 0.4 MILLIGRAM(S): 0.2 INJECTION INTRAMUSCULAR; INTRAVENOUS at 15:42

## 2019-06-15 RX ADMIN — MORPHINE SULFATE 2 MILLIGRAM(S): 50 CAPSULE, EXTENDED RELEASE ORAL at 17:57

## 2019-06-15 RX ADMIN — MEROPENEM 100 MILLIGRAM(S): 1 INJECTION INTRAVENOUS at 10:34

## 2019-06-15 RX ADMIN — MIDODRINE HYDROCHLORIDE 20 MILLIGRAM(S): 2.5 TABLET ORAL at 06:50

## 2019-06-15 RX ADMIN — Medication 3 MILLILITER(S): at 23:50

## 2019-06-15 RX ADMIN — Medication 3 MILLILITER(S): at 11:42

## 2019-06-15 RX ADMIN — Medication 600 MILLIGRAM(S): at 06:48

## 2019-06-15 RX ADMIN — Medication 3 MILLILITER(S): at 17:48

## 2019-06-15 RX ADMIN — MORPHINE SULFATE 2 MILLIGRAM(S): 50 CAPSULE, EXTENDED RELEASE ORAL at 13:15

## 2019-06-15 RX ADMIN — Medication 50 MICROGRAM(S): at 06:51

## 2019-06-15 RX ADMIN — Medication 1 APPLICATION(S): at 13:15

## 2019-06-15 RX ADMIN — Medication 15 MILLIGRAM(S): at 09:22

## 2019-06-15 RX ADMIN — MORPHINE SULFATE 2 MILLIGRAM(S): 50 CAPSULE, EXTENDED RELEASE ORAL at 17:42

## 2019-06-15 RX ADMIN — MORPHINE SULFATE 2 MILLIGRAM(S): 50 CAPSULE, EXTENDED RELEASE ORAL at 00:29

## 2019-06-15 RX ADMIN — HEPARIN SODIUM 5000 UNIT(S): 5000 INJECTION INTRAVENOUS; SUBCUTANEOUS at 17:41

## 2019-06-15 RX ADMIN — MEROPENEM 100 MILLIGRAM(S): 1 INJECTION INTRAVENOUS at 21:05

## 2019-06-15 RX ADMIN — CHLORHEXIDINE GLUCONATE 1 APPLICATION(S): 213 SOLUTION TOPICAL at 06:50

## 2019-06-15 RX ADMIN — Medication 3 MILLILITER(S): at 05:17

## 2019-06-15 RX ADMIN — MORPHINE SULFATE 2 MILLIGRAM(S): 50 CAPSULE, EXTENDED RELEASE ORAL at 13:30

## 2019-06-15 RX ADMIN — MORPHINE SULFATE 2 MILLIGRAM(S): 50 CAPSULE, EXTENDED RELEASE ORAL at 06:49

## 2019-06-15 RX ADMIN — PANTOPRAZOLE SODIUM 40 MILLIGRAM(S): 20 TABLET, DELAYED RELEASE ORAL at 13:16

## 2019-06-15 RX ADMIN — MORPHINE SULFATE 2 MILLIGRAM(S): 50 CAPSULE, EXTENDED RELEASE ORAL at 00:14

## 2019-06-15 RX ADMIN — HEPARIN SODIUM 5000 UNIT(S): 5000 INJECTION INTRAVENOUS; SUBCUTANEOUS at 06:51

## 2019-06-15 NOTE — PROGRESS NOTE ADULT - SUBJECTIVE AND OBJECTIVE BOX
Patient is a 73y old  Male who presents with a chief complaint of Sepsis (14 Jun 2019 17:13)  quadriplegic atient with recurrent sepsis from  infected decubiti with MDR organisms.  Had  fever last night , Now defervescing.- will keep merem  going as long  pt is in Hospital.   oterwise clinical status unchanged  DNR/Satatus.    INTERVAL HPI/OVERNIGHT EVENTS:  PAST MEDICAL & SURGICAL HISTORY:  DM (diabetes mellitus)  Heart failure  Diabetic neuropathy  Hyperlipidemia  Quadriplegia  Hypertension  Stroke  Hypertension  Obstructive cardiomyopathy  S/P percutaneous endoscopic gastrostomy (PEG) tube placement  History of tracheostomy      MEDICATIONS  (STANDING):  ALBUTerol    90 MICROgram(s) HFA Inhaler 1 Puff(s) Inhalation every 4 hours  ALBUTerol/ipratropium for Nebulization 3 milliLiter(s) Nebulizer every 6 hours  chlorhexidine 4% Liquid 1 Application(s) Topical <User Schedule>  dextrose 5% + sodium chloride 0.9%. 1000 milliLiter(s) (40 mL/Hr) IV Continuous <Continuous>  glycopyrrolate Injectable 0.4 milliGRAM(s) IV Push three times a day  heparin  Injectable 5000 Unit(s) SubCutaneous every 12 hours  ketorolac   Injectable 15 milliGRAM(s) IV Push once  levothyroxine 50 MICROGram(s) Oral daily  meropenem  IVPB      metolazone 2.5 milliGRAM(s) Oral daily  midodrine 20 milliGRAM(s) Oral every 8 hours  morphine  - Injectable 2 milliGRAM(s) IV Push every 6 hours  pantoprazole   Suspension 40 milliGRAM(s) Oral daily  petrolatum Ophthalmic Ointment 1 Application(s) Both EYES daily  tiotropium 18 MICROgram(s) Capsule 1 Capsule(s) Inhalation daily    MEDICATIONS  (PRN):  acetaminophen    Suspension .. 650 milliGRAM(s) Oral every 6 hours PRN Temp greater or equal to 38C (100.4F)      Allergies    penicillin (Unknown)    Intolerances        REVIEW OF SYSTEMS:  CONSTITUTIONAL: No fever, weight loss, or fatigue  EYES: No eye pain, visual disturbances, or discharge  ENMT:  No difficulty hearing, tinnitus, vertigo; No sinus or throat pain  NECK: No pain or stiffness  BREASTS: No pain, masses, or nipple discharge  RESPIRATORY: No cough, wheezing, chills or hemoptysis; No shortness of breath  CARDIOVASCULAR: No chest pain, palpitations, dizziness, or leg swelling  GASTROINTESTINAL: No abdominal or epigastric pain. No nausea, vomiting, or hematemesis; No diarrhea or constipation. No melena or hematochezia.  GENITOURINARY: No dysuria, frequency, hematuria, or incontinence  NEUROLOGICAL: No headaches, memory loss, loss of strength, numbness, or tremors  SKIN: No itching, burning, rashes, or lesions   LYMPH NODES: No enlarged glands  ENDOCRINE: No heat or cold intolerance; No hair loss  MUSCULOSKELETAL: No joint pain or swelling; No muscle, back, or extremity pain  PSYCHIATRIC: No depression, anxiety, mood swings, or difficulty sleeping  HEME/LYMPH: No easy bruising, or bleeding gums  ALLERY AND IMMUNOLOGIC: No hives or eczema    Vital Signs Last 24 Hrs  T(C): 38.4 (15 Fan 2019 09:03), Max: 38.8 (15 Fan 2019 05:13)  T(F): 101.2 (15 Fan 2019 09:03), Max: 101.9 (15 Fan 2019 05:13)  HR: 87 (15 Fan 2019 06:24) (87 - 129)  BP: 140/60 (15 Fan 2019 05:13) (122/48 - 149/61)  BP(mean): --  RR: 18 (15 Fan 2019 05:13) (17 - 20)  SpO2: 99% (15 Fan 2019 06:24) (97% - 100%)    PHYSICAL EXAM:  GENERAL: NAD, well-groomed, well-developed. un responsive  HEAD:  Atraumatic, Normocephalic  EYES: EOMI, PERRLA, conjunctiva and sclera clear  ENMT: No tonsillar erythema, exudates, or enlargement; Moist mucous membranes, Good dentition, No lesions  NECK: Supple, No JVD, Normal thyroid. s/p tracheostomy, connected to vent  NERVOUS SYSTEM:  Alert & Oriented X3, Good concentration; Motor Strength 5/5 B/L upper and lower extremities; DTRs 2+ intact and symmetric  CHEST/LUNG: Clear to percussion bilaterally; No rales, rhonchi, wheezing, or rubs  HEART: Regular rate and rhythm; No murmurs, rubs, or gallops  ABDOMEN: Soft, Nontender, Nondistended; Bowel sounds present. peg tube present  EXTREMITIES:  2+ Peripheral Pulses, No clubbing, cyanosis, or edema  LYMPH: No lymphadenopathy noted  SKIN: No rashes or lesions    Imaging Personally Reviewed:  [ ] YES  [ ] NO    Consultant(s) Notes Reviewed:  [ ] YES  [ ] NO    Care Discussed with Consultants/Other Providers [ ] YES  [ ] NO    Care discussed with family,         [  ]   yes  [  ]  No    imp:    stable[ ]    unstable[  ]     improving [   ]       unchanged  [ x ]                Plans:  Continue present plans  [x  ] will continue IV merrem. . No more lab tests. , only comfort care               New consult [  ]   specialty  .......               order test[  ]    test name.                  Discharge Planning  [ x ]

## 2019-06-15 NOTE — PROGRESS NOTE ADULT - ASSESSMENT
clinically unchanged, recurrent  sepsis.   S/P Septic shock.  Respiratory failure  S/P Tracheostomy.  Pseudomonas in sputum.  UTI with enterococcus.  CVA.  Quadriplegia.  HTN.  DM- Blood sugar is controlled.    patient is discharged  to LTC  for comfort care only.   DNR status.   No distress.    no vomiting   had fleet  and tap water enema yesterday.  Blood sugar is holding up.   continuing comfort care as per family wishes.  Prognosis POOR- Family aware,

## 2019-06-16 PROCEDURE — 71045 X-RAY EXAM CHEST 1 VIEW: CPT | Mod: 26

## 2019-06-16 RX ORDER — ACETAMINOPHEN 500 MG
650 TABLET ORAL EVERY 6 HOURS
Refills: 0 | Status: DISCONTINUED | OUTPATIENT
Start: 2019-06-16 | End: 2019-06-17

## 2019-06-16 RX ORDER — PETROLATUM,WHITE
1 JELLY (GRAM) TOPICAL THREE TIMES A DAY
Refills: 0 | Status: DISCONTINUED | OUTPATIENT
Start: 2019-06-16 | End: 2019-06-17

## 2019-06-16 RX ORDER — ONDANSETRON 8 MG/1
4 TABLET, FILM COATED ORAL EVERY 6 HOURS
Refills: 0 | Status: DISCONTINUED | OUTPATIENT
Start: 2019-06-16 | End: 2019-06-17

## 2019-06-16 RX ADMIN — MORPHINE SULFATE 2 MILLIGRAM(S): 50 CAPSULE, EXTENDED RELEASE ORAL at 07:22

## 2019-06-16 RX ADMIN — MORPHINE SULFATE 2 MILLIGRAM(S): 50 CAPSULE, EXTENDED RELEASE ORAL at 12:14

## 2019-06-16 RX ADMIN — CHLORHEXIDINE GLUCONATE 1 APPLICATION(S): 213 SOLUTION TOPICAL at 07:10

## 2019-06-16 RX ADMIN — Medication 3 MILLILITER(S): at 05:07

## 2019-06-16 RX ADMIN — MORPHINE SULFATE 2 MILLIGRAM(S): 50 CAPSULE, EXTENDED RELEASE ORAL at 18:50

## 2019-06-16 RX ADMIN — MORPHINE SULFATE 2 MILLIGRAM(S): 50 CAPSULE, EXTENDED RELEASE ORAL at 23:44

## 2019-06-16 RX ADMIN — MEROPENEM 100 MILLIGRAM(S): 1 INJECTION INTRAVENOUS at 07:06

## 2019-06-16 RX ADMIN — MEROPENEM 100 MILLIGRAM(S): 1 INJECTION INTRAVENOUS at 13:14

## 2019-06-16 RX ADMIN — Medication 650 MILLIGRAM(S): at 14:50

## 2019-06-16 RX ADMIN — MORPHINE SULFATE 2 MILLIGRAM(S): 50 CAPSULE, EXTENDED RELEASE ORAL at 07:06

## 2019-06-16 RX ADMIN — Medication 1 APPLICATION(S): at 12:14

## 2019-06-16 RX ADMIN — MORPHINE SULFATE 2 MILLIGRAM(S): 50 CAPSULE, EXTENDED RELEASE ORAL at 00:48

## 2019-06-16 RX ADMIN — Medication 1 APPLICATION(S): at 13:16

## 2019-06-16 RX ADMIN — MEROPENEM 100 MILLIGRAM(S): 1 INJECTION INTRAVENOUS at 22:41

## 2019-06-16 RX ADMIN — ROBINUL 0.4 MILLIGRAM(S): 0.2 INJECTION INTRAMUSCULAR; INTRAVENOUS at 13:15

## 2019-06-16 RX ADMIN — MORPHINE SULFATE 2 MILLIGRAM(S): 50 CAPSULE, EXTENDED RELEASE ORAL at 18:35

## 2019-06-16 RX ADMIN — ROBINUL 0.4 MILLIGRAM(S): 0.2 INJECTION INTRAMUSCULAR; INTRAVENOUS at 07:06

## 2019-06-16 RX ADMIN — Medication 650 MILLIGRAM(S): at 13:50

## 2019-06-16 RX ADMIN — MORPHINE SULFATE 2 MILLIGRAM(S): 50 CAPSULE, EXTENDED RELEASE ORAL at 01:05

## 2019-06-16 RX ADMIN — HEPARIN SODIUM 5000 UNIT(S): 5000 INJECTION INTRAVENOUS; SUBCUTANEOUS at 18:35

## 2019-06-16 RX ADMIN — HEPARIN SODIUM 5000 UNIT(S): 5000 INJECTION INTRAVENOUS; SUBCUTANEOUS at 07:06

## 2019-06-16 RX ADMIN — MORPHINE SULFATE 2 MILLIGRAM(S): 50 CAPSULE, EXTENDED RELEASE ORAL at 23:29

## 2019-06-16 RX ADMIN — ROBINUL 0.4 MILLIGRAM(S): 0.2 INJECTION INTRAMUSCULAR; INTRAVENOUS at 23:27

## 2019-06-16 RX ADMIN — Medication 1 APPLICATION(S): at 22:41

## 2019-06-16 RX ADMIN — Medication 50 MICROGRAM(S): at 07:06

## 2019-06-16 RX ADMIN — PANTOPRAZOLE SODIUM 40 MILLIGRAM(S): 20 TABLET, DELAYED RELEASE ORAL at 12:14

## 2019-06-16 RX ADMIN — MORPHINE SULFATE 2 MILLIGRAM(S): 50 CAPSULE, EXTENDED RELEASE ORAL at 12:29

## 2019-06-16 RX ADMIN — Medication 3 MILLILITER(S): at 11:33

## 2019-06-16 RX ADMIN — Medication 3 MILLILITER(S): at 17:14

## 2019-06-16 NOTE — PROGRESS NOTE ADULT - ASSESSMENT
Awaiting placement . patient is discharged.   clinically unchanged, recurrent  sepsis.   S/P Septic shock.  Respiratory failure  S/P Tracheostomy.  Pseudomonas in sputum.  UTI with enterococcus.  CVA.  Quadriplegia.  HTN.  DM- Blood sugar is controlled.    patient is discharged  to LTC  for comfort care only.   DNR status.

## 2019-06-16 NOTE — PROGRESS NOTE ADULT - SUBJECTIVE AND OBJECTIVE BOX
Patient is a 73y old  Male who presents with a chief complaint of Sepsis (15 Fan 2019 09:05)  afebrile.    clinially unchanged.   T. max 99.8    INTERVAL HPI/OVERNIGHT EVENTS:  PAST MEDICAL & SURGICAL HISTORY:  DM (diabetes mellitus)  Heart failure  Diabetic neuropathy  Hyperlipidemia  Quadriplegia  Hypertension  Stroke  Hypertension  Obstructive cardiomyopathy  S/P percutaneous endoscopic gastrostomy (PEG) tube placement  History of tracheostomy      MEDICATIONS  (STANDING):  ALBUTerol    90 MICROgram(s) HFA Inhaler 1 Puff(s) Inhalation every 4 hours  ALBUTerol/ipratropium for Nebulization 3 milliLiter(s) Nebulizer every 6 hours  chlorhexidine 4% Liquid 1 Application(s) Topical <User Schedule>  dextrose 5% + sodium chloride 0.9%. 1000 milliLiter(s) (40 mL/Hr) IV Continuous <Continuous>  glycopyrrolate Injectable 0.4 milliGRAM(s) IV Push three times a day  heparin  Injectable 5000 Unit(s) SubCutaneous every 12 hours  levothyroxine 50 MICROGram(s) Oral daily  meropenem  IVPB      meropenem  IVPB 1000 milliGRAM(s) IV Intermittent every 8 hours  metolazone 2.5 milliGRAM(s) Oral daily  midodrine 20 milliGRAM(s) Oral every 8 hours  morphine  - Injectable 2 milliGRAM(s) IV Push every 6 hours  pantoprazole   Suspension 40 milliGRAM(s) Oral daily  petrolatum Ophthalmic Ointment 1 Application(s) Both EYES daily  petrolatum white Ointment 1 Application(s) Topical three times a day  tiotropium 18 MICROgram(s) Capsule 1 Capsule(s) Inhalation daily    MEDICATIONS  (PRN):  acetaminophen    Suspension .. 650 milliGRAM(s) Oral every 6 hours PRN Temp greater or equal to 38C (100.4F)      Allergies    penicillin (Unknown)    Intolerances        REVIEW OF SYSTEMS:  CONSTITUTIONAL: No fever, weight loss, or fatigue  EYES: No eye pain, visual disturbances, or discharge  ENMT:  No difficulty hearing, tinnitus, vertigo; No sinus or throat pain  NECK: No pain or stiffness  BREASTS: No pain, masses, or nipple discharge  RESPIRATORY: No cough, wheezing, chills or hemoptysis; No shortness of breath  CARDIOVASCULAR: No chest pain, palpitations, dizziness, or leg swelling  GASTROINTESTINAL: No abdominal or epigastric pain. No nausea, vomiting, or hematemesis; No diarrhea or constipation. No melena or hematochezia.  GENITOURINARY: No dysuria, frequency, hematuria, or incontinence  NEUROLOGICAL: No headaches, memory loss, loss of strength, numbness, or tremors  SKIN: No itching, burning, rashes, or lesions   LYMPH NODES: No enlarged glands  ENDOCRINE: No heat or cold intolerance; No hair loss  MUSCULOSKELETAL: No joint pain or swelling; No muscle, back, or extremity pain  PSYCHIATRIC: No depression, anxiety, mood swings, or difficulty sleeping  HEME/LYMPH: No easy bruising, or bleeding gums  ALLERY AND IMMUNOLOGIC: No hives or eczema    Vital Signs Last 24 Hrs  T(C): 36.8 (16 Jun 2019 05:15), Max: 37.7 (15 Fan 2019 11:35)  T(F): 98.2 (16 Jun 2019 05:15), Max: 99.8 (15 Fan 2019 11:35)  HR: 104 (16 Jun 2019 08:26) (70 - 123)  BP: 138/60 (16 Jun 2019 05:15) (133/66 - 156/107)  BP(mean): --  RR: 18 (16 Jun 2019 05:15) (18 - 18)  SpO2: 99% (16 Jun 2019 08:26) (97% - 100%)    PHYSICAL EXAM:  GENERAL: NAD,   HEAD:  Atraumatic, Normocephalic  EYES: EOMI, PERRLA, conjunctiva and sclera clear  ENMT: No tonsillar erythema, exudates, or enlargement; Moist mucous membranes, Good dentition, No lesions  NECK: Supple, No JVD, Normal thyroid. s/p Tracheotomy, Onthe vent.  NERVOUS SYSTEM: Quadriplegic  CHEST/LUNG: Clear to percussion bilaterally; No rales, rhonchi, wheezing, or rubs  HEART: Regular rate and rhythm; No murmurs, rubs, or gallops  ABDOMEN: Soft, Nontender, Nondistended; Bowel sounds present  EXTREMITIES:  extremity edema present  LYMPH: No lymphadenopathy noted  SKIN:  deep decubiti in sacral area, heels, hips. osteomyelitis rt ankle    LABS:                CAPILLARY BLOOD GLUCOSE      POCT Blood Glucose.: 256 mg/dL (15 Fan 2019 20:09)                RADIOLOGY & ADDITIONAL TESTS:    Imaging Personally Reviewed:  [ ] YES  [ ] NO    Consultant(s) Notes Reviewed:  [ ] YES  [ ] NO    Care Discussed with Consultants/Other Providers [ ] YES  [ ] NO    Care discussed with family,         [  ]   yes  [  ]  No    imp:    stable[ ]    unstable[  ]     improving [   ]       unchanged  [x  ]                Plans:  Continue present plans  [ x ] will continue to monitor Bs once daily will treat only if consistently above 250               New consult [  ]   specialty  .......               order test[  ]    test name.                  Discharge Planning  [ x ]

## 2019-06-16 NOTE — PROGRESS NOTE ADULT - SUBJECTIVE AND OBJECTIVE BOX
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  NH report pt BUN & Cr incr, EMS report pt hypotensive on their arrival.  On arrival to ED, removed two 12microgram fentanyl patches (5/10/19 & 5/27/19)    In ED received 2200ml saline bolus. 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 1000 milliLiter(s) (40 mL/Hr) IV Continuous <Continuous>  glycopyrrolate Injectable 0.4 milliGRAM(s) IV Push three times a day  heparin  Injectable 5000 Unit(s) SubCutaneous every 12 hours  levothyroxine 50 MICROGram(s) Oral daily  meropenem  IVPB      meropenem  IVPB 1000 milliGRAM(s) IV Intermittent every 8 hours  metolazone 2.5 milliGRAM(s) Oral daily  midodrine 20 milliGRAM(s) Oral every 8 hours  morphine  - Injectable 2 milliGRAM(s) IV Push every 6 hours  pantoprazole   Suspension 40 milliGRAM(s) Oral daily  petrolatum Ophthalmic Ointment 1 Application(s) Both EYES daily  petrolatum white Ointment 1 Application(s) Topical three times a day  tiotropium 18 MICROgram(s) Capsule 1 Capsule(s) Inhalation daily    MEDICATIONS  (PRN):  acetaminophen    Suspension .. 650 milliGRAM(s) Oral every 6 hours PRN Temp greater or equal to 38C (100.4F)      FAMILY HISTORY:  No pertinent family history in first degree relatives      Allergies    penicillin (Unknown)    Intolerances        PMH/PSH:  DM (diabetes mellitus)  Heart failure  Diabetic neuropathy  Hyperlipidemia  Quadriplegia  Hypertension  Stroke  Hypertension  Obstructive cardiomyopathy  S/P percutaneous endoscopic gastrostomy (PEG) tube placement  History of tracheostomy  No significant past surgical history        REVIEW OF SYSTEMS:    RESPIRATORY: No cough, wheezing, chills or hemoptysis; No shortness of breath  CARDIOVASCULAR: No chest pain, palpitations, dizziness, or leg swelling  GASTROINTESTINAL: No abdominal or epigastric pain. No nausea, vomiting, or hematemesis; No diarrhea or constipation. No melena or hematochezia.      Vital Signs Last 24 Hrs  T(C): 36.9 (16 Jun 2019 17:08), Max: 37.9 (16 Jun 2019 13:17)  T(F): 98.4 (16 Jun 2019 17:08), Max: 100.3 (16 Jun 2019 13:17)  HR: 109 (16 Jun 2019 17:17) (70 - 121)  BP: 135/60 (16 Jun 2019 17:08) (135/60 - 146/64)  BP(mean): --  RR: 16 (16 Jun 2019 17:08) (16 - 18)  SpO2: 98% (16 Jun 2019 17:17) (97% - 100%)    PHYSICAL EXAM:  GENERAL: NAD, well-groomed, well-developed  CHEST/LUNG: Clear to percussion bilaterally; No rales, rhonchi, wheezing, or rubs  HEART: Regular rate and rhythm; No murmurs, rubs, or gallops  ABDOMEN: Soft, Nontender, Nondistended; Bowel sounds present      LAB        CBC:  06-10 @ 07:21  WBC 12.44   Hgb 9.0   Hct 30.6   Plts 438  MCV 98.7      Chemistry:  06-12 @ 06:35  Na+ 142  K+ 4.4  Cl- 109  CO2 27  BUN 13  Cr 0.68     06-11 @ 07:22  Na+ 141  K+ 4.4  Cl- 108  CO2 28  BUN 19  Cr 0.85     06-10 @ 17:40  Na+ --  K+ 5.9  Cl- --  CO2 --  BUN --  Cr --     06-10 @ 16:00  Na+ --  K+ 6.0  Cl- --  CO2 --  BUN --  Cr --     06-10 @ 07:21  Na+ 139  K+ 5.8  Cl- 108  CO2 27  BUN 24  Cr 0.83         Glucose, Serum: 65 mg/dL (06-12 @ 06:35)  Glucose, Serum: 101 mg/dL (06-11 @ 07:22)  Glucose, Serum: 116 mg/dL (06-10 @ 07:21)      12 Jun 2019 06:35    142    |  109    |  13     ----------------------------<  65     4.4     |  27     |  0.68   11 Jun 2019 07:22    141    |  108    |  19     ----------------------------<  101    4.4     |  28     |  0.85   10 Fan 2019 17:40    x      |  x      |  x      ----------------------------<  x      5.9     |  x      |  x      10 Fan 2019 16:00    x      |  x      |  x      ----------------------------<  x      6.0     |  x      |  x      10 Fan 2019 07:21    139    |  108    |  24     ----------------------------<  116    5.8     |  27     |  0.83     Ca    8.3        12 Jun 2019 06:35  Ca    8.0        11 Jun 2019 07:22  Ca    7.9        10 Fan 2019 07:21  Phos  3.9       11 Jun 2019 07:22  Mg     2.5       11 Jun 2019 07:22                CAPILLARY BLOOD GLUCOSE      POCT Blood Glucose.: 256 mg/dL (15 Fan 2019 20:09)          RADIOLOGY & ADDITIONAL TESTS:    Imaging Personally Reviewed:  [ ] YES  [ ] NO    Consultant(s) Notes Reviewed:  [ ] YES  [ ] NO    Care Discussed with Consultants/Other Providers [ ] YES  [ ] NO

## 2019-06-16 NOTE — PROGRESS NOTE ADULT - PROBLEM SELECTOR PLAN 2
Lock Syndrome.
Antibiotics.
Calcium is Normal now.
D/C PEG tube dressing. Clean PEG site H2O2 : H2O  1:1 BID
PEG
S/P Aredia
S/P Arerobina.
. Clean PEG site H2O2 : H2O  1:1 BID
. Clean PEG site H2O2 : H2O  1:1 BID
D/C PEG tube dressing. Clean PEG site H2O2 : H2O  1:1 BID
D/C PEG tube dressing. Clean PEG site H2O2 : H2O  1:1 BID

## 2019-06-16 NOTE — PROGRESS NOTE ADULT - PROBLEM SELECTOR PLAN 1
On Ventilator.
Antibiotics
Antibiotics.
Antibiotics.
Antibiotics.  Ventilator.  PEG
Decubitus Ulcers.  TRACH/PEG  Antibiotics.
KUB reveals  ileus and a large amount of stool. Mag / Phos nml, TSH slightly elevated  1) f/u KUB  2) enema followed by mag citrate  3) (?) Reglan
On ventilator.
VENTILATOR.
Ventilatory Support.
KUB revealed  ileus and a large amount of stool. Mag / Phos was nml, TSH slightly elevated  No BM after enema / Mag Citrate 1) f/u KUB  2) ? Movi prep
KUB shows significant improvement with less stool. Tolerating feeds @ 40 ml / hr. Continue to increase feeds and observe.
Tolerating feeds @ 50 ml / hr. Will follow on a PRN basis.
r/o obstruction vs ileus  1) KUB  2) check Phos, Mag, TSH  3) (?) Reglan

## 2019-06-17 ENCOUNTER — TRANSCRIPTION ENCOUNTER (OUTPATIENT)
Age: 74
End: 2019-06-17

## 2019-06-17 VITALS — OXYGEN SATURATION: 97 %

## 2019-06-17 LAB
GLUCOSE BLDC GLUCOMTR-MCNC: 188 MG/DL — HIGH (ref 70–99)
GLUCOSE BLDC GLUCOMTR-MCNC: 239 MG/DL — HIGH (ref 70–99)
GLUCOSE BLDC GLUCOMTR-MCNC: 355 MG/DL — HIGH (ref 70–99)
GLUCOSE BLDC GLUCOMTR-MCNC: 361 MG/DL — HIGH (ref 70–99)

## 2019-06-17 RX ORDER — INSULIN LISPRO 100/ML
VIAL (ML) SUBCUTANEOUS EVERY 6 HOURS
Refills: 0 | Status: DISCONTINUED | OUTPATIENT
Start: 2019-06-17 | End: 2019-06-17

## 2019-06-17 RX ORDER — INSULIN GLARGINE 100 [IU]/ML
10 INJECTION, SOLUTION SUBCUTANEOUS
Qty: 0 | Refills: 0 | DISCHARGE
Start: 2019-06-17

## 2019-06-17 RX ORDER — INSULIN GLARGINE 100 [IU]/ML
10 INJECTION, SOLUTION SUBCUTANEOUS EVERY MORNING
Refills: 0 | Status: DISCONTINUED | OUTPATIENT
Start: 2019-06-17 | End: 2019-06-17

## 2019-06-17 RX ADMIN — HEPARIN SODIUM 5000 UNIT(S): 5000 INJECTION INTRAVENOUS; SUBCUTANEOUS at 06:04

## 2019-06-17 RX ADMIN — Medication 3 MILLILITER(S): at 00:23

## 2019-06-17 RX ADMIN — PANTOPRAZOLE SODIUM 40 MILLIGRAM(S): 20 TABLET, DELAYED RELEASE ORAL at 11:30

## 2019-06-17 RX ADMIN — INSULIN GLARGINE 10 UNIT(S): 100 INJECTION, SOLUTION SUBCUTANEOUS at 11:00

## 2019-06-17 RX ADMIN — MORPHINE SULFATE 2 MILLIGRAM(S): 50 CAPSULE, EXTENDED RELEASE ORAL at 11:33

## 2019-06-17 RX ADMIN — Medication 2: at 11:30

## 2019-06-17 RX ADMIN — ROBINUL 0.4 MILLIGRAM(S): 0.2 INJECTION INTRAMUSCULAR; INTRAVENOUS at 14:14

## 2019-06-17 RX ADMIN — Medication 1: at 17:46

## 2019-06-17 RX ADMIN — Medication 50 MICROGRAM(S): at 06:02

## 2019-06-17 RX ADMIN — Medication 3 MILLILITER(S): at 11:48

## 2019-06-17 RX ADMIN — MORPHINE SULFATE 2 MILLIGRAM(S): 50 CAPSULE, EXTENDED RELEASE ORAL at 11:50

## 2019-06-17 RX ADMIN — Medication 3 MILLILITER(S): at 17:21

## 2019-06-17 RX ADMIN — ROBINUL 0.4 MILLIGRAM(S): 0.2 INJECTION INTRAMUSCULAR; INTRAVENOUS at 06:02

## 2019-06-17 RX ADMIN — MORPHINE SULFATE 2 MILLIGRAM(S): 50 CAPSULE, EXTENDED RELEASE ORAL at 06:04

## 2019-06-17 RX ADMIN — MEROPENEM 100 MILLIGRAM(S): 1 INJECTION INTRAVENOUS at 06:04

## 2019-06-17 RX ADMIN — Medication 3 MILLILITER(S): at 05:46

## 2019-06-17 RX ADMIN — MIDODRINE HYDROCHLORIDE 20 MILLIGRAM(S): 2.5 TABLET ORAL at 06:02

## 2019-06-17 RX ADMIN — Medication 1 APPLICATION(S): at 14:16

## 2019-06-17 RX ADMIN — MIDODRINE HYDROCHLORIDE 20 MILLIGRAM(S): 2.5 TABLET ORAL at 14:16

## 2019-06-17 RX ADMIN — MORPHINE SULFATE 2 MILLIGRAM(S): 50 CAPSULE, EXTENDED RELEASE ORAL at 17:47

## 2019-06-17 RX ADMIN — CHLORHEXIDINE GLUCONATE 1 APPLICATION(S): 213 SOLUTION TOPICAL at 06:09

## 2019-06-17 RX ADMIN — MEROPENEM 100 MILLIGRAM(S): 1 INJECTION INTRAVENOUS at 14:14

## 2019-06-17 RX ADMIN — Medication 1 APPLICATION(S): at 11:30

## 2019-06-17 RX ADMIN — Medication 1 APPLICATION(S): at 06:10

## 2019-06-17 RX ADMIN — Medication 5: at 08:12

## 2019-06-17 NOTE — DISCHARGE NOTE NURSING/CASE MANAGEMENT/SOCIAL WORK - NSDCDPATPORTLINK_GEN_ALL_CORE
You can access the [facility] Portal, offered by [facility], by registering with the following website: [email or web address]

## 2019-06-17 NOTE — PROGRESS NOTE ADULT - PROVIDER SPECIALTY LIST ADULT
Critical Care
Gastroenterology
Heme/Onc
Infectious Disease
Internal Medicine
Palliative Care
Palliative Care
Podiatry
Pulmonology
Critical Care
Infectious Disease
Critical Care
Podiatry
Internal Medicine
Infectious Disease
Heme/Onc

## 2019-06-17 NOTE — PROGRESS NOTE ADULT - SUBJECTIVE AND OBJECTIVE BOX
Patient is a 73y old  Male who presents with a chief complaint of Sepsis (16 Jun 2019 17:32)  essentially u8n changed.    No fever in last 24 hours.  Blood sugars trending up. - will start lantus and change tube feed to Glucerna.   Awaiting Placement      INTERVAL HPI/OVERNIGHT EVENTS:  PAST MEDICAL & SURGICAL HISTORY:  DM (diabetes mellitus)  Heart failure  Diabetic neuropathy  Hyperlipidemia  Quadriplegia  Hypertension  Stroke  Hypertension  Obstructive cardiomyopathy  S/P percutaneous endoscopic gastrostomy (PEG) tube placement  History of tracheostomy      MEDICATIONS  (STANDING):  ALBUTerol    90 MICROgram(s) HFA Inhaler 1 Puff(s) Inhalation every 4 hours  ALBUTerol/ipratropium for Nebulization 3 milliLiter(s) Nebulizer every 6 hours  chlorhexidine 4% Liquid 1 Application(s) Topical <User Schedule>  dextrose 5% + sodium chloride 0.9%. 1000 milliLiter(s) (40 mL/Hr) IV Continuous <Continuous>  glycopyrrolate Injectable 0.4 milliGRAM(s) IV Push three times a day  heparin  Injectable 5000 Unit(s) SubCutaneous every 12 hours  insulin glargine Injectable (LANTUS) 10 Unit(s) SubCutaneous every morning  insulin lispro (HumaLOG) corrective regimen sliding scale   SubCutaneous every 6 hours  levothyroxine 50 MICROGram(s) Oral daily  meropenem  IVPB      meropenem  IVPB 1000 milliGRAM(s) IV Intermittent every 8 hours  metolazone 2.5 milliGRAM(s) Oral daily  midodrine 20 milliGRAM(s) Oral every 8 hours  morphine  - Injectable 2 milliGRAM(s) IV Push every 6 hours  pantoprazole   Suspension 40 milliGRAM(s) Oral daily  petrolatum Ophthalmic Ointment 1 Application(s) Both EYES daily  petrolatum white Ointment 1 Application(s) Topical three times a day  tiotropium 18 MICROgram(s) Capsule 1 Capsule(s) Inhalation daily    MEDICATIONS  (PRN):  acetaminophen    Suspension .. 650 milliGRAM(s) Oral every 6 hours PRN Temp greater or equal to 38C (100.4F)  ondansetron Injectable 4 milliGRAM(s) IV Push every 6 hours PRN Nausea and/or Vomiting      Allergies    penicillin (Unknown)    Intolerances        REVIEW OF SYSTEMS:  CONSTITUTIONAL: No fever, weight loss, or fatigue  EYES: No eye pain, visual disturbances, or discharge  ENMT:  No difficulty hearing, tinnitus, vertigo; No sinus or throat pain  NECK: No pain or stiffness  BREASTS: No pain, masses, or nipple discharge  RESPIRATORY: No cough, wheezing, chills or hemoptysis; No shortness of breath  CARDIOVASCULAR: No chest pain, palpitations, dizziness, or leg swelling  GASTROINTESTINAL: No abdominal or epigastric pain. No nausea, vomiting, or hematemesis; No diarrhea or constipation. No melena or hematochezia.  GENITOURINARY: No dysuria, frequency, hematuria, or incontinence  NEUROLOGICAL: No headaches, memory loss, loss of strength, numbness, or tremors  SKIN: No itching, burning, rashes, or lesions   LYMPH NODES: No enlarged glands  ENDOCRINE: No heat or cold intolerance; No hair loss  MUSCULOSKELETAL: No joint pain or swelling; No muscle, back, or extremity pain  PSYCHIATRIC: No depression, anxiety, mood swings, or difficulty sleeping  HEME/LYMPH: No easy bruising, or bleeding gums  ALLERY AND IMMUNOLOGIC: No hives or eczema    Vital Signs Last 24 Hrs  T(C): 36.7 (17 Jun 2019 05:07), Max: 37.9 (16 Jun 2019 13:17)  T(F): 98 (17 Jun 2019 05:07), Max: 100.3 (16 Jun 2019 13:17)  HR: 104 (17 Jun 2019 09:48) (100 - 121)  BP: 102/63 (17 Jun 2019 05:07) (102/63 - 146/64)  BP(mean): --  RR: 18 (17 Jun 2019 05:07) (16 - 18)  SpO2: 98% (17 Jun 2019 09:48) (97% - 100%)    PHYSICAL EXAM:  GENERAL: NAD, well-groomed, well-developed  HEAD:  Atraumatic, Normocephalic  EYES: EOMI, PERRLA, conjunctiva and sclera clear  ENMT: No tonsillar erythema, exudates, or enlargement; Moist mucous membranes, Good dentition, No lesions  NECK: Supple, No JVD, Normal thyroid. s/p Tracheotomy. On the vent  NERVOUS SYSTEM:  Alert & Oriented X3, Good concentration; Motor Strength 5/5 B/L upper and lower extremities; DTRs 2+ intact and symmetric  CHEST/LUNG: Clear to percussion bilaterally; No rales, rhonchi, wheezing, or rubs  HEART: Regular rate and rhythm; No murmurs, rubs, or gallops  ABDOMEN: Soft, Nontender, Nondistended; Bowel sounds present. peg tube present. working Ok.  EXTREMITIES: oedema of exteremities. osteo of rt ankle with decubiti  LYMPH: No lymphadenopathy noted  SKIN: Multiple decubiti. stage 4 in sacral area/ on both heels and hips.    LABS:                CAPILLARY BLOOD GLUCOSE      POCT Blood Glucose.: 355 mg/dL (17 Jun 2019 07:13)  POCT Blood Glucose.: 361 mg/dL (17 Jun 2019 01:43)                RADIOLOGY & ADDITIONAL TESTS:    Imaging Personally Reviewed:  [ ] YES  [ ] NO    Consultant(s) Notes Reviewed:  [ ] YES  [ ] NO    Care Discussed with Consultants/Other Providers [ ] YES  [ ] NO    Care discussed with family,         [  ]   yes  [  ]  No    imp:    stable[ ]    unstable[  ]     improving [   ]       unchanged  [x  ]                Plans:  Continue present plans  [ x ] will start on lantus 10 units in Am, chnge tube feedings to Glucerna.               New consult [  ]   specialty  .......               order test[  ]    test name.                  Discharge Planning  [x  ]

## 2019-06-17 NOTE — PROGRESS NOTE ADULT - PROBLEM SELECTOR PROBLEM 1
Chronic respiratory failure
Quadriplegia
Reflux esophagitis
Sepsis, due to unspecified organism
Quadriplegia
Reflux esophagitis
Quadriplegia
Quadriplegia

## 2019-06-17 NOTE — PROGRESS NOTE ADULT - PROBLEM SELECTOR PROBLEM 3
Decubitus ulcer of sacral region, stage 4
Hypercalcemia
Chronic respiratory failure
Chronic respiratory failure
Decubitus ulcer of sacral region, stage 4
Decubitus ulcer of sacral region, stage 4
Hypercalcemia
Quadriplegia
Decubitus ulcer of sacral region, stage 4

## 2019-06-17 NOTE — PROGRESS NOTE ADULT - REASON FOR ADMISSION
Sepsis
Septic Shock
Sepsis
Sepsis/ hypernatremia, hypercalcemia, chronic resp failure
Sepsis

## 2019-06-17 NOTE — PROGRESS NOTE ADULT - ASSESSMENT
sepsis resolved   multiple decubiti   vent dependant   cultures noted ; decubiti noted  sputum culture noted   no xray abn ;; last 5/28   need to treat ? sputum ?mantain contact isolation   continue current treatment merrem and zyvox ';; day 14 of antibiotics today   stable decubiti

## 2019-06-17 NOTE — PROGRESS NOTE ADULT - PROBLEM SELECTOR PROBLEM 2
DM (diabetes mellitus)
Quadriplegia
DM (diabetes mellitus)
Dysphagia due to old cerebrovascular accident
Dysphagia, unspecified type
Hypercalcemia
Quadriplegia
Sepsis
DM (diabetes mellitus)
Dysphagia, unspecified type
DM (diabetes mellitus)

## 2019-06-17 NOTE — PROGRESS NOTE ADULT - NSHPATTENDINGPLANDISCUSS_GEN_ALL_CORE
team/son/
RN at 6 am, spoke with son also who agreed with current management
RN/ Team / son, Ernesto.
RN/Team/ Ernesto(HCP)
RN/team.
Team
Team.
betty sullivan/ case management. for discharge
nate hernández. Katiana/ team.
rn/ case management/ Palliative care
team
team.
team. case management.
son, Family members,
CCU MD

## 2019-06-17 NOTE — PROGRESS NOTE ADULT - PROBLEM SELECTOR PROBLEM 4
Sepsis
Decubitus ulcer of sacral region, stage 4
Sepsis
Sepsis, due to unspecified organism
Decubitus ulcer of sacral region, stage 4
Sepsis

## 2019-06-17 NOTE — PROGRESS NOTE ADULT - SUBJECTIVE AND OBJECTIVE BOX
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  NH report pt BUN & Cr incr, EMS report pt hypotensive on their arrival.  On arrival to ED, removed two 12microgram fentanyl patches.  admitted to ICU for severe poly microbial drug resistant sepsis, septic shock, dehydration, hypernatremia, acute renal failure with ATN, multiple infected decubitus ulcers (sacrum, hip, bilateral lower extremities), osteomyelitis of right ankle, hypercalcemia, left renal lesion and pulmonary nodules.  off antibiotics   for discharge   no change in status     Allergies    penicillin (Unknown)    Intolerances        MEDICATIONS  (STANDING):  ALBUTerol    90 MICROgram(s) HFA Inhaler 1 Puff(s) Inhalation every 4 hours  ALBUTerol/ipratropium for Nebulization 3 milliLiter(s) Nebulizer every 6 hours  chlorhexidine 4% Liquid 1 Application(s) Topical <User Schedule>  dextrose 5% + sodium chloride 0.9%. 1000 milliLiter(s) (40 mL/Hr) IV Continuous <Continuous>  glycopyrrolate Injectable 0.4 milliGRAM(s) IV Push three times a day  heparin  Injectable 5000 Unit(s) SubCutaneous every 12 hours  insulin glargine Injectable (LANTUS) 10 Unit(s) SubCutaneous every morning  insulin lispro (HumaLOG) corrective regimen sliding scale   SubCutaneous every 6 hours  levothyroxine 50 MICROGram(s) Oral daily  meropenem  IVPB      meropenem  IVPB 1000 milliGRAM(s) IV Intermittent every 8 hours  metolazone 2.5 milliGRAM(s) Oral daily  midodrine 20 milliGRAM(s) Oral every 8 hours  morphine  - Injectable 2 milliGRAM(s) IV Push every 6 hours  pantoprazole   Suspension 40 milliGRAM(s) Oral daily  petrolatum Ophthalmic Ointment 1 Application(s) Both EYES daily  petrolatum white Ointment 1 Application(s) Topical three times a day  tiotropium 18 MICROgram(s) Capsule 1 Capsule(s) Inhalation daily    MEDICATIONS  (PRN):  acetaminophen    Suspension .. 650 milliGRAM(s) Oral every 6 hours PRN Temp greater or equal to 38C (100.4F)  ondansetron Injectable 4 milliGRAM(s) IV Push every 6 hours PRN Nausea and/or Vomiting      REVIEW OF SYSTEMS:    unable to obtain     VITAL SIGNS:  T(C): 37.2 (06-17-19 @ 16:57), Max: 37.2 (06-17-19 @ 16:57)  T(F): 98.9 (06-17-19 @ 16:57), Max: 98.9 (06-17-19 @ 16:57)  HR: 114 (06-17-19 @ 17:51) (104 - 121)  BP: 131/99 (06-17-19 @ 16:57) (102/63 - 145/75)  RR: 18 (06-17-19 @ 16:57) (18 - 18)  SpO2: 97% (06-17-19 @ 17:51) (97% - 100%)  Wt(kg): --    PHYSICAL EXAM:   veg state  GENERAL: not in any distress  HEENT: tracheostomy to vent   CHEST/LUNG: Clear to auscultation bilaterally; No rales, rhonchi, wheezing  HEART: Regular rate and rhythm; No murmurs, rubs, or gallops  ABDOMEN: Soft, Nontender, Nondistended; Bowel sounds present, no rebound   EXTREMITIES:  2+ Peripheral Pulses, No clubbing, cyanosis, or edema  SKIN:multiple decubiti status quo   NERVOUS SYSTEM:  ch veg state    LABS:                                     Culture Results:   No growth to date. (06-15 @ 13:26)                Radiology:

## 2019-06-17 NOTE — CHART NOTE - NSCHARTNOTEFT_GEN_A_CORE
Pt is discharged awaiting placement. Clininically unchanged, recurrent sepsis ; s/p septic shock ; respiratory failure ; s/p tracheaostomy ; pseudomonas in sputum ; UTI w/ enterococcus ; CVA ; Quadriplegia ; HTN ; DM ; Pt is discharged to LTC for comfort care only ; DNR status.      Factors impacting intake: [ ] none [ ] nausea  [ ] vomiting [ ] diarrhea [ ] constipation  [ ]chewing problems [ ] swallowing issues  [x ] other: G - Tube feeding    Diet Prescription: Diet, NPO with Tube Feed:   Tube Feeding Modality: Gastrostomy  Glucerna 1.2 Tereso  Total Volume for 24 Hours (mL): 1200  Continuous  Starting Tube Feed Rate {mL per Hour}: 50  Until Goal Tube Feed Rate (mL per Hour): 50  Tube Feed Duration (in Hours): 24  Tube Feed Start Time: 10:30 (06-17-19 @ 10:07)    Intake: Glucerna 1.2 @  50 ml/hr = 1440 tereso, 72 gm pro, 972 mL free water     Current Weight: 6/17 - 167.9 (76.2 kg) - Edema - 4+ ( R & L) foot & arm ; 6/13 - 162.4 (73.7 kg) - Edema - 2+ - generalized / 3+ ( R & L) arm   % Weight Change - 3.2 % / 2.5 kg gain x 4 days w/ noted edema          Pertinent Medications: MEDICATIONS  (STANDING):  ALBUTerol    90 MICROgram(s) HFA Inhaler 1 Puff(s) Inhalation every 4 hours  ALBUTerol/ipratropium for Nebulization 3 milliLiter(s) Nebulizer every 6 hours  chlorhexidine 4% Liquid 1 Application(s) Topical <User Schedule>  dextrose 5% + sodium chloride 0.9%. 1000 milliLiter(s) (40 mL/Hr) IV Continuous <Continuous>  glycopyrrolate Injectable 0.4 milliGRAM(s) IV Push three times a day  heparin  Injectable 5000 Unit(s) SubCutaneous every 12 hours  insulin glargine Injectable (LANTUS) 10 Unit(s) SubCutaneous every morning  insulin lispro (HumaLOG) corrective regimen sliding scale   SubCutaneous every 6 hours  levothyroxine 50 MICROGram(s) Oral daily  meropenem  IVPB      meropenem  IVPB 1000 milliGRAM(s) IV Intermittent every 8 hours  metolazone 2.5 milliGRAM(s) Oral daily  midodrine 20 milliGRAM(s) Oral every 8 hours  morphine  - Injectable 2 milliGRAM(s) IV Push every 6 hours  pantoprazole   Suspension 40 milliGRAM(s) Oral daily  petrolatum Ophthalmic Ointment 1 Application(s) Both EYES daily  petrolatum white Ointment 1 Application(s) Topical three times a day  tiotropium 18 MICROgram(s) Capsule 1 Capsule(s) Inhalation daily    MEDICATIONS  (PRN):  acetaminophen    Suspension .. 650 milliGRAM(s) Oral every 6 hours PRN Temp greater or equal to 38C (100.4F)  ondansetron Injectable 4 milliGRAM(s) IV Push every 6 hours PRN Nausea and/or Vomiting    Pertinent Labs:  06-11 Phos 3.9 mg/dL 05-30 [id number] 7.4 %<H>     CAPILLARY BLOOD GLUCOSE      POCT Blood Glucose.: 239 mg/dL (17 Jun 2019 11:04)  POCT Blood Glucose.: 355 mg/dL (17 Jun 2019 07:13)  POCT Blood Glucose.: 361 mg/dL (17 Jun 2019 01:43)    Skin: Skin:  Left   lateral   calf        Right heel, unstageable  Right lower leg stage 2  Right outer ankle suspected DTI  Left  medial;  malleolus, unstageable    Left malleolus, unstageable     LEFT foot inner ankle, unstageable    Left  heel, unstageable    Left  outer ankle unstageable  Sacrum stage IV  Sacrum extended to coccyx stage IV   left buttocks, stage IV   Right:;  buttocks, stage III     wounds   1. Right:;  anterior;  ankle  2. Left:;  big toe, diabetic ulcer  3. trauma (specify)/  from flexi-seal      Estimated Needs:   [x ] no change since previous assessment (5/30/19)  [ ] recalculated:   Previous Nutrition Diagnosis:   [ ] Inadequate Energy Intake [ ]Inadequate Oral Intake [ ] Excessive Energy Intake   [ ] Underweight [ ] Increased Nutrient Needs [ ] Overweight/Obesity   [ ] Altered GI Function [ ] Unintended Weight Loss [ ] Food & Nutrition Related Knowledge Deficit [x ] Malnutrition; severe malnutrition in context of acute illness       Nutrition Diagnosis is [x ] ongoing  [ ] resolved [ ] not applicable       New Nutrition Diagnosis:   [ ] Inadequate Energy Intake [ ]Inadequate Oral Intake [ ] Excessive Energy Intake   [ ] Underweight [ ] Increased Nutrient Needs [ ] Overweight/Obesity   [ ] Altered GI Function [ ] Unintended Weight Loss [ ] Food & Nutrition Related Knowledge Deficit [x ] Malnutrition; severe malnutrition in context of acute illness     Related to: increased protein-energy needs in setting of s/p sepsis & multiple pressure ulcers/wounds     As evidenced by: 3+ edema of arms, physical findings of moderate muscle loss     Goal: enteral feeding as tolerated       Interventions:   Recommend  [ ] Change Diet To:  [x ] Nutrition Supplement; Recommend add No Carb Prosource 2 pkg daily=120 calories, 30 grams protein   [x ] Nutrition Support: enteral feeding is tolerated, recommend Glucerna 1.2 @ goal rate of 60 mL/hr x 24 hrs (1728 tereso, 86 gm pro, 1166 mL free water, 120% RDIs)  [ ] Other:     Monitoring and Evaluation:   [ ] PO intake [ x ] Tolerance to diet prescription [ x ] weights [ x ] labs[ x ] follow up per protocol  [ ] other:

## 2019-06-17 NOTE — DISCHARGE NOTE NURSING/CASE MANAGEMENT/SOCIAL WORK - NSDCPEWEB_GEN_ALL_CORE
[place] website --- [email or web address] Tobacco [email or web address] website --- [email or web address]

## 2019-06-17 NOTE — PROGRESS NOTE ADULT - ASSESSMENT
Patient dischrged. awaiting placement.  clinically unchanged, recurrent  sepsis.   S/P Septic shock.  Respiratory failure  S/P Tracheostomy.  Pseudomonas in sputum.  UTI with enterococcus.  CVA.  Quadriplegia.  HTN.  DM- Blood sugar is  trending up, . Lantus  coverage added. . Tube feeding changed to Glucerna..    patient is discharged  to LTC  for comfort care only.   DNR status.

## 2019-06-17 NOTE — DISCHARGE NOTE NURSING/CASE MANAGEMENT/SOCIAL WORK - NSDCPEEMAIL_GEN_ALL_CORE
[facility] for Tobacco Control email [email or web address]

## 2019-06-20 DIAGNOSIS — M86.9 OSTEOMYELITIS, UNSPECIFIED: ICD-10-CM

## 2019-06-20 DIAGNOSIS — E87.5 HYPERKALEMIA: ICD-10-CM

## 2019-06-20 DIAGNOSIS — I42.9 CARDIOMYOPATHY, UNSPECIFIED: ICD-10-CM

## 2019-06-20 DIAGNOSIS — K21.0 GASTRO-ESOPHAGEAL REFLUX DISEASE WITH ESOPHAGITIS: ICD-10-CM

## 2019-06-20 DIAGNOSIS — A41.9 SEPSIS, UNSPECIFIED ORGANISM: ICD-10-CM

## 2019-06-20 DIAGNOSIS — E87.0 HYPEROSMOLALITY AND HYPERNATREMIA: ICD-10-CM

## 2019-06-20 DIAGNOSIS — L89.154 PRESSURE ULCER OF SACRAL REGION, STAGE 4: ICD-10-CM

## 2019-06-20 DIAGNOSIS — R65.21 SEVERE SEPSIS WITH SEPTIC SHOCK: ICD-10-CM

## 2019-06-20 DIAGNOSIS — L89.622 PRESSURE ULCER OF LEFT HEEL, STAGE 2: ICD-10-CM

## 2019-06-20 DIAGNOSIS — R13.10 DYSPHAGIA, UNSPECIFIED: ICD-10-CM

## 2019-06-20 DIAGNOSIS — Z93.1 GASTROSTOMY STATUS: ICD-10-CM

## 2019-06-20 DIAGNOSIS — Z66 DO NOT RESUSCITATE: ICD-10-CM

## 2019-06-20 DIAGNOSIS — N17.0 ACUTE KIDNEY FAILURE WITH TUBULAR NECROSIS: ICD-10-CM

## 2019-06-20 DIAGNOSIS — B95.2 ENTEROCOCCUS AS THE CAUSE OF DISEASES CLASSIFIED ELSEWHERE: ICD-10-CM

## 2019-06-20 DIAGNOSIS — Z86.711 PERSONAL HISTORY OF PULMONARY EMBOLISM: ICD-10-CM

## 2019-06-20 DIAGNOSIS — I10 ESSENTIAL (PRIMARY) HYPERTENSION: ICD-10-CM

## 2019-06-20 DIAGNOSIS — J96.10 CHRONIC RESPIRATORY FAILURE, UNSPECIFIED WHETHER WITH HYPOXIA OR HYPERCAPNIA: ICD-10-CM

## 2019-06-20 DIAGNOSIS — I25.10 ATHEROSCLEROTIC HEART DISEASE OF NATIVE CORONARY ARTERY WITHOUT ANGINA PECTORIS: ICD-10-CM

## 2019-06-20 DIAGNOSIS — I69.969 OTHER PARALYTIC SYNDROME FOLLOWING UNSPECIFIED CEREBROVASCULAR DISEASE AFFECTING UNSPECIFIED SIDE: ICD-10-CM

## 2019-06-20 DIAGNOSIS — N39.0 URINARY TRACT INFECTION, SITE NOT SPECIFIED: ICD-10-CM

## 2019-06-20 DIAGNOSIS — E83.52 HYPERCALCEMIA: ICD-10-CM

## 2019-06-20 DIAGNOSIS — E11.69 TYPE 2 DIABETES MELLITUS WITH OTHER SPECIFIED COMPLICATION: ICD-10-CM

## 2019-06-20 DIAGNOSIS — Z88.0 ALLERGY STATUS TO PENICILLIN: ICD-10-CM

## 2019-06-20 DIAGNOSIS — Z93.0 TRACHEOSTOMY STATUS: ICD-10-CM

## 2019-06-20 DIAGNOSIS — Z99.81 DEPENDENCE ON SUPPLEMENTAL OXYGEN: ICD-10-CM

## 2019-06-20 DIAGNOSIS — E78.5 HYPERLIPIDEMIA, UNSPECIFIED: ICD-10-CM

## 2019-06-20 DIAGNOSIS — Z86.73 PERSONAL HISTORY OF TRANSIENT ISCHEMIC ATTACK (TIA), AND CEREBRAL INFARCTION WITHOUT RESIDUAL DEFICITS: ICD-10-CM

## 2019-06-20 DIAGNOSIS — E43 UNSPECIFIED SEVERE PROTEIN-CALORIE MALNUTRITION: ICD-10-CM

## 2019-06-20 DIAGNOSIS — L89.612 PRESSURE ULCER OF RIGHT HEEL, STAGE 2: ICD-10-CM

## 2019-06-20 DIAGNOSIS — I69.991 DYSPHAGIA FOLLOWING UNSPECIFIED CEREBROVASCULAR DISEASE: ICD-10-CM

## 2019-06-20 DIAGNOSIS — E11.40 TYPE 2 DIABETES MELLITUS WITH DIABETIC NEUROPATHY, UNSPECIFIED: ICD-10-CM

## 2019-06-20 DIAGNOSIS — E44.1 MILD PROTEIN-CALORIE MALNUTRITION: ICD-10-CM

## 2019-06-20 DIAGNOSIS — Z51.5 ENCOUNTER FOR PALLIATIVE CARE: ICD-10-CM

## 2019-06-20 LAB
CULTURE RESULTS: SIGNIFICANT CHANGE UP
SPECIMEN SOURCE: SIGNIFICANT CHANGE UP

## 2020-07-07 NOTE — PROGRESS NOTE ADULT - SUBJECTIVE AND OBJECTIVE BOX
Blood culture collection delegated to phlebotomist [place].   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  NH report pt BUN & Cr incr, EMS report pt hypotensive on their arrival.  On arrival to ED, removed two 12microgram fentanyl patches (5/10/19 & 19)    In ED received 2200ml saline bolus. (29 May 2019 05:43)      24 hr events:      ## ROS:  [ ] unable to obtain  CONSTITUTIONAL: No fever, weight loss, or fatigue  EYES: No eye pain, visual disturbances, or discharge  ENMT:  No difficulty hearing, tinnitus, vertigo; No sinus or throat pain  NECK: No pain or stiffness  RESPIRATORY: No cough, wheezing, chills or hemoptysis; No shortness of breath  CARDIOVASCULAR: No chest pain, palpitations, dizziness, or leg swelling  GASTROINTESTINAL: No abdominal or epigastric pain. No nausea, vomiting, or hematemesis; No diarrhea or constipation. No melena or hematochezia.  GENITOURINARY: No dysuria, frequency, hematuria, or incontinence  NEUROLOGICAL: No headaches, memory loss, loss of strength, numbness, or tremors  SKIN: No itching, burning, rashes, or lesions   LYMPH NODES: No enlarged glands  ENDOCRINE: No heat or cold intolerance; No hair loss  MUSCULOSKELETAL: No joint pain or swelling; No muscle, back, or extremity pain  PSYCHIATRIC: No depression, anxiety, mood swings, or difficulty sleeping  HEME/LYMPH: No easy bruising, or bleeding gums  ALLERGY AND IMMUNOLOGIC: No hives or eczema    ## Labs:  CBC:                        7.9    10.00 )-----------( 191      ( 2019 05:00 )             27.3     Chem:  06-01    148<H>  |  113<H>  |  43<H>  ----------------------------<  219<H>  3.9   |  29  |  1.38<H>    Ca    10.4<H>      2019 05:00  Phos  3.1     06-01  Mg     2.8           Coags:          ## Imaging:    ## Medications:  linezolid  IVPB 600 milliGRAM(s) IV Intermittent once  linezolid  IVPB      meropenem  IVPB        midodrine 20 milliGRAM(s) Oral every 8 hours  norepinephrine Infusion 0.06 MICROgram(s)/kG/Min IV Continuous <Continuous>    ALBUTerol    90 MICROgram(s) HFA Inhaler 1 Puff(s) Inhalation every 4 hours  ALBUTerol/ipratropium for Nebulization 3 milliLiter(s) Nebulizer every 6 hours  tiotropium 18 MICROgram(s) Capsule 1 Capsule(s) Inhalation daily    dextrose 40% Gel 15 Gram(s) Oral once PRN  dextrose 50% Injectable 12.5 Gram(s) IV Push once  dextrose 50% Injectable 25 Gram(s) IV Push once  dextrose 50% Injectable 25 Gram(s) IV Push once  glucagon  Injectable 1 milliGRAM(s) IntraMuscular once PRN  insulin glargine Injectable (LANTUS) 40 Unit(s) SubCutaneous every morning  insulin lispro (HumaLOG) corrective regimen sliding scale   SubCutaneous every 4 hours    heparin  Injectable 5000 Unit(s) SubCutaneous every 12 hours    pantoprazole   Suspension 40 milliGRAM(s) Oral daily    acetaminophen    Suspension .. 650 milliGRAM(s) Oral every 6 hours PRN  fentaNYL   Patch  12 MICROgram(s)/Hr 1 Patch Transdermal every 72 hours      ## Vitals:  T(C): 37.3 (19 @ 19:34), Max: 37.8 (19 @ 08:00)  HR: 78 (19 @ 20:00) (78 - 100)  BP: 111/32 (19 @ 20:00) (90/31 - 149/44)  BP(mean): 52 (19 @ 20:00) (45 - 77)  RR: 17 (19 @ 20:00) (12 - 20)  SpO2: 99% (19 @ 20:00) (97% - 100%)  Wt(kg): --  Vent: Mode: AC/ CMV (Assist Control/ Continuous Mandatory Ventilation), RR (machine): 12, RR (patient): 17, TV (machine): 500, FiO2: 30, PEEP: 5, PIP: 19  AB-31 @ : @ 07:00  --------------------------------------------------------  IN: 5790.1 mL / OUT: 3240 mL / NET: 2550.1 mL     @ 07: @ 20:29  --------------------------------------------------------  IN: 2570.4 mL / OUT: 1530 mL / NET: 1040.4 mL          ## P/E:  Gen: lying comfortably in bed in no apparent distress  HEENT: PERRL, EOMI  Resp: CTA B/L no c/r/w  CVS: S1S2 no m/r/g  Abd: soft NT/ND +BS  Ext: no c/c/e  Neuro: A&Ox3    CENTRAL LINE: [ ] YES [ ] NO  LOCATION:   DATE INSERTED:  REMOVE: [ ] YES [ ] NO      TAHIR: [ ] YES [ ] NO    DATE INSERTED:  REMOVE:  [ ] YES [ ] NO      A-LINE:  [ ] YES [ ] NO  LOCATION:   DATE INSERTED:  REMOVE:  [ ] YES [ ] NO  EXPLAIN:    GLOBAL ISSUE/BEST PRACTICE:  Analgesia:  Sedation:  HOB elevation: yes  Stress ulcer prophylaxis:  VTE prophylaxis:  Oral Care:  Glycemic control:  Nutrition:    CODE STATUS: [ ] full code  [ ] DNR  [ ] DNI  [ ] [facility]  Goals of care discussion: [ ] yes HPI:  73M PMH of CAD, 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  EMS reported pt was hypotensive at nursing facility. Pt had two 12microgram fentanyl patches (5/10/19 & 5/27/19) on patient which was removed. Admitted to ICU for severe sepsis, septic shock, dehydration, hypernatremia, acute renal failure, multiple decubitus ulcers (sacrum, hip, bilateral lower extremities), osteomyelitis of right ankle, hypercalcemia.         24 hr events:  remains on pressors unable to wean off due to hypotension  leukocytosis improving  Cr improving  hypernatremia improving  s/p dose of pamidronate yesterday for hypercalcemia      ## ROS:  [x] unable to obtain due to mechanical ventilation        ## Labs:  CBC:                        7.9    10.00 )-----------( 191      ( 01 Jun 2019 05:00 )             27.3     Chem:  06-01    148<H>  |  113<H>  |  43<H>  --------------------------------------------<  219<H>  3.9         |  29             |  1.38<H>    Ca    10.4<H>      01 Jun 2019 05:00  Phos  3.1     06-01  Mg     2.8     06-01    Vancomycin Level, Trough (06.01.19 @ 12:22)    Vancomycin Level, Trough: 27.2 ug/mL    Culture - Other (05.30.19 @ 18:37)    Specimen Source: Skin    Culture Results:   Moderate Klebsiella pneumoniae ESBL  Moderate Enterococcus faecium (vancomycin resistant)        Culture - Other (05.30.19 @ 18:36)    Specimen Source: Skin    Culture Results:   Few Methicillin resistant Staphylococcus aureus        Culture - Other (05.30.19 @ 18:35)    Specimen Source: Skin    Culture Results:   Moderate Klebsiella pneumoniae ESBL  Moderate Enterococcus faecalis  Moderate Proteus mirabilis  Moderate Enterococcus faecium (vancomycin resistant)      Culture - Blood (05.29.19 @ 10:45)    Specimen Source: .Blood    Culture Results: No growth to date.    Culture - Urine (05.29.19 @ 10:21)    Specimen Source: .Urine    Culture Results: 10,000 - 49,000 CFU/mL Enterococcus faecalis    Culture - Sputum . (05.29.19 @ 17:42)    Specimen Source: .Sputum    Culture Results: Numerous Pseudomonas aeruginosa (Carbapenem Resistant)      ## Imaging:  no new imaging    ## Medications:  linezolid  IVPB 600 milliGRAM(s) IV Intermittent once     meropenem  IVPB        midodrine 20 milliGRAM(s) Oral every 8 hours  norepinephrine Infusion 0.06 MICROgram(s)/kG/Min IV Continuous <Continuous>    ALBUTerol    90 MICROgram(s) HFA Inhaler 1 Puff(s) Inhalation every 4 hours  ALBUTerol/ipratropium for Nebulization 3 milliLiter(s) Nebulizer every 6 hours  tiotropium 18 MICROgram(s) Capsule 1 Capsule(s) Inhalation daily      insulin glargine Injectable (LANTUS) 40 Unit(s) SubCutaneous every morning  insulin lispro (HumaLOG) corrective regimen sliding scale   SubCutaneous every 4 hours    heparin  Injectable 5000 Unit(s) SubCutaneous every 12 hours    pantoprazole   Suspension 40 milliGRAM(s) Oral daily    acetaminophen    Suspension .. 650 milliGRAM(s) Oral every 6 hours PRN  fentaNYL   Patch  12 MICROgram(s)/Hr 1 Patch Transdermal every 72 hours      ## Vitals:  T(C): 37.3 (06-01-19 @ 19:34), Max: 37.8 (06-01-19 @ 08:00)  HR: 78 (06-01-19 @ 20:00) (78 - 100)  BP: 111/32 (06-01-19 @ 20:00) (90/31 - 149/44)  BP(mean): 52 (06-01-19 @ 20:00) (45 - 77)  RR: 17 (06-01-19 @ 20:00) (12 - 20)  SpO2: 99% (06-01-19 @ 20:00) (97% - 100%)    Vent: Mode: AC/ CMV (Assist Control/ Continuous Mandatory Ventilation), RR (machine): 12, RR (patient): 17, TV (machine): 500, FiO2: 30, PEEP: 5, PIP: 19         05-31 @ 07:01 - 06-01 @ 07:00  --------------------------------------------------------  IN: 5790.1 mL / OUT: 3240 mL / NET: 2550.1 mL    06-01 @ 07:01 - 06-01 @ 20:29  --------------------------------------------------------  IN: 2570.4 mL / OUT: 1530 mL / NET: 1040.4 mL          ## P/E:  Gen: trach to vent, chronically ill appearing   HEENT: pupils reactive, trach in place  Resp: CTA B/L, mechanical breath sounds  CVS: RRR  Abd: soft ND +BS + PEG + RUQ drain  Ext: bilateral heel eschar, right medical ankle with exposed bone and wound, right lateral leg wound  Neuro: unresponsive, blinks and grimaces, quadriplegic    CENTRAL LINE: [ ] YES [x] NO  LOCATION:   DATE INSERTED:  REMOVE: [ ] YES [ ] NO      COLE: [x] YES [ ] NO    DATE INSERTED:  REMOVE:  [ ] YES [x] NO  - chronic cole    A-LINE:  [ ] YES [x] NO  LOCATION:   DATE INSERTED:  REMOVE:  [ ] YES [ ] NO  EXPLAIN:    GLOBAL ISSUE/BEST PRACTICE:  Analgesia: fentanyl  Sedation: n/a  HOB elevation: yes  Stress ulcer prophylaxis: protonix  VTE prophylaxis: heparin sc  Oral Care: n/a  Glycemic control: lantus, insulin sliding scale coverage  Nutrition: peg feeds    CODE STATUS: [x] full code  [ ] DNR  [ ] DNI  [ ] MOLST  Goals of care discussion: [x] yes

## 2020-07-17 NOTE — DIETITIAN INITIAL EVALUATION ADULT. - FACTORS AFF FOOD INTAKE
difficulty swallowing Quality 130: Documentation Of Current Medications In The Medical Record: Eligible clinician attests to documenting in the medical record the patient is not eligible for a current list of medications being obtained, updated, or reviewed by the eligible clinician Detail Level: Generalized

## 2021-09-27 NOTE — DISCHARGE NOTE ADULT - CARE PROVIDERS DIRECT ADDRESSES
ED Note      Patient : Malik Samson Age: 31 year old Sex: male   MRN: [id number] Encounter Date: 9/27/2021      History     Chief Complaint   Patient presents with   • Facial Swelling   • Dental Problem     This 31-year-old male presents for evaluation of dental pain.  Patient states that a few days ago he cracked a bottom tooth.  Since then he has been having facial swelling and tenderness to the area.  Notes that he saw his dentist today and they prescribed him amoxicillin 500 mg 3 times daily for which she is taken 1 dose.  States that he was told to see an oral surgeon however was not able to see one today and after speaking with them and telling his symptoms they were concerned and told him to come to the ER.  States it is painful to swallow however denies any difficulty breathing.  No known fevers.  No other complaints.    PCP: ROSALBA Mckeon  Dentist: [facility] [place]  Oral Surgeon: Domi Lazar ([phone])    The history is provided by the patient.       During entire encounter today appropriate COVID protocol was worn including a mask, eye protection, and gloves.  All persons >2 years of age in a mask. I was not present for any aerosolizing procedures.    No Known Allergies    Past Medical History:   Diagnosis Date   • No pertinent past medical history        Past Surgical History:   Procedure Laterality Date   • ELBOW SURGERY Right    • MEDIAL COLLATERAL LIGAMENT REPAIR, KNEE Right        No family history on file.    Social History     Tobacco Use   • Smoking status: Never Smoker   • Smokeless tobacco: Never Used   Substance Use Topics   • Alcohol use: Never   • Drug use: Never       Review of Systems   Constitutional: Negative for activity change, chills and fever.   HENT: Negative for congestion, ear pain, sore throat and trouble swallowing.         Dental pain, facial swelling   Eyes: Negative for discharge and redness.   Respiratory: Negative for cough, chest tightness and shortness  of breath.    Cardiovascular: Negative for chest pain.   Gastrointestinal: Negative for abdominal pain, constipation, diarrhea, nausea and vomiting.   Genitourinary: Negative for dysuria and hematuria.   Musculoskeletal: Negative for arthralgias.   Skin: Negative for rash.   Neurological: Negative for dizziness, light-headedness and headaches.   Psychiatric/Behavioral: Negative.        Physical Exam     ED Triage Vitals [09/27/21 1627]   ED Triage Vitals Group      Temp 97.6 °F (36.4 °C)      Heart Rate 79      Resp 18      BP (!) 137/92      SpO2 99 %      EtCO2 mmHg       Height 5' 5\" (1.651 m)      Weight 223 lb 12.3 oz (101.5 kg)      Weight Scale Used       BMI (Calculated) 37.24      IBW/kg (Calculated) 61.5       Pulse Ox is [99%] on Room Air which is normal for this patient    Physical Exam  Constitutional:       General: He is not in acute distress.     Appearance: Normal appearance.   HENT:      Head: Normocephalic and atraumatic.      Mouth/Throat:      Comments: Swelling noted tenderness to the left mandible inferior to the mandible down to the neck, lymphadenopathy appreciated, no obvious drainable dental abscess  Eyes:      General: Vision grossly intact.      Conjunctiva/sclera: Conjunctivae normal.   Cardiovascular:      Rate and Rhythm: Normal rate and regular rhythm.      Heart sounds: Normal heart sounds.   Pulmonary:      Effort: Pulmonary effort is normal. No respiratory distress.      Breath sounds: Normal breath sounds. No stridor. No wheezing.   Abdominal:      General: Abdomen is flat. There is no distension.      Palpations: Abdomen is soft.      Tenderness: There is no abdominal tenderness.   Musculoskeletal:         General: Normal range of motion.      Cervical back: Normal range of motion and neck supple.   Skin:     General: Skin is warm and dry.      Findings: No rash.   Neurological:      General: No focal deficit present.      Mental Status: He is alert.   Psychiatric:          Behavior: Behavior is cooperative.         Lab Results     Results for orders placed or performed during the hospital encounter of 09/27/21   Comprehensive Metabolic Panel   Result Value Ref Range    Fasting Status      Sodium 141 135 - 145 mmol/L    Potassium 3.6 3.4 - 5.1 mmol/L    Chloride 104 98 - 107 mmol/L    Carbon Dioxide 24 21 - 32 mmol/L    Anion Gap 17 10 - 20 mmol/L    Glucose 78 70 - 99 mg/dL    BUN 16 6 - 20 mg/dL    Creatinine 0.79 0.67 - 1.17 mg/dL    Glomerular Filtration Rate >90 >=60    BUN/ Creatinine Ratio 20 7 - 25    Calcium 8.8 8.4 - 10.2 mg/dL    Bilirubin, Total 1.7 (H) 0.2 - 1.0 mg/dL    GOT/AST 12 <=37 Units/L    GPT/ALT 23 <64 Units/L    Alkaline Phosphatase 105 45 - 117 Units/L    Albumin 4.0 3.6 - 5.1 g/dL    Protein, Total 7.9 6.4 - 8.2 g/dL    Globulin 3.9 2.0 - 4.0 g/dL    A/G Ratio 1.0 1.0 - 2.4   CBC with Automated Differential (performable only)   Result Value Ref Range    WBC 21.3 (H) 4.2 - 11.0 K/mcL    RBC 5.47 4.50 - 5.90 mil/mcL    HGB 15.8 13.0 - 17.0 g/dL    HCT 46.5 39.0 - 51.0 %    MCV 85.0 78.0 - 100.0 fl    MCH 28.9 26.0 - 34.0 pg    MCHC 34.0 32.0 - 36.5 g/dL    RDW-CV 12.3 11.0 - 15.0 %    RDW-SD 37.9 (L) 39.0 - 50.0 fL     140 - 450 K/mcL    NRBC 0 <=0 /100 WBC    Neutrophil, Percent 77 %    Lymphocytes, Percent 11 %    Mono, Percent 10 %    Eosinophils, Percent 0 %    Basophils, Percent 1 %    Immature Granulocytes 1 %    Absolute Neutrophils 16.5 (H) 1.8 - 7.7 K/mcL    Absolute Lymphocytes 2.3 1.0 - 4.8 K/mcL    Absolute Monocytes 2.2 (H) 0.3 - 0.9 K/mcL    Absolute Eosinophils  0.1 0.0 - 0.5 K/mcL    Absolute Basophils 0.1 0.0 - 0.3 K/mcL    Absolute Immmature Granulocytes 0.1 0.0 - 0.2 K/mcL       EKG/Rhythm Strip Results       Radiology Results     Imaging Results          CT NECK SOFT TISSUE W CONTRAST (Final result)  Result time 09/27/21 18:44:02    Final result                 Impression:    The findings suggest left facial cellulitis primarily along  the  left mandible and submandibular fossa. This could be related to the recent  dental injury. There is no abscess or drainable fluid collection.  Lymphadenopathy is likely reactive in etiology.    Electronically Signed by: CLEMENT MOSQUERA MD   Signed on: 9/27/2021 6:44 PM                Narrative:    EXAMINATION: CT scan of the neck with intravenous contrast on  9/27/2021  6:01 PM    CLINICAL INDICATION:  31-year-old low with history of recently chip 2  presenting with difficulty swallowing and throat pain    COMPARISON: None    2.5 mm helical images were obtained from the level of the aortic arch  through the lower head following the rapid, timed intravenous infusion 75  milliliters Omnipaque 350. The study was performed on a multidetector CT  scanner. Thin section axial, sagittal, coronal reconstructions were  obtained from the volumetric data set.  Dose reduction was accomplished  through the adjustment of mA according to patient size.    FINDINGS:   1.    There is infiltration of the subcutaneous fat overlying the left  mandible. There is thickening of the platysma. Submandibular  lymphadenopathy is present. There is a also mild carotid triangle  lymphadenopathy greater on the left. There is no abscess or drainable fluid  collection.  2.    The thyroid gland and salivary glands are of  normal appearance.  3.    The paranasal sinuses are clear. Mastoid air cells are clear. The  middle ear cavities are clear.  4.    The parapharyngeal space is normal.  5.    The larynx and pharynx are unremarkable..  6.    The lung apices are clear.  7.    All of vascular structures are normal as visualized.  8.    The cervical spine is unremarkable.                                ED Medication Orders (From admission, onward)    Ordered Start     Status Ordering Provider    09/27/21 1722 09/27/21 1730  sodium chloride (NORMAL SALINE) 0.9 % bolus 1,000 mL  ONCE         Last MAR action: YUNIER Mcginnis    09/27/21 1722  09/27/21 1730  dexamethasone (DECADRON) injection 10 mg  ONCE         Last MAR action: Given YUNIER BOYD    09/27/21 1722 09/27/21 1730  ketorolac injection 30 mg  ONCE         Last MAR action: Given YUNIER BOYD            ED Course   Patient reevaluated, feels better after medications, tolerating p.o.  Visit Vitals  /69   Pulse 93   Temp 97.6 °F (36.4 °C)   Resp 18   Ht 5' 5\" (1.651 m)   Wt 101.5 kg (223 lb 12.3 oz)   SpO2 98%   BMI 37.24 kg/m²       ED Course as of Sep 27 2218   Mon Sep 27, 2021   1728 WBC(!): 21.3 [BH]   1728 HGB: 15.8 [BH]   1808 Absolute Neutrophil(!): 16.5 [BH]   1808 BUN: 16 [BH]   1808 Creatinine: 0.79 [BH]   1808 CO2: 24 []      ED Course User Index  [BH] Yunier Boyd PA-C       Procedures    MULTIPLE DIAGNOSES CONSIDERED INCLUDING BUT NOT LIMITED TO: Dental pain, dental abscess, oral compromise, pharyngitis, tonsillar abscess  MDM  Number of Diagnoses or Management Options  Facial cellulitis  Pain, dental  Diagnosis management comments: 31-year-old male presents for dental infection as above.  On arrival he is afebrile, not tachycardic, nontoxic.  Patient does have some facial swelling noted to the left however on my evaluation voice seems normal.  He is able to tolerate oral secretions.  Labs as noted above positive for leukocytosis.  CT scan does show soft tissue swelling and facial cellulitis however there is no obvious drainable abscess, see report.  Patient has only taken 1 dose of his amoxicillin and therefore at this time I do not think he needs this changed or increased.  Discussed with ED attending Dr. Garcia who agrees with this as well.  Patient was given IV fluids, Toradol and Decadron and did notice improvement of his symptoms and is able to tolerate p.o.  I had a long discussion with patient and his wife in regards to continuing the amoxicillin 3 times daily as prescribed and follow closely with a dentist however if his symptoms should return, he should have  difficulty breathing or swallowing that he needs to come back to the emergency department immediately and they voiced understanding.  At this time no further emergent work-up is warranted.  I explained to the patient that an emergency department evaluation is not exhaustive and it is important to follow up with a primary care physician or specialist as discussed, and to return to the emergency department if symptoms are not improving or are worsening. The patient verbalized understanding of these follow up instructions and return precautions.           Clinical Impression     ED Diagnosis   1. Pain, dental     2. Facial cellulitis         Disposition             Summary of your Discharge Medications      Take these Medications      Details   naproxen 500 MG tablet  Commonly known as: NAPROSYN   Take 1 tablet by mouth 2 times daily (with meals).            Discharge 9/27/2021  7:22 PM  Malik Piper discharge to home/self care.        Employed shared decision making with the patient and all questions answered. The patient and/or family was counseled on the preliminary diagnosis, treatment, prescription medications (especially for any sedating medications) if applicable, and instructed on concerning signs and symptoms and when to return to the ED for further evaluation. Involved parties verbalized their understanding of the instructions given.    Shireen Boyd PA-C   9/27/2021 5:15 PM       This note was made using voice dictation and may include inadvertent errors due to the dictation software. Please contact for clarification regarding any noted discrepancies.             Shireen Boyd PA-C  09/27/21 4122     [email or web address]

## 2022-02-24 NOTE — PROGRESS NOTE ADULT - PROBLEM SELECTOR PLAN 3
S/P Pamidronate.  Poor Outcome.
S/p Pamidronate.  Renal Lesion.
renal Mass.  S/P Pamidronate
Antibiotics.
On Ventilator.  Poor Outcome.
S/P Pamidronate.  Renal Lesion.
Trach:- On Ventilator.
no

## 2022-05-24 NOTE — ED PROVIDER NOTE - NSCAREINITIATED _GEN_ER
----- Message from Fabiola Lozano DNP, FNP-C sent at 5/24/2022 10:13 AM CDT -----  Please notify patient of results; positive h. Pylori; will send medications to the pharmacy. He needs to avoid all NSAIDS. Clarithromycin, metronidazole, and omeprazole sent to the pharmacy. Take as directed. Hold the pantoprazole while taking the omeprazole. He needs to let his GI provider know he is being treated for h. Pylori. He needs to be retested 1 month after completing the treatment for h. Pylori.    Saida Luis(Attending)

## 2022-07-14 NOTE — PATIENT PROFILE ADULT - NSPROMEDSHERBAL_GEN_A_NUR
Vaginal Delivery Note    Date: 2022    Luz Marina Mary is a 32 year old  at 40w1d who presented for elective IOL. She progressed to complete and pushed for 27 minutes and delivered a 3255 gram female infant over an intact perineum at 0344 on 2022. APGARs were 9 at 1 minute and 9 at 5 minutes. Head was delivered in [place]. Shoulders delivered without problems. Infant was placed on mom’s abdomen and bulb suctioned. Cord clamped x 2 and cut by support person after 60 seconds of delayed cord clamping. A 10cm section of cord was clamped and cut for gas.. An intact placenta with a 3 vessel cord delivered spontaneously and was normal in appearance. Fundal massage provided until uterus firm. Moderate lochia. Uterus cervix, vagina, and perineum explored and a left labial laceration was repaired using 4-0 vicryl on an SH needle. EBL = 150mL. The patient tolerated the procedure well. Lap, instrument, and needle counts were correct x 2. The patient was recovered in labor and delivery with planned transfer to postpartum unit. Care provided under the supervision of CLIFF Stewart MD.     Delivering Providers:  Vaibhav Wilson attending  Vaibhav Espino PGY1    Review the Delivery Report for details.      no

## 2023-11-13 NOTE — GOALS OF CARE CONVERSATION - PERSONAL ADVANCE DIRECTIVE - NS PRO AD ANY ON CHART
How Did Your Itching Occur?: gradual in onset  (over a period of years) How Severe Is Your Itching?: mild On A Scale Of 0 To 10 How Would You Rate Your Itching?: 4 No

## 2024-03-27 NOTE — PATIENT PROFILE ADULT - PRO INTERPRETER NEED 2
----- Message from Allyson Starks English

## 2024-07-26 NOTE — DISCHARGE NOTE ADULT - SECONDARY DIAGNOSIS.
History  Chief Complaint   Patient presents with    Dizziness     Pt BIBA from home with dizziness since yesterday, pt is bedbound. Pt complains of nausea, CP starting this morning that feels like he has a sticker. Pt has RS head pain. AAOx4. Has visiting nurse, brother and sister live with pt at home and help provide care.     The patient is a 73-year-old male with history of MS, DM, hypertension, suprapubic Cage catheter, basilar artery aneurysm  who presents to the ED for evaluation of dizziness, nausea, and chest pain.  He also reports associated headache.  He reports his symptoms began yesterday.  He describes his dizziness as a constant sensation of both the room spinning, as well as feeling like he is going to pass out.  He has not had any vomiting.  Of note, patient was evaluated in the ED 6 days ago for dizziness.  Reports symptoms originally resolved, but have returned.  He was noted to have stercoral colitis on CT, reports he has had several large bowel movements since.  He does report worsening of his vision over the past several weeks, but no acute vision changes today.  He otherwise denies fever, chills, new numbness/paresthesia, dyspnea, cough, hemoptysis, melena, hematochezia.  He believes he may have had a small amount of hematuria recently.  He also notes that he was recently diagnosed with a UTI, and he is on an antibiotic but he is unsure of which antibiotic.        Prior to Admission Medications   Prescriptions Last Dose Informant Patient Reported? Taking?   Empagliflozin (JARDIANCE) 10 MG TABS tablet  Outside Facility (Specify) Yes No   Sig: Take 10 mg by mouth every morning   Ergocalciferol (VITAMIN D2 PO)  Outside Facility (Specify) Yes No   Sig: Take 50,000 Units by mouth once a week   acetaminophen (TYLENOL) 325 mg tablet  Outside Facility (Specify) No No   Sig: Take 2 tablets (650 mg total) by mouth every 6 (six) hours as needed for mild pain   acetaminophen (TYLENOL) 325 mg tablet   Outside Facility (Specify) No No   Sig: Take 3 tablets (975 mg total) by mouth every 8 (eight) hours   albuterol (2.5 mg/3 mL) 0.083 % nebulizer solution  Outside Facility (Specify) No No   Sig: Take 3 mL (2.5 mg total) by nebulization every 6 (six) hours as needed for wheezing or shortness of breath   amLODIPine-atorvastatin (CADUET) 10-80 MG per tablet  Outside Facility (Specify) Yes No   Sig: Take 1 tablet by mouth daily   baclofen 10 mg tablet  Outside Facility (Specify) Yes No   Sig: Take 5 mg by mouth 3 (three) times a day   bisacodyl (DULCOLAX) 10 mg suppository  Outside Facility (Specify) Yes No   Sig: Insert 10 mg into the rectum daily as needed for constipation   budesonide-formoterol (SYMBICORT) 160-4.5 mcg/act inhaler  Outside Facility (Specify) Yes No   Sig: Inhale 2 puffs 2 (two) times a day Rinse mouth after use.   clopidogrel (PLAVIX) 75 mg tablet  Outside Facility (Specify) No No   Sig: Take 1 tablet (75 mg total) by mouth daily   cromolyn (NASALCHROM) 5.2 MG/ACT nasal spray   No No   Si spray into each nostril 3 (three) times a day   cyanocobalamin (VITAMIN B-12) 500 MCG tablet  Outside Facility (Specify) No No   Sig: Take 1 tablet (500 mcg total) by mouth daily   gabapentin (NEURONTIN) 300 mg capsule  Outside Facility (Specify) No No   Sig: Take 1 capsule (300 mg total) by mouth 2 (two) times a day   gabapentin (NEURONTIN) 300 mg capsule  Outside Facility (Specify) No No   Sig: Take 2 capsules (600 mg total) by mouth daily at bedtime   latanoprost (XALATAN) 0.005 % ophthalmic solution  Outside Facility (Specify) Yes No   Sig: Administer 1 drop to both eyes daily at bedtime   lidocaine (LIDODERM) 5 %  Outside Facility (Specify) No No   Sig: Apply 1 patch topically over 12 hours daily Remove & Discard patch within 12 hours or as directed by MD   melatonin 3 mg  Outside Facility (Specify) Yes No   Sig: Take 3 mg by mouth daily at bedtime as needed   metFORMIN (GLUCOPHAGE) 1000 MG tablet  Outside  Facility (Specify) Yes No   Sig: Take 1,000 mg by mouth 2 (two) times a day with meals   methenamine hippurate (HIPREX) 1 g tablet  Outside Facility (Specify) No No   Sig: Take 1 tablet (1 g total) by mouth 2 (two) times a day with meals   mirtazapine (REMERON) 7.5 MG tablet  Outside Facility (Specify) Yes No   Sig: Take 7.5 mg by mouth daily at bedtime   pantoprazole (PROTONIX) 40 mg tablet  Outside Facility (Specify) Yes No   Sig: Take 40 mg by mouth daily   polyethylene glycol (MIRALAX) 17 g packet   No No   Sig: Take 17 g by mouth 2 (two) times a day   propranolol (INDERAL LA) 60 mg 24 hr capsule  Outside Facility (Specify) Yes No   Sig: Take 60 mg by mouth daily   senna-docusate sodium (SENOKOT S) 8.6-50 mg per tablet  Outside Facility (Specify) Yes No   Sig: Take 2 tablets by mouth daily at bedtime      Facility-Administered Medications: None       Past Medical History:   Diagnosis Date    Acute laryngitis     Acute nonsuppurative otitis media, unspecified laterality     Arm weakness     Arthritis     Basilar artery aneurysm (HCC)     Bladder infection     Bronchitis     Constipation     Cough     Diabetes (HCC)     Diabetes mellitus (HCC)     Dizziness     Dysfunction of eustachian tube     Erectile dysfunction of non-organic origin     Fatigue     Glaucoma     Hiatal hernia     Hypertension     Imbalance     Leg muscle spasm     MS (multiple sclerosis) (HCC)     Multiple sclerosis (HCC)     Nephrolithiasis     Neurogenic bladder     No natural teeth     Sinus pain     Spinal stenosis     Strain of thoracic region     Stroke ([facility])     Suprapubic catheter ([facility])        Past Surgical History:   Procedure Laterality Date    APPENDECTOMY      BRAIN SURGERY      Coil placed in aneurysm    CEREBRAL ANEURYSM REPAIR      CYSTOSCOPY      CYSTOSCOPY      CYSTOSCOPY  06/11/2018    CYSTOSCOPY  01/15/2021    EYE SURGERY      transscleral cyclophotocoagulation noncontact YAG laser    IR SUPRAPUBIC CATHETER  CHECK/CHANGE/REINSERTION/UPSIZE  3/28/2024    MYRINGOTOMY      with ventilation tube insertion    MD LITHOLAPAXY SMPL/SM <2.5 CM N/A 2019    Procedure: CYSTOSCOPY, holmium laser litholapaxy of bladder stones, EXCHANGE OF SP TUBE;  Surgeon: Javy Jeffries MD;  Location: [facility];  Service: Urology    SUPRAPUBIC CATHETER INSERTION         Family History   Problem Relation Age of Onset    Heart attack Mother     Stroke Mother     Heart attack Father     Anuerysm Father         In Stomach     Aneurysm Father      I have reviewed and agree with the history as documented.    E-Cigarette/Vaping    E-Cigarette Use Never User      E-Cigarette/Vaping Substances    Nicotine No     THC No     CBD No     Flavoring No     Other No     Unknown No      Social History     Tobacco Use    Smoking status: Former     Current packs/day: 0.00     Average packs/day: 0.5 packs/day for 31.0 years (15.5 ttl pk-yrs)     Types: Cigarettes     Start date:      Quit date:      Years since quittin.5    Smokeless tobacco: Never   Vaping Use    Vaping status: Never Used   Substance Use Topics    Alcohol use: Not Currently    Drug use: No       Review of Systems   Constitutional:  Positive for fatigue. Negative for chills and fever.   HENT:  Negative for congestion and rhinorrhea.    Respiratory:  Negative for cough and shortness of breath.    Cardiovascular:  Positive for chest pain. Negative for leg swelling.   Gastrointestinal:  Positive for nausea. Negative for abdominal pain, constipation, diarrhea and vomiting.   Genitourinary:  Negative for dysuria and flank pain.   Musculoskeletal:  Negative for arthralgias and myalgias.   Skin:  Negative for rash and wound.   Neurological:  Positive for dizziness, weakness and light-headedness. Negative for syncope, numbness and headaches.       Physical Exam  Physical Exam  Vitals and nursing note reviewed.   Constitutional:       General: He is not in acute distress.     Appearance: He  is well-developed. He is not toxic-appearing.   HENT:      Head: Normocephalic and atraumatic.   Eyes:      Extraocular Movements: Extraocular movements intact.      Conjunctiva/sclera: Conjunctivae normal.      Pupils: Pupils are equal, round, and reactive to light.   Cardiovascular:      Rate and Rhythm: Normal rate and regular rhythm.      Heart sounds: No murmur heard.  Pulmonary:      Effort: Pulmonary effort is normal. No respiratory distress.      Breath sounds: Normal breath sounds.   Abdominal:      Palpations: Abdomen is soft.      Tenderness: There is no abdominal tenderness. There is no guarding or rebound.      Comments: Suprapubic Cage catheter with clear yellow drainage   Musculoskeletal:         General: No swelling.      Cervical back: Neck supple. No rigidity.   Skin:     General: Skin is warm and dry.      Capillary Refill: Capillary refill takes less than 2 seconds.   Neurological:      Mental Status: He is alert and oriented to person, place, and time.      GCS: GCS eye subscore is 4. GCS verbal subscore is 5. GCS motor subscore is 6.      Cranial Nerves: No facial asymmetry.      Sensory: Sensation is intact.      Motor: Weakness present.      Comments: 2 out of 5 strength in bilateral lower extremities.  5 out of 5 strength in upper extremities.  Sensation grossly intact   Psychiatric:         Mood and Affect: Mood normal.         Vital Signs  ED Triage Vitals   Temperature Pulse Respirations Blood Pressure SpO2   07/26/24 0127 07/26/24 0127 07/26/24 0127 07/26/24 0127 07/26/24 0127   98.5 °F (36.9 °C) 79 20 122/56 95 %      Temp Source Heart Rate Source Patient Position - Orthostatic VS BP Location FiO2 (%)   07/26/24 0127 07/26/24 0127 07/26/24 0230 07/26/24 0230 --   Oral Monitor Lying Right arm       Pain Score       07/26/24 0127       5           Vitals:    07/26/24 0300 07/26/24 0400 07/26/24 0430 07/26/24 0530   BP: 157/70 150/56 113/56 165/71   Pulse: 79 80 76 82   Patient Position -  Orthostatic VS:  Lying           Visual Acuity      ED Medications  Medications   hydrOXYzine HCL (ATARAX) tablet 10 mg (has no administration in time range)   hydrOXYzine HCL (ATARAX) tablet 10 mg (has no administration in time range)   acetaminophen (TYLENOL) tablet 650 mg (has no administration in time range)   ondansetron (ZOFRAN) injection 4 mg (has no administration in time range)   enoxaparin (LOVENOX) subcutaneous injection 40 mg (has no administration in time range)   meclizine (ANTIVERT) tablet 12.5 mg (has no administration in time range)   albuterol inhalation solution 2.5 mg (has no administration in time range)   amLODIPine (NORVASC) tablet 10 mg (has no administration in time range)     And   atorvastatin (LIPITOR) tablet 80 mg (has no administration in time range)   baclofen tablet 5 mg (has no administration in time range)   bisacodyl (DULCOLAX) rectal suppository 10 mg (has no administration in time range)   budesonide-formoterol (SYMBICORT) 160-4.5 mcg/act inhaler 2 puff (has no administration in time range)   clopidogrel (PLAVIX) tablet 75 mg (has no administration in time range)   cromolyn (NASALCHROM) nasal spray 1 spray (has no administration in time range)   cyanocobalamin (VITAMIN B-12) tablet 500 mcg (has no administration in time range)   gabapentin (NEURONTIN) capsule 300 mg (has no administration in time range)   gabapentin (NEURONTIN) capsule 600 mg (has no administration in time range)   latanoprost (XALATAN) 0.005 % ophthalmic solution 1 drop (has no administration in time range)   lidocaine (LIDODERM) 5 % patch 1 patch (has no administration in time range)   melatonin tablet 3 mg (has no administration in time range)   mirtazapine (REMERON) tablet 7.5 mg (has no administration in time range)   pantoprazole (PROTONIX) EC tablet 40 mg (has no administration in time range)   polyethylene glycol (MIRALAX) packet 17 g (has no administration in time range)   propranolol (INDERAL LA) 24 hr  capsule 60 mg (has no administration in time range)   senna-docusate sodium (SENOKOT S) 8.6-50 mg per tablet 2 tablet (has no administration in time range)   sulfamethoxazole-trimethoprim (BACTRIM DS) 800-160 mg per tablet 1 tablet (has no administration in time range)   iohexol (OMNIPAQUE) 350 MG/ML injection (MULTI-DOSE) 85 mL (85 mL Intravenous Given 7/26/24 0327)   meclizine (ANTIVERT) tablet 25 mg (25 mg Oral Given 7/26/24 0449)   Famotidine (PF) (PEPCID) injection 20 mg (20 mg Intravenous Given 7/26/24 0445)       Diagnostic Studies  Results Reviewed       Procedure Component Value Units Date/Time    Urine Microscopic [[id number]]  (Abnormal) Collected: 07/26/24 0556    Lab Status: Final result Specimen: Urine, Suprapubic catheter Updated: 07/26/24 0625     RBC, UA 30-50 /hpf      WBC, UA 30-50 /hpf      Epithelial Cells None Seen /hpf      Bacteria, UA Occasional /hpf      WBC Clumps Present     URINE COMMENT Presence of yeast confirmed micrioscopically     Budding Yeast Present    Urine culture [[id number]] Collected: 07/26/24 0556    Lab Status: In process Specimen: Urine, Suprapubic catheter Updated: 07/26/24 0625    UA w Reflex to Microscopic w Reflex to Culture [[id number]]  (Abnormal) Collected: 07/26/24 0556    Lab Status: Final result Specimen: Urine, Suprapubic catheter Updated: 07/26/24 0611     Color, UA Colorless     Clarity, UA Clear     Specific Gravity, UA 1.046     pH, UA 5.5     Leukocytes, UA Moderate     Nitrite, UA Negative     Protein, UA Negative mg/dl      Glucose, UA >=1000 (1%) mg/dl      Ketones, UA Negative mg/dl      Urobilinogen, UA <2.0 mg/dl      Bilirubin, UA Negative     Occult Blood, UA Negative    HS Troponin I 2hr [[id number]]  (Normal) Collected: 07/26/24 0358    Lab Status: Final result Specimen: Blood from Arm, Left Updated: 07/26/24 0426     hs TnI 2hr 6 ng/L      Delta 2hr hsTnI 2 ng/L     Potassium [[id number]]  (Normal) Collected: 07/26/24 4547    Lab Status: Final result  Specimen: Blood from Hand, Left Updated: 07/26/24 0401     Potassium 4.6 mmol/L     Potassium [[id number]]  (Abnormal) Collected: 07/26/24 0237    Lab Status: Final result Specimen: Blood from Line, Venous Updated: 07/26/24 0303     Potassium 6.0 mmol/L     Narrative:      Specimen is moderately hemolyzed, results may be affected    TSH, 3rd generation with Free T4 reflex [[id number]]  (Abnormal) Collected: 07/26/24 0153    Lab Status: Final result Specimen: Blood from Arm, Left Updated: 07/26/24 0232     TSH 3RD GENERATON 4.530 uIU/mL     T4, free [[id number]] Collected: 07/26/24 0153    Lab Status: In process Specimen: Blood from Arm, Left Updated: 07/26/24 0232    HS Troponin 0hr (reflex protocol) [[id number]]  (Normal) Collected: 07/26/24 0153    Lab Status: Final result Specimen: Blood from Arm, Left Updated: 07/26/24 0223     hs TnI 0hr 4 ng/L     Comprehensive metabolic panel [[id number]]  (Abnormal) Collected: 07/26/24 0153    Lab Status: Final result Specimen: Blood from Arm, Left Updated: 07/26/24 0221     Sodium 133 mmol/L      Potassium 5.7 mmol/L      Chloride 97 mmol/L      CO2 27 mmol/L      ANION GAP 9 mmol/L      BUN 21 mg/dL      Creatinine 1.09 mg/dL      Glucose 231 mg/dL      Calcium 10.2 mg/dL      AST 27 U/L      ALT 15 U/L      Alkaline Phosphatase 96 U/L      Total Protein 8.8 g/dL      Albumin 4.1 g/dL      Total Bilirubin 0.35 mg/dL      eGFR 66 ml/min/1.73sq m     Narrative:      Specimen is moderately hemolyzed, results may be affected  National Kid[employer] guidelines for Chronic Kidney Disease (CKD):     Stage 1 with normal or high GFR (GFR > 90 mL/min/1.73 square meters)    Stage 2 Mild CKD (GFR = 60-89 mL/min/1.73 square meters)    Stage 3A Moderate CKD (GFR = 45-59 mL/min/1.73 square meters)    Stage 3B Moderate CKD (GFR = 30-44 mL/min/1.73 square meters)    Stage 4 Severe CKD (GFR = 15-29 mL/min/1.73 square meters)    Stage 5 End Stage CKD (GFR <15 mL/min/1.73 square  meters)  Note: GFR calculation is accurate only with a steady state creatinine    Magnesium [[id number]]  (Normal) Collected: 07/26/24 0153    Lab Status: Final result Specimen: Blood from Arm, Left Updated: 07/26/24 0220     Magnesium 2.2 mg/dL     Lactic acid, plasma (w/reflex if result > 2.0) [[id number]]  (Normal) Collected: 07/26/24 0153    Lab Status: Final result Specimen: Blood from Arm, Left Updated: 07/26/24 0219     LACTIC ACID 1.0 mmol/L     Narrative:      Result may be elevated if tourniquet was used during collection.    Protime-INR [[id number]]  (Normal) Collected: 07/26/24 0153    Lab Status: Final result Specimen: Blood from Arm, Left Updated: 07/26/24 0214     Protime 12.2 seconds      INR 0.88    APTT [[id number]]  (Normal) Collected: 07/26/24 0153    Lab Status: Final result Specimen: Blood from Arm, Left Updated: 07/26/24 0214     PTT 28 seconds     CBC and differential [[id number]]  (Abnormal) Collected: 07/26/24 0153    Lab Status: Final result Specimen: Blood from Arm, Left Updated: 07/26/24 0201     WBC 12.31 Thousand/uL      RBC 5.69 Million/uL      Hemoglobin 15.6 g/dL      Hematocrit 48.6 %      MCV 85 fL      MCH 27.4 pg      MCHC 32.1 g/dL      RDW 13.4 %      MPV 8.5 fL      Platelets 362 Thousands/uL      nRBC 0 /100 WBCs      Segmented % 64 %      Immature Grans % 0 %      Lymphocytes % 24 %      Monocytes % 6 %      Eosinophils Relative 5 %      Basophils Relative 1 %      Absolute Neutrophils 7.90 Thousands/µL      Absolute Immature Grans 0.04 Thousand/uL      Absolute Lymphocytes 2.93 Thousands/µL      Absolute Monocytes 0.70 Thousand/µL      Eosinophils Absolute 0.63 Thousand/µL      Basophils Absolute 0.11 Thousands/µL                    CTA head and neck with and without contrast   Final Result by Joshua Rice MD (07/26 0410)      CT Brain:  No acute intracranial abnormality. Status post placement of basilar tip aneurysm stent coiling      CT Angiography: 50-69% stenosis at the  proximal left internal carotid artery. Stable mild to moderate focal stenosis at the left inferior M2 division and right P2 segment. .                  Workstation performed: [id number]                    Procedures  Procedures         ED Course  ED Course as of 07/26/24 0629 Fri Jul 26, 2024   0403 Potassium: 4.6   0436 Delta 2hr hsTnI: 2     EKG normal sinus rhythm at 80 bpm, normal axis, , QTc 456, T wave inversion in V5, no ST elevation or depression as interpreted by me    Repeat EKG normal sinus rhythm at 78 bpm, normal axis, , QTc 451, no acute ischemic changes as interpreted by me    SBIRT 20yo+      Flowsheet Row Most Recent Value   Initial Alcohol Screen: US AUDIT-C     1. How often do you have a drink containing alcohol? 0 Filed at: 07/26/2024 0117   2. How many drinks containing alcohol do you have on a typical day you are drinking?  0 Filed at: 07/26/2024 0117   3b. FEMALE Any Age, or MALE 65+: How often do you have 4 or more drinks on one occassion? 0 Filed at: 07/26/2024 0117   Audit-C Score 0 Filed at: 07/26/2024 0117   ALPESH: How many times in the past year have you...    Used an illegal drug or used a prescription medication for non-medical reasons? Never Filed at: 07/26/2024 0117            Medical Decision Making  DDx including but not limited to: metabolic abnormality, cardiac abnormality, thyroid disease, intracranial process, adverse reaction, infectious process.     Will obtain EKG, troponin to evaluate for ACS.  Will obtain CTA head and neck. Will obtain CBC to evaluate for leukocytosis, anemia.  Will obtain CMP to evaluate kidney function, for electrolyte disturbance.  Will obtain coags.   Will obtain UA to evaluate for UTI.     Mild leukocytosis.  Nonspecific.  Mild hyponatremia.  Potassium hemolyzed, elevated.  Repeat within normal limits.  Mild hyperglycemia, however no anion gap.  TSH mildly elevated.  EKG without acute ischemic changes.  CTA without acute findings.  Given  history of dizziness with history of MS in setting of UTI, will admit    At the time of admission, the patient is in no acute distress. I discussed with the patient the diagnosis, treatment plan, and plan for admission; they were given the opportunity to ask questions and verbalized understanding. They agree with plan.    Problems Addressed:  Dizziness: acute illness or injury    Amount and/or Complexity of Data Reviewed  External Data Reviewed: labs, radiology, ECG and notes.  Labs: ordered. Decision-making details documented in ED Course.  Radiology: ordered. Decision-making details documented in ED Course.  ECG/medicine tests: ordered and independent interpretation performed. Decision-making details documented in ED Course.    Risk  Prescription drug management.  Decision regarding hospitalization.                 Disposition  Final diagnoses:   Dizziness     Time reflects when diagnosis was documented in both MDM as applicable and the Disposition within this note       Time User Action Codes Description Comment    7/26/2024  5:05 AM Merly Peacock Add [R42] Dizziness           ED Disposition       ED Disposition   Admit    Condition   Stable    Date/Time   Fri Jul 26, 2024 0505    Comment   Case was discussed with LISSETTE and the patient's admission status was agreed to be Admission Status: observation status to the service of Dr. Dickerson .               Follow-up Information    None         Current Discharge Medication List        CONTINUE these medications which have NOT CHANGED    Details   !! acetaminophen (TYLENOL) 325 mg tablet Take 2 tablets (650 mg total) by mouth every 6 (six) hours as needed for mild pain    Associated Diagnoses: Chronic suprapubic catheter (HCC)      !! acetaminophen (TYLENOL) 325 mg tablet Take 3 tablets (975 mg total) by mouth every 8 (eight) hours    Associated Diagnoses: Fall, initial encounter      albuterol (2.5 mg/3 mL) 0.083 % nebulizer solution Take 3 mL (2.5 mg total) by  nebulization every 6 (six) hours as needed for wheezing or shortness of breath  Qty: 60 mL, Refills: 3    Associated Diagnoses: Shortness of breath      amLODIPine-atorvastatin (CADUET) 10-80 MG per tablet Take 1 tablet by mouth daily      baclofen 10 mg tablet Take 5 mg by mouth 3 (three) times a day      bisacodyl (DULCOLAX) 10 mg suppository Insert 10 mg into the rectum daily as needed for constipation      budesonide-formoterol (SYMBICORT) 160-4.5 mcg/act inhaler Inhale 2 puffs 2 (two) times a day Rinse mouth after use.      clopidogrel (PLAVIX) 75 mg tablet Take 1 tablet (75 mg total) by mouth daily  Refills: 0    Associated Diagnoses: Chronic suprapubic catheter (HCC); Neurogenic bladder; Cellulitis      cromolyn (NASALCHROM) 5.2 MG/ACT nasal spray 1 spray into each nostril 3 (three) times a day  Qty: 13 mL, Refills: 0    Associated Diagnoses: Cough; Symptoms of upper respiratory infection (URI)      cyanocobalamin (VITAMIN B-12) 500 MCG tablet Take 1 tablet (500 mcg total) by mouth daily    Associated Diagnoses: Vitamin B deficiency      Empagliflozin (JARDIANCE) 10 MG TABS tablet Take 10 mg by mouth every morning      Ergocalciferol (VITAMIN D2 PO) Take 50,000 Units by mouth once a week      !! gabapentin (NEURONTIN) 300 mg capsule Take 1 capsule (300 mg total) by mouth 2 (two) times a day  Qty: 60 capsule, Refills: 0    Associated Diagnoses: Multiple sclerosis (HCC)      !! gabapentin (NEURONTIN) 300 mg capsule Take 2 capsules (600 mg total) by mouth daily at bedtime  Qty: 30 capsule, Refills: 0    Associated Diagnoses: Multiple sclerosis (HCC)      latanoprost (XALATAN) 0.005 % ophthalmic solution Administer 1 drop to both eyes daily at bedtime      lidocaine (LIDODERM) 5 % Apply 1 patch topically over 12 hours daily Remove & Discard patch within 12 hours or as directed by MD    Associated Diagnoses: Fall, initial encounter      melatonin 3 mg Take 3 mg by mouth daily at bedtime as needed      metFORMIN  (GLUCOPHAGE) 1000 MG tablet Take 1,000 mg by mouth 2 (two) times a day with meals      methenamine hippurate (HIPREX) 1 g tablet Take 1 tablet (1 g total) by mouth 2 (two) times a day with meals  Qty: 180 tablet, Refills: 3    Associated Diagnoses: Chronic suprapubic catheter (HCC); Cystitis      mirtazapine (REMERON) 7.5 MG tablet Take 7.5 mg by mouth daily at bedtime      pantoprazole (PROTONIX) 40 mg tablet Take 40 mg by mouth daily      polyethylene glycol (MIRALAX) 17 g packet Take 17 g by mouth 2 (two) times a day  Qty: 60 each, Refills: 0    Associated Diagnoses: Stercoral colitis      propranolol (INDERAL LA) 60 mg 24 hr capsule Take 60 mg by mouth daily      senna-docusate sodium (SENOKOT S) 8.6-50 mg per tablet Take 2 tablets by mouth daily at bedtime       !! - Potential duplicate medications found. Please discuss with provider.          No discharge procedures on file.    PDMP Review         Value Time User    PDMP Reviewed  Yes 4/1/2024  3:21 AM Ar Beltran DO            ED Provider  Electronically Signed by             Merly Peacock PA-C  07/26/24 [id number]     Respiratory failure GI bleed DM (diabetes mellitus) Pulmonary emboli CVA (cerebral vascular accident) Wound

## 2025-05-26 NOTE — DIETITIAN INITIAL EVALUATION ADULT. - NS AS NUTRI DX NUTRIENT
Malnutrition/Mild malnutrition in the context of chronic illness
4 = No assist / stand by assistance